# Patient Record
Sex: MALE | Race: WHITE | NOT HISPANIC OR LATINO | Employment: OTHER | URBAN - METROPOLITAN AREA
[De-identification: names, ages, dates, MRNs, and addresses within clinical notes are randomized per-mention and may not be internally consistent; named-entity substitution may affect disease eponyms.]

---

## 2017-01-11 ENCOUNTER — PATIENT MESSAGE (OUTPATIENT)
Dept: HEALTH INFORMATION MANAGEMENT | Facility: OTHER | Age: 67
End: 2017-01-11

## 2017-01-25 ENCOUNTER — TELEPHONE (OUTPATIENT)
Dept: MEDICAL GROUP | Facility: PHYSICIAN GROUP | Age: 67
End: 2017-01-25

## 2017-01-25 NOTE — TELEPHONE ENCOUNTER
ANNUAL WELLNESS VISIT PRE-VISIT PLANNING     1.  Reviewed last PCP office visit assessment and plan notes: Yes    2.  If any orders were placed last visit do we have Results/Consult Notes?        •  Labs? No none ordered       •  Imaging? No none ordered       •  Referrals? No none ordered    3.  Patient Care Coordination Note was updated with diagnosis information:  Yes    4.  Patient is due for these Health Maintenance Topics:   Health Maintenance Due   Topic Date Due   • SERUM CREATININE  01/15/2017   • Annual Wellness Visit  01/15/2017          •  JERICA letter was faxed to:  [facility] for retinal exam records    5.  Immunizations were updated in [employer] using [employer]?: Yes       •  [employer] Recommendations:  Pt is up to date on all vaccines.        •  Is patient due for Tdap/Shingles? No.     6.  Patient has:       •   Diabetes: controlled with diet    7.  Updated Care Team with [facility] and all specialists?        •   Gait devices, O2, CPAP, etc: N\A        •   Eye professional: yes       •   Other specialists (GYN, cardiology, endo, etc): yes    8.  Is patient in need of any refills prior to office visit? No       •    Separate refill encounter created?: no    9.  Patient was informed to arrive 15 min prior to their scheduled appointment and bring in their medication bottles? yes    10.  Patient was advised: “This is a free wellness visit. The provider will screen for medical conditions to help you stay healthy. If you have other concerns to address you may be asked to discuss these at a separate visit or there may be an additional fee.”  Yes

## 2017-01-31 ENCOUNTER — OFFICE VISIT (OUTPATIENT)
Dept: MEDICAL GROUP | Facility: PHYSICIAN GROUP | Age: 67
End: 2017-01-31
Payer: MEDICARE

## 2017-01-31 VITALS
DIASTOLIC BLOOD PRESSURE: 58 MMHG | WEIGHT: 183 LBS | OXYGEN SATURATION: 95 % | SYSTOLIC BLOOD PRESSURE: 114 MMHG | RESPIRATION RATE: 16 BRPM | BODY MASS INDEX: 27.74 KG/M2 | HEART RATE: 70 BPM | TEMPERATURE: 97.7 F | HEIGHT: 68 IN

## 2017-01-31 DIAGNOSIS — Z86.2 HX OF NORMOCYTIC NORMOCHROMIC ANEMIA: ICD-10-CM

## 2017-01-31 DIAGNOSIS — I51.81 STRESS-INDUCED CARDIOMYOPATHY: ICD-10-CM

## 2017-01-31 DIAGNOSIS — Z00.00 MEDICARE ANNUAL WELLNESS VISIT, SUBSEQUENT: ICD-10-CM

## 2017-01-31 DIAGNOSIS — Z87.891 HISTORY OF TOBACCO ABUSE: ICD-10-CM

## 2017-01-31 DIAGNOSIS — Z12.5 SCREENING PSA (PROSTATE SPECIFIC ANTIGEN): ICD-10-CM

## 2017-01-31 DIAGNOSIS — I70.0 ATHEROSCLEROSIS OF AORTA (HCC): ICD-10-CM

## 2017-01-31 DIAGNOSIS — Z86.010 H/O COLONOSCOPY WITH POLYPECTOMY: ICD-10-CM

## 2017-01-31 DIAGNOSIS — E11.29 MICROALBUMINURIA DUE TO TYPE 2 DIABETES MELLITUS (HCC): ICD-10-CM

## 2017-01-31 DIAGNOSIS — Z98.890 H/O COLONOSCOPY WITH POLYPECTOMY: ICD-10-CM

## 2017-01-31 DIAGNOSIS — Z11.59 NEED FOR HEPATITIS C SCREENING TEST: ICD-10-CM

## 2017-01-31 DIAGNOSIS — F10.21 HISTORY OF ALCOHOLISM (HCC): ICD-10-CM

## 2017-01-31 DIAGNOSIS — I73.9 BILATERAL CLAUDICATION OF LOWER LIMB (HCC): ICD-10-CM

## 2017-01-31 DIAGNOSIS — R80.9 MICROALBUMINURIA DUE TO TYPE 2 DIABETES MELLITUS (HCC): ICD-10-CM

## 2017-01-31 DIAGNOSIS — E11.51 DIABETES MELLITUS WITH PERIPHERAL VASCULAR DISEASE (HCC): ICD-10-CM

## 2017-01-31 PROCEDURE — 3044F HG A1C LEVEL LT 7.0%: CPT | Performed by: FAMILY MEDICINE

## 2017-01-31 PROCEDURE — G8432 DEP SCR NOT DOC, RNG: HCPCS | Performed by: FAMILY MEDICINE

## 2017-01-31 PROCEDURE — G0439 PPPS, SUBSEQ VISIT: HCPCS | Performed by: FAMILY MEDICINE

## 2017-01-31 PROCEDURE — 1036F TOBACCO NON-USER: CPT | Performed by: FAMILY MEDICINE

## 2017-01-31 ASSESSMENT — PAIN SCALES - GENERAL: PAINLEVEL: NO PAIN

## 2017-01-31 ASSESSMENT — PATIENT HEALTH QUESTIONNAIRE - PHQ9: CLINICAL INTERPRETATION OF PHQ2 SCORE: 0

## 2017-01-31 NOTE — PROGRESS NOTES
Chief Complaint   Patient presents with   • Annual Wellness Visit         HPI:  Chung is a 67 y.o. male here for Medicare Annual Wellness Visit    Patient has a history of diet controlled diabetes mellitus. Patient denies chest pain, numbness and tingling in hands and feet, change in sensation in hands or feet, sores on feet, change in urine,  or change in vision.    Patient also has history of bilateral peripheral vascular disease. Atherosclerosis is noted of the aorta. Patient states that he has chronic burning pain in bilateral legs with walking greater than 5 minutes. Patient is not interested in surgical intervention at this point.    Patient has a history of tobacco and alcohol use. He has not used either in greater than 5 years.    Patient continues to follow with cardiology.    Patient Active Problem List    Diagnosis Date Noted   • History of tobacco abuse 01/31/2017   • Atherosclerosis of aorta (CMS-HCC) 01/31/2017   • Diabetes mellitus with peripheral vascular disease (CMS-HCC) 11/15/2016   • Microalbuminuria due to type 2 diabetes mellitus (CMS-HCC) 11/15/2016   • Bruit 11/01/2016   • Stress-induced cardiomyopathy 03/10/2015   • H/O colonoscopy with polypectomy 05/07/2013   • Bilateral claudication of lower limb (CMS-HCC) 10/22/2012   • History of alcoholism (CMS-HCC) 08/22/2012       Current Outpatient Prescriptions   Medication Sig Dispense Refill   • Multiple Vitamins-Minerals (CENTRUM SILVER PO) Take  by mouth.     • alprazolam (XANAX) 0.5 MG Tab Take 0.5-1 Tabs by mouth at bedtime as needed for Sleep. 30 Tab 5   • atorvastatin (LIPITOR) 80 MG tablet TAKE 1 TABLET BY MOUTH AT BEDTIME-GENERIC FOR LIPITOR 90 Tab 4   • glucose blood (FREESTYLE LITE) strip TEST EVERY MORNING AND RANDOMLY EVERY      EVENING 100 Strip 12   • lisinopril (PRINIVIL) 5 MG Tab TAKE 1 TABLET BY MOUTH DAILY -GENERICFOR PRINIVIL 90 Tab 3   • metoprolol SR (TOPROL XL) 25 MG TABLET SR 24 HR Take 1 Tab by mouth every day. 90 Tab 3    • clopidogrel (PLAVIX) 75 MG Tab TAKE 1 TABLET BY MOUTH DAILY -GENERICFOR PLAVIX 90 Tab 4   • Misc. Devices KIT Diagnosis 250.00. Freestyle glucometer, test strips, #100, RF11 and lancets #100, RF 11. To test fasting in the AM and random in the PM. 1 Kit 0   • aspirin EC 81 MG EC tablet Take 1 Tab by mouth every day. 30 Tab 0     No current facility-administered medications for this visit.        The patient reports adherence to this regimen   Current supplements as per medication list.   Chronic narcotic pain medicines: no    Allergies: Review of patient's allergies indicates no known allergies.    Current social contact/activities: visits with friends     Is patient current with immunizations?  yes   If no, due for Pt is up to date on all vaccines.     He  reports that he quit smoking about 5 years ago. His smoking use included Cigarettes. He started smoking about 57 years ago. He has a 50 pack-year smoking history. He has never used smokeless tobacco. He reports that he uses illicit drugs (Marijuana). He reports that he does not drink alcohol.  Counseling given: Yes        DPA/Advanced Directive:  Patient does not have an advanced directive.  If not on file, instructed to bring in a copy to scan into his chart. If no advanced directive exists, a packet and workshop information was provided    ROS:    Gait: Uses no assistive device   Ostomy: no   Other tubes: no   Amputations: no   Chronic oxygen use no   Last eye exam 3 wks ago   : Denies incontinence.       Screening:    DIABETES  1. Records requested for overdue  topics specifically for diabetes? N\A    2. Has patient ever had diabetes education? Yes      a. If so, when? When he was in the hospital      b. If not, is patient interested? no       Depression Screening    Little interest or pleasure in doing things?  0 - not at all  Feeling down, depressed, or hopeless?  0 - not at all  Patient Health Questionnaire Score: 0    If depressive symptoms  identified deferred to follow up visit unless specifically addressed in assessment and plan.    Screening for Cognitive Impairment    Three Minute Recall (banana, sunrise, fence)  3/3    Draws clock face with all 12 numbers and sets the hands to show 10 minutes past 10 o'clock  1 5/5  Cognitive concerns identified deferred for follow up unless specifically addressed in assessment and plan.    Fall Risk Assessment    Has the patient had two or more falls in the last year or any fall with injury in the last year?  No    Safety Assessment    Throw rugs on floor.  No  Handrails on all stairs.  Yes  Good lighting in all hallways.  Yes  Difficulty hearing.  Yes  Patient counseled about all safety risks that were identified.    Functional Assessment ADLs    Are there any barriers preventing you from cooking for yourself or meeting nutritional needs?  No.    Are there any barriers preventing you from driving safely or obtaining transportation?  No.    Are there any barriers preventing you from using a telephone or calling for help?  No.    Are there any barriers preventing you from shopping?  No.    Are there any barriers preventing you from taking care of your own finances?  No.    Are there any barriers preventing you from managing your medications?  No.    Are currently engaging any exercise or physical activity?  Yes.  Walks every day    Health Maintenance Summary                SERUM CREATININE Overdue 1/15/2017      Done 1/15/2016 COMP METABOLIC PANEL (A)     Patient has more history with this topic...    Annual Wellness Visit Overdue 1/15/2017      Done 1/15/2016 Visit Dx: Medicare annual wellness visit, initial     Patient has more history with this topic...    A1C SCREENING Next Due 5/15/2017      Done 11/15/2016 POCT A1C     Patient has more history with this topic...    URINE ACR / MICROALBUMIN Next Due 11/9/2017      Done 11/9/2016 MICROALBUMIN CREAT RATIO URINE (A)     Patient has more history with this  topic...    DIABETES MONOFILAMENT / LE EXAM Next Due 11/15/2017      Done 11/15/2016 AMB DIABETIC MONOFILAMENT LOWER EXTREMITY EXAM     Patient has more history with this topic...    FASTING LIPID PROFILE Next Due 11/17/2017      Done 11/17/2016 LIPID PROFILE (A)     Patient has more history with this topic...    RETINAL SCREENING Next Due 1/10/2018      Done 1/10/2017 REFERRAL FOR RETINAL SCREENING EXAM     Patient has more history with this topic...    COLONOSCOPY Next Due 10/3/2019      Done 10/3/2016 REFERRAL TO GI FOR COLONOSCOPY     Patient has more history with this topic...    IMM DTaP/Tdap/Td Vaccine Next Due 12/2/2024      Done 12/2/2014 Imm Admin: Tdap Vaccine          Patient Care Team:  Loree Soto D.O. as PCP - General (Family Medicine)  Manjit Gleason M.D. as Consulting Physician (Cardiology)  [facility] as Consulting Physician (Optometry)  Augustin King M.D. (Cardiology)    Social History   Substance Use Topics   • Smoking status: Former Smoker -- 1.00 packs/day for 50 years     Types: Cigarettes     Start date: 01/01/1960     Quit date: 09/01/2011   • Smokeless tobacco: Never Used      Comment: avoid all tobacco products   • Alcohol Use: No      Comment: quit 1985, drank 2 times since     Family History   Problem Relation Age of Onset   • Heart Disease Father    • Diabetes Neg Hx    • Hypertension Neg Hx    • Stroke Neg Hx    • Hyperlipidemia Neg Hx    • No Known Problems Mother    • No Known Problems Sister    • Cancer Brother      type unknown     He  has a past medical history of Claudication (CMS-HCC) (8/22/2012); History of alcoholism (CMS-HCC) (8/22/2012); Hepatitis C; H/O colonoscopy with polypectomy (5/7/2013); Cancer (CMS-HCC); Hypertension; Hyperlipidemia; and Diabetes (CMS-HCC).   Past Surgical History   Procedure Laterality Date   • Recovery  10/22/2012     Performed by Pily Lee M.D. at SURGERY [facility]   • Wide excision  5/6/2014     Performed by  "Nicholas Govea M.D. at SURGERY SAME DAY [facility]   • Flap graft  5/6/2014     Performed by Nicholas Govea M.D. at SURGERY SAME DAY [facility]           Exam:     Blood pressure 114/58, pulse 70, temperature 36.5 °C (97.7 °F), resp. rate 16, height 1.727 m (5' 7.99\"), weight 83.008 kg (183 lb), SpO2 95 %. Body mass index is 27.83 kg/(m^2).    Hearing fair.    Dentition upper and lower dentures  Alert, oriented in no acute distress.  Eye contact is good, speech goal directed, affect calm  Heart: Regular rate and rhythm no murmurs  Respiratory: Scheduled patient bilaterally no wheezing rales or rhonchi    Assessment and Plan. The following treatment and monitoring plan is recommended:        1. Medicare annual wellness visit, subsequent      2. Diabetes mellitus with peripheral vascular disease (CMS-HCC)  DM2 A1c  Goal A1C 7.0, current A1C 6.0  Complications microalbuminuria, peripheral vascular disease  Other providers involved with care: Cardiology  Continue aspirin, Lipitor, and lisinopril.  - COMP METABOLIC PANEL (For Serum Creatinine Topic, choose 1 only); Future  - MICROALBUMIN CREAT RATIO URINE; Future    3. Microalbuminuria due to type 2 diabetes mellitus (CMS-HCC)  As per #2 diabetes is currently well controlled. There are no signs of acute renal failure.    4. Bilateral claudication of lower limb (CMS-HCC)  Chronic: Patient is not interested in surgical intervention. Recommend patient continue Plavix.    5. Atherosclerosis of aorta (CMS-HCC)  As per #4    6. Stress-induced cardiomyopathy  Continue to follow with cardiology. No chest pain. Continue Lipitor, metoprolol, aspirin, lisinopril, and Plavix    7. History of alcoholism (CMS-HCC)  Currently in remission.    8. History of tobacco abuse  Currently in remission. Lung cancer screening explained to patient.  - REFERRAL TO LUNG CANCER SCREENING PROGRAM    9. Hx of normocytic normochromic anemia  Based on previous CBC. We'll repeat and treat as " indicated.  - CBC WITH DIFFERENTIAL; Future    10. H/O colonoscopy with polypectomy  Based on history. Most recent colonoscopy 10/2016    11. Screening PSA (prostate specific antigen)  - PROSTATE SPECIFIC AG SCREENING; Future    12. Need for hepatitis C screening test  - HEP C VIRUS ANTIBODY      Services needed: No services needed at this time  Health Care Screening recommendations as per orders if indicated.  Referrals offered: PT/OT/Nutrition counseling/Behavioral Health/Smoking cessation as per orders if indicated.    Discussion today about general wellness and lifestyle habits:    · Prevent falls and reduce trip hazards; Cautioned about securing or removing rugs.  · Have a working fire alarm and carbon monoxide detector;   · Engage in regular physical activity and social activities   · Continue with current daily activities (goals)      Follow-up: Return in about 6 months (around 7/31/2017) for chronic conditions.

## 2017-01-31 NOTE — MR AVS SNAPSHOT
"        Chung Limon   2017 9:00 AM   Office Visit   MRN: [id number]    Department:  [facility]   Dept Phone:  [phone]    Description:  Male : 1950   Provider:  Loree Soto D.O.; [facility]            Reason for Visit     Annual Wellness Visit           Allergies as of 2017     No Known Allergies      You were diagnosed with     Medicare annual wellness visit, subsequent   [572998]       Screening PSA (prostate specific antigen)   [835695]       Need for hepatitis C screening test   [290188]       History of tobacco abuse   [489778]       Diabetes mellitus with peripheral vascular disease (CMS-HCC)   [869265]       Hx of normocytic normochromic anemia   [230321]         Vital Signs     Blood Pressure Pulse Temperature Respirations Height Weight    114/58 mmHg 70 36.5 °C (97.7 °F) 16 1.727 m (5' 7.99\") 83.008 kg (183 lb)    Body Mass Index Oxygen Saturation Smoking Status             27.83 kg/m2 95% Former Smoker         Basic Information     Date Of Birth Sex Race Ethnicity Preferred Language    1950 Male White Non- English      Your appointments     2017  6:15 AM   Adult Draw/Collection with [facility] [place]   [facility] [place] (--)    [facility]  [place] [address]   [phone]            May 01, 2017  9:00 AM   FOLLOW UP with Manjit Gleason M.D.   [facility] (--)    [address], [address]  [place] [id number] [phone]            May 15, 2017  8:00 AM   Established Patient with Loree Soto D.O.   [facility]    [address], [address]  [place] [id number] [phone]           You will be receiving a confirmation call a few days before your appointment from our automated call confirmation system.              Problem List              ICD-10-CM Priority Class Noted - Resolved    History of alcoholism (CMS-HCC) F10.21   " 8/22/2012 - Present    Bilateral claudication of lower limb (CMS-HCC) I73.9   10/22/2012 - Present    Occlusion of artery of leg (CMS-HCC) I74.3   2/4/2013 - Present    H/O colonoscopy with polypectomy Z98.890, Z86.010   5/7/2013 - Present    Peripheral vascular disease (CMS-HCC) I73.9   1/21/2015 - Present    Stress-induced cardiomyopathy I51.81   3/10/2015 - Present    Bruit R09.89   11/1/2016 - Present    Diabetes mellitus with peripheral vascular disease (CMS-HCC) E11.51   11/15/2016 - Present    Microalbuminuria due to type 2 diabetes mellitus (CMS-HCC) E11.29, R80.9   11/15/2016 - Present    History of tobacco abuse Z87.891   1/31/2017 - Present      Health Maintenance        Date Due Completion Dates    SERUM CREATININE 1/15/2017 1/15/2016, 3/6/2015, 1/19/2015, 1/17/2015, 1/16/2015, 1/16/2015, 1/16/2015, 1/16/2015, 1/15/2015, 1/15/2015, 1/15/2015, 5/6/2014, 1/9/2013, 10/19/2012, 9/21/2012    A1C SCREENING 5/15/2017 11/15/2016, 4/11/2016, 11/9/2015, 5/4/2015, 1/15/2015    URINE ACR / MICROALBUMIN 11/9/2017 11/9/2016, 11/9/2015    DIABETES MONOFILAMENT / LE EXAM 11/15/2017 11/15/2016, 11/9/2015    FASTING LIPID PROFILE 11/17/2017 11/17/2016, 1/15/2016, 1/16/2015, 9/21/2012    RETINAL SCREENING 1/10/2018 1/10/2017, 1/24/2015    COLONOSCOPY 10/3/2019 10/3/2016, 4/30/2013 (Done)    Override on 4/30/2013: Done (two polyps, tubular adenomas)    IMM DTaP/Tdap/Td Vaccine (2 - Td) 12/2/2024 12/2/2014            Current Immunizations     13-VALENT PCV PREVNAR 1/14/2016    Influenza Vaccine Adult HD 11/15/2016, 10/28/2015    Influenza Vaccine Quad Inj (Pf) 12/2/2014    Pneumococcal polysaccharide vaccine (PPSV-23) 12/2/2014    SHINGLES VACCINE 11/15/2016    Tdap Vaccine 12/2/2014      Below and/or attached are the medications your provider expects you to take. Review all of your home medications and newly ordered medications with your provider and/or pharmacist. Follow medication instructions as directed by your provider  and/or pharmacist. Please keep your medication list with you and share with your provider. Update the information when medications are discontinued, doses are changed, or new medications (including over-the-counter products) are added; and carry medication information at all times in the event of emergency situations     Allergies:  No Known Allergies          Medications  Valid as of: January 31, 2017 - 12:01 PM    Generic Name Brand Name Tablet Size Instructions for use    ALPRAZolam (Tab) XANAX 0.5 MG Take 0.5-1 Tabs by mouth at bedtime as needed for Sleep.        Aspirin (Tablet Delayed Response) aspirin 81 MG Take 1 Tab by mouth every day.        Atorvastatin Calcium (Tab) LIPITOR 80 MG TAKE 1 TABLET BY MOUTH AT BEDTIME-GENERIC FOR LIPITOR        Clopidogrel Bisulfate (Tab) PLAVIX 75 MG TAKE 1 TABLET BY MOUTH DAILY -GENERICFOR PLAVIX        Glucose Blood (Strip) glucose blood  TEST EVERY MORNING AND RANDOMLY EVERY      EVENING        Lisinopril (Tab) PRINIVIL 5 MG TAKE 1 TABLET BY MOUTH DAILY -GENERICFOR PRINIVIL        Metoprolol Succinate (TABLET SR 24 HR) TOPROL XL 25 MG Take 1 Tab by mouth every day.        Misc. Devices (Kit) Misc. Devices  Diagnosis 250.00. Freestyle glucometer, test strips, #100, RF11 and lancets #100, RF 11. To test fasting in the AM and random in the PM.        Multiple Vitamins-Minerals   Take  by mouth.        .                 Medicines prescribed today were sent to:     [id number] - [address]    [address] [place] [address]    Phone: [phone] Fax: [phone]    Open 24 Hours?: No      Medication refill instructions:       If your prescription bottle indicates you have medication refills left, it is not necessary to call your provider’s office. Please contact your pharmacy and they will refill your medication.    If your prescription bottle indicates you do not have any refills left, you may request refills at any time through one of  the following ways: The online [employer] system (except Urgent Care), by calling your provider’s office, or by asking your pharmacy to contact your provider’s office with a refill request. Medication refills are processed only during regular business hours and may not be available until the next business day. Your provider may request additional information or to have a follow-up visit with you prior to refilling your medication.   *Please Note: Medication refills are assigned a new Rx number when refilled electronically. Your pharmacy may indicate that no refills were authorized even though a new prescription for the same medication is available at the pharmacy. Please request the medicine by name with the pharmacy before contacting your provider for a refill.        Your To Do List     Future Labs/Procedures Complete By Expires    CBC WITH DIFFERENTIAL  As directed 1/31/2018    COMP METABOLIC PANEL (For Serum Creatinine Topic, choose 1 only)  As directed 1/31/2018    MICROALBUMIN CREAT RATIO URINE  As directed 1/31/2018    PROSTATE SPECIFIC AG SCREENING  As directed 1/31/2018      Other Notes About Your Plan                    [employer] Access Code: Activation code not generated  Current [employer] Status: Active

## 2017-02-01 ENCOUNTER — HOSPITAL ENCOUNTER (OUTPATIENT)
Dept: LAB | Facility: MEDICAL CENTER | Age: 67
End: 2017-02-01
Attending: FAMILY MEDICINE
Payer: MEDICARE

## 2017-02-01 DIAGNOSIS — E11.51 DIABETES MELLITUS WITH PERIPHERAL VASCULAR DISEASE (HCC): ICD-10-CM

## 2017-02-01 DIAGNOSIS — Z12.5 SCREENING PSA (PROSTATE SPECIFIC ANTIGEN): ICD-10-CM

## 2017-02-01 DIAGNOSIS — Z86.2 HX OF NORMOCYTIC NORMOCHROMIC ANEMIA: ICD-10-CM

## 2017-02-01 LAB
ALBUMIN SERPL BCP-MCNC: 4.1 G/DL (ref 3.2–4.9)
ALBUMIN/GLOB SERPL: 1.2 G/DL
ALP SERPL-CCNC: 66 U/L (ref 30–99)
ALT SERPL-CCNC: 78 U/L (ref 2–50)
ANION GAP SERPL CALC-SCNC: 6 MMOL/L (ref 0–11.9)
AST SERPL-CCNC: 67 U/L (ref 12–45)
BASOPHILS # BLD AUTO: 0.04 K/UL (ref 0–0.12)
BASOPHILS NFR BLD AUTO: 1.1 % (ref 0–1.8)
BILIRUB SERPL-MCNC: 0.6 MG/DL (ref 0.1–1.5)
BUN SERPL-MCNC: 22 MG/DL (ref 8–22)
CALCIUM SERPL-MCNC: 9.7 MG/DL (ref 8.5–10.5)
CHLORIDE SERPL-SCNC: 109 MMOL/L (ref 96–112)
CO2 SERPL-SCNC: 23 MMOL/L (ref 20–33)
CREAT SERPL-MCNC: 0.86 MG/DL (ref 0.5–1.4)
CREAT UR-MCNC: 118.4 MG/DL
EOSINOPHIL # BLD: 0.16 K/UL (ref 0–0.51)
EOSINOPHIL NFR BLD AUTO: 4.6 % (ref 0–6.9)
ERYTHROCYTE [DISTWIDTH] IN BLOOD BY AUTOMATED COUNT: 42.2 FL (ref 35.9–50)
GLOBULIN SER CALC-MCNC: 3.4 G/DL (ref 1.9–3.5)
GLUCOSE SERPL-MCNC: 129 MG/DL (ref 65–99)
HCT VFR BLD AUTO: 42.3 % (ref 42–52)
HCV AB S/CO SERPL IA: REACTIVE
HGB BLD-MCNC: 14.4 G/DL (ref 14–18)
IMM GRANULOCYTES # BLD AUTO: 0 K/UL (ref 0–0.11)
IMM GRANULOCYTES NFR BLD AUTO: 0 % (ref 0–0.9)
LYMPHOCYTES # BLD: 1.56 K/UL (ref 1–4.8)
LYMPHOCYTES NFR BLD AUTO: 44.8 % (ref 22–41)
MCH RBC QN AUTO: 30.7 PG (ref 27–33)
MCHC RBC AUTO-ENTMCNC: 34 G/DL (ref 33.7–35.3)
MCV RBC AUTO: 90.2 FL (ref 81.4–97.8)
MICROALBUMIN UR-MCNC: 7.7 MG/DL
MICROALBUMIN/CREAT UR: 65 MG/G (ref 0–30)
MONOCYTES # BLD: 0.25 K/UL (ref 0–0.85)
MONOCYTES NFR BLD AUTO: 7.2 % (ref 0–13.4)
NEUTROPHILS # BLD: 1.47 K/UL (ref 1.82–7.42)
NEUTROPHILS NFR BLD AUTO: 42.3 % (ref 44–72)
NRBC # BLD AUTO: 0 K/UL
NRBC BLD-RTO: 0 /100 WBC
PLATELET # BLD AUTO: 103 K/UL (ref 164–446)
PMV BLD AUTO: 11.9 FL (ref 9–12.9)
POTASSIUM SERPL-SCNC: 3.9 MMOL/L (ref 3.6–5.5)
PROT SERPL-MCNC: 7.5 G/DL (ref 6–8.2)
PSA SERPL DL<=0.01 NG/ML-MCNC: 0.25 NG/ML (ref 0–4)
RBC # BLD AUTO: 4.69 M/UL (ref 4.7–6.1)
SODIUM SERPL-SCNC: 138 MMOL/L (ref 135–145)
WBC # BLD AUTO: 3.5 K/UL (ref 4.8–10.8)

## 2017-02-01 PROCEDURE — 86803 HEPATITIS C AB TEST: CPT

## 2017-02-01 PROCEDURE — 84153 ASSAY OF PSA TOTAL: CPT

## 2017-02-01 PROCEDURE — 80053 COMPREHEN METABOLIC PANEL: CPT

## 2017-02-01 PROCEDURE — 82570 ASSAY OF URINE CREATININE: CPT

## 2017-02-01 PROCEDURE — 87522 HEPATITIS C REVRS TRNSCRPJ: CPT

## 2017-02-01 PROCEDURE — 36415 COLL VENOUS BLD VENIPUNCTURE: CPT

## 2017-02-01 PROCEDURE — 82043 UR ALBUMIN QUANTITATIVE: CPT

## 2017-02-01 PROCEDURE — 85025 COMPLETE CBC W/AUTO DIFF WBC: CPT

## 2017-02-04 LAB
HCV RNA SERPL NAA+PROBE-ACNC: ABNORMAL IU/ML
HCV RNA SERPL NAA+PROBE-LOG IU: 6.1 LOG IU
HCV RNA SERPL QL NAA+PROBE: DETECTED
PATHOLOGY STUDY: ABNORMAL

## 2017-02-07 ENCOUNTER — TELEPHONE (OUTPATIENT)
Dept: MEDICAL GROUP | Facility: PHYSICIAN GROUP | Age: 67
End: 2017-02-07

## 2017-02-08 NOTE — TELEPHONE ENCOUNTER
----- Message from Loree Soto D.O. sent at 2/6/2017  9:13 AM PST -----  Please have patient schedule an appointment to go over results.  Thank you,  Dr. Soto

## 2017-02-13 ENCOUNTER — DOCUMENTATION (OUTPATIENT)
Dept: HEMATOLOGY ONCOLOGY | Facility: MEDICAL CENTER | Age: 67
End: 2017-02-13

## 2017-02-13 NOTE — NURSING NOTE
Received referral for [facility] from .  Chart review to assess for lung cancer screening program eligibility.   Age 55-80 yrs of age? Yes 67 y.o.  30 pack year hx of smoking, or greater? Yes 1 [id number] 50 pkyr hx  Current smoker? Per chart review pt quit in 2011   Any signs or symptoms of lung cancer? None noted  Previous history of lung cancer? None noted  Chest CT within past 12 mos.? None noted  Patient DOES meet eligibility criteria - [facility] scheduling notified to schedule the shared decision making visit.

## 2017-02-24 ENCOUNTER — TELEPHONE (OUTPATIENT)
Dept: HEMATOLOGY ONCOLOGY | Facility: MEDICAL CENTER | Age: 67
End: 2017-02-24

## 2017-02-25 NOTE — TELEPHONE ENCOUNTER
2nd attempt     Left voicemail for patient requesting a return call to schedule shared decision making visit for lung screening program with TRISTAN Barrios. Ref: Loree Soto:, Dx:History of tobacco abuse

## 2017-02-28 ENCOUNTER — OFFICE VISIT (OUTPATIENT)
Dept: HEMATOLOGY ONCOLOGY | Facility: MEDICAL CENTER | Age: 67
End: 2017-02-28
Payer: MEDICARE

## 2017-02-28 VITALS
BODY MASS INDEX: 27.71 KG/M2 | TEMPERATURE: 97.9 F | RESPIRATION RATE: 16 BRPM | OXYGEN SATURATION: 98 % | DIASTOLIC BLOOD PRESSURE: 56 MMHG | SYSTOLIC BLOOD PRESSURE: 106 MMHG | HEART RATE: 68 BPM | WEIGHT: 182.8 LBS | HEIGHT: 68 IN

## 2017-02-28 DIAGNOSIS — Z87.891 HISTORY OF TOBACCO USE: ICD-10-CM

## 2017-02-28 PROCEDURE — G0296 VISIT TO DETERM LDCT ELIG: HCPCS | Performed by: NURSE PRACTITIONER

## 2017-02-28 ASSESSMENT — ENCOUNTER SYMPTOMS
CHILLS: 0
WEIGHT LOSS: 0
FEVER: 0
SHORTNESS OF BREATH: 1
SPUTUM PRODUCTION: 0
HEMOPTYSIS: 0
WHEEZING: 0
COUGH: 1

## 2017-02-28 ASSESSMENT — PAIN SCALES - GENERAL: PAINLEVEL: NO PAIN

## 2017-02-28 NOTE — MR AVS SNAPSHOT
"Chung Limon   2017 11:00 AM   Office Visit   MRN: [id number]    Department:  [facility]   Dept Phone:  [phone]    Description:  Male : 1950   Provider:  LEVI Barrios           Reason for Visit     Other ILCS      Allergies as of 2017     No Known Allergies      Vital Signs     Blood Pressure Pulse Temperature Respirations Height Weight    106/56 mmHg 68 36.6 °C (97.9 °F) 16 1.727 m (5' 7.99\") 82.918 kg (182 lb 12.8 oz)    Body Mass Index Oxygen Saturation Smoking Status             27.80 kg/m2 98% Former Smoker         Basic Information     Date Of Birth Sex Race Ethnicity Preferred Language    1950 Male White Non- English      Your appointments     Mar 06, 2017  2:00 PM   Established Patient with Loree Soto D.O.   [facility])    [address] [place] [address], [address]  [place] [address]   [phone]           You will be receiving a confirmation call a few days before your appointment from our automated call confirmation system.            May 01, 2017  9:00 AM   FOLLOW UP with Manjit Gleason M.D.   [facility] (--)    [address] [place] [address] [address]  [place] [address]   [phone]            May 15, 2017  8:00 AM   Established Patient with Loree Soto D.O.   [facility])    [facility] [facility], [address]  [place] [address]   [phone]           You will be receiving a confirmation call a few days before your appointment from our automated call confirmation system.            2017  9:00 AM   ADVANCED DIRECTIVE CLASS with RENOWN AT [address]   Events (--)    Please Check Your Invitation   [phone]           [address] [place] [address]              Problem List              ICD-10-CM Priority Class Noted - Resolved    History of alcoholism (CMS-HCC) F10.21   2012 - Present    Bilateral claudication of " lower limb (CMS-HCC) I73.9   10/22/2012 - Present    H/O colonoscopy with polypectomy Z98.890, Z86.010   5/7/2013 - Present    Stress-induced cardiomyopathy I51.81   3/10/2015 - Present    Bruit R09.89   11/1/2016 - Present    Diabetes mellitus with peripheral vascular disease (CMS-HCC) E11.51   11/15/2016 - Present    Microalbuminuria due to type 2 diabetes mellitus (CMS-HCC) E11.29, R80.9   11/15/2016 - Present    History of tobacco abuse Z87.891   1/31/2017 - Present    Atherosclerosis of aorta (CMS-HCC) I70.0   1/31/2017 - Present      Health Maintenance        Date Due Completion Dates    A1C SCREENING 5/15/2017 11/15/2016, 4/11/2016, 11/9/2015, 5/4/2015, 1/15/2015    DIABETES MONOFILAMENT / LE EXAM 11/15/2017 11/15/2016, 11/9/2015    FASTING LIPID PROFILE 11/17/2017 11/17/2016, 1/15/2016, 1/16/2015, 9/21/2012    RETINAL SCREENING 1/10/2018 1/10/2017, 1/24/2015    URINE ACR / MICROALBUMIN 2/1/2018 2/1/2017, 11/9/2016, 11/9/2015    SERUM CREATININE 2/1/2018 2/1/2017, 1/15/2016, 3/6/2015, 1/19/2015, 1/17/2015, 1/16/2015, 1/16/2015, 1/16/2015, 1/16/2015, 1/15/2015, 1/15/2015, 1/15/2015, 5/6/2014, 1/9/2013, 10/19/2012, 9/21/2012    COLONOSCOPY 10/3/2019 10/3/2016, 4/30/2013 (Done)    Override on 4/30/2013: Done (two polyps, tubular adenomas)    IMM DTaP/Tdap/Td Vaccine (2 - Td) 12/2/2024 12/2/2014            Current Immunizations     13-VALENT PCV PREVNAR 1/14/2016    Influenza Vaccine Adult HD 11/15/2016, 10/28/2015    Influenza Vaccine Quad Inj (Pf) 12/2/2014    Pneumococcal polysaccharide vaccine (PPSV-23) 12/2/2014    SHINGLES VACCINE 11/15/2016    Tdap Vaccine 12/2/2014      Below and/or attached are the medications your provider expects you to take. Review all of your home medications and newly ordered medications with your provider and/or pharmacist. Follow medication instructions as directed by your provider and/or pharmacist. Please keep your medication list with you and share with your provider. Update  the information when medications are discontinued, doses are changed, or new medications (including over-the-counter products) are added; and carry medication information at all times in the event of emergency situations     Allergies:  No Known Allergies          Medications  Valid as of: February 28, 2017 - 11:43 AM    Generic Name Brand Name Tablet Size Instructions for use    ALPRAZolam (Tab) XANAX 0.5 MG Take 0.5-1 Tabs by mouth at bedtime as needed for Sleep.        Aspirin (Tablet Delayed Response) aspirin 81 MG Take 1 Tab by mouth every day.        Atorvastatin Calcium (Tab) LIPITOR 80 MG TAKE 1 TABLET BY MOUTH AT BEDTIME-GENERIC FOR LIPITOR        Clopidogrel Bisulfate (Tab) PLAVIX 75 MG TAKE 1 TABLET BY MOUTH DAILY -GENERICFOR PLAVIX        Glucose Blood (Strip) glucose blood  TEST EVERY MORNING AND RANDOMLY EVERY      EVENING        Lisinopril (Tab) PRINIVIL 5 MG TAKE 1 TABLET BY MOUTH DAILY -GENERICFOR PRINIVIL        Metoprolol Succinate (TABLET SR 24 HR) TOPROL XL 25 MG Take 1 Tab by mouth every day.        Misc. Devices (Kit) Misc. Devices  Diagnosis 250.00. Freestyle glucometer, test strips, #100, RF11 and lancets #100, RF 11. To test fasting in the AM and random in the PM.        Multiple Vitamins-Minerals   Take  by mouth.        .                 Medicines prescribed today were sent to:     [id number] [place] - [address]    [address] [place] [address]    Phone: [phone] Fax: [phone]    Open 24 Hours?: No      Medication refill instructions:       If your prescription bottle indicates you have medication refills left, it is not necessary to call your provider’s office. Please contact your pharmacy and they will refill your medication.    If your prescription bottle indicates you do not have any refills left, you may request refills at any time through one of the following ways: The online Tr[employer] system (except Urgent Care), by calling your provider’s  office, or by asking your pharmacy to contact your provider’s office with a refill request. Medication refills are processed only during regular business hours and may not be available until the next business day. Your provider may request additional information or to have a follow-up visit with you prior to refilling your medication.   *Please Note: Medication refills are assigned a new Rx number when refilled electronically. Your pharmacy may indicate that no refills were authorized even though a new prescription for the same medication is available at the pharmacy. Please request the medicine by name with the pharmacy before contacting your provider for a refill.        Other Notes About Your Plan                    MyChart Access Code: Activation code not generated  Current [employer] Status: Active

## 2017-02-28 NOTE — PROGRESS NOTES
Subjective:   Date of Consultation:2/28/2017 11:33 AM  Primary care physician:Loree Soto D.O.  Patient seen today for initial lung cancer screening visit. Patient referred by Dr. Loree Soto.    The patient meets eligibility criteria including age, smoking history (30+ pack years), if former smoker, quit in the last 15 years, and absence of signs or symptoms of lung cancer.    - Age - 67  - Smoking history - Patient has smoked for 51 years at an average of 1 ppd = 50 pack year smoking history.  - Current smoking status -Former smoker.  - No symptoms of lung cancer and no previous history of lung cancer     Past Medical History   Diagnosis Date   • Claudication (CMS-HCC) 8/22/2012   • History of alcoholism (CMS-HCC) 8/22/2012   • Hepatitis C    • H/O colonoscopy with polypectomy 5/7/2013   • Cancer (CMS-HCC)    • Hypertension    • Hyperlipidemia    • Diabetes (CMS-HCC)      Past Surgical History   Procedure Laterality Date   • Recovery  10/22/2012     Performed by Pily Lee M.D. at SURGERY [facility] ORS   • Wide excision  5/6/2014     Performed by Nicholas Govea M.D. at SURGERY SAME DAY [facility] ORS   • Flap graft  5/6/2014     Performed by Nicholas Govea M.D. at SURGERY SAME DAY [facility] ORS     No Known Allergies     History   Smoking status   • Former Smoker -- 1.00 packs/day for 50 years   • Types: Cigarettes   • Start date: 01/01/1960   • Quit date: 09/01/2011   Smokeless tobacco   • Never Used     Comment: avoid all tobacco products     History   Alcohol Use No     Comment: quit 1985, drank 2 times since     History   Drug Use   • Yes   • Special: Marijuana          Family History   Problem Relation Age of Onset   • Heart Disease Father    • Diabetes Neg Hx    • Hypertension Neg Hx    • Stroke Neg Hx    • Hyperlipidemia Neg Hx    • No Known Problems Mother    • No Known Problems Sister    • Cancer Brother      type unknown       Review of Systems   Constitutional: Negative for fever, chills,  weight loss and malaise/fatigue.   Respiratory: Positive for cough and shortness of breath. Negative for hemoptysis, sputum production and wheezing.         Objective:   There were no vitals taken for this visit.    Physical Exam    Assessment:       Medical Decision Making:  Today's Assessment / Status / Plan:   We conducted a shared decision-making process using a decision aid. We reviewed benefits and harms of screening, including false positives and potential need for additional diagnostic testing, the possibility of over diagnosis, and total radiation exposure.    We discussed the importance of adhering to annual LDCT screening. We also discussed the impact of comorbities on the patient's the ability or willingness to undergo diagnostic procedure(s) and treatment.    Counseling on the importance of maintaining cigarette smoking abstinence if former smoker; or the importance of smoking cessation if current smoker and, if appropriate, furnishing of information about tobacco cessation interventions.    Based on our discussion, we have decided not to initiate regular lung cancer screening.     Patient c/o chronic cough which has not been documented by PCP and work-up has not been completed.  Therefore, patient is not eligible at this time.

## 2017-03-06 ENCOUNTER — OFFICE VISIT (OUTPATIENT)
Dept: MEDICAL GROUP | Facility: PHYSICIAN GROUP | Age: 67
End: 2017-03-06
Payer: MEDICARE

## 2017-03-06 ENCOUNTER — APPOINTMENT (OUTPATIENT)
Dept: RADIOLOGY | Facility: IMAGING CENTER | Age: 67
End: 2017-03-06
Attending: FAMILY MEDICINE
Payer: MEDICARE

## 2017-03-06 VITALS
OXYGEN SATURATION: 95 % | DIASTOLIC BLOOD PRESSURE: 70 MMHG | SYSTOLIC BLOOD PRESSURE: 110 MMHG | HEART RATE: 77 BPM | WEIGHT: 182 LBS | HEIGHT: 68 IN | RESPIRATION RATE: 16 BRPM | BODY MASS INDEX: 27.58 KG/M2 | TEMPERATURE: 97.5 F

## 2017-03-06 DIAGNOSIS — B18.2 CHRONIC HEPATITIS C WITHOUT HEPATIC COMA (HCC): ICD-10-CM

## 2017-03-06 DIAGNOSIS — M54.2 CERVICAL PAIN: ICD-10-CM

## 2017-03-06 PROCEDURE — 1101F PT FALLS ASSESS-DOCD LE1/YR: CPT | Performed by: FAMILY MEDICINE

## 2017-03-06 PROCEDURE — G8598 ASA/ANTIPLAT THER USED: HCPCS | Performed by: FAMILY MEDICINE

## 2017-03-06 PROCEDURE — G8420 CALC BMI NORM PARAMETERS: HCPCS | Performed by: FAMILY MEDICINE

## 2017-03-06 PROCEDURE — G8482 FLU IMMUNIZE ORDER/ADMIN: HCPCS | Performed by: FAMILY MEDICINE

## 2017-03-06 PROCEDURE — 1036F TOBACCO NON-USER: CPT | Performed by: FAMILY MEDICINE

## 2017-03-06 PROCEDURE — 99214 OFFICE O/P EST MOD 30 MIN: CPT | Performed by: FAMILY MEDICINE

## 2017-03-06 PROCEDURE — 3017F COLORECTAL CA SCREEN DOC REV: CPT | Performed by: FAMILY MEDICINE

## 2017-03-06 PROCEDURE — 72040 X-RAY EXAM NECK SPINE 2-3 VW: CPT | Mod: TC | Performed by: FAMILY MEDICINE

## 2017-03-06 PROCEDURE — 4040F PNEUMOC VAC/ADMIN/RCVD: CPT | Performed by: FAMILY MEDICINE

## 2017-03-06 PROCEDURE — G8432 DEP SCR NOT DOC, RNG: HCPCS | Performed by: FAMILY MEDICINE

## 2017-03-06 RX ORDER — IBUPROFEN 800 MG/1
800 TABLET ORAL EVERY 8 HOURS PRN
Qty: 30 TAB | Refills: 3 | Status: SHIPPED | OUTPATIENT
Start: 2017-03-06 | End: 2017-03-15

## 2017-03-06 RX ORDER — TIZANIDINE 4 MG/1
4 TABLET ORAL 2 TIMES DAILY
Qty: 30 TAB | Refills: 1 | Status: SHIPPED | OUTPATIENT
Start: 2017-03-06 | End: 2017-05-01 | Stop reason: SINTOL

## 2017-03-06 NOTE — MR AVS SNAPSHOT
"        Chung Limon   3/6/2017 2:00 PM   Office Visit   MRN: [id number]    Department:  [facility]   Dept Phone:  [phone]    Description:  Male : 1950   Provider:  Loree Soto D.O.           Reason for Visit     Results           Allergies as of 3/6/2017     No Known Allergies      You were diagnosed with     Chronic hepatitis C without hepatic coma (CMS-HCC)   [227051]       Cervical pain   [769140]         Vital Signs     Blood Pressure Pulse Temperature Respirations Height Weight    110/70 mmHg 77 36.4 °C (97.5 °F) 16 1.727 m (5' 7.99\") 82.555 kg (182 lb)    Body Mass Index Oxygen Saturation Smoking Status             27.68 kg/m2 95% Former Smoker         Basic Information     Date Of Birth Sex Race Ethnicity Preferred Language    1950 Male White Non- English      Your appointments     May 01, 2017  9:00 AM   FOLLOW UP with Manjit Gleason M.D.   [facility] (--)    [address] [facility]  [place] [id number] [phone]            May 15, 2017  8:00 AM   Established Patient with Loree Soto D.O.   [facility]    [address] [facility], [address]  [place] [address] [phone]           You will be receiving a confirmation call a few days before your appointment from our automated call confirmation system.            2017  9:00 AM   ADVANCED DIRECTIVE CLASS with RENOWN AT [address]   Events (--)    Please Check Your Invitation   [phone]           [address] [place] [address]              Problem List              ICD-10-CM Priority Class Noted - Resolved    History of alcoholism (CMS-HCC) F10.21   2012 - Present    Bilateral claudication of lower limb (CMS-HCC) I73.9   10/22/2012 - Present    H/O colonoscopy with polypectomy Z98.890, Z86.010   2013 - Present    Stress-induced cardiomyopathy I51.81   3/10/2015 - Present    Bruit R09.89   2016 - " Present    Diabetes mellitus with peripheral vascular disease (CMS-HCC) E11.51   11/15/2016 - Present    Microalbuminuria due to type 2 diabetes mellitus (CMS-HCC) E11.29, R80.9   11/15/2016 - Present    History of tobacco abuse Z87.891   1/31/2017 - Present    Atherosclerosis of aorta (CMS-HCC) I70.0   1/31/2017 - Present      Health Maintenance        Date Due Completion Dates    A1C SCREENING 5/15/2017 11/15/2016, 4/11/2016, 11/9/2015, 5/4/2015, 1/15/2015    DIABETES MONOFILAMENT / LE EXAM 11/15/2017 11/15/2016, 11/9/2015    FASTING LIPID PROFILE 11/17/2017 11/17/2016, 1/15/2016, 1/16/2015, 9/21/2012    RETINAL SCREENING 1/10/2018 1/10/2017, 1/24/2015    URINE ACR / MICROALBUMIN 2/1/2018 2/1/2017, 11/9/2016, 11/9/2015    SERUM CREATININE 2/1/2018 2/1/2017, 1/15/2016, 3/6/2015, 1/19/2015, 1/17/2015, 1/16/2015, 1/16/2015, 1/16/2015, 1/16/2015, 1/15/2015, 1/15/2015, 1/15/2015, 5/6/2014, 1/9/2013, 10/19/2012, 9/21/2012    COLONOSCOPY 10/3/2019 10/3/2016, 4/30/2013 (Done)    Override on 4/30/2013: Done (two polyps, tubular adenomas)    IMM DTaP/Tdap/Td Vaccine (2 - Td) 12/2/2024 12/2/2014            Current Immunizations     13-VALENT PCV PREVNAR 1/14/2016    Influenza Vaccine Adult HD 11/15/2016, 10/28/2015    Influenza Vaccine Quad Inj (Pf) 12/2/2014    Pneumococcal polysaccharide vaccine (PPSV-23) 12/2/2014    SHINGLES VACCINE 11/15/2016    Tdap Vaccine 12/2/2014      Below and/or attached are the medications your provider expects you to take. Review all of your home medications and newly ordered medications with your provider and/or pharmacist. Follow medication instructions as directed by your provider and/or pharmacist. Please keep your medication list with you and share with your provider. Update the information when medications are discontinued, doses are changed, or new medications (including over-the-counter products) are added; and carry medication information at all times in the event of emergency  situations     Allergies:  No Known Allergies          Medications  Valid as of: March 06, 2017 -  7:57 PM    Generic Name Brand Name Tablet Size Instructions for use    ALPRAZolam (Tab) XANAX 0.5 MG Take 0.5-1 Tabs by mouth at bedtime as needed for Sleep.        Aspirin (Tablet Delayed Response) aspirin 81 MG Take 1 Tab by mouth every day.        Atorvastatin Calcium (Tab) LIPITOR 80 MG TAKE 1 TABLET BY MOUTH AT BEDTIME-GENERIC FOR LIPITOR        Clopidogrel Bisulfate (Tab) PLAVIX 75 MG TAKE 1 TABLET BY MOUTH DAILY -GENERICFOR PLAVIX        Glucose Blood (Strip) glucose blood  TEST EVERY MORNING AND RANDOMLY EVERY      EVENING        Ibuprofen (Tab) MOTRIN 800 MG Take 1 Tab by mouth every 8 hours as needed.        Lisinopril (Tab) PRINIVIL 5 MG TAKE 1 TABLET BY MOUTH DAILY -GENERICFOR PRINIVIL        Metoprolol Succinate (TABLET SR 24 HR) TOPROL XL 25 MG Take 1 Tab by mouth every day.        Misc. Devices (Kit) Misc. Devices  Diagnosis 250.00. Freestyle glucometer, test strips, #100, RF11 and lancets #100, RF 11. To test fasting in the AM and random in the PM.        Multiple Vitamins-Minerals   Take  by mouth.        TiZANidine HCl (Tab) ZANAFLEX 4 MG Take 1 Tab by mouth 2 times a day.        .                 Medicines prescribed today were sent to:     [id number] - [place] - [address]    [facility] [place] [address]    Phone: [phone] Fax: [phone]    Open 24 Hours?: No      Medication refill instructions:       If your prescription bottle indicates you have medication refills left, it is not necessary to call your provider’s office. Please contact your pharmacy and they will refill your medication.    If your prescription bottle indicates you do not have any refills left, you may request refills at any time through one of the following ways: The online [employer]ix system (except Urgent Care), by calling your provider’s office, or by asking your pharmacy to contact your  provider’s office with a refill request. Medication refills are processed only during regular business hours and may not be available until the next business day. Your provider may request additional information or to have a follow-up visit with you prior to refilling your medication.   *Please Note: Medication refills are assigned a new Rx number when refilled electronically. Your pharmacy may indicate that no refills were authorized even though a new prescription for the same medication is available at the pharmacy. Please request the medicine by name with the pharmacy before contacting your provider for a refill.        Your To Do List     Future Labs/Procedures Complete By Expires    DX-CERVICAL SPINE-2 OR 3 VIEWS  As directed 3/6/2018      Referral     A referral request has been sent to our patient care coordination department. Please allow 3-5 business days for us to process this request and contact you either by phone or mail. If you do not hear from us by the 5th business day, please call us at [phone].        Other Notes About Your Plan                    [employer] Access Code: Activation code not generated  Current [employer] Status: Active

## 2017-03-06 NOTE — PROGRESS NOTES
Subjective:     Chief Complaint   Patient presents with   • Results       Chung Limon is a 67 y.o. male here today for follow-up on labs. Patient had labs on February 1, 2017.  Which showed hep C reactive. Patient reports history of being told he had hepatitis C antibody greater than 15 years prior when donating blood. Patient has had no further workup since then. Patient denies nausea, vomiting, diarrhea, abdominal pain, abdominal distention, or yellowing of skin.    Patient has a new complaint: Pt reports cervical pain for several  years. Pt reports radiation into arm with occasional numbness and tingling. Patient denies weakness or dropping objects.       No Known Allergies  Current medicines (including changes today)  Current Outpatient Prescriptions   Medication Sig Dispense Refill   • tizanidine (ZANAFLEX) 4 MG Tab Take 1 Tab by mouth 2 times a day. 30 Tab 1   • ibuprofen (MOTRIN) 800 MG Tab Take 1 Tab by mouth every 8 hours as needed. 30 Tab 3   • alprazolam (XANAX) 0.5 MG Tab Take 0.5-1 Tabs by mouth at bedtime as needed for Sleep. 30 Tab 5   • atorvastatin (LIPITOR) 80 MG tablet TAKE 1 TABLET BY MOUTH AT BEDTIME-GENERIC FOR LIPITOR 90 Tab 4   • lisinopril (PRINIVIL) 5 MG Tab TAKE 1 TABLET BY MOUTH DAILY -GENERICFOR PRINIVIL 90 Tab 3   • metoprolol SR (TOPROL XL) 25 MG TABLET SR 24 HR Take 1 Tab by mouth every day. 90 Tab 3   • clopidogrel (PLAVIX) 75 MG Tab TAKE 1 TABLET BY MOUTH DAILY -GENERICFOR PLAVIX 90 Tab 4   • Multiple Vitamins-Minerals (CENTRUM SILVER PO) Take  by mouth.     • glucose blood (FREESTYLE LITE) strip TEST EVERY MORNING AND RANDOMLY EVERY      EVENING 100 Strip 12   • [facility]. Devices KIT Diagnosis 250.00. Freestyle glucometer, test strips, #100, RF11 and lancets #100, RF 11. To test fasting in the AM and random in the PM. 1 Kit 0   • aspirin EC 81 MG EC tablet Take 1 Tab by mouth every day. 30 Tab 0     No current facility-administered medications for this visit.     Social History  "  Substance Use Topics   • Smoking status: Former Smoker -- 1.00 packs/day for 50 years     Types: Cigarettes     Start date: 1960     Quit date: 2011   • Smokeless tobacco: Never Used      Comment: avoid all tobacco products   • Alcohol Use: No      Comment: quit , drank 2 times since     Family Status   Relation Status Death Age   • Father     • Brother Alive      1/2 sibling   • Mother Alive    • Sister Alive    • Brother       Family History   Problem Relation Age of Onset   • Heart Disease Father    • Diabetes Neg Hx    • Hypertension Neg Hx    • Stroke Neg Hx    • Hyperlipidemia Neg Hx    • No Known Problems Mother    • No Known Problems Sister    • Cancer Brother      type unknown     He    has a past medical history of Claudication (CMS-[facility]) (2012); History of alcoholism (CMS-HCC) (2012); Hepatitis C; H/O colonoscopy with polypectomy (2013); Cancer (CMS-HCC); Hypertension; Hyperlipidemia; and Diabetes (CMS-HCC).        ROS   GEN: no weight loss, fevers, or chills  HEENT: no blurry vision or change in vision  Resp: no shortness of breath, no cough  CV: no racing heart, no irregular beats, no chest pain  Abd: no nausea, no vomiting, no diarrhea, no constipation, no blood in stool, no dark stools, no incontinence  MSK:  As per HPI  Neuro: no headaches, no dizziness, no LOC, no weakness     Objective:     Blood pressure 110/70, pulse 77, temperature 36.4 °C (97.5 °F), resp. rate 16, height 1.727 m (5' 7.99\"), weight 82.555 kg (182 lb), SpO2 95 %. Body mass index is 27.68 kg/(m^2).  Physical Exam:  Constitutional: Alert, no distress.  Skin: Warm, dry, good turgor, no rashes in visible areas.  Eye: Equal, round and reactive, conjunctiva clear, lids normal.  Neck: Trachea midline, no masses, no thyromegaly. No cervical or supraclavicular lymphadenopathy. Full ROM  Respiratory: Unlabored respiratory effort, good air movement, lungs clear to auscultation, no wheezes, no " candice.  Cardiovascular:RRR, +S1, S2, no murmur, no peripheral edema, pedal and radial pulses equal and intact bilat  Abdomen: Soft, non-tender, no masses, no hepatosplenomegaly.  MSK:5/5 muscle strength in upper extremities as well as lower extremity bilaterally, nonreproducible numbness in arms. No numbness with cervical compression, negative Adson  Neuro: CN2-12 intact, no gross motor or sensory deficits      Assessment and Plan:   The following treatment plan was discussed    1. Chronic hepatitis C without hepatic coma (CMS-HCC)  New finding on labs. Patient reports being told in his history that he had hepatitis C but no further workup was done. Risks discussed with patient. Transmission discussed with patient. Advised avoiding contact via bodily fluids.  - REFERRAL TO GASTROENTEROLOGY    2. Cervical pain   Worsening. Recommend anti-inflammatory and muscle relaxant as needed. Risks and benefits of most of the legs discussed with patient. Patient advised to take only at night when he is not driving and not drinking alcohol.  - tizanidine (ZANAFLEX) 4 MG Tab; Take 1 Tab by mouth 2 times a day.  Dispense: 30 Tab; Refill: 1  - ibuprofen (MOTRIN) 800 MG Tab; Take 1 Tab by mouth every 8 hours as needed.  Dispense: 30 Tab; Refill: 3  - DX-CERVICAL SPINE-2 OR 3 VIEWS; Future      Followup: Return in about 8 weeks (around 5/1/2017) for cervical pain.    Please note that this dictation was created using voice recognition software. I have made every reasonable attempt to correct obvious errors, but I expect that there are errors of grammar and possibly content that I did not discover before finalizing the note.

## 2017-03-10 ENCOUNTER — TELEPHONE (OUTPATIENT)
Dept: MEDICAL GROUP | Facility: PHYSICIAN GROUP | Age: 67
End: 2017-03-10

## 2017-03-10 NOTE — TELEPHONE ENCOUNTER
----- Message from Loree Soto D.O. sent at 3/8/2017 12:24 PM PST -----  Please have patient schedule an appointment to go over results.  Thank you,  Dr. Soto

## 2017-03-15 ENCOUNTER — TELEPHONE (OUTPATIENT)
Dept: URGENT CARE | Facility: PHYSICIAN GROUP | Age: 67
End: 2017-03-15

## 2017-03-15 ENCOUNTER — OFFICE VISIT (OUTPATIENT)
Dept: MEDICAL GROUP | Facility: PHYSICIAN GROUP | Age: 67
End: 2017-03-15
Payer: MEDICARE

## 2017-03-15 VITALS
WEIGHT: 182 LBS | SYSTOLIC BLOOD PRESSURE: 112 MMHG | HEART RATE: 81 BPM | DIASTOLIC BLOOD PRESSURE: 74 MMHG | BODY MASS INDEX: 27.58 KG/M2 | RESPIRATION RATE: 16 BRPM | HEIGHT: 68 IN | OXYGEN SATURATION: 96 % | TEMPERATURE: 97.3 F

## 2017-03-15 DIAGNOSIS — I65.22 STENOSIS OF LEFT CAROTID ARTERY: ICD-10-CM

## 2017-03-15 DIAGNOSIS — F41.9 ANXIETY: ICD-10-CM

## 2017-03-15 DIAGNOSIS — M50.00 CERVICAL DISC DISEASE WITH MYELOPATHY: ICD-10-CM

## 2017-03-15 PROCEDURE — 1101F PT FALLS ASSESS-DOCD LE1/YR: CPT | Performed by: FAMILY MEDICINE

## 2017-03-15 PROCEDURE — G8420 CALC BMI NORM PARAMETERS: HCPCS | Performed by: FAMILY MEDICINE

## 2017-03-15 PROCEDURE — 3017F COLORECTAL CA SCREEN DOC REV: CPT | Performed by: FAMILY MEDICINE

## 2017-03-15 PROCEDURE — 1036F TOBACCO NON-USER: CPT | Performed by: FAMILY MEDICINE

## 2017-03-15 PROCEDURE — G8482 FLU IMMUNIZE ORDER/ADMIN: HCPCS | Performed by: FAMILY MEDICINE

## 2017-03-15 PROCEDURE — G8598 ASA/ANTIPLAT THER USED: HCPCS | Performed by: FAMILY MEDICINE

## 2017-03-15 PROCEDURE — G8432 DEP SCR NOT DOC, RNG: HCPCS | Performed by: FAMILY MEDICINE

## 2017-03-15 PROCEDURE — 4040F PNEUMOC VAC/ADMIN/RCVD: CPT | Performed by: FAMILY MEDICINE

## 2017-03-15 PROCEDURE — 99214 OFFICE O/P EST MOD 30 MIN: CPT | Performed by: FAMILY MEDICINE

## 2017-03-15 RX ORDER — ALPRAZOLAM 0.5 MG/1
.5-.75 TABLET ORAL NIGHTLY PRN
Qty: 40 TAB | Refills: 5 | Status: SHIPPED | OUTPATIENT
Start: 2017-03-15 | End: 2017-09-26 | Stop reason: SDUPTHER

## 2017-03-15 RX ORDER — ALPRAZOLAM 0.5 MG/1
.25-.5 TABLET ORAL NIGHTLY PRN
Qty: 40 TAB | Refills: 5 | Status: SHIPPED | OUTPATIENT
Start: 2017-03-15 | End: 2017-03-15 | Stop reason: SDUPTHER

## 2017-03-15 NOTE — MR AVS SNAPSHOT
"        Chung Limon   3/15/2017 3:20 PM   Office Visit   MRN: [id number]    Department:  [facility]   Dept Phone:  [phone]    Description:  Male : 1950   Provider:  Loree Soto D.O.           Reason for Visit     Results           Allergies as of 3/15/2017     No Known Allergies      You were diagnosed with     Stenosis of left carotid artery   [241267]       Cervical disc disease with myelopathy   [380572]       Anxiety   [631269]         Vital Signs     Blood Pressure Pulse Temperature Respirations Height Weight    112/74 mmHg 81 36.3 °C (97.3 °F) 16 1.727 m (5' 8\") 82.555 kg (182 lb)    Body Mass Index Oxygen Saturation Smoking Status             27.68 kg/m2 96% Former Smoker         Basic Information     Date Of Birth Sex Race Ethnicity Preferred Language    1950 Male White Non- English      Your appointments     May 01, 2017  9:00 AM   FOLLOW UP with Manjit Gleason M.D.   [facility] (--)    [address], [address]  [place] [id number] [phone]            May 15, 2017  8:00 AM   Established Patient with Loree Soto D.O.   [facility] [place]    [address], [address]  [place] [address] [phone]           You will be receiving a confirmation call a few days before your appointment from our automated call confirmation system.            2017  9:00 AM   ADVANCED DIRECTIVE CLASS with RENOWN AT [facility] "[employer]   Events (--)    Please Check Your Invitation   [phone]           [address] [place] [address]              Problem List              ICD-10-CM Priority Class Noted - Resolved    History of alcoholism (CMS-HCC) F10.21   2012 - Present    Bilateral claudication of lower limb (CMS-HCC) I73.9   10/22/2012 - Present    H/O colonoscopy with polypectomy Z98.890, Z86.010   2013 - Present    Stress-induced cardiomyopathy I51.81   3/10/2015 - Present   "    Pelon R09.89   11/1/2016 - Present    Diabetes mellitus with peripheral vascular disease (CMS-HCC) E11.51   11/15/2016 - Present    Microalbuminuria due to type 2 diabetes mellitus (CMS-HCC) E11.29, R80.9   11/15/2016 - Present    History of tobacco abuse Z87.891   1/31/2017 - Present    Atherosclerosis of aorta (CMS-HCC) I70.0   1/31/2017 - Present      Health Maintenance        Date Due Completion Dates    A1C SCREENING 5/15/2017 11/15/2016, 4/11/2016, 11/9/2015, 5/4/2015, 1/15/2015    DIABETES MONOFILAMENT / LE EXAM 11/15/2017 11/15/2016, 11/9/2015    FASTING LIPID PROFILE 11/17/2017 11/17/2016, 1/15/2016, 1/16/2015, 9/21/2012    RETINAL SCREENING 1/10/2018 1/10/2017, 1/24/2015    URINE ACR / MICROALBUMIN 2/1/2018 2/1/2017, 11/9/2016, 11/9/2015    SERUM CREATININE 2/1/2018 2/1/2017, 1/15/2016, 3/6/2015, 1/19/2015, 1/17/2015, 1/16/2015, 1/16/2015, 1/16/2015, 1/16/2015, 1/15/2015, 1/15/2015, 1/15/2015, 5/6/2014, 1/9/2013, 10/19/2012, 9/21/2012    COLONOSCOPY 10/3/2019 10/3/2016, 4/30/2013 (Done)    Override on 4/30/2013: Done (two polyps, tubular adenomas)    IMM DTaP/Tdap/Td Vaccine (2 - Td) 12/2/2024 12/2/2014            Current Immunizations     13-VALENT PCV PREVNAR 1/14/2016    Influenza Vaccine Adult HD 11/15/2016, 10/28/2015    Influenza Vaccine Quad Inj (Pf) 12/2/2014    Pneumococcal polysaccharide vaccine (PPSV-23) 12/2/2014    SHINGLES VACCINE 11/15/2016    Tdap Vaccine 12/2/2014      Below and/or attached are the medications your provider expects you to take. Review all of your home medications and newly ordered medications with your provider and/or pharmacist. Follow medication instructions as directed by your provider and/or pharmacist. Please keep your medication list with you and share with your provider. Update the information when medications are discontinued, doses are changed, or new medications (including over-the-counter products) are added; and carry medication information at all times in  the event of emergency situations     Allergies:  No Known Allergies          Medications  Valid as of: March 15, 2017 -  5:13 PM    Generic Name Brand Name Tablet Size Instructions for use    ALPRAZolam (Tab) XANAX 0.5 MG Take 1-1.5 Tabs by mouth at bedtime as needed for Sleep.        Aspirin (Tablet Delayed Response) aspirin 81 MG Take 1 Tab by mouth every day.        Atorvastatin Calcium (Tab) LIPITOR 80 MG TAKE 1 TABLET BY MOUTH AT BEDTIME-GENERIC FOR LIPITOR        Clopidogrel Bisulfate (Tab) PLAVIX 75 MG TAKE 1 TABLET BY MOUTH DAILY -GENERICFOR PLAVIX        Glucose Blood (Strip) glucose blood  TEST EVERY MORNING AND RANDOMLY EVERY      EVENING        Lisinopril (Tab) PRINIVIL 5 MG TAKE 1 TABLET BY MOUTH DAILY -GENERICFOR PRINIVIL        Metoprolol Succinate (TABLET SR 24 HR) TOPROL XL 25 MG Take 1 Tab by mouth every day.        Misc. Devices (Kit) Misc. Devices  Diagnosis 250.00. Freestyle glucometer, test strips, #100, RF11 and lancets #100, RF 11. To test fasting in the AM and random in the PM.        Multiple Vitamins-Minerals   Take  by mouth.        TiZANidine HCl (Tab) ZANAFLEX 4 MG Take 1 Tab by mouth 2 times a day.        .                 Medicines prescribed today were sent to:     [facility] [place] - [address]    [address] [place] [address]    Phone: [phone] Fax: [phone]    Open 24 Hours?: No      Medication refill instructions:       If your prescription bottle indicates you have medication refills left, it is not necessary to call your provider’s office. Please contact your pharmacy and they will refill your medication.    If your prescription bottle indicates you do not have any refills left, you may request refills at any time through one of the following ways: The online [employer] system (except Urgent Care), by calling your provider’s office, or by asking your pharmacy to contact your provider’s office with a refill request. Medication refills are  processed only during regular business hours and may not be available until the next business day. Your provider may request additional information or to have a follow-up visit with you prior to refilling your medication.   *Please Note: Medication refills are assigned a new Rx number when refilled electronically. Your pharmacy may indicate that no refills were authorized even though a new prescription for the same medication is available at the pharmacy. Please request the medicine by name with the pharmacy before contacting your provider for a refill.        Your To Do List     Future Labs/Procedures Complete By Expires    MR-CERVICAL SPINE-W/O  As directed 3/15/2018      Referral     A referral request has been sent to our patient care coordination department. Please allow 3-5 business days for us to process this request and contact you either by phone or mail. If you do not hear from us by the 5th business day, please call us at [phone].        Other Notes About Your Plan                    [employer] Access Code: Activation code not generated  Current [facility] Status: Active

## 2017-03-15 NOTE — PROGRESS NOTES
Subjective:     Chief Complaint   Patient presents with   • Results       Chung Limon is a 67 y.o. male here today for follow up on CXR. Pt was seen in 3/6/17 for chronic cervical pain. Patient reports he continues to have cervical pain for greater than 10 years. Patient reports numbness and tingling in his left arm when he sits upright or extends his head. Patient reports resolution of numbness and tingling when patient flexes his neck forward.    Cervical spine x-ray revealed disc narrowing at C3/4 and C4/5 as well as multilevel joint arthropathy. There is also noted to be calcified plaque in the carotid arteries. Patient had carotid ultrasound in November 2016 ordered by cardiology which showed mild stenosis of the right internal carotid artery and moderate stenosis of the left internal carotid artery.    No Known Allergies  Current medicines (including changes today)  Current Outpatient Prescriptions   Medication Sig Dispense Refill   • alprazolam (XANAX) 0.5 MG Tab Take 1-1.5 Tabs by mouth at bedtime as needed for Sleep. 40 Tab 5   • tizanidine (ZANAFLEX) 4 MG Tab Take 1 Tab by mouth 2 times a day. 30 Tab 1   • Multiple Vitamins-Minerals (CENTRUM SILVER PO) Take  by mouth.     • atorvastatin (LIPITOR) 80 MG tablet TAKE 1 TABLET BY MOUTH AT BEDTIME-GENERIC FOR LIPITOR 90 Tab 4   • glucose blood (FREESTYLE LITE) strip TEST EVERY MORNING AND RANDOMLY EVERY      EVENING 100 Strip 12   • lisinopril (PRINIVIL) 5 MG Tab TAKE 1 TABLET BY MOUTH DAILY -GENERICFOR PRINIVIL 90 Tab 3   • metoprolol SR (TOPROL XL) 25 MG TABLET SR 24 HR Take 1 Tab by mouth every day. 90 Tab 3   • clopidogrel (PLAVIX) 75 MG Tab TAKE 1 TABLET BY MOUTH DAILY -GENERICFOR PLAVIX 90 Tab 4   • Misc. Devices KIT Diagnosis 250.00. Freestyle glucometer, test strips, #100, RF11 and lancets #100, RF 11. To test fasting in the AM and random in the PM. 1 Kit 0   • aspirin EC 81 MG EC tablet Take 1 Tab by mouth every day. 30 Tab 0     No current  "facility-administered medications for this visit.     Social History   Substance Use Topics   • Smoking status: Former Smoker -- 1.00 packs/day for 50 years     Types: Cigarettes     Start date: 1960     Quit date: 2011   • Smokeless tobacco: Never Used      Comment: avoid all tobacco products   • Alcohol Use: No      Comment: quit , drank 2 times since     Family Status   Relation Status Death Age   • Father     • Brother Alive      1/2 sibling   • Mother Alive    • Sister Alive    • Brother       Family History   Problem Relation Age of Onset   • Heart Disease Father    • Diabetes Neg Hx    • Hypertension Neg Hx    • Stroke Neg Hx    • Hyperlipidemia Neg Hx    • No Known Problems Mother    • No Known Problems Sister    • Cancer Brother      type unknown     He    has a past medical history of Claudication (CMS-[facility]) (2012); History of alcoholism (CMS-HCC) (2012); Hepatitis C; H/O colonoscopy with polypectomy (2013); Cancer (CMS-HCC); Hypertension; Hyperlipidemia; and Diabetes (CMS-HCC).        ROS   HEENT: no blurry vision or change in vision  Resp: no shortness of breath, no cough  CV: no racing heart, no irregular beats, no chest pain  MSK: Cervical pain as per history of present illness  Neuro: no headaches, no dizziness, no LOC, no weakness, + left arm numbness/tingling       Objective:     Blood pressure 112/74, pulse 81, temperature 36.3 °C (97.3 °F), resp. rate 16, height 1.727 m (5' 8\"), weight 82.555 kg (182 lb), SpO2 96 %. Body mass index is 27.68 kg/(m^2).   Physical Exam:  Constitutional: Alert, no distress.  Skin: Warm, dry, good turgor, no rashes in visible areas.  Eye: Equal, round and reactive, conjunctiva clear, lids normal.  Neck: Trachea midline, no masses, no thyromegaly. No cervical or supraclavicular lymphadenopathy. Full ROM, hypertonic cervical paraspinal muscles, hypertonic left trapezius muscle  Respiratory: Unlabored respiratory effort, good " air movement, lungs clear to auscultation, no wheezes, no ronchi.  Cardiovascular:RRR, +S1, S2, no murmur, no peripheral edema, pedal and radial pulses equal and intact bilat  MSK:5/5 muscle strength in upper extremities as well as lower extremity bilaterally  Psych: Alert and oriented x3, appropriate affect and mood.  Neuro: CN2-12 intact, no gross motor or sensory deficits      Assessment and Plan:   The following treatment plan was discussed    1. Stenosis of left carotid artery  Finding based off x-ray and carotid ultrasound. Recommend referral to vascular surgery for consultation. Patient denies any symptoms of dizziness or loss of consciousness.  - REFERRAL TO VASCULAR SURGERY    2. Cervical disc disease with myelopathy  Chronic: Worsening. Due to patient's numbness and tingling in left arm with cervical extension recommend MRI to rule out nerve impingement. Also recommend referral to neurosurgery.- REFERRAL TO NEUROSURGERY  - MR-CERVICAL SPINE-W/O; Future    3. Anxiety  Chronic: Patient states he takes Xanax when necessary usually 1 to 1-1/2 tablets per night.  Patient understands they  cannot take any other medications including prescription, over-the-counter, or illicit substances including alcohol while being treated with a benzodiazepine from our office. Patient understands the risk of benzodiazepine including respiratory depression and sedation. Patient does not take medication prior to driving. Patient only takes medications when they are at home.   verified. Patient was given previous prescription of Xanax. I'm rewriting prescription to increase to  40 tablets per month  - alprazolam (XANAX) 0.5 MG Tab; Take 1-1.5 Tabs by mouth at bedtime as needed for Sleep.  Dispense: 40 Tab; Refill: 5      Followup: No Follow-up on file.    Please note that this dictation was created using voice recognition software. I have made every reasonable attempt to correct obvious errors, but I expect that there are  errors of grammar and possibly content that I did not discover before finalizing the note.

## 2017-03-15 NOTE — TELEPHONE ENCOUNTER
Pt came in stating that his Rx for Xanax was rejected by the pharmacy.  I called [facility] and they stated the sig was wrong.  It states take 0.5-1 tab but it needed to state take 1-1.5 tab.   New Rx printed, old one taken and scanned into media.

## 2017-03-31 ENCOUNTER — HOSPITAL ENCOUNTER (OUTPATIENT)
Dept: RADIOLOGY | Facility: MEDICAL CENTER | Age: 67
End: 2017-03-31
Attending: FAMILY MEDICINE
Payer: MEDICARE

## 2017-03-31 DIAGNOSIS — M50.00 CERVICAL DISC DISEASE WITH MYELOPATHY: ICD-10-CM

## 2017-03-31 PROCEDURE — 72141 MRI NECK SPINE W/O DYE: CPT

## 2017-05-01 ENCOUNTER — OFFICE VISIT (OUTPATIENT)
Dept: CARDIOLOGY | Facility: MEDICAL CENTER | Age: 67
End: 2017-05-01
Payer: MEDICARE

## 2017-05-01 VITALS
HEART RATE: 86 BPM | DIASTOLIC BLOOD PRESSURE: 62 MMHG | OXYGEN SATURATION: 96 % | BODY MASS INDEX: 24.68 KG/M2 | HEIGHT: 70 IN | WEIGHT: 172.4 LBS | SYSTOLIC BLOOD PRESSURE: 140 MMHG

## 2017-05-01 DIAGNOSIS — R06.02 SOB (SHORTNESS OF BREATH): ICD-10-CM

## 2017-05-01 DIAGNOSIS — I73.9 PERIPHERAL VASCULAR DISEASE (HCC): ICD-10-CM

## 2017-05-01 DIAGNOSIS — I51.81 STRESS-INDUCED CARDIOMYOPATHY: ICD-10-CM

## 2017-05-01 DIAGNOSIS — E11.51 DIABETES MELLITUS WITH PERIPHERAL VASCULAR DISEASE (HCC): ICD-10-CM

## 2017-05-01 PROCEDURE — G8598 ASA/ANTIPLAT THER USED: HCPCS | Performed by: NURSE PRACTITIONER

## 2017-05-01 PROCEDURE — 94620 PR PULMONARY STRESS TESTING,SIMPLE: CPT | Performed by: NURSE PRACTITIONER

## 2017-05-01 PROCEDURE — 3046F HEMOGLOBIN A1C LEVEL >9.0%: CPT | Mod: 8P | Performed by: NURSE PRACTITIONER

## 2017-05-01 PROCEDURE — 4040F PNEUMOC VAC/ADMIN/RCVD: CPT | Performed by: NURSE PRACTITIONER

## 2017-05-01 PROCEDURE — G8420 CALC BMI NORM PARAMETERS: HCPCS | Performed by: NURSE PRACTITIONER

## 2017-05-01 PROCEDURE — G8432 DEP SCR NOT DOC, RNG: HCPCS | Performed by: NURSE PRACTITIONER

## 2017-05-01 PROCEDURE — 1036F TOBACCO NON-USER: CPT | Performed by: NURSE PRACTITIONER

## 2017-05-01 PROCEDURE — 1101F PT FALLS ASSESS-DOCD LE1/YR: CPT | Performed by: NURSE PRACTITIONER

## 2017-05-01 PROCEDURE — 99214 OFFICE O/P EST MOD 30 MIN: CPT | Mod: 25 | Performed by: NURSE PRACTITIONER

## 2017-05-01 RX ORDER — LISINOPRIL 10 MG/1
10 TABLET ORAL DAILY
Qty: 90 TAB | Refills: 3 | Status: SHIPPED | OUTPATIENT
Start: 2017-05-01 | End: 2018-05-10 | Stop reason: SDUPTHER

## 2017-05-01 ASSESSMENT — ENCOUNTER SYMPTOMS
ABDOMINAL PAIN: 0
FEVER: 0
SHORTNESS OF BREATH: 0
PALPITATIONS: 0
ORTHOPNEA: 0
MYALGIAS: 0
PND: 0
COUGH: 0
DIZZINESS: 0
CLAUDICATION: 1

## 2017-05-01 ASSESSMENT — MINNESOTA LIVING WITH HEART FAILURE QUESTIONNAIRE (MLHF)
FEELING LIKE A BURDEN TO FAMILY AND FRIENDS: 1
HAVING TO SIT OR LIE DOWN DURING THE DAY: 3
TOTAL_SCORE: 48
COSTING YOU MONEY FOR MEDICAL CARE: 0
DIFFICULTY SLEEPING WELL AT NIGHT: 2
EATING LESS FOODS YOU LIKE: 1
DIFFICULTY WITH RECREATIONAL PASTIMES, SPORTS, HOBBIES: 4
WALKING ABOUT OR CLIMBING STAIRS DIFFICULT: 4
DIFFICULTY GOING AWAY FROM HOME: 1
TIRED, FATIGUED OR LOW ON ENERGY: 4
DIFFICULTY WITH SEXUAL ACTIVITIES: 4
DIFFICULTY WORKING TO EARN A LIVING: 4
MAKING YOU STAY IN A HOSPITAL: 0
SWELLING IN ANKLES OR LEGS: 1
MAKING YOU SHORT OF BREATH: 2
GIVING YOU SIDE EFFECTS FROM TREATMENTS: 1
LOSS OF SELF CONTROL IN YOUR LIFE: 2
MAKING YOU FEEL DEPRESSED: 2
DIFFICULTY TO CONCENTRATE OR REMEMBERING THINGS: 4
DIFFICULTY SOCIALIZING WITH FAMILY OR FRIENDS: 3
WORKING AROUND THE HOUSE OR YARD DIFFICULT: 2
MAKING YOU WORRY: 3

## 2017-05-01 ASSESSMENT — 6 MINUTE WALK TEST (6MWT): TOTAL DISTANCE WALKED (METERS): 381

## 2017-05-01 NOTE — MR AVS SNAPSHOT
Chung Chen Ashly   2017 8:20 AM   Office Visit   MRN: [id number]    Department:  [facility]   Dept Phone:  [phone]    Description:  Male : 1950   Provider:  LEVI Carlin           Reason for Visit     Follow-Up           Allergies as of 2017     No Known Allergies      You were diagnosed with     Peripheral vascular disease (CMS-HCC)   [379421]       Stress-induced cardiomyopathy   [701997]         Vital Signs     Blood Pressure Pulse Weight Oxygen Saturation Smoking Status       140/62 mmHg 86 78.2 kg (172 lb 6.4 oz) 96% Former Smoker       Basic Information     Date Of Birth Sex Race Ethnicity Preferred Language    1950 Male White Non- English      Your appointments     May 16, 2017  8:00 AM   Established Patient with [facility])    [address], [address]  [place] [address] [phone]           You will be receiving a confirmation call a few days before your appointment from our automated call confirmation system.            2017  9:00 AM   ADVANCED DIRECTIVE CLASS with RENOWN AT [facility] [place]   Events (--)    Please Check Your Invitation   [phone]           [address] [facility] [address] [place] [address]              Problem List              ICD-10-CM Priority Class Noted - Resolved    History of alcoholism (CMS-[facility]) F10.21   2012 - Present    Bilateral claudication of lower limb (CMS-[facility]) I73.9   10/22/2012 - Present    H/O colonoscopy with polypectomy Z98.890, Z86.010   2013 - Present    Stress-induced cardiomyopathy I51.81   3/10/2015 - Present    Bruit R09.89   2016 - Present    Diabetes mellitus with peripheral vascular disease (CMS-HCC) E11.51   11/15/2016 - Present    Microalbuminuria due to type 2 diabetes mellitus (CMS-[facility]) E11.29, R80.9   11/15/2016 - Present    History of tobacco abuse Z87.891   2017 - Present    Atherosclerosis of aorta (CMS-[facility]) I70.0    1/31/2017 - Present      Health Maintenance        Date Due Completion Dates    A1C SCREENING 5/15/2017 11/15/2016, 4/11/2016, 11/9/2015, 5/4/2015, 1/15/2015    DIABETES MONOFILAMENT / LE EXAM 11/15/2017 11/15/2016, 11/9/2015    FASTING LIPID PROFILE 11/17/2017 11/17/2016, 1/15/2016, 1/16/2015, 9/21/2012    RETINAL SCREENING 1/10/2018 1/10/2017, 1/24/2015    URINE ACR / MICROALBUMIN 2/1/2018 2/1/2017, 11/9/2016, 11/9/2015    SERUM CREATININE 2/1/2018 2/1/2017, 1/15/2016, 3/6/2015, 1/19/2015, 1/17/2015, 1/16/2015, 1/16/2015, 1/16/2015, 1/16/2015, 1/15/2015, 1/15/2015, 1/15/2015, 5/6/2014, 1/9/2013, 10/19/2012, 9/21/2012    COLONOSCOPY 10/3/2019 10/3/2016, 4/30/2013 (Done)    Override on 4/30/2013: Done (two polyps, tubular adenomas)    IMM DTaP/Tdap/Td Vaccine (2 - Td) 12/2/2024 12/2/2014            Current Immunizations     13-VALENT PCV PREVNAR 1/14/2016    Influenza Vaccine Adult HD 11/15/2016, 10/28/2015    Influenza Vaccine Quad Inj (Pf) 12/2/2014    Pneumococcal polysaccharide vaccine (PPSV-23) 12/2/2014    SHINGLES VACCINE 11/15/2016    Tdap Vaccine 12/2/2014      Below and/or attached are the medications your provider expects you to take. Review all of your home medications and newly ordered medications with your provider and/or pharmacist. Follow medication instructions as directed by your provider and/or pharmacist. Please keep your medication list with you and share with your provider. Update the information when medications are discontinued, doses are changed, or new medications (including over-the-counter products) are added; and carry medication information at all times in the event of emergency situations     Allergies:  No Known Allergies          Medications  Valid as of: May 01, 2017 -  9:20 AM    Generic Name Brand Name Tablet Size Instructions for use    ALPRAZolam (Tab) XANAX 0.5 MG Take 1-1.5 Tabs by mouth at bedtime as needed for Sleep.        Aspirin (Tablet Delayed Response) aspirin 81 MG  Take 1 Tab by mouth every day.        Atorvastatin Calcium (Tab) LIPITOR 80 MG TAKE 1 TABLET BY MOUTH AT BEDTIME-GENERIC FOR LIPITOR        Clopidogrel Bisulfate (Tab) PLAVIX 75 MG TAKE 1 TABLET BY MOUTH DAILY -GENERICFOR PLAVIX        Glucose Blood (Strip) glucose blood  TEST EVERY MORNING AND RANDOMLY EVERY      EVENING        Lisinopril (Tab) PRINIVIL 10 MG Take 1 Tab by mouth every day.        Metoprolol Succinate (TABLET SR 24 HR) TOPROL XL 25 MG Take 1 Tab by mouth every day.        Misc. Devices (Kit) Misc. Devices  Diagnosis 250.00. Freestyle glucometer, test strips, #100, RF11 and lancets #100, RF 11. To test fasting in the AM and random in the PM.        Multiple Vitamins-Minerals   Take  by mouth.        .                 Medicines prescribed today were sent to:     [facility] - [place], [place] - [address]    [address] [place] [address]    Phone: [phone] Fax: [phone]    Open 24 Hours?: No      Medication refill instructions:       If your prescription bottle indicates you have medication refills left, it is not necessary to call your provider’s office. Please contact your pharmacy and they will refill your medication.    If your prescription bottle indicates you do not have any refills left, you may request refills at any time through one of the following ways: The online [employer] system (except Urgent Care), by calling your provider’s office, or by asking your pharmacy to contact your provider’s office with a refill request. Medication refills are processed only during regular business hours and may not be available until the next business day. Your provider may request additional information or to have a follow-up visit with you prior to refilling your medication.   *Please Note: Medication refills are assigned a new Rx number when refilled electronically. Your pharmacy may indicate that no refills were authorized even though a new prescription for the same medication  is available at the pharmacy. Please request the medicine by name with the pharmacy before contacting your provider for a refill.        Other Notes About Your Plan                    MyChart Access Code: Activation code not generated  Current MyChart Status: Active

## 2017-05-01 NOTE — PROGRESS NOTES
Subjective:   Chung Lmion is a 67 y.o. male who presents today for follow up.    Patient of Dr. Gleason. Pt was last seen 11/1/16. Pt has peripheral vascular disease, with claudication. Pt states he is able to ambulate about 5 minutes at a time before developing a burning sensation to his calves. Pt denies pain. Pt continues to decline intervention for his PVD d/t time and cost of the procedure. Pt states he is on a limited income. Pt states his claudication have not worsened and he is monitoring signs and symptoms.     Patient feels well, denies chest pain, shortness of breath with rest, ADLs and exertion, palpitations, dizziness/lightheadedness, orthopnea, PND or Edema. Home weights have been 172-173 lbs.    At 6 minute walk test re-evaluation, patient was able to complete 381 m during his 6 minute walk test. His O2 saturation at baseline was 96% and at the end of the test, the O2 saturation was 97%. He reported level 2 of dyspnea on Franki scale.       Past Medical History   Diagnosis Date   • Claudication (CMS-HCC) 8/22/2012   • History of alcoholism (CMS-HCC) 8/22/2012   • Hepatitis C    • H/O colonoscopy with polypectomy 5/7/2013   • Cancer (CMS-HCC)    • Hypertension    • Hyperlipidemia    • Diabetes (CMS-HCC)      Past Surgical History   Procedure Laterality Date   • Recovery  10/22/2012     Performed by Pily Lee M.D. at SURGERY [facility]   • Wide excision  5/6/2014     Performed by Nicholas Govea M.D. at SURGERY SAME DAY [facility]   • Flap graft  5/6/2014     Performed by Nicholas Govea M.D. at SURGERY SAME DAY [facility]     Family History   Problem Relation Age of Onset   • Heart Disease Father    • Diabetes Neg Hx    • Hypertension Neg Hx    • Stroke Neg Hx    • Hyperlipidemia Neg Hx    • Other Mother      arthritis   • No Known Problems Sister    • Cancer Brother      type unknown     History   Smoking status   • Former Smoker -- 1.00 packs/day for 50 years   • Types:  Cigarettes   • Start date: 01/01/1960   • Quit date: 09/01/2011   Smokeless tobacco   • Never Used     Comment: avoid all tobacco products     No Known Allergies  Outpatient Encounter Prescriptions as of 5/1/2017   Medication Sig Dispense Refill   • lisinopril (PRINIVIL) 10 MG Tab Take 1 Tab by mouth every day. 90 Tab 3   • Multiple Vitamins-Minerals (CENTRUM SILVER PO) Take  by mouth.     • atorvastatin (LIPITOR) 80 MG tablet TAKE 1 TABLET BY MOUTH AT BEDTIME-GENERIC FOR LIPITOR 90 Tab 4   • glucose blood (FREESTYLE LITE) strip TEST EVERY MORNING AND RANDOMLY EVERY      EVENING 100 Strip 12   • metoprolol SR (TOPROL XL) 25 MG TABLET SR 24 HR Take 1 Tab by mouth every day. 90 Tab 3   • clopidogrel (PLAVIX) 75 MG Tab TAKE 1 TABLET BY MOUTH DAILY -GENERICFOR PLAVIX 90 Tab 4   • Misc. Devices KIT Diagnosis 250.00. Freestyle glucometer, test strips, #100, RF11 and lancets #100, RF 11. To test fasting in the AM and random in the PM. 1 Kit 0   • aspirin EC 81 MG EC tablet Take 1 Tab by mouth every day. 30 Tab 0   • alprazolam (XANAX) 0.5 MG Tab Take 1-1.5 Tabs by mouth at bedtime as needed for Sleep. 40 Tab 5   • [DISCONTINUED] tizanidine (ZANAFLEX) 4 MG Tab Take 1 Tab by mouth 2 times a day. 30 Tab 1   • [DISCONTINUED] lisinopril (PRINIVIL) 5 MG Tab TAKE 1 TABLET BY MOUTH DAILY -GENERICFOR PRINIVIL 90 Tab 3     No facility-administered encounter medications on file as of 5/1/2017.     Review of Systems   Constitutional: Negative for fever and malaise/fatigue.   Respiratory: Negative for cough and shortness of breath.    Cardiovascular: Positive for claudication (Burning sensation in calves after ambulating 5 minutes.). Negative for chest pain, palpitations, orthopnea, leg swelling and PND.   Gastrointestinal: Negative for abdominal pain.   Musculoskeletal: Negative for myalgias.   Neurological: Negative for dizziness.   All other systems reviewed and are negative.       Objective:   /62 mmHg  Pulse 86  Wt 78.2 kg  (172 lb 6.4 oz)  SpO2 96%    Physical Exam   Constitutional: He is oriented to person, place, and time. He appears well-developed and well-nourished.   HENT:   Head: Normocephalic and atraumatic.   Eyes: EOM are normal.   Neck: Normal range of motion. Neck supple. No JVD present.   Cardiovascular: Normal rate, regular rhythm, normal heart sounds and intact distal pulses.    No murmur heard.  Pulses:       Dorsalis pedis pulses are 1+ on the right side, and 1+ on the left side.        Posterior tibial pulses are 1+ on the right side, and 1+ on the left side.   Warm LE    Pulmonary/Chest: Effort normal and breath sounds normal. No respiratory distress. He has no wheezes. He has no rales.   Abdominal: Soft. Bowel sounds are normal.   Musculoskeletal: Normal range of motion. He exhibits no edema.   Neurological: He is alert and oriented to person, place, and time.   Skin: Skin is warm and dry.   Psychiatric: He has a normal mood and affect.   Nursing note and vitals reviewed.    Lab Results   Component Value Date/Time    CHOLESTEROL, 11/17/2016 09:40 AM    LDL 50 11/17/2016 09:40 AM    HDL 33* 11/17/2016 09:40 AM    TRIGLYCERIDES 113 11/17/2016 09:40 AM       Lab Results   Component Value Date/Time    SODIUM 138 02/01/2017 06:08 AM    POTASSIUM 3.9 02/01/2017 06:08 AM    CHLORIDE 109 02/01/2017 06:08 AM    CO2 23 02/01/2017 06:08 AM    GLUCOSE 129* 02/01/2017 06:08 AM    BUN 22 02/01/2017 06:08 AM    CREATININE 0.86 02/01/2017 06:08 AM     Lab Results   Component Value Date/Time    ALKALINE PHOSPHATASE 66 02/01/2017 06:08 AM    AST(SGOT) 67* 02/01/2017 06:08 AM    ALT(SGPT) 78* 02/01/2017 06:08 AM    TOTAL BILIRUBIN 0.6 02/01/2017 06:08 AM      Results for BI CHOI (MRN [id number]) as of 5/1/2017 09:34   Ref. Range 11/15/2016 11:55   Glycohemoglobin Unknown 6.0     Transthoracic Echo Report 1/15/15  Moderate to severely reduced left ventricular systolic function.  Mid to apical anterior and lateral  severe hypokinesis to akinesis.  Mild concentric left ventricular hypertrophy.  Grade I diastolic dysfunction.  Mildly dilated left atrium.  Calcification of the posterior mitral valve chordae.  Trace mitral regurgitation.  Aortic sclerosis without stenosis.  Mild aortic insufficiency.  Trace tricuspid regurgitation.    Transthoracic Echo Report 6/8/15  Normal left ventricular size, thickness, systolic function.  Aortic sclerosis without stenosis.  Trace mitral regurgitation.  Right ventricular systolic pressure is estimated to be 20 mmHg.  The right ventricle was normal in size and function.     Carotid Duplex Report 11/16/16     Mild stenosis of the right internal carotid (< 50%).    Moderate stenosis of the left internal carotid (50-69%).    Flow within both subclavian arteries appears to be within normal limits.      Antegrade flow, bilateral vertebral arteries.    No prior study is available for comparison..       Assessment:     1. Stress-induced cardiomyopathy     2. Peripheral vascular disease (CMS-HCC)  lisinopril (PRINIVIL) 10 MG Tab   3. Diabetes mellitus with peripheral vascular disease (CMS-[facility])         Medical Decision Making:  Today's Assessment / Status / Plan:   1. Stress-induced Cardiomyopathy: Pt does not exhibit any heart failure symptoms.   -Increase lisinopril to 10 mg daily (for borderline blood pressure)  -Continue Toprol XL 25 mg Daily     2. PVD:  -Discussed with patient to consider having regular follow up with Vascular  -Continue Atorvastatin 80 mg daily  -Continue Plavix 75 mg daily  -Get lipid panel drawn as ordered by PCP for follow up appointment.    3. Diabetes: Stable, Last A1c on file was 6.0 from 11/15/16.  -Continue diet and exercise control    FU in clinic in 6-8 months with Dr. Gleason. Sooner if needed.    Patient verbalizes understanding and agrees with the plan of care.     Collaborating MD: Claudia Mishra MD

## 2017-05-16 ENCOUNTER — OFFICE VISIT (OUTPATIENT)
Dept: MEDICAL GROUP | Facility: PHYSICIAN GROUP | Age: 67
End: 2017-05-16
Payer: MEDICARE

## 2017-05-16 VITALS
SYSTOLIC BLOOD PRESSURE: 110 MMHG | BODY MASS INDEX: 24.62 KG/M2 | TEMPERATURE: 98.6 F | RESPIRATION RATE: 16 BRPM | DIASTOLIC BLOOD PRESSURE: 70 MMHG | OXYGEN SATURATION: 94 % | WEIGHT: 172 LBS | HEIGHT: 70 IN | HEART RATE: 71 BPM

## 2017-05-16 DIAGNOSIS — I73.9 BILATERAL CLAUDICATION OF LOWER LIMB (HCC): ICD-10-CM

## 2017-05-16 DIAGNOSIS — M50.90 CERVICAL DISC DISEASE: ICD-10-CM

## 2017-05-16 DIAGNOSIS — B18.2 CHRONIC HEPATITIS C WITHOUT HEPATIC COMA (HCC): ICD-10-CM

## 2017-05-16 DIAGNOSIS — R05.9 COUGH: ICD-10-CM

## 2017-05-16 PROCEDURE — 4040F PNEUMOC VAC/ADMIN/RCVD: CPT | Performed by: FAMILY MEDICINE

## 2017-05-16 PROCEDURE — 1036F TOBACCO NON-USER: CPT | Performed by: FAMILY MEDICINE

## 2017-05-16 PROCEDURE — G8432 DEP SCR NOT DOC, RNG: HCPCS | Performed by: FAMILY MEDICINE

## 2017-05-16 PROCEDURE — 1101F PT FALLS ASSESS-DOCD LE1/YR: CPT | Performed by: FAMILY MEDICINE

## 2017-05-16 PROCEDURE — G8598 ASA/ANTIPLAT THER USED: HCPCS | Performed by: FAMILY MEDICINE

## 2017-05-16 PROCEDURE — 99214 OFFICE O/P EST MOD 30 MIN: CPT | Performed by: FAMILY MEDICINE

## 2017-05-16 PROCEDURE — G8420 CALC BMI NORM PARAMETERS: HCPCS | Performed by: FAMILY MEDICINE

## 2017-05-16 NOTE — MR AVS SNAPSHOT
"        Chung Limon   2017 8:00 AM   Office Visit   MRN: [id number]    Department:  [facility]   Dept Phone:  [phone]    Description:  Male : 1950   Provider:  Loree Soto D.O.           Reason for Visit     Follow-Up           Allergies as of 2017     No Known Allergies      You were diagnosed with     Cough   [786.2.ICD-9-CM]       Atherosclerosis of aorta (CMS-HCC)   [440.0.ICD-9-CM]         Vital Signs     Blood Pressure Pulse Temperature Respirations Height Weight    110/70 mmHg 71 37 °C (98.6 °F) 16 1.778 m (5' 10\") 78.019 kg (172 lb)    Body Mass Index Oxygen Saturation Smoking Status             24.68 kg/m2 94% Former Smoker         Basic Information     Date Of Birth Sex Race Ethnicity Preferred Language    1950 Male White Non- English      Your appointments     2017 10:30 AM   US ABDOMEN FASTING (30 MINUTES) with [facility]    [address] [facility] [address] [phone]           NPO 8 hours.  For  Abdomen, NPO 2 hours, schedule Abdomen Complete and include \" Abdomen\" in Appt Notes.            2017  9:00 AM   ADVANCED DIRECTIVE CLASS with [facility] AT [address]   Events (--)    Please Check Your Invitation   [phone]           [address] [place] [address]              Problem List              ICD-10-CM Priority Class Noted - Resolved    History of alcoholism F10.21   2012 - Present    Bilateral claudication of lower limb (CMS-HCC) I73.9   10/22/2012 - Present    H/O colonoscopy with polypectomy Z98.890, Z86.010   2013 - Present    Stress-induced cardiomyopathy I51.81   3/10/2015 - Present    Bruit R09.89   2016 - Present    Diabetes mellitus with peripheral vascular disease (CMS-HCC) E11.51   11/15/2016 - Present    Microalbuminuria due to type 2 diabetes mellitus (CMS-HCC) E11.29, R80.9   11/15/2016 - Present    History of tobacco abuse Z87.891   2017 - " Present    Atherosclerosis of aorta (CMS-HCC) I70.0   1/31/2017 - Present      Health Maintenance        Date Due Completion Dates    A1C SCREENING 5/15/2017 11/15/2016, 4/11/2016, 11/9/2015, 5/4/2015, 1/15/2015    DIABETES MONOFILAMENT / LE EXAM 11/15/2017 11/15/2016, 11/9/2015    FASTING LIPID PROFILE 11/17/2017 11/17/2016, 1/15/2016, 1/16/2015, 9/21/2012    RETINAL SCREENING 1/10/2018 1/10/2017, 1/24/2015    URINE ACR / MICROALBUMIN 2/1/2018 2/1/2017, 11/9/2016, 11/9/2015    SERUM CREATININE 2/1/2018 2/1/2017, 1/15/2016, 3/6/2015, 1/19/2015, 1/17/2015, 1/16/2015, 1/16/2015, 1/16/2015, 1/16/2015, 1/15/2015, 1/15/2015, 1/15/2015, 5/6/2014, 1/9/2013, 10/19/2012, 9/21/2012    COLONOSCOPY 10/3/2019 10/3/2016, 4/30/2013 (Done)    Override on 4/30/2013: Done (two polyps, tubular adenomas)    IMM DTaP/Tdap/Td Vaccine (2 - Td) 12/2/2024 12/2/2014            Current Immunizations     13-VALENT PCV PREVNAR 1/14/2016    Influenza Vaccine Adult HD 11/15/2016, 10/28/2015    Influenza Vaccine Quad Inj (Pf) 12/2/2014    Pneumococcal polysaccharide vaccine (PPSV-23) 12/2/2014    SHINGLES VACCINE 11/15/2016    Tdap Vaccine 12/2/2014      Below and/or attached are the medications your provider expects you to take. Review all of your home medications and newly ordered medications with your provider and/or pharmacist. Follow medication instructions as directed by your provider and/or pharmacist. Please keep your medication list with you and share with your provider. Update the information when medications are discontinued, doses are changed, or new medications (including over-the-counter products) are added; and carry medication information at all times in the event of emergency situations     Allergies:  No Known Allergies          Medications  Valid as of: May 16, 2017 -  9:52 AM    Generic Name Brand Name Tablet Size Instructions for use    ALPRAZolam (Tab) XANAX 0.5 MG Take 1-1.5 Tabs by mouth at bedtime as needed for Sleep.          Aspirin (Tablet Delayed Response) aspirin 81 MG Take 1 Tab by mouth every day.        Atorvastatin Calcium (Tab) LIPITOR 80 MG TAKE 1 TABLET BY MOUTH AT BEDTIME-GENERIC FOR LIPITOR        Clopidogrel Bisulfate (Tab) PLAVIX 75 MG TAKE 1 TABLET BY MOUTH DAILY -GENERICFOR PLAVIX        Glucose Blood (Strip) glucose blood  TEST EVERY MORNING AND RANDOMLY EVERY      EVENING        Lisinopril (Tab) PRINIVIL 10 MG Take 1 Tab by mouth every day.        Metoprolol Succinate (TABLET SR 24 HR) TOPROL XL 25 MG Take 1 Tab by mouth every day.        Misc. Devices (Kit) Misc. Devices  Diagnosis 250.00. Freestyle glucometer, test strips, #100, RF11 and lancets #100, RF 11. To test fasting in the AM and random in the PM.        Multiple Vitamins-Minerals   Take  by mouth.        .                 Medicines prescribed today were sent to:     [id number] - [place], [place] - [address]    [address] [place] [address]    Phone: [phone] Fax: [phone]    Open 24 Hours?: No      Medication refill instructions:       If your prescription bottle indicates you have medication refills left, it is not necessary to call your provider’s office. Please contact your pharmacy and they will refill your medication.    If your prescription bottle indicates you do not have any refills left, you may request refills at any time through one of the following ways: The online [employer] system (except Urgent Care), by calling your provider’s office, or by asking your pharmacy to contact your provider’s office with a refill request. Medication refills are processed only during regular business hours and may not be available until the next business day. Your provider may request additional information or to have a follow-up visit with you prior to refilling your medication.   *Please Note: Medication refills are assigned a new Rx number when refilled electronically. Your pharmacy may indicate that no refills were authorized  even though a new prescription for the same medication is available at the pharmacy. Please request the medicine by name with the pharmacy before contacting your provider for a refill.        Other Notes About Your Plan                    MyChart Access Code: Activation code not generated  Current MyChart Status: Active

## 2017-05-16 NOTE — PROGRESS NOTES
Subjective:     Chief Complaint   Patient presents with   • Follow-Up       Chung Limon is a 67 y.o. male here today for follow up multiple complaints.  Patient reports that he continues to have bilateral lower extremity pain. He continues to be followed by vascular. Patient states he cannot afford to have surgery done.    Patient has a history of hepatitis C. He was recently followed by GI. Patient states that the recommended treatment but he is concerned about the cost. Patient denies nausea, vomiting, skin changes, or abdominal pain.    Patient reports chronic dry cough for many years. He currently denies shortness of breath on exertion. Patient states he is unable to walk greater than 100 feet due to leg pain. He is therefore unable to assess his shortness of breath. Patient has significant history of tobacco abuse. Patient was denied lung cancer screening due to reports of chronic cough.       Patient continues to have cervical neck pain. Patient was evaluated by spine [place]. MRI showed multilevel degenerative changes. He was recommended to start physical therapy. The patient has not yet gone to physical therapy.      No Known Allergies  Current medicines (including changes today)  Current Outpatient Prescriptions   Medication Sig Dispense Refill   • lisinopril (PRINIVIL) 10 MG Tab Take 1 Tab by mouth every day. 90 Tab 3   • alprazolam (XANAX) 0.5 MG Tab Take 1-1.5 Tabs by mouth at bedtime as needed for Sleep. 40 Tab 5   • Multiple Vitamins-Minerals (CENTRUM SILVER PO) Take  by mouth.     • atorvastatin (LIPITOR) 80 MG tablet TAKE 1 TABLET BY MOUTH AT BEDTIME-GENERIC FOR LIPITOR 90 Tab 4   • glucose blood (FREESTYLE LITE) strip TEST EVERY MORNING AND RANDOMLY EVERY      EVENING 100 Strip 12   • metoprolol SR (TOPROL XL) 25 MG TABLET SR 24 HR Take 1 Tab by mouth every day. 90 Tab 3   • clopidogrel (PLAVIX) 75 MG Tab TAKE 1 TABLET BY MOUTH DAILY -GENERICFOR PLAVIX 90 Tab 4   • Misc. Devices KIT Diagnosis  "250.00. Freestyle glucometer, test strips, #100, RF11 and lancets #100, RF 11. To test fasting in the AM and random in the PM. 1 Kit 0   • aspirin EC 81 MG EC tablet Take 1 Tab by mouth every day. 30 Tab 0     No current facility-administered medications for this visit.     Social History   Substance Use Topics   • Smoking status: Former Smoker -- 1.00 packs/day for 50 years     Types: Cigarettes     Start date: 1960     Quit date: 2011   • Smokeless tobacco: Never Used      Comment: avoid all tobacco products   • Alcohol Use: No      Comment: quit , drank 2 times since     Family Status   Relation Status Death Age   • Father  75     heart failure   • Mother Alive    • Sister Alive    • Brother     • Brother Alive      1/2 sibling     Family History   Problem Relation Age of Onset   • Heart Disease Father    • Diabetes Neg Hx    • Hypertension Neg Hx    • Stroke Neg Hx    • Hyperlipidemia Neg Hx    • Other Mother      arthritis   • No Known Problems Sister    • Cancer Brother      type unknown     He    has a past medical history of Claudication (CMS-HCC) (2012); History of alcoholism (2012); Hepatitis C; H/O colonoscopy with polypectomy (2013); Cancer (CMS-HCC); Hypertension; Hyperlipidemia; and Diabetes (CMS-HCC).        ROS   GEN: no weight loss, fevers, or chills  HEENT:  no ear pain, no difficulty swallowing, no throat pain, no runny nose, no nasal congestion  Resp: + cough  CV: no racing heart, no irregular beats, no chest pain  Abd: no nausea, no vomiting, no diarrhea, no constipation, no blood in stool, no dark stools, no incontinence  MSK:cervical pain, bilateral lower extremity claudication  Neuro: no headaches, no dizziness, no LOC, no weakness, no numbness/tingling       Objective:     Blood pressure 110/70, pulse 71, temperature 37 °C (98.6 °F), resp. rate 16, height 1.778 m (5' 10\"), weight 78.019 kg (172 lb), SpO2 94 %. Body mass index is 24.68 " kg/(m^2).  Physical Exam:  Constitutional: Alert, no distress.  Skin: Warm, dry, good turgor, no rashes in visible areas.  Eye: Equal, round and reactive, conjunctiva clear, lids normal.  ENMT: Lips without lesions, oropharynx non-erythematous, no exudate, moist oral mucosa  Neck: Trachea midline, no masses, no thyromegaly. No cervical or supraclavicular lymphadenopathy.  Respiratory: Unlabored respiratory effort, good air movement, lungs clear to auscultation, no wheezes, no ronchi.  Cardiovascular:RRR, +S1, S2, no murmur, no peripheral edema, pedal and radial pulses equal and intact bilat  Abdomen: Soft, non-tender, no masses, no hepatosplenomegaly.  Psych: Alert and oriented x3, appropriate affect and mood.  Neuro: CN2-12 intact, no gross motor or sensory deficits      Assessment and Plan:   The following treatment plan was discussed  1. Cough  Patient reports chronic in nature. Most likely variant of COPD due to patient's history of tobacco abuse. Patient states he is unable to assess shortness of breath as claudication prevents him from walking.  - PULMONARY FUNCTION TESTS Test requested: Complete Pulmonary Function Test    2. Bilateral claudication of lower limb (CMS-HCC)  Chronic: Continue to follow with vascular. I advised patient that the cost of surgery will most likely be covered by insurance and if he is concerned about the cost needs to discuss it with his vascular specialist    3. Chronic hepatitis C without hepatic coma (CMS-HCC)  Recommend continuing to follow with GI. Again discussed insurance will most likely convert cost of treatment. Need for treatment discussed to prevent liver cirrhosis.    4. Cervical disc disease  Chronic: Recommend continuing to follow with him at his spine and physical therapy.      Followup: Return in about 6 months (around 11/16/2017) for F/U DM.    Please note that this dictation was created using voice recognition software. I have made every reasonable attempt to  correct obvious errors, but I expect that there are errors of grammar and possibly content that I did not discover before finalizing the note.

## 2017-05-18 PROBLEM — M50.00 CERVICAL DISC DISEASE WITH MYELOPATHY: Status: ACTIVE | Noted: 2017-05-18

## 2017-05-18 PROBLEM — M50.90 CERVICAL DISC DISEASE: Status: ACTIVE | Noted: 2017-05-18

## 2017-05-18 PROBLEM — B18.2 CHRONIC HEPATITIS C WITHOUT HEPATIC COMA (HCC): Status: ACTIVE | Noted: 2017-05-18

## 2017-05-26 NOTE — PROGRESS NOTES
"Evaluation of 6MWT/MLWHF Questionnaire    Date: No previous on file  6MWT: n/a  MLWHF: n/a    Date: 2017  6MWT: 381 meters  MLWHF: 48    OP Heart Failure  Vitals  Appointment Type: Heart Failure Established  Weight: 78.2 kg (172 lb 6.4 oz) (pt stated he weighed this morning )  How Weight Obtained: Stated Weight  Height: 177.8 cm (5' 10\")  BMI (Calculated): 24.74  Blood Pressure : 140/62 mmHg  Pulse: 86    System Assessment  NYHA Functional Class Assessment:  (Stress-Induced CM)     Smoking Hx  Have you Ever Smoked: Yes  Have you Smoked in the Last 12 Mos: No  Confirm Quit Date: 11     Alcohol Hx  Do you Drink?: No     Illicit Drug Hx  Illicit Drug History: No    [place] Living with Heart Failure  Swelling in Ankles or Legs: 1  Having to Sit or Lie Down During the Day: 3  Walking About or Climbing Stairs Difficult: 4  Working Around the House or Yard Difficult: 2  Difficulty Going Away from Home: 1  Difficulty Sleeping Well at Night: 2  Difficulty Socializing with Family or Friends: 3  Difficulty Working to Earn a Livin  Difficulty with Recreational Pastimes, Sports, Hobbies: 4  Difficulty with Sexual Activities: 4  Eating Less Foods You Like: 1  Making you Short of Breath: 2  Tired, Fatigued or Low on Energy: 4  Making you Stay in a Hospital: 0  Costing you Money for Medical Care?: 0  Giving you Side Effects from Treatments: 1  Feeling like a [place] to Family and Friends: 1  Loss of Self Control in your Life: 2  Making You Worry: 3  Difficulty to Concentrate or Remembering Things: 4  Making you Feel Depressed: 2  MLF Total Score : 48    6 Minute Walk Test  Baseline to end of test: 6:00  Total meters walked: 381       DICK Manuel RN  [phone]  "

## 2017-06-02 ENCOUNTER — HOSPITAL ENCOUNTER (OUTPATIENT)
Dept: OTHER | Facility: MEDICAL CENTER | Age: 67
End: 2017-06-02
Attending: FAMILY MEDICINE
Payer: MEDICARE

## 2017-06-02 DIAGNOSIS — R05.9 COUGH: ICD-10-CM

## 2017-06-02 PROCEDURE — 94726 PLETHYSMOGRAPHY LUNG VOLUMES: CPT | Mod: 26 | Performed by: INTERNAL MEDICINE

## 2017-06-02 PROCEDURE — 94060 EVALUATION OF WHEEZING: CPT

## 2017-06-02 PROCEDURE — 94060 EVALUATION OF WHEEZING: CPT | Mod: 26 | Performed by: INTERNAL MEDICINE

## 2017-06-02 PROCEDURE — 94726 PLETHYSMOGRAPHY LUNG VOLUMES: CPT

## 2017-06-02 PROCEDURE — 94729 DIFFUSING CAPACITY: CPT

## 2017-06-02 PROCEDURE — 94729 DIFFUSING CAPACITY: CPT | Mod: 26 | Performed by: INTERNAL MEDICINE

## 2017-06-02 ASSESSMENT — PULMONARY FUNCTION TESTS
FEV1_PERCENT_PREDICTED: 114
FEV1/FVC_PERCENT_PREDICTED: 74
FVC: 4.96
FEV1_PREDICTED: 3.04
FEV1: 3.71
FVC_PERCENT_PREDICTED: 120
FEV1: 3.46
FEV1/FVC_PERCENT_PREDICTED: 95
FEV1_PERCENT_CHANGE: 2
FEV1_PERCENT_CHANGE: 7
FEV1/FVC_PERCENT_PREDICTED: 99
FVC_PERCENT_PREDICTED: 123
FEV1_PERCENT_PREDICTED: 122
FEV1/FVC: 70
FVC: 5.07
FVC_PREDICTED: 4.12
FEV1/FVC_PERCENT_CHANGE: 350
FEV1/FVC: 73.18

## 2017-06-03 NOTE — PROCEDURES
Review of spirometry shows a mild obstructive process evident by reduction in   the FEV1/FVC percent.  However, the absolute flow rates are all totally   normal.  The flow volume loop is confirmatory.  There is no significant change   in flow rates following inhaled bronchodilators.  Lung volumes are normal   aside from a minimal increase in the RV to 121% of predicted.  Diffusion and   airways resistance are both normal.  Overall, pulmonary function is normal,   although the pattern is suggestive of mild obstruction, which may not be   significant, given the normal absolute value.       ____________________________________     MD BRITTANY DIALLO / NTS    DD:  06/03/2017 08:58:30  DT:  06/03/2017 09:46:21    D#:  [id number]  Job#:  [id number]

## 2017-06-07 ENCOUNTER — HOSPITAL ENCOUNTER (OUTPATIENT)
Dept: RADIOLOGY | Facility: MEDICAL CENTER | Age: 67
End: 2017-06-07
Attending: INTERNAL MEDICINE
Payer: MEDICARE

## 2017-06-07 DIAGNOSIS — B18.2 CARRIER OF VIRAL HEPATITIS C (HCC): ICD-10-CM

## 2017-06-07 PROCEDURE — 76700 US EXAM ABDOM COMPLETE: CPT

## 2017-06-07 PROCEDURE — 0346T US-LIVER ELASTOGRAPHY: CPT

## 2017-06-09 ENCOUNTER — APPOINTMENT (OUTPATIENT)
Dept: OTHER | Facility: IMAGING CENTER | Age: 67
End: 2017-06-09

## 2017-06-09 ENCOUNTER — HOSPITAL ENCOUNTER (OUTPATIENT)
Dept: LAB | Facility: MEDICAL CENTER | Age: 67
End: 2017-06-09
Attending: INTERNAL MEDICINE
Payer: MEDICARE

## 2017-06-09 LAB
ALBUMIN SERPL BCP-MCNC: 3.9 G/DL (ref 3.2–4.9)
ALBUMIN/GLOB SERPL: 1.1 G/DL
ALP SERPL-CCNC: 68 U/L (ref 30–99)
ALT SERPL-CCNC: 67 U/L (ref 2–50)
AMMONIA PLAS-SCNC: 47 UMOL/L (ref 11–45)
AMPHET UR QL SCN: NEGATIVE
ANION GAP SERPL CALC-SCNC: 7 MMOL/L (ref 0–11.9)
AST SERPL-CCNC: 46 U/L (ref 12–45)
BARBITURATES UR QL SCN: NEGATIVE
BASOPHILS # BLD AUTO: 0.7 % (ref 0–1.8)
BASOPHILS # BLD: 0.03 K/UL (ref 0–0.12)
BENZODIAZ UR QL SCN: POSITIVE
BILIRUB SERPL-MCNC: 0.5 MG/DL (ref 0.1–1.5)
BUN SERPL-MCNC: 23 MG/DL (ref 8–22)
BZE UR QL SCN: NEGATIVE
CALCIUM SERPL-MCNC: 9.3 MG/DL (ref 8.5–10.5)
CANNABINOIDS UR QL SCN: POSITIVE
CHLORIDE SERPL-SCNC: 111 MMOL/L (ref 96–112)
CO2 SERPL-SCNC: 23 MMOL/L (ref 20–33)
CREAT SERPL-MCNC: 0.66 MG/DL (ref 0.5–1.4)
EOSINOPHIL # BLD AUTO: 0.16 K/UL (ref 0–0.51)
EOSINOPHIL NFR BLD: 3.6 % (ref 0–6.9)
ERYTHROCYTE [DISTWIDTH] IN BLOOD BY AUTOMATED COUNT: 41.7 FL (ref 35.9–50)
ETHANOL BLD-MCNC: 0 G/DL
GFR SERPL CREATININE-BSD FRML MDRD: >60 ML/MIN/1.73 M 2
GLOBULIN SER CALC-MCNC: 3.4 G/DL (ref 1.9–3.5)
GLUCOSE SERPL-MCNC: 96 MG/DL (ref 65–99)
HBV SURFACE AB SERPL IA-ACNC: <3.1 MIU/ML (ref 0–10)
HBV SURFACE AG SER QL: NEGATIVE
HCT VFR BLD AUTO: 39 % (ref 42–52)
HGB BLD-MCNC: 13.4 G/DL (ref 14–18)
IMM GRANULOCYTES # BLD AUTO: 0 K/UL (ref 0–0.11)
IMM GRANULOCYTES NFR BLD AUTO: 0 % (ref 0–0.9)
INR PPP: 0.99 (ref 0.87–1.13)
LYMPHOCYTES # BLD AUTO: 1.93 K/UL (ref 1–4.8)
LYMPHOCYTES NFR BLD: 43 % (ref 22–41)
MCH RBC QN AUTO: 30.7 PG (ref 27–33)
MCHC RBC AUTO-ENTMCNC: 34.4 G/DL (ref 33.7–35.3)
MCV RBC AUTO: 89.4 FL (ref 81.4–97.8)
MDMA UR QL SCN: NEGATIVE
METHADONE UR QL SCN: NEGATIVE
MONOCYTES # BLD AUTO: 0.24 K/UL (ref 0–0.85)
MONOCYTES NFR BLD AUTO: 5.3 % (ref 0–13.4)
NEUTROPHILS # BLD AUTO: 2.13 K/UL (ref 1.82–7.42)
NEUTROPHILS NFR BLD: 47.4 % (ref 44–72)
NRBC # BLD AUTO: 0 K/UL
NRBC BLD AUTO-RTO: 0 /100 WBC
OPIATES UR QL SCN: NEGATIVE
OXYCODONE UR QL SCN: NEGATIVE
PCP UR QL SCN: NEGATIVE
PLATELET # BLD AUTO: 113 K/UL (ref 164–446)
PMV BLD AUTO: 12.7 FL (ref 9–12.9)
POTASSIUM SERPL-SCNC: 4.5 MMOL/L (ref 3.6–5.5)
PROPOXYPH UR QL SCN: NEGATIVE
PROT SERPL-MCNC: 7.3 G/DL (ref 6–8.2)
PROTHROMBIN TIME: 13.4 SEC (ref 12–14.6)
RBC # BLD AUTO: 4.36 M/UL (ref 4.7–6.1)
SODIUM SERPL-SCNC: 141 MMOL/L (ref 135–145)
WBC # BLD AUTO: 4.5 K/UL (ref 4.8–10.8)

## 2017-06-09 PROCEDURE — 80053 COMPREHEN METABOLIC PANEL: CPT

## 2017-06-09 PROCEDURE — 86706 HEP B SURFACE ANTIBODY: CPT

## 2017-06-09 PROCEDURE — 85025 COMPLETE CBC W/AUTO DIFF WBC: CPT

## 2017-06-09 PROCEDURE — 82105 ALPHA-FETOPROTEIN SERUM: CPT

## 2017-06-09 PROCEDURE — 85610 PROTHROMBIN TIME: CPT

## 2017-06-09 PROCEDURE — 83516 IMMUNOASSAY NONANTIBODY: CPT | Mod: 59

## 2017-06-09 PROCEDURE — 86708 HEPATITIS A ANTIBODY: CPT

## 2017-06-09 PROCEDURE — 36415 COLL VENOUS BLD VENIPUNCTURE: CPT

## 2017-06-09 PROCEDURE — 82784 ASSAY IGA/IGD/IGG/IGM EACH: CPT

## 2017-06-09 PROCEDURE — 87902 NFCT AGT GNTYP ALYS HEP C: CPT

## 2017-06-09 PROCEDURE — 87340 HEPATITIS B SURFACE AG IA: CPT

## 2017-06-09 PROCEDURE — 82140 ASSAY OF AMMONIA: CPT

## 2017-06-09 PROCEDURE — 80307 DRUG TEST PRSMV CHEM ANLYZR: CPT

## 2017-06-11 LAB
AFP-TM SERPL-MCNC: 11 NG/ML (ref 0–9)
HAV AB SER QL IA: POSITIVE
IGA SERPL-MCNC: 178 MG/DL (ref 68–408)
TTG IGA SER IA-ACNC: 1 U/ML (ref 0–3)

## 2017-06-16 LAB — HCV GENTYP SERPL NAA+PROBE: NORMAL

## 2017-06-20 DIAGNOSIS — I25.2 HISTORY OF MI (MYOCARDIAL INFARCTION): ICD-10-CM

## 2017-06-20 DIAGNOSIS — I70.209 OCCLUSION OF ARTERY OF LEG (HCC): ICD-10-CM

## 2017-06-20 RX ORDER — CLOPIDOGREL BISULFATE 75 MG/1
TABLET ORAL
Qty: 90 TAB | Refills: 1 | Status: SHIPPED | OUTPATIENT
Start: 2017-06-20 | End: 2017-12-22 | Stop reason: SDUPTHER

## 2017-06-20 NOTE — TELEPHONE ENCOUNTER
Was the patient seen in the last year in this department? Yes     Does patient have an active prescription for medications requested? No     Received Request Via: Pharmacy      Pt met protocol?: Yes, OV 5/17   Lab Results   Component Value Date/Time    HDL 33* 11/17/2016 09:40 AM

## 2017-08-08 DIAGNOSIS — I25.2 HISTORY OF MI (MYOCARDIAL INFARCTION): ICD-10-CM

## 2017-08-08 RX ORDER — METOPROLOL SUCCINATE 25 MG/1
25 TABLET, EXTENDED RELEASE ORAL DAILY
Qty: 90 TAB | Refills: 3 | Status: SHIPPED | OUTPATIENT
Start: 2017-08-08 | End: 2018-08-29 | Stop reason: SDUPTHER

## 2017-09-26 DIAGNOSIS — F41.9 ANXIETY: ICD-10-CM

## 2017-09-27 RX ORDER — ALPRAZOLAM 0.5 MG/1
.5-.75 TABLET ORAL NIGHTLY PRN
Qty: 40 TAB | Refills: 5 | Status: SHIPPED | OUTPATIENT
Start: 2017-09-27 | End: 2018-04-05 | Stop reason: SDUPTHER

## 2017-12-05 ENCOUNTER — PATIENT OUTREACH (OUTPATIENT)
Dept: HEALTH INFORMATION MANAGEMENT | Facility: OTHER | Age: 67
End: 2017-12-05

## 2017-12-05 ENCOUNTER — TELEPHONE (OUTPATIENT)
Dept: MEDICAL GROUP | Facility: PHYSICIAN GROUP | Age: 67
End: 2017-12-05

## 2017-12-05 DIAGNOSIS — E11.9 CONTROLLED TYPE 2 DIABETES MELLITUS WITHOUT COMPLICATION, WITHOUT LONG-TERM CURRENT USE OF INSULIN (HCC): ICD-10-CM

## 2017-12-05 NOTE — PROGRESS NOTES
WebIZ Checked & Epic Updated: Yes  · WebIZ Recommendations: Patient is up to date on all vaccines  · Is patient due for Tdap? NO  · Is patient due for Shingles? NO  [facility] Verified: yes  Verify PCP: yes    Communication Preference Obtained: yes     Review Care Team: yes    Annual Wellness Visit Scheduling  1. Scheduling Status:Scheduled      Care Gap Scheduling (Attempt to Schedule EACH Overdue Care Gap!)     Health Maintenance Due   Topic Date Due   • A1C SCREENING  Orders sent to PCP   • IMM INFLUENZA (1) Declined   • DIABETES MONOFILAMENT / LE EXAM  Scheduled   • FASTING LIPID PROFILE  Orders sent to PCP        Scheduled patient for Annual Wellness Visit and Diabetes Monofilament Exam       MyChart Activation: already active

## 2017-12-12 ENCOUNTER — OFFICE VISIT (OUTPATIENT)
Dept: MEDICAL GROUP | Facility: PHYSICIAN GROUP | Age: 67
End: 2017-12-12
Payer: MEDICARE

## 2017-12-12 VITALS
DIASTOLIC BLOOD PRESSURE: 60 MMHG | BODY MASS INDEX: 25.48 KG/M2 | HEIGHT: 69 IN | HEART RATE: 72 BPM | OXYGEN SATURATION: 96 % | SYSTOLIC BLOOD PRESSURE: 112 MMHG | WEIGHT: 172 LBS | TEMPERATURE: 97.6 F

## 2017-12-12 DIAGNOSIS — M50.90 CERVICAL DISC DISEASE: ICD-10-CM

## 2017-12-12 DIAGNOSIS — E11.51 DIABETES MELLITUS WITH PERIPHERAL VASCULAR DISEASE (HCC): ICD-10-CM

## 2017-12-12 DIAGNOSIS — E11.29 MICROALBUMINURIA DUE TO TYPE 2 DIABETES MELLITUS (HCC): ICD-10-CM

## 2017-12-12 DIAGNOSIS — R80.9 MICROALBUMINURIA DUE TO TYPE 2 DIABETES MELLITUS (HCC): ICD-10-CM

## 2017-12-12 PROCEDURE — 99215 OFFICE O/P EST HI 40 MIN: CPT | Performed by: FAMILY MEDICINE

## 2017-12-12 NOTE — PROGRESS NOTES
Subjective:     HPI    Chief Complaint   Patient presents with   • Diabetes       Chung Limon is a 67 y.o. male who presents for follow up of chronic conditions of diabetes mellitus    RN-CDE notes reviewed including HPI for DM  Exercise frequency and limitations reviewed.  Feet symptoms reviewed.  Nutritional status reviewed.  Retinal eye exam history reviewed.    Patient additionally reports:  Cervical neck pain. Patient reports his left arm falls asleep when he is driving for a long period of time. Patient was seen by [facility]  but did not wish to pay 30 co-pay.      He indicates that he is feeling well and denies any symptoms referable to the above diagnoses. Specifically denies chest pain, palpitations, dyspnea, orthopnea, PND or peripheral edema. Also denies polyuria, polydipsia, urinary complaints, abdominal complaints, myalgias, numbness, weakness or other related symptoms.     Patient Active Problem List    Diagnosis Date Noted   • Chronic hepatitis C without hepatic coma (CMS-HCC) 05/18/2017   • Cervical disc disease 05/18/2017   • History of tobacco abuse 01/31/2017   • Atherosclerosis of aorta (CMS-HCC) 01/31/2017   • Diabetes mellitus with peripheral vascular disease (CMS-HCC) 11/15/2016   • Microalbuminuria due to type 2 diabetes mellitus (CMS-HCC) 11/15/2016   • Bruit 11/01/2016   • Stress-induced cardiomyopathy 03/10/2015   • H/O colonoscopy with polypectomy 05/07/2013   • Bilateral claudication of lower limb (CMS-HCC) 10/22/2012   • History of alcoholism (CMS-HCC) 08/22/2012       Health Maintenance/Immunizations:   Health Maintenance Topics with due status: Overdue       Topic Date Due    A1C SCREENING 05/15/2017    IMM INFLUENZA 09/01/2017    FASTING LIPID PROFILE 11/17/2017         Current medications including changes today:  Current Outpatient Prescriptions   Medication Sig Dispense Refill   • alprazolam (XANAX) 0.5 MG Tab Take 1-1.5 Tabs by mouth at bedtime as needed for Sleep.  "40 Tab 5   • metoprolol SR (TOPROL XL) 25 MG TABLET SR 24 HR Take 1 Tab by mouth every day. 90 Tab 3   • clopidogrel (PLAVIX) 75 MG Tab Take 1 tablet orally once daily-GENERIC FOR PLAVIX 90 Tab 1   • lisinopril (PRINIVIL) 10 MG Tab Take 1 Tab by mouth every day. 90 Tab 3   • Multiple Vitamins-Minerals (CENTRUM SILVER PO) Take  by mouth.     • atorvastatin (LIPITOR) 80 MG tablet TAKE 1 TABLET BY MOUTH AT BEDTIME-GENERIC FOR LIPITOR 90 Tab 4   • glucose blood (FREESTYLE LITE) strip TEST EVERY MORNING AND RANDOMLY EVERY      EVENING 100 Strip 12   • Misc. Devices KIT Diagnosis 250.00. Freestyle glucometer, test strips, #100, RF11 and lancets #100, RF 11. To test fasting in the AM and random in the PM. 1 Kit 0   • aspirin EC 81 MG EC tablet Take 1 Tab by mouth every day. 30 Tab 0     No current facility-administered medications for this visit.        Allergies: No Known Allergies     Social History   Substance Use Topics   • Smoking status: Former Smoker     Packs/day: 1.00     Years: 50.00     Types: Cigarettes     Start date: 1/1/1960     Quit date: 9/1/2011   • Smokeless tobacco: Never Used      Comment: avoid all tobacco products   • Alcohol use No      Comment: quit 1985, drank 2 times since       Family History   Problem Relation Age of Onset   • Heart Disease Father    • Diabetes Neg Hx    • Hypertension Neg Hx    • Stroke Neg Hx    • Hyperlipidemia Neg Hx    • Other Mother      arthritis   • No Known Problems Sister    • Cancer Brother      type unknown         ROS  Pertinent ROS findings as above.   All other systems reviewed and are negative except as per HPI.     Objective:     /60   Pulse 72   Temp 36.4 °C (97.6 °F)   Ht 1.753 m (5' 9\")   Wt 78 kg (172 lb)   SpO2 96%   BMI 25.40 kg/m²  Body mass index is 25.4 kg/m².     Alert, oriented in no acute distress.  Eye contact is good, speech goal directed, affect calm.  HEENT: EOMI, PERRL, conjunctiva non-injected, sclera non-icteric.  Nares patent " with no significant congestion or drainage.  Chanell pinnae, external auditory canals, TM pearly gray with normal light reflex bilaterally.  Oral mucous membranes pink and moist with no lesions or thrush.  Neck supple with no cervical lymphadenopathy, JVD, palpable thyroid nodules or carotid bruits.  Lungs: clear to auscultation bilaterally with good excursion.  CV: regular rate and rhythm.  Abdomen soft, non-distended, non-tender with normal bowel sounds. No CVAT  Lower extremities warm with no edema.    Lab Results   Component Value Date/Time    HBA1C 6.0 11/15/2016 11:55 AM          Assessment/Plan:       1. Diabetes mellitus with peripheral vascular disease (CMS-[facility])  Diabetic Monofilament LE Exam   2. Microalbuminuria due to type 2 diabetes mellitus (CMS-HCC)     3. Cervical disc disease         DM2 A1c is at goal -RN-CDE notes reviewed and discussed.  -Discussed diet, exercise, disease management and weight loss goals.   -Education and advice provided today: See RN-CDE note.  Additional education, advice, refills, and referrals per order    MRI showed degenerative cervical changes. Patient declines follow-up with spinal specialist or physical therapy. Recommend a home stretches. Patient is interested in spinal decompression. Risks and benefits discussed.    Follow-up:   Return in about 6 months (around 6/12/2018) for chronic conditions, F/U DM with RN DM Nurse. sooner should new symptoms or problems arise.    The total time spent seeing the patient in consultation, and formulating an action plan for this visit was 40minutes.  Greater than 50% of this time was spent counseling, discussing problems documented above, coordinating care and answering questions by the provider and registered nurse--certified diabetes educator.

## 2017-12-12 NOTE — PROGRESS NOTES
RN-CDE Note  Type 2 Diabetes controlled with diet and exercise  Subjective:     Health changes since last visit/interval Hx: States has pain in lower extremities with walking due to claudiacation.    Medications (including changes made today)  Current Outpatient Prescriptions   Medication Sig Dispense Refill   • alprazolam (XANAX) 0.5 MG Tab Take 1-1.5 Tabs by mouth at bedtime as needed for Sleep. 40 Tab 5   • metoprolol SR (TOPROL XL) 25 MG TABLET SR 24 HR Take 1 Tab by mouth every day. 90 Tab 3   • clopidogrel (PLAVIX) 75 MG Tab Take 1 tablet orally once daily-GENERIC FOR PLAVIX 90 Tab 1   • lisinopril (PRINIVIL) 10 MG Tab Take 1 Tab by mouth every day. 90 Tab 3   • Multiple Vitamins-Minerals (CENTRUM SILVER PO) Take  by mouth.     • atorvastatin (LIPITOR) 80 MG tablet TAKE 1 TABLET BY MOUTH AT BEDTIME-GENERIC FOR LIPITOR 90 Tab 4   • glucose blood (FREESTYLE LITE) strip TEST EVERY MORNING AND RANDOMLY EVERY      EVENING 100 Strip 12   • Misc. Devices KIT Diagnosis 250.00. Freestyle glucometer, test strips, #100, RF11 and lancets #100, RF 11. To test fasting in the AM and random in the PM. 1 Kit 0   • aspirin EC 81 MG EC tablet Take 1 Tab by mouth every day. 30 Tab 0     No current facility-administered medications for this visit.          Taking daily ASA: Yes  Taking above medications as prescribed: Yes   Patient Denies side effects of medication.    Exercise: walking as tolerated and keeping busy.  Diet: Eating the loaded [employer] breakfast sandwich.  Eating a lot of chicken and salads the rest of the day.    Health Maintenance:   Health Maintenance Topics with due status: Overdue       Topic Date Due    A1C SCREENING 05/15/2017    IMM INFLUENZA 09/01/2017    DIABETES MONOFILAMENT / LE EXAM 11/15/2017    FASTING LIPID PROFILE 11/17/2017         DM:   Last A1c:   Lab Results   Component Value Date/Time    HBA1C 6.0 11/15/2016 11:55 AM      A1c goal: < 6.5    Glucose monitoring frequency: weekly  trend:  100-110  Hypoglycemic episodes: no     Last Retinal Exam: 8/30/17 Provider: Leonardo  Daily Foot Exam: yes  Routine Dental Exams: yes    Lab Results   Component Value Date/Time    MALBCRT 65 (H) 02/01/2017 06:08 AM    MICROALBUR 7.7 02/01/2017 06:08 AM        ACR Albumin/Creatinine Ratio goal <30     Diabetic complications: peripheral vascular disease    HTN:   Blood pressure goal <140/<80 .   Currently Rx ACE/ARB: Yes    Dyslipidemia:    Lab Results   Component Value Date/Time    CHOLSTRLTOT 106 11/17/2016 09:40 AM    LDL 50 11/17/2016 09:40 AM    HDL 33 (A) 11/17/2016 09:40 AM    TRIGLYCERIDE 113 11/17/2016 09:40 AM       Lab Results   Component Value Date/Time    SODIUM 141 06/09/2017 02:07 PM    POTASSIUM 4.5 06/09/2017 02:07 PM    CHLORIDE 111 06/09/2017 02:07 PM    CO2 23 06/09/2017 02:07 PM    GLUCOSE 96 06/09/2017 02:07 PM    BUN 23 (H) 06/09/2017 02:07 PM    CREATININE 0.66 06/09/2017 02:07 PM     Lab Results   Component Value Date/Time    ALKPHOSPHAT 68 06/09/2017 02:07 PM    ASTSGOT 46 (H) 06/09/2017 02:07 PM    ALTSGPT 67 (H) 06/09/2017 02:07 PM    TBILIRUBIN 0.5 06/09/2017 02:07 PM        Currently Rx Statin: Yes      He  reports that he quit smoking about 6 years ago. His smoking use included Cigarettes. He started smoking about 57 years ago. He has a 50.00 pack-year smoking history. He has never used smokeless tobacco.    Objective:     Exam:  Monofilament: done  Monofilament testing with a 10 gram force: sensation intact: intact bilaterally  Visual Inspection: Feet without maceration, ulcers, fissures.  Pedal pulses: intact bilaterally      Plan:     - Diabetic diet discussed in detail-plate method.  - Home glucose monitoring.   - He will walk for 20-30 minutes daily.  - Discussed importance of immunizations and yearly eye exams.   - Advised daily foot exams. Educated on signs of infection.       Recommended medication changes: States totally changed his eating since learning he had diabetes.  Found  his family and is looking forward to life.

## 2017-12-22 DIAGNOSIS — E78.6 HYPOCHOLESTEREMIA: ICD-10-CM

## 2017-12-22 DIAGNOSIS — I25.2 HISTORY OF MI (MYOCARDIAL INFARCTION): ICD-10-CM

## 2017-12-22 DIAGNOSIS — I70.209 OCCLUSION OF ARTERY OF LEG (HCC): ICD-10-CM

## 2017-12-22 RX ORDER — ATORVASTATIN CALCIUM 80 MG/1
TABLET, FILM COATED ORAL
Qty: 90 TAB | Refills: 0 | Status: SHIPPED | OUTPATIENT
Start: 2017-12-22 | End: 2018-03-22 | Stop reason: SDUPTHER

## 2017-12-22 RX ORDER — CLOPIDOGREL BISULFATE 75 MG/1
TABLET ORAL
Qty: 90 TAB | Refills: 0 | Status: SHIPPED | OUTPATIENT
Start: 2017-12-22 | End: 2018-04-05 | Stop reason: SDUPTHER

## 2017-12-22 NOTE — TELEPHONE ENCOUNTER
Was the patient seen in the last year in this department? Yes     Does patient have an active prescription for medications requested? No     Received Request Via: Pharmacy      Pt met protocol?: No, PT LAST OV 12/12/17  Due for labs  HDL   Date Value Ref Range Status   11/17/2016 33 (A) >=40 mg/dL Final     LDL   Date Value Ref Range Status   11/17/2016 50 <100 mg/dL Final     Cholesterol,Tot   Date Value Ref Range Status   11/17/2016 106 100 - 199 mg/dL Final

## 2018-01-09 ENCOUNTER — TELEPHONE (OUTPATIENT)
Dept: MEDICAL GROUP | Facility: PHYSICIAN GROUP | Age: 68
End: 2018-01-09

## 2018-01-09 NOTE — TELEPHONE ENCOUNTER
ANNUAL WELLNESS VISIT PRE-VISIT PLANNING     1.  Reviewed note from last office visit with PCP: YES 12/12/2017    2.  If any orders were placed at last visit, do we have Results/Consult Notes?        •  Labs - Labs ordered, NOT completed. Patient advised to complete prior to next appointment.       •  Imaging - Imaging was not ordered at last office visit.       •  Referrals - No referrals were ordered at last office visit.    3.  Immunizations were updated in [facility] using WebIZ?: Yes       •  WebIZ Recommendations: FLU       •  Is patient due for Tdap? NO       •  Is patient due for Shingles? NO     4.  Patient is due for the following Health Maintenance Topics:   Health Maintenance Due   Topic Date Due   • A1C SCREENING  05/15/2017   • IMM INFLUENZA (1) 09/01/2017   • FASTING LIPID PROFILE  11/17/2017       5.  Reviewed/Updated the following with patient:       •   Preferred Pharmacy? YES       •   Preferred Lab? YES       •   Preferred Communication? YES       •   Allergies? YES       •   Medications? YES. Was Abstract Encounter opened and chart updated? YES       •   Social History? YES. Was Abstract Encounter opened and chart updated? YES       •   Family History (document living status of immediate family members and if + hx of cancer, diabetes, hypertension, hyperlipidemia, heart attack, stroke) YES. Was Abstract Encounter opened and chart updated? YES    6.  Care Team Updated:       •   DME Company (gait device, O2, CPAP, etc.): YES       •   Other Specialists (eye doctor, derm, GYN, cardiology, endo, etc): YES    7.  Patient has the following Care Path diagnoses on Problem List:  DIABETES    8.  Specialty Comments was updated with diagnosis information provided by [facility]: YES There is a Note     9.  Patient was advised: “This is a free wellness visit. The provider will screen for medical conditions to help you stay healthy. If you have other concerns to address you may be asked to discuss these at a separate  visit or there may be an additional fee.”     6.  Patient was informed to arrive 15 min prior to their scheduled appointment and bring in their medication bottles.

## 2018-01-11 ENCOUNTER — TELEPHONE (OUTPATIENT)
Dept: MEDICAL GROUP | Facility: PHYSICIAN GROUP | Age: 68
End: 2018-01-11

## 2018-01-16 ENCOUNTER — OFFICE VISIT (OUTPATIENT)
Dept: MEDICAL GROUP | Facility: PHYSICIAN GROUP | Age: 68
End: 2018-01-16
Payer: MEDICARE

## 2018-01-16 ENCOUNTER — TELEPHONE (OUTPATIENT)
Dept: HEMATOLOGY ONCOLOGY | Facility: MEDICAL CENTER | Age: 68
End: 2018-01-16

## 2018-01-16 VITALS
SYSTOLIC BLOOD PRESSURE: 124 MMHG | TEMPERATURE: 98 F | WEIGHT: 177 LBS | HEART RATE: 80 BPM | HEIGHT: 69 IN | OXYGEN SATURATION: 94 % | BODY MASS INDEX: 26.22 KG/M2 | DIASTOLIC BLOOD PRESSURE: 60 MMHG

## 2018-01-16 DIAGNOSIS — Z01.83 BLOOD TYPING ENCOUNTER: ICD-10-CM

## 2018-01-16 DIAGNOSIS — M50.90 CERVICAL DISC DISEASE: ICD-10-CM

## 2018-01-16 DIAGNOSIS — Z12.5 SCREENING PSA (PROSTATE SPECIFIC ANTIGEN): ICD-10-CM

## 2018-01-16 DIAGNOSIS — Z86.010 H/O COLONOSCOPY WITH POLYPECTOMY: ICD-10-CM

## 2018-01-16 DIAGNOSIS — E11.29 MICROALBUMINURIA DUE TO TYPE 2 DIABETES MELLITUS (HCC): ICD-10-CM

## 2018-01-16 DIAGNOSIS — E11.51 DIABETES MELLITUS WITH PERIPHERAL VASCULAR DISEASE (HCC): ICD-10-CM

## 2018-01-16 DIAGNOSIS — I73.9 BILATERAL CLAUDICATION OF LOWER LIMB (HCC): ICD-10-CM

## 2018-01-16 DIAGNOSIS — I51.81 STRESS-INDUCED CARDIOMYOPATHY: ICD-10-CM

## 2018-01-16 DIAGNOSIS — Z87.891 HISTORY OF TOBACCO ABUSE: ICD-10-CM

## 2018-01-16 DIAGNOSIS — Z00.00 MEDICARE ANNUAL WELLNESS VISIT, SUBSEQUENT: ICD-10-CM

## 2018-01-16 DIAGNOSIS — R80.9 MICROALBUMINURIA DUE TO TYPE 2 DIABETES MELLITUS (HCC): ICD-10-CM

## 2018-01-16 DIAGNOSIS — B18.2 CHRONIC HEPATITIS C WITHOUT HEPATIC COMA (HCC): ICD-10-CM

## 2018-01-16 DIAGNOSIS — D10.39: ICD-10-CM

## 2018-01-16 DIAGNOSIS — I70.0 ATHEROSCLEROSIS OF AORTA (HCC): ICD-10-CM

## 2018-01-16 DIAGNOSIS — R09.89 BRUIT: ICD-10-CM

## 2018-01-16 DIAGNOSIS — Z98.890 H/O COLONOSCOPY WITH POLYPECTOMY: ICD-10-CM

## 2018-01-16 DIAGNOSIS — F10.21 HISTORY OF ALCOHOLISM (HCC): ICD-10-CM

## 2018-01-16 PROBLEM — C05.1 CANCER OF SOFT PALATE (HCC): Status: ACTIVE | Noted: 2018-01-16

## 2018-01-16 PROCEDURE — G0439 PPPS, SUBSEQ VISIT: HCPCS | Performed by: FAMILY MEDICINE

## 2018-01-16 ASSESSMENT — PATIENT HEALTH QUESTIONNAIRE - PHQ9: CLINICAL INTERPRETATION OF PHQ2 SCORE: 0

## 2018-01-16 NOTE — PROGRESS NOTES
Chief Complaint   Patient presents with   • Annual Wellness Visit         HPI:  Chung is a 67 y.o. 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   • Chronic hepatitis C without hepatic coma (CMS-HCC) 05/18/2017   • Cervical disc disease 05/18/2017   • History of tobacco abuse 01/31/2017   • Atherosclerosis of aorta (CMS-HCC) 01/31/2017   • Diabetes mellitus with peripheral vascular disease (CMS-HCC) 11/15/2016   • Microalbuminuria due to type 2 diabetes mellitus (CMS-HCC) 11/15/2016   • Bruit 11/01/2016   • Stress-induced cardiomyopathy 03/10/2015   • H/O colonoscopy with polypectomy 05/07/2013   • Bilateral claudication of lower limb (CMS-HCC) 10/22/2012   • History of alcoholism (CMS-HCC) 08/22/2012       Current Outpatient Prescriptions   Medication Sig Dispense Refill   • Cholecalciferol (VITAMIN D PO) Take  by mouth.     • VITAMIN K PO Take  by mouth.     • atorvastatin (LIPITOR) 80 MG tablet TAKE 1 TABLET BY MOUTH AT BEDTIME-GENERIC FOR LIPITOR 90 Tab 0   • clopidogrel (PLAVIX) 75 MG Tab Take 1 tablet orally once daily-GENERIC FOR PLAVIX 90 Tab 0   • alprazolam (XANAX) 0.5 MG Tab Take 1-1.5 Tabs by mouth at bedtime as needed for Sleep. 40 Tab 5   • metoprolol SR (TOPROL XL) 25 MG TABLET SR 24 HR Take 1 Tab by mouth every day. 90 Tab 3   • lisinopril (PRINIVIL) 10 MG Tab Take 1 Tab by mouth every day. 90 Tab 3   • Multiple Vitamins-Minerals (CENTRUM SILVER PO) Take  by mouth.     • glucose blood (FREESTYLE LITE) strip TEST EVERY MORNING AND RANDOMLY EVERY      EVENING 100 Strip 12   • [facility]. Devices KIT Diagnosis 250.00. Freestyle glucometer, test strips, #100, RF11 and lancets #100, RF 11. To test fasting in the AM and random in the PM. 1 Kit 0   • aspirin EC 81 MG EC tablet Take 1 Tab by mouth every day. 30 Tab 0     No current facility-administered medications for this visit.         Patient is taking  medications as noted in medication list.  Current supplements as per medication list.     Allergies: Patient has no known allergies.    Current social contact/activities: Visits with family and friends, fishing with friends      Is patient current with immunizations? No, due for FLU. Patient is interested in receiving NONE today.    He  reports that he quit smoking about 6 years ago. His smoking use included Cigarettes. He started smoking about 58 years ago. He has a 50.00 pack-year smoking history. He has never used smokeless tobacco. He reports that he does not drink alcohol or use drugs.  Counseling given: Not Answered        DPA/Advanced directive: Patient has Advanced Directive on file.     ROS:    Gait: Uses no assistive device   Ostomy: no   Other tubes: no   Amputations: no   Chronic oxygen use no   Last eye exam 1 year ago    Wears hearing aids: no   : Denies any urinary leakage during the last 6 months      Screening:    DIABETES    Has patient ever had diabetes education? Yes, and is NOT interested in more at this time.            Depression Screening    Little interest or pleasure in doing things?  0 - not at all  Feeling down, depressed, or hopeless? 0 - not at all  Patient Health Questionnaire Score: 0    If depressive symptoms identified deferred to follow up visit unless specifically addressed in assessment and plan.    Interpretation of PHQ-9 Total Score   Score Severity   1-4 No Depression   5-9 Mild Depression   10-14 Moderate Depression   15-19 Moderately Severe Depression   20-27 Severe Depression    Screening for Cognitive Impairment    Three Minute Recall (apple, watch, masoud)  3/3 [facility] kitchen baby   Draw clock face with all 12 numbers set to the hand to show 10 minutes past 11 o'clock  1 3:45 4/5  If cognitive concerns identified, deferred for follow up unless specifically addressed in assessment and plan.    Fall Risk Assessment    Has the patient had two or more falls in the last year  or any fall with injury in the last year?  No  If fall risk identified, deferred for follow up unless specifically addressed in assessment and plan.    Safety Assessment    Throw rugs on floor.  No  Handrails on all stairs.  Yes  Good lighting in all hallways.  Yes  Difficulty hearing.  No  Patient counseled about all safety risks that were identified.    Functional Assessment ADLs    Are there any barriers preventing you from cooking for yourself or meeting nutritional needs?  No.    Are there any barriers preventing you from driving safely or obtaining transportation?  No.    Are there any barriers preventing you from using a telephone or calling for help?  No.    Are there any barriers preventing you from shopping?  No.    Are there any barriers preventing you from taking care of your own finances?  No.    Are there any barriers preventing you from managing your medications?  No.    Are you currently engaging any exercise or physical activity?  Yes.  Walk    Health Maintenance Summary                A1C SCREENING Overdue 5/15/2017      Done 11/15/2016 POCT A1C     Patient has more history with this topic...    IMM INFLUENZA Overdue 9/1/2017      Done 11/15/2016 Imm Admin: Influenza Vaccine Adult HD     Patient has more history with this topic...    FASTING LIPID PROFILE Overdue 11/17/2017      Done 11/17/2016 LIPID PROFILE (A)     Patient has more history with this topic...    URINE ACR / MICROALBUMIN Next Due 2/1/2018      Done 2/1/2017 MICROALBUMIN CREAT RATIO URINE (A)     Patient has more history with this topic...    Annual Wellness Visit Next Due 2/1/2018      Done 1/31/2017 Visit Dx: Medicare annual wellness visit, subsequent     Patient has more history with this topic...    SERUM CREATININE Next Due 6/9/2018      Done 6/9/2017 COMP METABOLIC PANEL (A)     Patient has more history with this topic...    RETINAL SCREENING Next Due 8/30/2018      Done 8/30/2017 REFERRAL FOR RETINAL SCREENING EXAM     Patient  has more history with this topic...    DIABETES MONOFILAMENT / LE EXAM Next Due 12/12/2018      Done 12/12/2017 AMB DIABETIC MONOFILAMENT LOWER EXTREMITY EXAM     Patient has more history with this topic...    COLONOSCOPY Next Due 10/3/2019      Done 10/3/2016 REFERRAL TO GI FOR COLONOSCOPY     Patient has more history with this topic...    IMM DTaP/Tdap/Td Vaccine Next Due 12/2/2024      Done 12/2/2014 Imm Admin: Tdap Vaccine          Patient Care Team:  Loree Polanco D.O. as PCP - General (Family Medicine)  Manjit Gleason M.D. as Consulting Physician (Cardiology)  [facility] as Consulting Physician (Optometry)    Social History   Substance Use Topics   • Smoking status: Former Smoker     Packs/day: 1.00     Years: 50.00     Types: Cigarettes     Start date: 1/1/1960     Quit date: 9/1/2011   • Smokeless tobacco: Never Used      Comment: avoid all tobacco products   • Alcohol use No      Comment: quit 1985, drank 2 times since     Family History   Problem Relation Age of Onset   • Heart Disease Father    • Arthritis Mother    • No Known Problems Sister    • Cancer Brother      type unknown   • Cancer Brother      skin    • No Known Problems Maternal Grandmother    • No Known Problems Maternal Grandfather    • No Known Problems Paternal Grandmother    • No Known Problems Paternal Grandfather    • Diabetes Neg Hx    • Hypertension Neg Hx    • Stroke Neg Hx    • Hyperlipidemia Neg Hx      He  has a past medical history of Cancer (CMS-HCC); Claudication (CMS-HCC) (8/22/2012); Diabetes (CMS-HCC); H/O colonoscopy with polypectomy (5/7/2013); Hepatitis C; History of alcoholism (CMS-HCC) (8/22/2012); Hyperlipidemia; and Hypertension.   Past Surgical History:   Procedure Laterality Date   • WIDE EXCISION  5/6/2014    Performed by Nicholas Govea M.D. at SURGERY SAME DAY [facility] ORS   • FLAP GRAFT  5/6/2014    Performed by Nicholas Govea M.D. at SURGERY SAME DAY [facility] ORS   • RECOVERY  10/22/2012  "   Performed by Pily Lee M.D. at SURGERY [facility] ORS           Exam:     Blood pressure 124/60, pulse 80, temperature 36.7 °C (98 °F), height 1.753 m (5' 9\"), weight 80.3 kg (177 lb), SpO2 94 %. Body mass index is 26.14 kg/m².    Hearing excellent.    Dentition good  Alert, oriented in no acute distress.  Eye contact is good, speech goal directed, affect calm      Assessment and Plan. The following treatment and monitoring plan is recommended:    1. Medicare annual wellness visit, subsequent      2. Microalbuminuria due to type 2 diabetes mellitus (CMS-HCC)  Chronic: stable    3. Diabetes mellitus with peripheral vascular disease (CMS-HCC)  Chronic: stable    4. Bilateral claudication of lower limb (CMS-HCC)  Chronic: improving, patient declines surgery     5. Atherosclerosis of aorta (CMS-HCC)  Chronic: followed by Cardio.  Continue BP control , patient declines referral to vascular surgeon    6. Chronic hepatitis C without hepatic coma (CMS-HCC)  Chronic: Patient has been seen by GI. He declines treatment.    7. History of alcoholism (CMS-HCC)  Patient has had no recent alcohol use    8. History of tobacco abuse  Patient is in no recent tobacco abuse. Qualifies for lung cancer screening which has been ordered.    9. H/O colonoscopy with polypectomy  Chronic: Continue routine follow-up    10. Cervical disc disease  Chronic: Stable    11. Bruit  Noted in history: Patient declines vascular surgery.    12. Screening PSA (prostate specific antigen)  - PROSTATE SPECIFIC AG SCREENING; Future    13. Benign neoplasm of soft palate  She requests referral to ENT. Noted in 2014.  - REFERRAL TO ENT    14. Blood typing encounter  Pt requests blood typing in order to follow blood type diet. Patient aware that insurance will not cover this.  - ABO AND RH DETERMINATION; Future    15. Stress-induced cardiomyopathy  Pt does not been seen by cardiology in greater than 6 months. Referral created.      Services suggested: " No services needed at this time  Health Care Screening recommendations as per orders if indicated.  Referrals offered: PT/OT/Nutrition counseling/Behavioral Health/Smoking cessation as per orders if indicated.    Discussion today about general wellness and lifestyle habits:    · Prevent falls and reduce trip hazards; Cautioned about securing or removing rugs.  · Have a working fire alarm and carbon monoxide detector;   · Engage in regular physical activity and social activities       Follow-up: Return in about 3 months (around 4/16/2018) for chronic conditions.

## 2018-01-16 NOTE — TELEPHONE ENCOUNTER
Received referral to lung cancer screening program.  Chart review to assess for lung cancer screening program eligibility.   1. Age 55-77 yrs of age? Yes 67 y.o.  2. 30 pack year hx of smoking, or greater? Yes 1 ppdx 58 yrs= 58 pkyr hx  3. Current smoker or if quit, has pt quit within last 15 yrs?Yes  Quit 9/2011  4. Any signs or symptoms of lung cancer? None noted  5. Previous history of lung cancer? None noted- Does have a hx of previous cancer, site unknown  6. Chest CT within past 12 mos.? None noted  Patient does meet eligibility criteria. [facility] scheduling notified to schedule the shared decision making visit.

## 2018-01-17 ENCOUNTER — HOSPITAL ENCOUNTER (OUTPATIENT)
Dept: LAB | Facility: MEDICAL CENTER | Age: 68
End: 2018-01-17
Attending: FAMILY MEDICINE
Payer: MEDICARE

## 2018-01-17 DIAGNOSIS — E11.9 CONTROLLED TYPE 2 DIABETES MELLITUS WITHOUT COMPLICATION, WITHOUT LONG-TERM CURRENT USE OF INSULIN (HCC): ICD-10-CM

## 2018-01-17 DIAGNOSIS — B18.2 CHRONIC HEPATITIS C WITHOUT HEPATIC COMA (HCC): ICD-10-CM

## 2018-01-17 DIAGNOSIS — Z12.5 SCREENING PSA (PROSTATE SPECIFIC ANTIGEN): ICD-10-CM

## 2018-01-17 DIAGNOSIS — Z01.83 BLOOD TYPING ENCOUNTER: ICD-10-CM

## 2018-01-17 DIAGNOSIS — E11.51 DIABETES MELLITUS WITH PERIPHERAL VASCULAR DISEASE (HCC): ICD-10-CM

## 2018-01-17 LAB
ABO GROUP BLD: NORMAL
ALBUMIN SERPL BCP-MCNC: 4 G/DL (ref 3.2–4.9)
ALBUMIN/GLOB SERPL: 1.3 G/DL
ALP SERPL-CCNC: 44 U/L (ref 30–99)
ALT SERPL-CCNC: 61 U/L (ref 2–50)
ANION GAP SERPL CALC-SCNC: 7 MMOL/L (ref 0–11.9)
AST SERPL-CCNC: 44 U/L (ref 12–45)
BILIRUB SERPL-MCNC: 0.7 MG/DL (ref 0.1–1.5)
BUN SERPL-MCNC: 18 MG/DL (ref 8–22)
CALCIUM SERPL-MCNC: 9.3 MG/DL (ref 8.5–10.5)
CHLORIDE SERPL-SCNC: 109 MMOL/L (ref 96–112)
CHOLEST SERPL-MCNC: 86 MG/DL (ref 100–199)
CO2 SERPL-SCNC: 25 MMOL/L (ref 20–33)
CREAT SERPL-MCNC: 0.79 MG/DL (ref 0.5–1.4)
EST. AVERAGE GLUCOSE BLD GHB EST-MCNC: 128 MG/DL
GLOBULIN SER CALC-MCNC: 3 G/DL (ref 1.9–3.5)
GLUCOSE SERPL-MCNC: 116 MG/DL (ref 65–99)
HBA1C MFR BLD: 6.1 % (ref 0–5.6)
HDLC SERPL-MCNC: 29 MG/DL
LDLC SERPL CALC-MCNC: 34 MG/DL
POTASSIUM SERPL-SCNC: 4.2 MMOL/L (ref 3.6–5.5)
PROT SERPL-MCNC: 7 G/DL (ref 6–8.2)
PSA SERPL-MCNC: 0.37 NG/ML (ref 0–4)
RH BLD: NORMAL
SODIUM SERPL-SCNC: 141 MMOL/L (ref 135–145)
TRIGL SERPL-MCNC: 117 MG/DL (ref 0–149)

## 2018-01-17 PROCEDURE — 84153 ASSAY OF PSA TOTAL: CPT

## 2018-01-17 PROCEDURE — 86900 BLOOD TYPING SEROLOGIC ABO: CPT

## 2018-01-17 PROCEDURE — 86901 BLOOD TYPING SEROLOGIC RH(D): CPT

## 2018-01-17 PROCEDURE — 83036 HEMOGLOBIN GLYCOSYLATED A1C: CPT

## 2018-01-17 PROCEDURE — 80053 COMPREHEN METABOLIC PANEL: CPT

## 2018-01-17 PROCEDURE — 36415 COLL VENOUS BLD VENIPUNCTURE: CPT

## 2018-01-17 PROCEDURE — 80061 LIPID PANEL: CPT

## 2018-01-26 ENCOUNTER — APPOINTMENT (OUTPATIENT)
Dept: HEMATOLOGY ONCOLOGY | Facility: MEDICAL CENTER | Age: 68
End: 2018-01-26
Payer: MEDICARE

## 2018-01-31 ENCOUNTER — OFFICE VISIT (OUTPATIENT)
Dept: CARDIOLOGY | Facility: MEDICAL CENTER | Age: 68
End: 2018-01-31
Payer: MEDICARE

## 2018-01-31 VITALS
BODY MASS INDEX: 25.05 KG/M2 | WEIGHT: 175 LBS | DIASTOLIC BLOOD PRESSURE: 68 MMHG | HEIGHT: 70 IN | HEART RATE: 73 BPM | SYSTOLIC BLOOD PRESSURE: 112 MMHG | OXYGEN SATURATION: 96 %

## 2018-01-31 DIAGNOSIS — E11.51 DIABETES MELLITUS WITH PERIPHERAL VASCULAR DISEASE (HCC): ICD-10-CM

## 2018-01-31 DIAGNOSIS — R80.9 MICROALBUMINURIA DUE TO TYPE 2 DIABETES MELLITUS (HCC): ICD-10-CM

## 2018-01-31 DIAGNOSIS — I51.81 STRESS-INDUCED CARDIOMYOPATHY: ICD-10-CM

## 2018-01-31 DIAGNOSIS — I70.0 ATHEROSCLEROSIS OF AORTA (HCC): ICD-10-CM

## 2018-01-31 DIAGNOSIS — I73.9 BILATERAL CLAUDICATION OF LOWER LIMB (HCC): ICD-10-CM

## 2018-01-31 DIAGNOSIS — E11.29 MICROALBUMINURIA DUE TO TYPE 2 DIABETES MELLITUS (HCC): ICD-10-CM

## 2018-01-31 DIAGNOSIS — B18.2 CHRONIC HEPATITIS C WITHOUT HEPATIC COMA (HCC): ICD-10-CM

## 2018-01-31 DIAGNOSIS — F10.21 HISTORY OF ALCOHOLISM (HCC): ICD-10-CM

## 2018-01-31 PROCEDURE — 99214 OFFICE O/P EST MOD 30 MIN: CPT | Performed by: INTERNAL MEDICINE

## 2018-01-31 ASSESSMENT — ENCOUNTER SYMPTOMS
PND: 0
ORTHOPNEA: 0
WEAKNESS: 0
BRUISES/BLEEDS EASILY: 0
NEUROLOGICAL NEGATIVE: 1
CARDIOVASCULAR NEGATIVE: 1
HEMOPTYSIS: 0
SPUTUM PRODUCTION: 0
SHORTNESS OF BREATH: 0
FEVER: 0
CHILLS: 0
DIZZINESS: 0
COUGH: 0
CONSTITUTIONAL NEGATIVE: 1
WHEEZING: 0
LOSS OF CONSCIOUSNESS: 0
CLAUDICATION: 0
MUSCULOSKELETAL NEGATIVE: 1
SORE THROAT: 0
EYES NEGATIVE: 1
GASTROINTESTINAL NEGATIVE: 1
STRIDOR: 0
RESPIRATORY NEGATIVE: 1
PALPITATIONS: 0

## 2018-01-31 NOTE — LETTER
[facility]   [address], [facility] [address]  [place] [address]  Phone: [phone]  Fax: [phone]              Chung Limon  1950    Encounter Date: 1/31/2018    Manjit Gleason M.D.          PROGRESS NOTE:  Subjective:   Chung Limon is a 68 y.o. male who presents today as a follow-up for his peripheral vascular disease. He is quite happy because he found his long loss daughter who was taken from him in 6 months of age and is now 48. He is establishing a relationship with her. He's changed his diet because of this and is becoming more healthy. His blood pressure is well controlled he's having stable to nonexistent lower extremity claudication symptoms.      Past Medical History:   Diagnosis Date   • Cancer (CMS-HCC)    • Claudication (CMS-HCC) 8/22/2012   • Diabetes (CMS-[facility])    • H/O colonoscopy with polypectomy 5/7/2013   • Hepatitis C    • History of alcoholism (CMS-[facility]) 8/22/2012   • Hyperlipidemia    • Hypertension      Past Surgical History:   Procedure Laterality Date   • WIDE EXCISION  5/6/2014    Performed by Nicholas Govea M.D. at SURGERY SAME DAY [facility] ORS   • FLAP GRAFT  5/6/2014    Performed by Nicholas Govea M.D. at SURGERY SAME DAY [facility] ORS   • RECOVERY  10/22/2012    Performed by Pily Lee M.D. at SURGERY [facility]     Family History   Problem Relation Age of Onset   • Heart Disease Father    • Arthritis Mother    • No Known Problems Sister    • Cancer Brother      type unknown   • Cancer Brother      skin    • No Known Problems Maternal Grandmother    • No Known Problems Maternal Grandfather    • No Known Problems Paternal Grandmother    • No Known Problems Paternal Grandfather    • Diabetes Neg Hx    • Hypertension Neg Hx    • Stroke Neg Hx    • Hyperlipidemia Neg Hx      History   Smoking Status   • Former Smoker   • Packs/day: 1.00   • Years: 50.00   • Types: Cigarettes   • Start date: 1/1/1960   • Quit  date: 9/1/2011   Smokeless Tobacco   • Never Used     Comment: avoid all tobacco products     No Known Allergies  Outpatient Encounter Prescriptions as of 1/31/2018   Medication Sig Dispense Refill   • Cholecalciferol (VITAMIN D PO) Take  by mouth.     • VITAMIN K PO Take  by mouth.     • atorvastatin (LIPITOR) 80 MG tablet TAKE 1 TABLET BY MOUTH AT BEDTIME-GENERIC FOR LIPITOR 90 Tab 0   • clopidogrel (PLAVIX) 75 MG Tab Take 1 tablet orally once daily-GENERIC FOR PLAVIX 90 Tab 0   • alprazolam (XANAX) 0.5 MG Tab Take 1-1.5 Tabs by mouth at bedtime as needed for Sleep. 40 Tab 5   • metoprolol SR (TOPROL XL) 25 MG TABLET SR 24 HR Take 1 Tab by mouth every day. 90 Tab 3   • lisinopril (PRINIVIL) 10 MG Tab Take 1 Tab by mouth every day. 90 Tab 3   • Multiple Vitamins-Minerals (CENTRUM SILVER PO) Take  by mouth.     • glucose blood (FREESTYLE LITE) strip TEST EVERY MORNING AND RANDOMLY EVERY      EVENING 100 Strip 12   • [facility]. Devices KIT Diagnosis 250.00. Freestyle glucometer, test strips, #100, RF11 and lancets #100, RF 11. To test fasting in the AM and random in the PM. 1 Kit 0   • aspirin EC 81 MG EC tablet Take 1 Tab by mouth every day. 30 Tab 0     No facility-administered encounter medications on file as of 1/31/2018.      Review of Systems   Constitutional: Negative.  Negative for chills, fever and malaise/fatigue.   HENT: Negative.  Negative for sore throat.    Eyes: Negative.    Respiratory: Negative.  Negative for cough, hemoptysis, sputum production, shortness of breath, wheezing and stridor.    Cardiovascular: Negative.  Negative for chest pain, palpitations, orthopnea, claudication, leg swelling and PND.   Gastrointestinal: Negative.    Genitourinary: Negative.    Musculoskeletal: Negative.    Skin: Negative.    Neurological: Negative.  Negative for dizziness, loss of consciousness and weakness.   Endo/Heme/Allergies: Negative.  Does not bruise/bleed easily.   All other systems reviewed and are  "negative.       Objective:   /68   Pulse 73   Ht 1.778 m (5' 10\")   Wt 79.4 kg (175 lb)   SpO2 96%   BMI 25.11 kg/m²      Physical Exam   Constitutional: He is oriented to person, place, and time. He appears well-developed and well-nourished. No distress.   HENT:   Head: Normocephalic.   Mouth/Throat: Oropharynx is clear and moist.   Eyes: EOM are normal. Pupils are equal, round, and reactive to light. Right eye exhibits no discharge. Left eye exhibits no discharge. No scleral icterus.   Neck: Normal range of motion. Neck supple. No JVD present. No tracheal deviation present.   Cardiovascular: Normal rate, regular rhythm, S1 normal, S2 normal, normal heart sounds, intact distal pulses and normal pulses.  Exam reveals no gallop, no S3, no S4 and no friction rub.    No murmur heard.   No systolic murmur is present    No diastolic murmur is present   Pulses:       Carotid pulses are 2+ on the right side, and 2+ on the left side.       Radial pulses are 2+ on the right side, and 2+ on the left side.        Dorsalis pedis pulses are 2+ on the right side, and 2+ on the left side.        Posterior tibial pulses are 2+ on the right side, and 2+ on the left side.   Pulmonary/Chest: Effort normal and breath sounds normal. No respiratory distress. He has no wheezes. He has no rales.   Abdominal: Soft. Bowel sounds are normal. He exhibits no distension and no mass. There is no tenderness. There is no rebound and no guarding.   Musculoskeletal: He exhibits no edema.   Neurological: He is alert and oriented to person, place, and time. No cranial nerve deficit.   Skin: Skin is warm and dry. He is not diaphoretic. No pallor.   Psychiatric: He has a normal mood and affect. His behavior is normal. Judgment and thought content normal.   Nursing note and vitals reviewed.      Assessment:     1. Atherosclerosis of aorta (CMS-HCC)     2. Bilateral claudication of lower limb (CMS-HCC)     3. Chronic hepatitis C without hepatic " coma (CMS-[facility])     4. Diabetes mellitus with peripheral vascular disease (CMS-[facility])     5. History of alcoholism (CMS-[facility])     6. Microalbuminuria due to type 2 diabetes mellitus (CMS-[facility])     7. Stress-induced cardiomyopathy         Medical Decision Making:  Today's Assessment / Status / Plan:     68-year-old male with peripheral vascular disease. I congratulated him on finding his daughter. He is otherwise doing well. We will see him back in one year.    Thank for you allowing me to take part in your patient's care, please call should you have any questions or would like to discuss this patient.      Loree Polanco D.O.  [address]  [address]  [place] [address]  VIA In Basket

## 2018-02-01 NOTE — PROGRESS NOTES
Subjective:   Chung Limon is a 68 y.o. male who presents today as a follow-up for his peripheral vascular disease. He is quite happy because he found his long loss daughter who was taken from him in 6 months of age and is now 48. He is establishing a relationship with her. He's changed his diet because of this and is becoming more healthy. His blood pressure is well controlled he's having stable to nonexistent lower extremity claudication symptoms.      Past Medical History:   Diagnosis Date   • Cancer (CMS-HCC)    • Claudication (CMS-HCC) 8/22/2012   • Diabetes (CMS-HCC)    • H/O colonoscopy with polypectomy 5/7/2013   • Hepatitis C    • History of alcoholism (CMS-HCC) 8/22/2012   • Hyperlipidemia    • Hypertension      Past Surgical History:   Procedure Laterality Date   • WIDE EXCISION  5/6/2014    Performed by Nicholas Govea M.D. at SURGERY SAME DAY [facility] ORS   • FLAP GRAFT  5/6/2014    Performed by Nicholas Govea M.D. at SURGERY SAME DAY [facility] ORS   • RECOVERY  10/22/2012    Performed by Pily Lee M.D. at SURGERY [facility]     Family History   Problem Relation Age of Onset   • Heart Disease Father    • Arthritis Mother    • No Known Problems Sister    • Cancer Brother      type unknown   • Cancer Brother      skin    • No Known Problems Maternal Grandmother    • No Known Problems Maternal Grandfather    • No Known Problems Paternal Grandmother    • No Known Problems Paternal Grandfather    • Diabetes Neg Hx    • Hypertension Neg Hx    • Stroke Neg Hx    • Hyperlipidemia Neg Hx      History   Smoking Status   • Former Smoker   • Packs/day: 1.00   • Years: 50.00   • Types: Cigarettes   • Start date: 1/1/1960   • Quit date: 9/1/2011   Smokeless Tobacco   • Never Used     Comment: avoid all tobacco products     No Known Allergies  Outpatient Encounter Prescriptions as of 1/31/2018   Medication Sig Dispense Refill   • Cholecalciferol (VITAMIN D PO) Take  by mouth.     • VITAMIN K PO  "Take  by mouth.     • atorvastatin (LIPITOR) 80 MG tablet TAKE 1 TABLET BY MOUTH AT BEDTIME-GENERIC FOR LIPITOR 90 Tab 0   • clopidogrel (PLAVIX) 75 MG Tab Take 1 tablet orally once daily-GENERIC FOR PLAVIX 90 Tab 0   • alprazolam (XANAX) 0.5 MG Tab Take 1-1.5 Tabs by mouth at bedtime as needed for Sleep. 40 Tab 5   • metoprolol SR (TOPROL XL) 25 MG TABLET SR 24 HR Take 1 Tab by mouth every day. 90 Tab 3   • lisinopril (PRINIVIL) 10 MG Tab Take 1 Tab by mouth every day. 90 Tab 3   • Multiple Vitamins-Minerals (CENTRUM SILVER PO) Take  by mouth.     • glucose blood (FREESTYLE LITE) strip TEST EVERY MORNING AND RANDOMLY EVERY      EVENING 100 Strip 12   • [facility]. Devices KIT Diagnosis 250.00. Freestyle glucometer, test strips, #100, RF11 and lancets #100, RF 11. To test fasting in the AM and random in the PM. 1 Kit 0   • aspirin EC 81 MG EC tablet Take 1 Tab by mouth every day. 30 Tab 0     No facility-administered encounter medications on file as of 1/31/2018.      Review of Systems   Constitutional: Negative.  Negative for chills, fever and malaise/fatigue.   HENT: Negative.  Negative for sore throat.    Eyes: Negative.    Respiratory: Negative.  Negative for cough, hemoptysis, sputum production, shortness of breath, wheezing and stridor.    Cardiovascular: Negative.  Negative for chest pain, palpitations, orthopnea, claudication, leg swelling and PND.   Gastrointestinal: Negative.    Genitourinary: Negative.    Musculoskeletal: Negative.    Skin: Negative.    Neurological: Negative.  Negative for dizziness, loss of consciousness and weakness.   Endo/Heme/Allergies: Negative.  Does not bruise/bleed easily.   All other systems reviewed and are negative.       Objective:   /68   Pulse 73   Ht 1.778 m (5' 10\")   Wt 79.4 kg (175 lb)   SpO2 96%   BMI 25.11 kg/m²     Physical Exam   Constitutional: He is oriented to person, place, and time. He appears well-developed and well-nourished. No distress.   HENT: "   Head: Normocephalic.   Mouth/Throat: Oropharynx is clear and moist.   Eyes: EOM are normal. Pupils are equal, round, and reactive to light. Right eye exhibits no discharge. Left eye exhibits no discharge. No scleral icterus.   Neck: Normal range of motion. Neck supple. No JVD present. No tracheal deviation present.   Cardiovascular: Normal rate, regular rhythm, S1 normal, S2 normal, normal heart sounds, intact distal pulses and normal pulses.  Exam reveals no gallop, no S3, no S4 and no friction rub.    No murmur heard.   No systolic murmur is present    No diastolic murmur is present   Pulses:       Carotid pulses are 2+ on the right side, and 2+ on the left side.       Radial pulses are 2+ on the right side, and 2+ on the left side.        Dorsalis pedis pulses are 2+ on the right side, and 2+ on the left side.        Posterior tibial pulses are 2+ on the right side, and 2+ on the left side.   Pulmonary/Chest: Effort normal and breath sounds normal. No respiratory distress. He has no wheezes. He has no rales.   Abdominal: Soft. Bowel sounds are normal. He exhibits no distension and no mass. There is no tenderness. There is no rebound and no guarding.   Musculoskeletal: He exhibits no edema.   Neurological: He is alert and oriented to person, place, and time. No cranial nerve deficit.   Skin: Skin is warm and dry. He is not diaphoretic. No pallor.   Psychiatric: He has a normal mood and affect. His behavior is normal. Judgment and thought content normal.   Nursing note and vitals reviewed.      Assessment:     1. Atherosclerosis of aorta (CMS-HCC)     2. Bilateral claudication of lower limb (CMS-HCC)     3. Chronic hepatitis C without hepatic coma (CMS-HCC)     4. Diabetes mellitus with peripheral vascular disease (CMS-HCC)     5. History of alcoholism (CMS-HCC)     6. Microalbuminuria due to type 2 diabetes mellitus (CMS-HCC)     7. Stress-induced cardiomyopathy         Medical Decision Making:  Today's  Assessment / Status / Plan:     68-year-old male with peripheral vascular disease. I congratulated him on finding his daughter. He is otherwise doing well. We will see him back in one year.    Thank for you allowing me to take part in your patient's care, please call should you have any questions or would like to discuss this patient.

## 2018-02-16 ENCOUNTER — OFFICE VISIT (OUTPATIENT)
Dept: HEMATOLOGY ONCOLOGY | Facility: MEDICAL CENTER | Age: 68
End: 2018-02-16
Payer: MEDICARE

## 2018-02-16 VITALS
RESPIRATION RATE: 16 BRPM | DIASTOLIC BLOOD PRESSURE: 68 MMHG | BODY MASS INDEX: 25.03 KG/M2 | HEIGHT: 70 IN | HEART RATE: 72 BPM | TEMPERATURE: 97.7 F | OXYGEN SATURATION: 97 % | WEIGHT: 174.82 LBS | SYSTOLIC BLOOD PRESSURE: 108 MMHG

## 2018-02-16 DIAGNOSIS — Z87.891 PERSONAL HISTORY OF TOBACCO USE, PRESENTING HAZARDS TO HEALTH: ICD-10-CM

## 2018-02-16 PROCEDURE — G0296 VISIT TO DETERM LDCT ELIG: HCPCS | Performed by: NURSE PRACTITIONER

## 2018-02-16 ASSESSMENT — ENCOUNTER SYMPTOMS
HEMOPTYSIS: 0
SPUTUM PRODUCTION: 1
SHORTNESS OF BREATH: 0
WEIGHT LOSS: 1
COUGH: 1
WHEEZING: 0

## 2018-02-16 ASSESSMENT — PAIN SCALES - GENERAL: PAINLEVEL: NO PAIN

## 2018-02-16 NOTE — PROGRESS NOTES
Subjective:      Chung Limon is a 68 y.o. male who presents for Lung Cancer Screening Program Prescreen (Ref by Vannessa Soto Dx: Hx of Nicotine Dependence) for lung cancer screening shared decision making visit.        HPI  Patient seen today for initial lung cancer screening visit. Patient referred by PCP, Dr. Soto.     The patient meets eligibility criteria including age, smoking history (30+ pack years), if former smoker, quit in the last 15 years, and absence of signs or symptoms of lung cancer.    - Age - 68  - Smoking history - Patient has smoked for 58 years at an average of 1 ppd = 58 pack year smoking history.  - Current smoking status - Quit 9/2011  - No symptoms of lung cancer and no previous history of lung cancer     No Known Allergies    Current Outpatient Prescriptions on File Prior to Visit   Medication Sig Dispense Refill   • Cholecalciferol (VITAMIN D PO) Take  by mouth.     • VITAMIN K PO Take  by mouth.     • atorvastatin (LIPITOR) 80 MG tablet TAKE 1 TABLET BY MOUTH AT BEDTIME-GENERIC FOR LIPITOR 90 Tab 0   • clopidogrel (PLAVIX) 75 MG Tab Take 1 tablet orally once daily-GENERIC FOR PLAVIX 90 Tab 0   • alprazolam (XANAX) 0.5 MG Tab Take 1-1.5 Tabs by mouth at bedtime as needed for Sleep. 40 Tab 5   • metoprolol SR (TOPROL XL) 25 MG TABLET SR 24 HR Take 1 Tab by mouth every day. 90 Tab 3   • lisinopril (PRINIVIL) 10 MG Tab Take 1 Tab by mouth every day. 90 Tab 3   • Multiple Vitamins-Minerals (CENTRUM SILVER PO) Take  by mouth.     • glucose blood (FREESTYLE LITE) strip TEST EVERY MORNING AND RANDOMLY EVERY      EVENING 100 Strip 12   • [employer] Devices KIT Diagnosis 250.00. Freestyle glucometer, test strips, #100, RF11 and lancets #100, RF 11. To test fasting in the AM and random in the PM. 1 Kit 0   • aspirin EC 81 MG EC tablet Take 1 Tab by mouth every day. 30 Tab 0     No current facility-administered medications on file prior to visit.        Review of Systems   Constitutional:  "Positive for weight loss (30 lb. weight loss over 2 years with diet changes). Negative for malaise/fatigue (\"clogged aorta\" legs get fatigued).   Respiratory: Positive for cough (\" a little in the mornig\") and sputum production (clear white in am). Negative for hemoptysis, shortness of breath and wheezing.         Objective:     /68   Pulse 72   Temp 36.5 °C (97.7 °F)   Resp 16   Ht 1.778 m (5' 10\")   Wt 79.3 kg (174 lb 13.2 oz)   SpO2 97%   BMI 25.08 kg/m²      Physical Exam   Constitutional: He is oriented to person, place, and time. He appears well-developed and well-nourished. No distress.   Cardiovascular: Normal rate, regular rhythm and normal heart sounds.  Exam reveals no gallop and no friction rub.    No murmur heard.  Pulmonary/Chest: Effort normal and breath sounds normal. No respiratory distress. He has no wheezes.   Musculoskeletal: Normal range of motion.   Neurological: He is alert and oriented to person, place, and time.   Skin: Skin is warm and dry. He is not diaphoretic.   Vitals reviewed.       Assessment/Plan:     1. Personal history of tobacco use, presenting hazards to health  CT-LUNG CANCER-SCREENING       We conducted a shared decision-making process using a decision aid. We reviewed benefits and harms of screening, including false positives and potential need for additional diagnostic testing, the possibility of over diagnosis, and total radiation exposure.    We discussed the importance of adhering to annual LDCT screening. We also discussed the impact of comorbities on the patient's the ability or willingness to undergo diagnostic procedure(s) and treatment.    Counseling on the importance of maintaining cigarette smoking abstinence if former smoker; or the importance of smoking cessation if current smoker and, if appropriate, furnishing of information about tobacco cessation interventions.    Based on our discussion, we have decided to begin annual lung cancer screening " starting now.

## 2018-02-27 ENCOUNTER — HOSPITAL ENCOUNTER (OUTPATIENT)
Dept: RADIOLOGY | Facility: MEDICAL CENTER | Age: 68
End: 2018-02-27
Attending: NURSE PRACTITIONER
Payer: MEDICARE

## 2018-02-27 DIAGNOSIS — Z87.891 PERSONAL HISTORY OF TOBACCO USE, PRESENTING HAZARDS TO HEALTH: ICD-10-CM

## 2018-02-27 PROCEDURE — G0297 LDCT FOR LUNG CA SCREEN: HCPCS

## 2018-02-28 ENCOUNTER — TELEPHONE (OUTPATIENT)
Dept: HEMATOLOGY ONCOLOGY | Facility: MEDICAL CENTER | Age: 68
End: 2018-02-28

## 2018-02-28 NOTE — TELEPHONE ENCOUNTER
Phoned patient with results of LDCT exam performed 2/28/18.  Notified him that the results showed no suspicious pulmonary nodules and no concern for lung cancer.  Recommend follow up CT in 12 months.Patient agrees to all recommendations. Referring provider Loree Polanco D.O. notified of results via inbasket message. Health maintenance updated and patient sent lung cancer screening result letter.

## 2018-03-12 ENCOUNTER — TELEPHONE (OUTPATIENT)
Dept: MEDICAL GROUP | Facility: PHYSICIAN GROUP | Age: 68
End: 2018-03-12

## 2018-03-12 DIAGNOSIS — M54.2 CERVICAL PAIN: ICD-10-CM

## 2018-03-12 RX ORDER — IBUPROFEN 800 MG/1
800 TABLET ORAL EVERY 8 HOURS PRN
Qty: 30 TAB | Refills: 0 | Status: SHIPPED | OUTPATIENT
Start: 2018-03-12 | End: 2018-05-08 | Stop reason: SDUPTHER

## 2018-03-22 DIAGNOSIS — E78.6 HYPOCHOLESTEREMIA: ICD-10-CM

## 2018-03-22 RX ORDER — ATORVASTATIN CALCIUM 80 MG/1
TABLET, FILM COATED ORAL
Qty: 90 TAB | Refills: 1 | Status: SHIPPED | OUTPATIENT
Start: 2018-03-22 | End: 2018-10-19 | Stop reason: SDUPTHER

## 2018-03-22 NOTE — TELEPHONE ENCOUNTER
Was the patient seen in the last year in this department? Yes     Does patient have an active prescription for medications requested? No     Received Request Via: Pharmacy      Pt met protocol?: Yes    LAST OV 01/16/2018      Lab Results  Component Value Date/Time   HBA1C 6.1 (H) 01/17/2018 0631       Lab Results  Component Value Date/Time   AVGLUC 128 01/17/2018 0631       Lab Results  Component Value Date/Time   CHOLSTRLTOT 86 (L) 01/17/2018 0631       Lab Results  Component Value Date/Time   TRIGLYCERIDE 117 01/17/2018 0631       Lab Results  Component Value Date/Time   HDL 29 (A) 01/17/2018 0631       Lab Results  Component Value Date/Time   LDL 34 01/17/2018 0631

## 2018-03-26 DIAGNOSIS — F41.9 ANXIETY: ICD-10-CM

## 2018-03-26 RX ORDER — ALPRAZOLAM 0.5 MG/1
.5-.75 TABLET ORAL NIGHTLY PRN
Qty: 40 TAB | Refills: 5 | OUTPATIENT
Start: 2018-03-26

## 2018-04-04 ENCOUNTER — TELEPHONE (OUTPATIENT)
Dept: MEDICAL GROUP | Facility: PHYSICIAN GROUP | Age: 68
End: 2018-04-04

## 2018-04-04 NOTE — TELEPHONE ENCOUNTER
Future Appointments       Provider Department Center    4/5/2018 4:40 PM Gloria Holman M.D. [facility]        ESTABLISHED PATIENT PRE-VISIT PLANNING     Note: Patient will not be contacted if there is no indication to call.     1.  Reviewed notes from the last few office visits within the medical group: Yes 01/16/2018  2.  If any orders were placed at last visit or intended to be done for this visit (i.e. 6 mos follow-up), do we have Results/Consult Notes?        •  Labs - Labs ordered, completed on 01/17/2018 and results are in chart.       •  Imaging - Imaging ordered, completed and results are in chart.       •  Referrals - Referral ordered, patient was seen and consult notes are in chart. Care Teams updated  YES.    3. Is this appointment scheduled as a Hospital Follow-Up? No    4.  Immunizations were updated in [facility] using WebIZ?: Yes       •  Web Iz Recommendations: FLU and TD    5.  Patient is due for the following Health Maintenance Topics:   Health Maintenance Due   Topic Date Due   • URINE ACR / MICROALBUMIN  02/01/2018       6.  MDX printed for Provider? YES    7.  Patient was informed to arrive 15 min prior to their scheduled appointment and bring in their medication bottles.

## 2018-04-05 ENCOUNTER — OFFICE VISIT (OUTPATIENT)
Dept: MEDICAL GROUP | Facility: PHYSICIAN GROUP | Age: 68
End: 2018-04-05
Payer: MEDICARE

## 2018-04-05 VITALS
SYSTOLIC BLOOD PRESSURE: 122 MMHG | WEIGHT: 171 LBS | TEMPERATURE: 98.2 F | HEIGHT: 70 IN | HEART RATE: 80 BPM | DIASTOLIC BLOOD PRESSURE: 64 MMHG | OXYGEN SATURATION: 95 % | BODY MASS INDEX: 24.48 KG/M2

## 2018-04-05 DIAGNOSIS — Z98.890 H/O COLONOSCOPY WITH POLYPECTOMY: ICD-10-CM

## 2018-04-05 DIAGNOSIS — I73.9 BILATERAL CLAUDICATION OF LOWER LIMB (HCC): ICD-10-CM

## 2018-04-05 DIAGNOSIS — I25.2 HISTORY OF MI (MYOCARDIAL INFARCTION): ICD-10-CM

## 2018-04-05 DIAGNOSIS — I70.209 OCCLUSION OF ARTERY OF LEG (HCC): ICD-10-CM

## 2018-04-05 DIAGNOSIS — F41.9 ANXIETY: ICD-10-CM

## 2018-04-05 DIAGNOSIS — Z00.00 HEALTHCARE MAINTENANCE: ICD-10-CM

## 2018-04-05 DIAGNOSIS — Z86.010 H/O COLONOSCOPY WITH POLYPECTOMY: ICD-10-CM

## 2018-04-05 DIAGNOSIS — I10 ESSENTIAL HYPERTENSION: ICD-10-CM

## 2018-04-05 DIAGNOSIS — E11.51 DIABETES MELLITUS WITH PERIPHERAL VASCULAR DISEASE (HCC): ICD-10-CM

## 2018-04-05 DIAGNOSIS — B18.2 CHRONIC HEPATITIS C WITHOUT HEPATIC COMA (HCC): ICD-10-CM

## 2018-04-05 DIAGNOSIS — M54.12 CERVICAL RADICULOPATHY, CHRONIC: ICD-10-CM

## 2018-04-05 PROCEDURE — 99204 OFFICE O/P NEW MOD 45 MIN: CPT | Performed by: INTERNAL MEDICINE

## 2018-04-05 RX ORDER — ALPRAZOLAM 0.5 MG/1
.5-.75 TABLET ORAL NIGHTLY PRN
Qty: 40 TAB | Refills: 5 | Status: SHIPPED | OUTPATIENT
Start: 2018-04-05 | End: 2018-05-05

## 2018-04-05 RX ORDER — CLOPIDOGREL BISULFATE 75 MG/1
TABLET ORAL
Qty: 90 TAB | Refills: 0 | Status: SHIPPED | OUTPATIENT
Start: 2018-04-05 | End: 2018-07-06 | Stop reason: SDUPTHER

## 2018-04-05 NOTE — ASSESSMENT & PLAN NOTE
This is a chronic health problem that is uncontrolled with current medications and lifestyle measures. Takes ibuprofen when necessary 800 mg around 2-3 times week. Last imaging reviewed done in 2017 positive for degenerative disc disease at uncovertebral joint. Planning to use a cervical collar.

## 2018-04-06 NOTE — PROGRESS NOTES
CC:  To establish care with new PCP, hypertension.    HISTORY OF THE PRESENT ILLNESS: Patient is a 68 y.o. male. This pleasant patient is here today to establish care with new PCP, discuss on his medical conditions as mentioned in history of low.     Health Maintenance: Completed      Cervical radiculopathy, chronic  This is a chronic health problem that is uncontrolled with current medications and lifestyle measures. Takes ibuprofen when necessary 800 mg around 2-3 times week. Last imaging reviewed done in 2017 positive for degenerative disc disease at uncovertebral joint. Planning to use a cervical collar.    Diabetes mellitus with peripheral vascular disease (CMS-HCC)  This is a chronic health problem that is well controlled with current medications and lifestyle measures. Last A1c in January 2018 6.1%. Not on any medications. Lost over 30 pounds with intentional weight loss to come over this.    Bilateral claudication of lower limb  This is a chronic health problem that is well controlled with current medications and lifestyle measures.Pt denies to undergo any procedure at this time, previously seen by Dr. Lee after the [facility] with a duplex positive for Aorto iliac atherosclerosis. Currently takes Plavix 75 mg daily, aspirin 81 mg daily and atorvastatin 80 mg daily. His cholesterol was normal and the last check on January 2018.    Chronic hepatitis C without hepatic coma (CMS-HCC)  Pt prefers not to have any treatment at this time as he is 69 yrs. last lab work done in 2015 positive for RNA load and reactive for hepatitis C.    H/O colonoscopy with polypectomy  Last colonoscopy done in January 2017 positive for 2 polyps removed which was showing pathology of hyperplastic polyp. As per the report he needs repeat colonoscopy in 3-5 years. next one due in 2021.    Healthcare maintenance  Had pneumococcal shot, not opting for zoster vaccine this time/flu shot at this time.    Essential hypertension  This is a  chronic health problem that is well controlled with current medications and lifestyle measures. Does not check blood pressure at home, currently on medications lisinopril 10 mg daily, metoprolol 25 mg daily.    Anxiety  This is a chronic health problem that is well controlled with current medications and lifestyle measures. Well controlled on Xanax 0.5 mg tablet takes one to one and half tablet as needed every night. Requesting for refills. We will do pain contract and urine drug screen today.    PHQ score 0, BMI within normal limits, no tobacco, no fall injuries    Allergies: Patient has no known allergies.    Current Outpatient Prescriptions Ordered in [facility]   Medication Sig Dispense Refill   • ALPRAZolam (XANAX) 0.5 MG Tab Take 1-1.5 Tabs by mouth at bedtime as needed for Sleep for up to 30 days. 40 Tab 5   • clopidogrel (PLAVIX) 75 MG Tab Take 1 tablet orally once daily-GENERIC FOR PLAVIX 90 Tab 0   • atorvastatin (LIPITOR) 80 MG tablet TAKE 1 TABLET BY MOUTH AT BEDTIME-GENERIC FOR LIPITOR 90 Tab 1   • ibuprofen (MOTRIN) 800 MG Tab Take 1 Tab by mouth every 8 hours as needed. 30 Tab 0   • Cholecalciferol (VITAMIN D PO) Take  by mouth.     • VITAMIN K PO Take  by mouth.     • metoprolol SR (TOPROL XL) 25 MG TABLET SR 24 HR Take 1 Tab by mouth every day. 90 Tab 3   • lisinopril (PRINIVIL) 10 MG Tab Take 1 Tab by mouth every day. 90 Tab 3   • Multiple Vitamins-Minerals (CENTRUM SILVER PO) Take  by mouth.     • glucose blood (FREESTYLE LITE) strip TEST EVERY MORNING AND RANDOMLY EVERY      EVENING 100 Strip 12   • [facility] [place] KIT Diagnosis 250.00. Freestyle glucometer, test strips, #100, RF11 and lancets #100, RF 11. To test fasting in the AM and random in the PM. 1 Kit 0   • aspirin EC 81 MG EC tablet Take 1 Tab by mouth every day. 30 Tab 0     No current Epic-ordered facility-administered medications on file.        Past Medical History:   Diagnosis Date   • Cancer (CMS-HCC)    • Claudication (CMS-HCC) 8/22/2012    • Diabetes (CMS-HCC)    • H/O colonoscopy with polypectomy 5/7/2013   • Hepatitis C    • History of alcoholism (CMS-HCC) 8/22/2012   • Hyperlipidemia    • Hypertension        Past Surgical History:   Procedure Laterality Date   • WIDE EXCISION  5/6/2014    Performed by Nicholas Govea M.D. at SURGERY SAME DAY [facility] ORS   • FLAP GRAFT  5/6/2014    Performed by Nicholas Govea M.D. at SURGERY SAME DAY [facility]   • RECOVERY  10/22/2012    Performed by Pily Lee M.D. at SURGERY [facility]       Social History   Substance Use Topics   • Smoking status: Former Smoker     Packs/day: 1.00     Years: 58.00     Types: Cigarettes     Start date: 1/1/1960     Quit date: 9/1/2011   • Smokeless tobacco: Never Used      Comment: avoid all tobacco products   • Alcohol use No      Comment: quit 1985, drank 2 times since       Social History     Social History Narrative   • No narrative on file       Family History   Problem Relation Age of Onset   • Heart Disease Father    • Arthritis Mother    • No Known Problems Sister    • Cancer Brother      type unknown   • Cancer Brother      skin    • No Known Problems Maternal Grandmother    • No Known Problems Maternal Grandfather    • No Known Problems Paternal Grandmother    • No Known Problems Paternal Grandfather    • Diabetes Neg Hx    • Hypertension Neg Hx    • Stroke Neg Hx    • Hyperlipidemia Neg Hx        ROS:     - Constitutional: Negative for fever, chills, unexpected weight change, and fatigue/generalized weakness.     - HEENT: Negative for headaches, vision changes, hearing changes, ear pain, ear discharge, rhinorrhea, sinus congestion, sore throat, and neck pain.      - Respiratory: Negative for cough, sputum production, chest congestion, dyspnea, wheezing, and crackles.      - Cardiovascular: Negative for chest pain, palpitations, orthopnea, and bilateral lower extremity edema.     - Gastrointestinal: Negative for heartburn, nausea, vomiting, abdominal  "pain, hematochezia, melena, diarrhea, constipation, and greasy/foul-smelling stools.     - Genitourinary: Negative for dysuria, polyuria, hematuria, pyuria, urinary urgency, and urinary incontinence.     - Musculoskeletal: Positive for neck pain and radiation to the left arm Negative for myalgias, back pain, and joint pain.     - Skin: Negative for rash, itching, cyanotic skin color change.     - Neurological: Negative for dizziness, tingling, tremors, focal sensory deficit, focal weakness and headaches.     - Endo/Heme/Allergies: Does not bruise/bleed easily.     - Psychiatric/Behavioral: Negative for depression, suicidal/homicidal ideation and memory loss.      Last lab done in January 2018 reviewed and discussed with the patient.    Exam: Blood pressure 122/64, pulse 80, temperature 36.8 °C (98.2 °F), height 1.778 m (5' 10\"), weight 77.6 kg (171 lb), SpO2 95 %. Body mass index is 24.54 kg/m².    General: Normal appearing. No distress.  HEENT: Normocephalic. Eyes conjunctiva clear lids without ptosis, pupils equal and reactive to light accommodation, ears normal shape and contour, canals are clear bilaterally, tympanic membranes are benign, nasal mucosa benign, oropharynx is without erythema, edema or exudates.   Neck: Supple without JVD or bruit. Thyroid is not enlarged.  Pulmonary: Clear to ausculation.  Normal effort. No rales, ronchi, or wheezing.  Cardiovascular: Regular rate and rhythm without murmur. Carotid and radial pulses are intact and equal bilaterally.  Abdomen: Soft, nontender, nondistended. Normal bowel sounds. Liver and spleen are not palpable  Neurologic: Grossly nonfocal  Lymph: No cervical, supraclavicular or axillary lymph nodes are palpable  Skin: Warm and dry.  No obvious lesions.  Musculoskeletal: Normal gait. No extremity cyanosis, clubbing, or edema.  Psych: Normal mood and affect. Alert and oriented x3. Judgment and insight is normal.      Please note that this dictation was created " using voice recognition software. I have made every reasonable attempt to correct obvious errors, but I expect that there are errors of grammar and possibly content that I did not discover before finalizing the note.      Assessment/Plan  1. Diabetes mellitus with peripheral vascular disease (CMS-HCC)  Last A1c well controlled at 6.1%, patient has managed to normalize his diabetes with diet and lifestyle modification. Currently not on any medication to continue the diet and lifestyle modification.    2. Cervical radiculopathy, chronic  Well controlled on when necessary ibuprofen but intermittent neck pains, given instructions to manage with cervical collar as patient refuses to go for any referrals/procedures at this time. Discussed at length regarding the risks and benefits but patient wants to try the cervical collar before I give referral to [facility] [place] which she understood and agreed.    3. Bilateral claudication of lower limb (CMS-HCC)  4. Occlusion of artery of leg (CMS-HCC)  Positive for aortoiliac atherosclerosis, denies to go for any femoral bypass surgeries at this time. Continue the current medication aspirin 81 mg daily, Plavix 75 mg daily, atorvastatin 80 mg daily. Patient is also taking vitamin K for some reason thinking that it is a supplemental vitamin for him self treating. I have explained to him that the reason behind the antiplatelet medication is to prevent clots and vitamin K is acting against it which she understood and agreed to stop it at this time.  - clopidogrel (PLAVIX) 75 MG Tab; Take 1 tablet orally once daily-GENERIC FOR PLAVIX  Dispense: 90 Tab; Refill: 0    5. Chronic hepatitis C without hepatic coma (CMS-HCC)  Given the last lab work done in 2015 offered the patient regarding referral to infectious disease for treatment of hepatitis C given his viral load but patient refuses to go for any treatment at this time because he says he is asymptomatic and wants to leave it alone.    6.  H/O colonoscopy with polypectomy  Last colonoscopy done in 2017 positive for hyperplastic polyps status post polypectomy of 2 polyps. He was recommended to have repeat colonoscopy after 3-5 years not due until 2021.    7.  Anxiety  Well controlled on when necessary Xanax daily at bedtime, patient requesting for refills will do a pain substance contract and urine drug screen as I'm new provided to him. Refilled his medication.  - ALPRAZolam (XANAX) 0.5 MG Tab; Take 1-1.5 Tabs by mouth at bedtime as needed for Sleep for up to 30 days.  Dispense: 40 Tab; Refill: 5  - CONTROLLED SUBSTANCE TREATMENT AGREEMENT  - [facility] PAIN MANAGEMENT SCREEN; Future    8. Essential hypertension  Well-controlled, to continue the current medication lisinopril 10 mg daily, metoprolol 25 mg daily.

## 2018-04-06 NOTE — ASSESSMENT & PLAN NOTE
Pt prefers not to have any treatment at this time as he is 69 yrs. last lab work done in 2015 positive for RNA load and reactive for hepatitis C.

## 2018-04-06 NOTE — ASSESSMENT & PLAN NOTE
Last colonoscopy done in January 2017 positive for 2 polyps removed which was showing pathology of hyperplastic polyp. As per the report he needs repeat colonoscopy in 3-5 years. next one due in 2021.

## 2018-04-06 NOTE — ASSESSMENT & PLAN NOTE
This is a chronic health problem that is well controlled with current medications and lifestyle measures. Well controlled on Xanax 0.5 mg tablet takes one to one and half tablet as needed every night. Requesting for refills. We will do pain contract and urine drug screen today.

## 2018-04-06 NOTE — ASSESSMENT & PLAN NOTE
This is a chronic health problem that is well controlled with current medications and lifestyle measures.Pt denies to undergo any procedure at this time, previously seen by Dr. Lee after the lower extremity duplex positive for Aorto iliac atherosclerosis. Currently takes Plavix 75 mg daily, aspirin 81 mg daily and atorvastatin 80 mg daily. His cholesterol was normal and the last check on January 2018.

## 2018-04-06 NOTE — ASSESSMENT & PLAN NOTE
This is a chronic health problem that is well controlled with current medications and lifestyle measures. Does not check blood pressure at home, currently on medications lisinopril 10 mg daily, metoprolol 25 mg daily.

## 2018-04-12 ENCOUNTER — TELEPHONE (OUTPATIENT)
Dept: URGENT CARE | Facility: PHYSICIAN GROUP | Age: 68
End: 2018-04-12

## 2018-04-12 NOTE — TELEPHONE ENCOUNTER
----- Message from Aziza Solorio, Med Ass't sent at 4/12/2018  9:33 AM PDT -----      ----- Message -----  From: Gloria Holman M.D.  Sent: 4/12/2018  12:00 AM  To: [facility]    Your UDS is positive for Marijuana and as per our clinic policy we abide by [place] rules that I will not be able to refill your Xanax anymore.I am sorry for that.   Gloria Holman M.D.

## 2018-04-12 NOTE — LETTER
April 12, 2018        Chung Limon  [address]  [place] [address]        Dear Chung:    After much consideration, Dr Holman and the [facility] have decided to discontinue our treatment for your pain management related issues after several inconsistencies were noted with your urine samples that you provided us.    Dr Holman will still remain your Primary Care Provider for any other issues should you desire. Dr Holman will also provide you enough of your narcotic medications to last you through the next month. Those prescriptions can be picked up from the  of our office at your convenience. This office will not provide you with any other controlled substances.    If you should desire narcotic rehabilitation services, Dr Holman would be more than happy to refer you to an appropriate clinic to receive treatment.    In the event that you wish to no longer remain a patient with Dr Holman  please establish with another provider ASAP to minimize any gaps in your healthcare. We would be happy to transfer any records to your new provider upon your written request.    If you have any questions, please do not hesitate to call.      Sincerely,          Brie Ochoa  Practice Supervisor  [facility]

## 2018-05-03 ENCOUNTER — PATIENT MESSAGE (OUTPATIENT)
Dept: URGENT CARE | Facility: PHYSICIAN GROUP | Age: 68
End: 2018-05-03

## 2018-05-08 DIAGNOSIS — I73.9 PERIPHERAL VASCULAR DISEASE (HCC): ICD-10-CM

## 2018-05-08 DIAGNOSIS — M54.2 CERVICAL PAIN: ICD-10-CM

## 2018-05-08 NOTE — TELEPHONE ENCOUNTER
Was the patient seen in the last year in this department? Yes     Does patient have an active prescription for medications requested? No     Received Request Via: Pharmacy      Pt met protocol?: Yes, OV last month

## 2018-05-09 RX ORDER — IBUPROFEN 800 MG/1
800 TABLET ORAL EVERY 8 HOURS PRN
Qty: 30 TAB | Refills: 2 | Status: SHIPPED | OUTPATIENT
Start: 2018-05-09 | End: 2019-01-11 | Stop reason: SDUPTHER

## 2018-05-10 DIAGNOSIS — I73.9 PERIPHERAL VASCULAR DISEASE (HCC): ICD-10-CM

## 2018-05-11 RX ORDER — LISINOPRIL 10 MG/1
10 TABLET ORAL DAILY
Qty: 90 TAB | Refills: 3 | Status: SHIPPED | OUTPATIENT
Start: 2018-05-11 | End: 2019-04-16 | Stop reason: SDUPTHER

## 2018-07-06 DIAGNOSIS — I70.209 OCCLUSION OF ARTERY OF LEG (HCC): ICD-10-CM

## 2018-07-06 DIAGNOSIS — I25.2 HISTORY OF MI (MYOCARDIAL INFARCTION): ICD-10-CM

## 2018-07-06 NOTE — TELEPHONE ENCOUNTER
*PT NEEDS TO ESTABLISH WITH NEW PCP*  Was the patient seen in the last year in this department? Yes     Does patient have an active prescription for medications requested? No     Received Request Via: Pharmacy      Pt met protocol?: Yes    LAST OV 04/05/2018

## 2018-07-09 RX ORDER — CLOPIDOGREL BISULFATE 75 MG/1
TABLET ORAL
Qty: 90 TAB | Refills: 0 | Status: SHIPPED | OUTPATIENT
Start: 2018-07-09 | End: 2018-10-19 | Stop reason: SDUPTHER

## 2018-07-09 NOTE — TELEPHONE ENCOUNTER
Refilled x 3 months. Please advise patient to schedule follow-up for future fills to establish with new PCP.

## 2018-08-10 ENCOUNTER — TELEPHONE (OUTPATIENT)
Dept: MEDICAL GROUP | Facility: PHYSICIAN GROUP | Age: 68
End: 2018-08-10

## 2018-08-10 ENCOUNTER — PATIENT OUTREACH (OUTPATIENT)
Dept: HEALTH INFORMATION MANAGEMENT | Facility: OTHER | Age: 68
End: 2018-08-10

## 2018-08-10 DIAGNOSIS — R80.9 MICROALBUMINURIA DUE TO TYPE 2 DIABETES MELLITUS (HCC): ICD-10-CM

## 2018-08-10 DIAGNOSIS — E11.29 MICROALBUMINURIA DUE TO TYPE 2 DIABETES MELLITUS (HCC): ICD-10-CM

## 2018-08-10 NOTE — PROGRESS NOTES
1. Attempt #:1/Need to call back when diabetic lab orders are ready to schedule a follow up visit for those labs    2. WebIZ Checked & Epic Updated: Yes  3. HealthConnect Verified: yes  4. Verify PCP: yes    5. Communication Preference Obtained: yes    6. Diabetes Visit Scheduling  Scheduling Status:Not yet scheduled. Will schedule when lab orders are ready      7. Care Gap Scheduling (Attempt to Schedule EACH Overdue Care Gap!)    Health Maintenance Due   Topic Date Due   • IMM ZOSTER VACCINES (2 of 3) 01/10/2017   • URINE ACR / MICROALBUMIN  02/01/2018   • A1C SCREENING  07/17/2018        8. Patient was directed to Health and Wellness Website: no/patient has advanced directive on file/non tobacco smoker     - Patient plans to schedule appointment for diabetic fv    9. Screened for [employer] Prescription? yes  10. [employer] Activation: already active  11. [employer] Ramez: no  12. Virtual Visits: no  13. Opt In to Text Messages: no

## 2018-08-15 ENCOUNTER — HOSPITAL ENCOUNTER (OUTPATIENT)
Dept: LAB | Facility: MEDICAL CENTER | Age: 68
End: 2018-08-15
Attending: FAMILY MEDICINE
Payer: MEDICARE

## 2018-08-15 DIAGNOSIS — R80.9 MICROALBUMINURIA DUE TO TYPE 2 DIABETES MELLITUS (HCC): ICD-10-CM

## 2018-08-15 DIAGNOSIS — E11.29 MICROALBUMINURIA DUE TO TYPE 2 DIABETES MELLITUS (HCC): ICD-10-CM

## 2018-08-15 LAB
CREAT UR-MCNC: 172.5 MG/DL
EST. AVERAGE GLUCOSE BLD GHB EST-MCNC: 140 MG/DL
HBA1C MFR BLD: 6.5 % (ref 0–5.6)
MICROALBUMIN UR-MCNC: 15.6 MG/DL
MICROALBUMIN/CREAT UR: 90 MG/G (ref 0–30)

## 2018-08-15 PROCEDURE — 82043 UR ALBUMIN QUANTITATIVE: CPT

## 2018-08-15 PROCEDURE — 82570 ASSAY OF URINE CREATININE: CPT

## 2018-08-15 PROCEDURE — 36415 COLL VENOUS BLD VENIPUNCTURE: CPT

## 2018-08-15 PROCEDURE — 83036 HEMOGLOBIN GLYCOSYLATED A1C: CPT

## 2018-08-20 ENCOUNTER — OFFICE VISIT (OUTPATIENT)
Dept: URGENT CARE | Facility: PHYSICIAN GROUP | Age: 68
End: 2018-08-20
Payer: MEDICARE

## 2018-08-20 VITALS
DIASTOLIC BLOOD PRESSURE: 60 MMHG | OXYGEN SATURATION: 97 % | TEMPERATURE: 97.2 F | HEART RATE: 65 BPM | SYSTOLIC BLOOD PRESSURE: 112 MMHG

## 2018-08-20 DIAGNOSIS — S05.02XA CONJUNCTIVAL ABRASION, LEFT, INITIAL ENCOUNTER: ICD-10-CM

## 2018-08-20 PROCEDURE — 99214 OFFICE O/P EST MOD 30 MIN: CPT | Performed by: NURSE PRACTITIONER

## 2018-08-20 RX ORDER — POLYMYXIN B SULFATE AND TRIMETHOPRIM 1; 10000 MG/ML; [USP'U]/ML
1 SOLUTION OPHTHALMIC EVERY 4 HOURS
Qty: 10 ML | Refills: 0 | Status: SHIPPED | OUTPATIENT
Start: 2018-08-20 | End: 2018-08-25

## 2018-08-20 ASSESSMENT — ENCOUNTER SYMPTOMS
PHOTOPHOBIA: 0
FOREIGN BODY SENSATION: 1
DOUBLE VISION: 0
EYE REDNESS: 1
EYE PAIN: 0
EYE DISCHARGE: 0
BLURRED VISION: 0

## 2018-08-20 NOTE — PROGRESS NOTES
Subjective:      Chung Limon is a 68 y.o. male who presents with Eye Problem (Possible saw dust in lt eye for 3 days )            Eye Problem    The left eye is affected. This is a new problem. Episode onset: pt reports it feels like he may have gotten saw dust in his left eye 3 days ago. he admits when he blinks he feels like there is still something in his eye. Reports slight film over field of vision but clears with blinking. Denies drainage or pain to eye. The problem occurs constantly. The problem has been unchanged. Injury mechanism: possible sawdust. There is no known exposure to pink eye. He does not wear contacts. Associated symptoms include eye redness and a foreign body sensation. Pertinent negatives include no blurred vision, eye discharge, double vision or photophobia. He has tried nothing for the symptoms.       Review of Systems   Eyes: Positive for redness. Negative for blurred vision, double vision, photophobia, pain and discharge.   All other systems reviewed and are negative.    Past Medical History:   Diagnosis Date   • Cancer (HCC)    • Claudication (HCC) 8/22/2012   • Diabetes (HCC)    • H/O colonoscopy with polypectomy 5/7/2013   • Hepatitis C    • History of alcoholism (HCC) 8/22/2012   • Hyperlipidemia    • Hypertension       Past Surgical History:   Procedure Laterality Date   • WIDE EXCISION  5/6/2014    Performed by Nicholas Govea M.D. at SURGERY SAME DAY [facility] ORS   • FLAP GRAFT  5/6/2014    Performed by Nicholas Govea M.D. at SURGERY SAME DAY [facility] ORS   • RECOVERY  10/22/2012    Performed by Pily Lee M.D. at SURGERY [facility]      Social History     Social History   • Marital status: Single     Spouse name: N/A   • Number of children: N/A   • Years of education: N/A     Occupational History   • Not on file.     Social History Main Topics   • Smoking status: Former Smoker     Packs/day: 1.00     Years: 58.00     Types: Cigarettes     Start date: 1/1/1960      Quit date: 9/1/2011   • Smokeless tobacco: Never Used      Comment: avoid all tobacco products   • Alcohol use No      Comment: quit 1985, drank 2 times since   • Drug use: No   • Sexual activity: No     Other Topics Concern   • Not on file     Social History Narrative   • No narrative on file          Objective:     /60   Pulse 65   Temp 36.2 °C (97.2 °F)   SpO2 97%      Physical Exam   Constitutional: He is oriented to person, place, and time. Vital signs are normal. He appears well-developed and well-nourished.   HENT:   Head: Normocephalic and atraumatic.   Eyes: Pupils are equal, round, and reactive to light. EOM are normal.   Slit lamp exam:       The left eye shows fluorescein uptake.       Neck: Normal range of motion.   Cardiovascular: Normal rate and regular rhythm.    Pulmonary/Chest: Effort normal.   Musculoskeletal: Normal range of motion.   Neurological: He is alert and oriented to person, place, and time.   Skin: Skin is warm and dry. Capillary refill takes less than 2 seconds.   Psychiatric: He has a normal mood and affect. His speech is normal and behavior is normal. Thought content normal.   Vitals reviewed.              Assessment/Plan:     1. Conjunctival abrasion, left, initial encounter  - polymixin-trimethoprim (POLYTRIM) 21812-5.1 UNIT/ML-% Solution; Place 1 Drop in left eye every 4 hours for 5 days.  Dispense: 10 mL; Refill: 0    Alternate warm and cool compresses to eye TID  Do not touch dropper to eye (s)  Wash hands frequently   Supportive care, differential diagnoses, and indications for immediate follow-up discussed with patient.    Pathogenesis of diagnosis discussed including typical length and natural progression.      Instructed to return to  or nearest emergency department if symptoms fail to improve, for any change in condition, further concerns, or new concerning symptoms.  Patient states understanding of the plan of care and discharge instructions.

## 2018-08-29 ENCOUNTER — TELEPHONE (OUTPATIENT)
Dept: MEDICAL GROUP | Facility: PHYSICIAN GROUP | Age: 68
End: 2018-08-29

## 2018-08-29 DIAGNOSIS — I10 ESSENTIAL HYPERTENSION: ICD-10-CM

## 2018-08-29 DIAGNOSIS — I25.2 HISTORY OF MI (MYOCARDIAL INFARCTION): ICD-10-CM

## 2018-08-29 RX ORDER — METOPROLOL SUCCINATE 25 MG/1
25 TABLET, EXTENDED RELEASE ORAL DAILY
Qty: 90 TAB | Refills: 2 | Status: SHIPPED | OUTPATIENT
Start: 2018-08-29 | End: 2019-07-17 | Stop reason: SDUPTHER

## 2018-08-29 NOTE — TELEPHONE ENCOUNTER
Future Appointments       Provider Department Center    8/30/2018 9:00 AM Gloria Holman M.D. [facility]        ESTABLISHED PATIENT PRE-VISIT PLANNING     Note: Patient will not be contacted if there is no indication to call.     1.  Reviewed notes from the last few office visits within the medical group: Yes    2.  If any orders were placed at last visit or intended to be done for this visit (i.e. 6 mos follow-up), do we have Results/Consult Notes?        •  Labs - Labs ordered, completed on 08/15/18 and results are in chart.       •  Imaging - Imaging was not ordered at last office visit.       •  Referrals - No referrals were ordered at last office visit.    3. Is this appointment scheduled as a Hospital Follow-Up? No    4.  Immunizations were updated in [employer] using WebIZ?: Yes       •  Web Iz Recommendations: FLU, TD and ZOSTAVAX (Shingles)    5.  Patient is due for the following Health Maintenance Topics:   Health Maintenance Due   Topic Date Due   • IMM HEP B VACCINE (1 of 3 - Risk 3-dose series) 01/19/1969   • IMM ZOSTER VACCINES (2 of 3) 01/10/2017   • IMM INFLUENZA (1) 09/01/2018       6.  MDX printed for Provider? NO complete and compliant on 04/05/18    7.  Patient was informed to arrive 15 min prior to their scheduled appointment and bring in their medication bottles. Confirmed through automated call

## 2018-08-30 ENCOUNTER — OFFICE VISIT (OUTPATIENT)
Dept: MEDICAL GROUP | Facility: PHYSICIAN GROUP | Age: 68
End: 2018-08-30
Payer: MEDICARE

## 2018-08-30 VITALS
TEMPERATURE: 98.8 F | OXYGEN SATURATION: 96 % | SYSTOLIC BLOOD PRESSURE: 108 MMHG | HEIGHT: 70 IN | WEIGHT: 171 LBS | DIASTOLIC BLOOD PRESSURE: 64 MMHG | BODY MASS INDEX: 24.48 KG/M2 | HEART RATE: 62 BPM

## 2018-08-30 DIAGNOSIS — E11.29 MICROALBUMINURIA DUE TO TYPE 2 DIABETES MELLITUS (HCC): ICD-10-CM

## 2018-08-30 DIAGNOSIS — E11.51 DIABETES MELLITUS WITH PERIPHERAL VASCULAR DISEASE (HCC): ICD-10-CM

## 2018-08-30 DIAGNOSIS — F41.9 ANXIETY: ICD-10-CM

## 2018-08-30 DIAGNOSIS — I70.0 ATHEROSCLEROSIS OF AORTA (HCC): ICD-10-CM

## 2018-08-30 DIAGNOSIS — I10 ESSENTIAL HYPERTENSION: ICD-10-CM

## 2018-08-30 DIAGNOSIS — R80.9 MICROALBUMINURIA DUE TO TYPE 2 DIABETES MELLITUS (HCC): ICD-10-CM

## 2018-08-30 PROCEDURE — 99214 OFFICE O/P EST MOD 30 MIN: CPT | Performed by: INTERNAL MEDICINE

## 2018-08-30 NOTE — ASSESSMENT & PLAN NOTE
This is a chronic health problem that is well controlled with current medications and lifestyle measures. Reviewed the recent lower extremity Dopplers which is positive for Low velocity monophasic flow in the bilateral common femoral arteries without stenosis from inguinal ligament to the ankle, indicitive of aorto-iliac occlusion. Last A1c in August 2018 was showing 6.2%, currently not on any medications following diet and lifestyle modification. Due for a retinal eye exam for which he is scheduled at [facility] on September 10.

## 2018-08-30 NOTE — PROGRESS NOTES
CC: Follow-up visit, diabetes mellitus. To review on his lab work.    HISTORY OF PRESENT ILLNESS: Patient is a 68 y.o. male established patient who presents today to discuss some medical conditions as mentioned in history of present illness below.    Health Maintenance: Patient is due for his immunizations for shingles and also hepatitis B which he refuses to take.    Diabetes mellitus with peripheral vascular disease (CMS-HCC)  This is a chronic health problem that is well controlled with current medications and lifestyle measures. Reviewed the recent lower extremity Dopplers which is positive for Low velocity monophasic flow in the bilateral common femoral arteries without stenosis from inguinal ligament to the ankle, indicitive of aorto-iliac occlusion. Last A1c in August 2018 was showing 6.2%, currently not on any medications following diet and lifestyle modification. Due for a retinal eye exam for which he is scheduled at [facility] on September 10.    Microalbuminuria due to type 2 diabetes mellitus (CMS-[facility])  This is a chronic health problem that is uncontrolled with current medications and lifestyle measures. Last microalbumin levels in August 2018 was showing abnormal at 90. Currently on lisinopril 10 mg daily.    Essential hypertension  This is a chronic health problem that is well controlled with current medications and lifestyle measures. Blood pressure in office today is 108/64, currently on lisinopril 10 mg daily for his microalbuminuria. Given instructions to check blood pressure every day.      PHQ score 0, BMI within normal limits, Former tobacco, no fall injuries.    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   • Chronic hepatitis C without hepatic coma (HCC) 05/18/2017   • Cervical disc  disease 05/18/2017   • History of tobacco abuse 01/31/2017   • Atherosclerosis of aorta (HCC) 01/31/2017   • Diabetes mellitus with peripheral vascular disease (HCC) 11/15/2016   • Microalbuminuria due to type 2 diabetes mellitus ([facility]) 11/15/2016   • Bruit 11/01/2016   • Stress-induced cardiomyopathy 03/10/2015   • H/O colonoscopy with polypectomy 05/07/2013   • Bilateral claudication of lower limb ([facility]) 10/22/2012      Allergies:Patient has no known allergies.    Current Outpatient Prescriptions   Medication Sig Dispense Refill   • metoprolol SR (TOPROL XL) 25 MG TABLET SR 24 HR Take 1 Tab by mouth every day. 90 Tab 2   • clopidogrel (PLAVIX) 75 MG Tab Take 1 tablet orally once daily-GENERIC FOR PLAVIX 90 Tab 0   • lisinopril (PRINIVIL) 10 MG Tab Take 1 Tab by mouth every day. 90 Tab 3   • ibuprofen (MOTRIN) 800 MG Tab Take 1 Tab by mouth every 8 hours as needed. 30 Tab 2   • atorvastatin (LIPITOR) 80 MG tablet TAKE 1 TABLET BY MOUTH AT BEDTIME-GENERIC FOR LIPITOR 90 Tab 1   • Cholecalciferol (VITAMIN D PO) Take  by mouth.     • Multiple Vitamins-Minerals (CENTRUM SILVER PO) Take  by mouth.     • glucose blood (FREESTYLE LITE) strip TEST EVERY MORNING AND RANDOMLY EVERY      EVENING 100 Strip 12   • [facility] [place] [facility] KIT Diagnosis 250.00. Freestyle glucometer, test strips, #100, RF11 and lancets #100, RF 11. To test fasting in the AM and random in the PM. 1 Kit 0   • aspirin EC 81 MG EC tablet Take 1 Tab by mouth every day. 30 Tab 0     No current facility-administered medications for this visit.        Social History   Substance Use Topics   • Smoking status: Former Smoker     Packs/day: 1.00     Years: 58.00     Types: Cigarettes     Start date: 1/1/1960     Quit date: 9/1/2011   • Smokeless tobacco: Never Used      Comment: avoid all tobacco products   • Alcohol use No      Comment: quit 1985, drank 2 times since     Social History     Social History Narrative   • No narrative on file       Family History   Problem  "Relation Age of Onset   • Heart Disease Father    • Arthritis Mother    • No Known Problems Sister    • Cancer Brother         type unknown   • Cancer Brother         skin    • No Known Problems Maternal Grandmother    • No Known Problems Maternal Grandfather    • No Known Problems Paternal Grandmother    • No Known Problems Paternal Grandfather    • Diabetes Neg Hx    • Hypertension Neg Hx    • Stroke Neg Hx    • Hyperlipidemia Neg Hx         ROS:     - Constitutional:  Negative for fever, chills, unexpected weight change, and fatigue/generalized weakness.    - HEENT:  Negative for headaches, vision changes, hearing changes, ear pain, ear discharge, rhinorrhea, sinus congestion, sore throat, and neck pain.      - Respiratory: Negative for cough, sputum production, chest congestion, dyspnea, wheezing, and crackles.      - Cardiovascular: Negative for chest pain, palpitations, orthopnea, and bilateral lower extremity edema.     - Gastrointestinal: Negative for heartburn, nausea, vomiting, abdominal pain, hematochezia, melena, diarrhea, constipation, and greasy/foul-smelling stools.     - Genitourinary: Negative for dysuria, polyuria, hematuria, pyuria, urinary urgency, and urinary incontinence.     - Musculoskeletal: Occasional leg pain due to work-related stress. Negative for myalgias, back pain, and joint pain.     - Skin: Negative for rash, itching, cyanotic skin color change.     - Neurological: Negative for dizziness, tingling, tremors, focal sensory deficit, focal weakness and headaches.     - Endo/Heme/Allergies: Does not bruise/bleed easily.     - Psychiatric/Behavioral: Negative for depression, suicidal/homicidal ideation and memory loss.       Last lab work done in 08/2018 reviewed and discussed with the patient.      Exam:    Blood pressure 108/64, pulse 62, temperature 37.1 °C (98.8 °F), height 1.778 m (5' 10\"), weight 77.6 kg (171 lb), SpO2 96 %. Body mass index is 24.54 kg/m².    General:  Well " nourished, well developed male in NAD  Head is grossly normal.  Neck: Supple without JVD or bruit. Thyroid is not enlarged.  Pulmonary: Clear to ausculation and percussion.  Normal effort. No rales, ronchi, or wheezing.  Cardiovascular: Regular rate and rhythm without murmur. Carotid and radial pulses are intact and equal bilaterally.  Extremities: no clubbing, cyanosis, or edema.      Please note that this dictation was created using voice recognition software. I have made every reasonable attempt to correct obvious errors, but I expect that there are errors of grammar and possibly content that I did not discover before finalizing the note.    Assessment/Plan:  1. Essential hypertension  Well controlled, continue current lisinopril 10 mg daily, metoprolol 25 mg daily.Given instructions to check his blood pressure every day to make sure he doesn't go too low and hydrate himself well which he understood and agreed.    2. Diabetes mellitus with peripheral vascular disease (HCC)  Stable, last A1c in now August 2018 showing 6.5%. Discussed at length on his diet and lifestyle more dictation. Provided American diabetic Association diet in his office visit summary today.    3. Anxiety  Stable at this time, previously on Xanax but tapered it down and stopped. Patient is not getting any more of his Xanax refills from this office because he was positive for marijuana on his urine drug screen.    4. Atherosclerosis of aorta ([facility])  Reviewed his previous arterial Doppler showing aorto iliac occlusion, patient was offered procedure by vascular surgery in the past but he refused to get any procedures done. Given instructions to keep his cholesterol levels within normal limits which patient understood. Last cholesterol levels this year was normal. Continue current atorvastatin 80 mg daily.    5. Microalbuminuria due to type 2 diabetes mellitus ([facility])  Abnormal, recent microalbumin levels in August 2018 showing at 90. His current  lisinopril is 10 mg daily to continue the same. I wouldn't increase the dose at this time as the patient blood pressure is running in 100/60s. Given instructions to check his blood pressure every day to make sure he doesn't go too low and hydrate himself well which she understood and agreed.

## 2018-08-30 NOTE — PATIENT INSTRUCTIONS
American Diabetes Association (ADA) nutritional guidelines --   1. A diet that includes carbohydrates from fruits, vegetables, whole grains, legumes, and low-fat milk is encouraged.The use of lower glycemic index and glycemic load meals may provide a modest additional benefit for glycemic control.  Low-Glycemic Foods  1. Sweet potatoes  2. Many vegetables, including leafy greens, asparagus, cauliflower  3. Steel-cut oatmeal  4. Farrow  5. Quinoa  6. Legumes, including lentils, chickpeas  7. Leif bread  8. Skim milk  9. Reduced-fat yogurt  10. Sesame seeds, peanuts, flax seeds  2. A variety of eating patterns (Mediterranean, low fat, low carbohydrate, vegetarian) are acceptable.  3. Fat quality is more important than fat quantity-  with monounsaturated and polyunsaturated fatty acids (eg, those found in fish, olive oil, nuts).   4. Protein intake goals should be individualized but not lower than 0.8 g/kg body weight per day (the recommended daily allowance). Patients should be encouraged to substitute lean meats, fish, eggs, beans, peas, soy products, and nuts and seeds for red meat.  5. Fiber intake should be at least 14 grams per 1000 calories daily; higher fiber intake may improve glycemic control.  6. A reduced sodium intake of 2300 mg per day with a diet high in fruits, vegetables, and low-fat dairy products is prudent. For individuals with hypertension, further reduction in sodium may be necessary.  7. Foods containing sucrose to be avoided.  Physical Activity - 30 minutes or more of moderate-intensity physical activity on most days of the week .

## 2018-08-30 NOTE — ASSESSMENT & PLAN NOTE
This is a chronic health problem that is uncontrolled with current medications and lifestyle measures. Last microalbumin levels in August 2018 was showing abnormal at 90. Currently on lisinopril 10 mg daily.

## 2018-08-30 NOTE — ASSESSMENT & PLAN NOTE
This is a chronic health problem that is well controlled with current medications and lifestyle measures. Blood pressure in office today is 108/64, currently on lisinopril 10 mg daily for his microalbuminuria. Given instructions to check blood pressure every day.

## 2018-10-19 DIAGNOSIS — I25.2 HISTORY OF MI (MYOCARDIAL INFARCTION): ICD-10-CM

## 2018-10-19 DIAGNOSIS — I70.209 OCCLUSION OF ARTERY OF LEG (HCC): ICD-10-CM

## 2018-10-19 DIAGNOSIS — E78.6 HYPOCHOLESTEREMIA: ICD-10-CM

## 2018-10-19 RX ORDER — CLOPIDOGREL BISULFATE 75 MG/1
TABLET ORAL
Qty: 90 TAB | Refills: 1 | Status: SHIPPED | OUTPATIENT
Start: 2018-10-19 | End: 2019-05-30 | Stop reason: SDUPTHER

## 2018-10-19 RX ORDER — ATORVASTATIN CALCIUM 80 MG/1
TABLET, FILM COATED ORAL
Qty: 90 TAB | Refills: 1 | Status: SHIPPED | OUTPATIENT
Start: 2018-10-19 | End: 2019-06-13 | Stop reason: SDUPTHER

## 2018-10-19 NOTE — TELEPHONE ENCOUNTER
Pt has had OV within the 12 month protocol and lipid panel is current. 6 month supply sent to pharmacy.   Lab Results   Component Value Date/Time    CHOLSTRLTOT 86 (L) 01/17/2018 06:31 AM    LDL 34 01/17/2018 06:31 AM    HDL 29 (A) 01/17/2018 06:31 AM    TRIGLYCERIDE 117 01/17/2018 06:31 AM       Lab Results   Component Value Date/Time    SODIUM 141 01/17/2018 06:31 AM    POTASSIUM 4.2 01/17/2018 06:31 AM    CHLORIDE 109 01/17/2018 06:31 AM    CO2 25 01/17/2018 06:31 AM    GLUCOSE 116 (H) 01/17/2018 06:31 AM    BUN 18 01/17/2018 06:31 AM    CREATININE 0.79 01/17/2018 06:31 AM     Lab Results   Component Value Date/Time    ALKPHOSPHAT 44 01/17/2018 06:31 AM    ASTSGOT 44 01/17/2018 06:31 AM    ALTSGPT 61 (H) 01/17/2018 06:31 AM    TBILIRUBIN 0.7 01/17/2018 06:31 AM

## 2019-01-11 DIAGNOSIS — M54.2 CERVICAL PAIN: ICD-10-CM

## 2019-01-11 RX ORDER — IBUPROFEN 800 MG/1
800 TABLET ORAL EVERY 8 HOURS PRN
Qty: 30 TAB | Refills: 1 | Status: SHIPPED | OUTPATIENT
Start: 2019-01-11 | End: 2019-04-23 | Stop reason: SDUPTHER

## 2019-04-16 DIAGNOSIS — I73.9 PERIPHERAL VASCULAR DISEASE (HCC): ICD-10-CM

## 2019-04-16 RX ORDER — LISINOPRIL 10 MG/1
10 TABLET ORAL DAILY
Qty: 100 TAB | Refills: 3 | Status: ON HOLD | OUTPATIENT
Start: 2019-04-16 | End: 2019-09-18

## 2019-04-18 ENCOUNTER — TELEPHONE (OUTPATIENT)
Dept: MEDICAL GROUP | Facility: PHYSICIAN GROUP | Age: 69
End: 2019-04-18

## 2019-04-18 NOTE — TELEPHONE ENCOUNTER
Future Appointments       Provider Department Center    4/19/2019 9:40 AM Gloria Holman M.D. [facility] [place]    4/22/2019 1:30 PM Manjit Gleason M.D. [facility]         ESTABLISHED PATIENT PRE-VISIT PLANNING     Patient was NOT contacted to complete PVP.      1.  Reviewed notes from the last few office visits within the medical group: Yes    2.  If any orders were placed at last visit or intended to be done for this visit (i.e. 6 mos follow-up), do we have Results/Consult Notes?        •  Labs - Labs were not ordered at last office visit.          •  Imaging - Imaging was not ordered at last office visit.       •  Referrals - No referrals were ordered at last office visit.    3. Is this appointment scheduled as a Hospital Follow-Up? No    4.  Immunizations were updated in katena using WebIZ?: Yes       •  Web Iz Recommendations: FLU, TD, VARICELLA (Chicken Pox)  and SHINGRIX (Shingles)    5.  Patient is due for the following Health Maintenance Topics:   Health Maintenance Due   Topic Date Due   • IMM HEP B VACCINE (1 of 3 - Risk 3-dose series) 01/19/1969   • IMM ZOSTER VACCINES (2 of 3) 01/10/2017   • RETINAL SCREENING  08/30/2018   • DIABETES MONOFILAMENT / LE EXAM  12/12/2018   • FASTING LIPID PROFILE  01/17/2019   • SERUM CREATININE  01/17/2019   • Annual Wellness Visit  01/17/2019   • A1C SCREENING  02/15/2019   • LUNG CANCER SCREENING  02/27/2019           6. Orders for overdue Health Maintenance topics pended in Pre-Charting? YES    7.  AHA (MDX) form printed for Provider? YES    8.  Patient was NOT informed to arrive 15 min prior to their scheduled appointment and bring in their medication bottles.  Called pt and left a VM for appointment reminder and to arrive early

## 2019-04-19 ENCOUNTER — OFFICE VISIT (OUTPATIENT)
Dept: MEDICAL GROUP | Facility: PHYSICIAN GROUP | Age: 69
End: 2019-04-19
Payer: MEDICARE

## 2019-04-19 VITALS
HEIGHT: 70 IN | BODY MASS INDEX: 25.48 KG/M2 | WEIGHT: 178 LBS | HEART RATE: 74 BPM | OXYGEN SATURATION: 96 % | DIASTOLIC BLOOD PRESSURE: 64 MMHG | TEMPERATURE: 97.9 F | SYSTOLIC BLOOD PRESSURE: 126 MMHG

## 2019-04-19 DIAGNOSIS — I73.9 BILATERAL CLAUDICATION OF LOWER LIMB (HCC): ICD-10-CM

## 2019-04-19 DIAGNOSIS — R80.9 MICROALBUMINURIA DUE TO TYPE 2 DIABETES MELLITUS (HCC): ICD-10-CM

## 2019-04-19 DIAGNOSIS — B18.2 CHRONIC HEPATITIS C WITHOUT HEPATIC COMA (HCC): ICD-10-CM

## 2019-04-19 DIAGNOSIS — D17.21 LIPOMA OF RIGHT SHOULDER: ICD-10-CM

## 2019-04-19 DIAGNOSIS — E11.51 DIABETES MELLITUS WITH PERIPHERAL VASCULAR DISEASE (HCC): ICD-10-CM

## 2019-04-19 DIAGNOSIS — E11.29 MICROALBUMINURIA DUE TO TYPE 2 DIABETES MELLITUS (HCC): ICD-10-CM

## 2019-04-19 DIAGNOSIS — I51.81 STRESS-INDUCED CARDIOMYOPATHY: ICD-10-CM

## 2019-04-19 DIAGNOSIS — Z87.891 HISTORY OF TOBACCO ABUSE: ICD-10-CM

## 2019-04-19 DIAGNOSIS — M54.12 CERVICAL RADICULOPATHY, CHRONIC: ICD-10-CM

## 2019-04-19 DIAGNOSIS — D14.0: ICD-10-CM

## 2019-04-19 DIAGNOSIS — F10.21 ALCOHOL DEPENDENCE IN REMISSION (HCC): ICD-10-CM

## 2019-04-19 DIAGNOSIS — I74.3 EMBOLISM AND THROMBOSIS OF ARTERIES OF LOWER EXTREMITY (HCC): ICD-10-CM

## 2019-04-19 DIAGNOSIS — Z12.2 ENCOUNTER FOR SCREENING FOR MALIGNANT NEOPLASM OF RESPIRATORY ORGANS: ICD-10-CM

## 2019-04-19 PROBLEM — F41.9 ANXIETY: Status: RESOLVED | Noted: 2018-04-05 | Resolved: 2019-04-19

## 2019-04-19 PROCEDURE — 99214 OFFICE O/P EST MOD 30 MIN: CPT | Performed by: INTERNAL MEDICINE

## 2019-04-19 PROCEDURE — 8041 PR SCP AHA: Performed by: INTERNAL MEDICINE

## 2019-04-19 ASSESSMENT — PATIENT HEALTH QUESTIONNAIRE - PHQ9: CLINICAL INTERPRETATION OF PHQ2 SCORE: 0

## 2019-04-19 NOTE — ASSESSMENT & PLAN NOTE
This is a chronic health problem that is well controlled with current medications and lifestyle measures.  Patient has been diagnosed with thrombosis of the artery supplying the lower extremities in the past and was supposed to undergo procedure for removal of this which patient refused due to the cost of it and affordability.  Currently on medications  Plavix for his PVD, on medications metoprolol 25 mg daily, atorvastatin 80 mg daily, lisinopril 10 mg daily and aspirin 81 mg daily.

## 2019-04-19 NOTE — ASSESSMENT & PLAN NOTE
This is a chronic health problem that is well controlled with current medications and lifestyle measures. Follows cardiology . Angiogram was normal. Currently on Plavix for his PVD, on medications metoprolol 25 mg daily, atorvastatin 80 mg daily, lisinopril 10 mg daily and aspirin 81 mg daily.  Has upcoming appointment with .

## 2019-04-19 NOTE — ASSESSMENT & PLAN NOTE
This is a chronic health problem that is uncontrolled with current medications and lifestyle measures. Also has ongoing shoulder pain and takes Ibuprofen everynight

## 2019-04-19 NOTE — ASSESSMENT & PLAN NOTE
This is a chronic health problem that is well controlled with current medications and lifestyle measures.  Patient has quit alcohol remotely and not on it anymore.

## 2019-04-19 NOTE — PROGRESS NOTES
Subjective:     Chung Limon is a 69 y.o. male here today for follow-up visit, diabetes mellitus.  And Annual Health Assessment.    Stress-induced cardiomyopathy  This is a chronic health problem that is well controlled with current medications and lifestyle measures. Follows cardiology . Angiogram was normal. Currently on Plavix for his PVD, on medications metoprolol 25 mg daily, atorvastatin 80 mg daily, lisinopril 10 mg daily and aspirin 81 mg daily.  Has upcoming appointment with .    Cervical radiculopathy, chronic  This is a chronic health problem that is uncontrolled with current medications and lifestyle measures. Also has ongoing shoulder pain and takes Ibuprofen everynight    Alcohol dependence in remission (HCC)  This is a chronic health problem that is well controlled with current medications and lifestyle measures.  Patient has quit alcohol remotely and not on it anymore.    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  Patient has been diagnosed with thrombosis of the artery supplying the lower extremities in the past and was supposed to undergo procedure for removal of this which patient refused due to the cost of it and affordability.  Currently on medications  Plavix for his PVD, on medications metoprolol 25 mg daily, atorvastatin 80 mg daily, lisinopril 10 mg daily and aspirin 81 mg daily.     PHQ score 0, BMI within normal limits, former tobacco, no fall injuries    Health Maintenance Summary                IMM HEP B VACCINE Overdue 1/19/1968     IMM ZOSTER VACCINES Overdue 1/10/2017      Done 11/15/2016 Imm Admin: Zoster Vaccine Live (ZVL) (Zostavax)    RETINAL SCREENING Overdue 8/30/2018      Done 8/30/2017 REFERRAL FOR RETINAL SCREENING EXAM     Patient has more history with this topic...    DIABETES MONOFILAMENT / LE EXAM Overdue 12/12/2018      Done  12/12/2017 AMB DIABETIC MONOFILAMENT LOWER EXTREMITY EXAM     Patient has more history with this topic...    FASTING LIPID PROFILE Overdue 1/17/2019      Done 1/17/2018 LIPID PROFILE      Patient has more history with this topic...    SERUM CREATININE Overdue 1/17/2019      Done 1/17/2018 COMP METABOLIC PANEL      Patient has more history with this topic...    Annual Wellness Visit Overdue 1/17/2019      Done 1/16/2018 Visit Dx: Medicare annual wellness visit, subsequent     Patient has more history with this topic...    A1C SCREENING Overdue 2/15/2019      Done 8/15/2018 HEMOGLOBIN A1C      Patient has more history with this topic...    LUNG CANCER SCREENING Overdue 2/27/2019      Done 2/27/2018 CT-LUNG CANCER-SCREENING    URINE ACR / MICROALBUMIN Next Due 8/15/2019      Done 8/15/2018 MICROALBUMIN CREAT RATIO URINE      Patient has more history with this topic...    IMM INFLUENZA Next Due 9/1/2019      Done 11/15/2016 Imm Admin: Influenza Vaccine Adult HD     Patient has more history with this topic...    COLONOSCOPY Next Due 10/3/2019      Done 10/3/2016 REFERRAL TO GI FOR COLONOSCOPY     Patient has more history with this topic...    IMM DTaP/Tdap/Td Vaccine Next Due 12/2/2024      Done 12/2/2014 Imm Admin: Tdap Vaccine           Annual Health Assessment Questions:     1.  Are you currently engaging in any exercise or physical activity? Yes    2.  How would you describe your mood or emotional well-being today? fair    3.  Have you had any falls in the last year? No    4.  Have you noticed any problems with your balance or had difficulty walking? No    5.  In the last six months have you experienced any leakage of urine? No    6. DPA/Advanced Directive: Patient has Advanced Directive, but it is not on file. Instructed to bring in a copy to scan into their chart.    Current medicines (including changes today)  Current Outpatient Prescriptions   Medication Sig Dispense Refill   • lisinopril (PRINIVIL) 10 MG Tab  Take 1 Tab by mouth every day. 100 Tab 3   • clopidogrel (PLAVIX) 75 MG Tab Take 1 tablet orally once daily-GENERIC FOR PLAVIX 90 Tab 1   • atorvastatin (LIPITOR) 80 MG tablet TAKE 1 TABLET BY MOUTH AT BEDTIME-GENERIC FOR LIPITOR 90 Tab 1   • metoprolol SR (TOPROL XL) 25 MG TABLET SR 24 HR Take 1 Tab by mouth every day. 90 Tab 2   • Multiple Vitamins-Minerals (CENTRUM SILVER PO) Take  by mouth.     • glucose blood (FREESTYLE LITE) strip TEST EVERY MORNING AND RANDOMLY EVERY      EVENING 100 Strip 12   • aspirin EC 81 MG EC tablet Take 1 Tab by mouth every day. 30 Tab 0   • ibuprofen (MOTRIN) 800 MG Tab Take 1 Tab by mouth every 8 hours as needed. 30 Tab 1     No current facility-administered medications for this visit.        He  has a past medical history of Cancer (HCC); Claudication (HCC) (8/22/2012); Diabetes (HCC); H/O colonoscopy with polypectomy (5/7/2013); Hepatitis C; History of alcoholism (HCC) (8/22/2012); Hyperlipidemia; and Hypertension.    Patient has no known allergies.    He  reports that he quit smoking about 7 years ago. His smoking use included Cigarettes. He started smoking about 59 years ago. He has a 58.00 pack-year smoking history. He has never used smokeless tobacco. He reports that he does not drink alcohol or use drugs.  Counseling given: Not Answered      Constitutional: Denies fevers or chills  Eyes: Denies changes in vision  Ears/Nose/Throat/Mouth: Denies nasal congestion or sore throat   Cardiovascular: Denies chest pain or palpitations   Respiratory: Denies shortness of breath , Denies cough  Gastrointestinal/Hepatic: Denies abd pain, nausea, vomiting   Genitourinary: Denies dysuria or frequency  Musculoskeletal/Rheum: Denies joint pain and swelling   Neurological: Denies headache  Psychiatric: Denies mood disorder   Endocrine: Denies hx of diabetes or thyroid dysfunction  Heme/Oncology/Lymph Nodes: Denies weight changes or enlarged LNs.   Allergic/Immunologic: Denies hx of allergies  "     Objective:     Physical Exam:  /64 (BP Location: Right arm, Patient Position: Sitting, BP Cuff Size: Adult)   Pulse 74   Temp 36.6 °C (97.9 °F) (Temporal)   Ht 1.778 m (5' 10\")   Wt 80.7 kg (178 lb)   SpO2 96%  Body mass index is 25.54 kg/m².   Constitutional: Alert, no distress.  Skin: Warm, dry, good turgor, no rashes in visible areas.  Eye: Equal, round and reactive, conjunctiva clear, lids normal.  Positive for a congested benign tumor on the nasal septum through the right nostril with tenderness on palpation.  ENMT: Lips without lesions, good dentition, oropharynx clear.  Neck: Trachea midline, no masses, no thyromegaly. No cervical or supraclavicular lymphadenopathy.  Respiratory: Unlabored respiratory effort, lungs clear to auscultation, no wheezes, no rhonchi.  Cardiovascular: Normal S1, S2, no murmur, no edema.  Abdomen: Soft, non-tender, no masses, no hepatosplenomegaly.  Right shoulder exam : Positive for lipoma tumor present on the upper part of the deltoid region on the right shoulder, no tenderness on palpation.  It measures around 5 cmx 4 cm in diameter.  Psych: Alert and oriented x3, normal affect and mood.      Assessment and Plan:     1. Diabetes mellitus with peripheral vascular disease (HCC)  Improving, with HbA1c at goal given his age.  Will recheck all the diabetic maintenance at this time.  Continue current diet and lifestyle modification.  - Lipid Profile; Future  - VITAMIN D,25 HYDROXY; Future  - HEMOGLOBIN A1C; Future  - POCT Retinal Eye Exam  - Comp Metabolic Panel; Future    2. Chronic hepatitis C without hepatic coma (HCC)  Stable, asymptomatic.  Will check a CBC and CMP at this time.  - CBC WITH DIFFERENTIAL; Future    3. Microalbuminuria due to type 2 diabetes mellitus (HCC)  Improving, continue current lisinopril 10 mg daily.    4. 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 Bilateral claudication of lower limb (HCC)  Well-controlled, continue " current Plavix, atorvastatin.  Patient has chosen conservative management instead of procedure.    6. Stress-induced cardiomyopathy  Well-controlled, follow-up with cardiology.  As mentioned in his.  I will continue all the current medications.    7. Cervical radiculopathy, chronic  Stable, continue the current ibuprofen as needed which he takes for pain.    8. Encounter for screening for malignant neoplasm of respiratory organs  9. History of tobacco abuse  Last CT lungs was done in 2018, will need a repeat at this time.  - CT-LUNG CANCER-SCREENING; Future    10. Benign tumor of septum of nose  As per HPI above and physical exam findings will give referral to ENT for possible need of removal because patient does have tendency to tumors with previous removal on the palate.  - REFERRAL TO ENT    11. Lipoma of right shoulder  New problem, will give referral to general surgery for removal.  - REFERRAL TO GENERAL SURGERY    12. Alcohol dependence in remission (HCC)  Well-controlled, to remain abstain from the alcohol at this time.      Discussion today about general wellness and lifestyle habits:    · Engage in regular physical activity and social activities.  · Prevent falls and reduce trip hazards; using ambulatory aides, hearing and vision testing if appropriate.  · Steps to improve urinary incontinence.  · Advanced care planning.    Follow-Up: Return in about 6 months (around 10/19/2019), or if symptoms worsen or fail to improve, for Procedure visit 2-3 weeks.         PLEASE NOTE: This dictation was created using voice recognition software. I have made every reasonable attempt to correct obvious errors, but I expect that there are errors of grammar and possibly content that I did not discover before finalizing the note.

## 2019-04-22 ENCOUNTER — OFFICE VISIT (OUTPATIENT)
Dept: CARDIOLOGY | Facility: MEDICAL CENTER | Age: 69
End: 2019-04-22
Payer: MEDICARE

## 2019-04-22 VITALS
BODY MASS INDEX: 25.48 KG/M2 | DIASTOLIC BLOOD PRESSURE: 60 MMHG | HEART RATE: 66 BPM | OXYGEN SATURATION: 95 % | SYSTOLIC BLOOD PRESSURE: 120 MMHG | WEIGHT: 178 LBS | HEIGHT: 70 IN

## 2019-04-22 DIAGNOSIS — F10.21 ALCOHOL DEPENDENCE IN REMISSION (HCC): ICD-10-CM

## 2019-04-22 DIAGNOSIS — Z00.00 HEALTHCARE MAINTENANCE: ICD-10-CM

## 2019-04-22 DIAGNOSIS — R09.89 BRUIT: ICD-10-CM

## 2019-04-22 DIAGNOSIS — I70.0 ATHEROSCLEROSIS OF AORTA (HCC): ICD-10-CM

## 2019-04-22 DIAGNOSIS — I51.81 STRESS-INDUCED CARDIOMYOPATHY: ICD-10-CM

## 2019-04-22 DIAGNOSIS — E11.51 DIABETES MELLITUS WITH PERIPHERAL VASCULAR DISEASE (HCC): ICD-10-CM

## 2019-04-22 DIAGNOSIS — I73.9 BILATERAL CLAUDICATION OF LOWER LIMB (HCC): ICD-10-CM

## 2019-04-22 DIAGNOSIS — B18.2 CHRONIC HEPATITIS C WITHOUT HEPATIC COMA (HCC): ICD-10-CM

## 2019-04-22 DIAGNOSIS — I10 ESSENTIAL HYPERTENSION: ICD-10-CM

## 2019-04-22 DIAGNOSIS — Z87.891 HISTORY OF TOBACCO ABUSE: ICD-10-CM

## 2019-04-22 PROCEDURE — 99214 OFFICE O/P EST MOD 30 MIN: CPT | Performed by: INTERNAL MEDICINE

## 2019-04-22 ASSESSMENT — ENCOUNTER SYMPTOMS
CARDIOVASCULAR NEGATIVE: 1
EYES NEGATIVE: 1
DIZZINESS: 0
RESPIRATORY NEGATIVE: 1
LOSS OF CONSCIOUSNESS: 0
CHILLS: 0
NEUROLOGICAL NEGATIVE: 1
SORE THROAT: 0
GASTROINTESTINAL NEGATIVE: 1
PND: 0
WHEEZING: 0
BRUISES/BLEEDS EASILY: 0
CLAUDICATION: 0
HEMOPTYSIS: 0
STRIDOR: 0
FEVER: 0
CONSTITUTIONAL NEGATIVE: 1
PALPITATIONS: 0
ORTHOPNEA: 0
COUGH: 0
MUSCULOSKELETAL NEGATIVE: 1
SHORTNESS OF BREATH: 0
SPUTUM PRODUCTION: 0
WEAKNESS: 0

## 2019-04-22 NOTE — LETTER
[facility]   [address], [facility] [address]  [place] [address]  Phone: [phone]  Fax: [phone]              Chung Limon  1950    Encounter Date: 4/22/2019    Manjit Gleason M.D.          PROGRESS NOTE:  Chief Complaint   Patient presents with   • Aortic Atherosclerosis     F/V: 1 YR       Subjective:   Chung Limon is a 69 y.o. male who presents today as a follow-up for hypertension peripheral vascular disease diabetes claudication and high blood pressure.  Since he was last seen his lipids continue to be at goal.  He is having no chest pain palpitations or PND.  Blood pressures are goal.  He reports good compliance with medical therapy.    Past Medical History:   Diagnosis Date   • Cancer (HCC)    • Claudication (HCC) 8/22/2012   • Diabetes (HCC)    • H/O colonoscopy with polypectomy 5/7/2013   • Hepatitis C    • History of alcoholism (HCC) 8/22/2012   • Hyperlipidemia    • Hypertension      Past Surgical History:   Procedure Laterality Date   • WIDE EXCISION  5/6/2014    Performed by Nicholas Govea M.D. at SURGERY SAME DAY [facility]S   • FLAP GRAFT  5/6/2014    Performed by Nicholas Govea M.D. at SURGERY SAME DAY [facility]   • RECOVERY  10/22/2012    Performed by Pily Lee M.D. at SURGERY [facility]     Family History   Problem Relation Age of Onset   • Heart Disease Father    • Arthritis Mother    • No Known Problems Sister    • Cancer Brother         type unknown   • Cancer Brother         skin    • No Known Problems Maternal Grandmother    • No Known Problems Maternal Grandfather    • No Known Problems Paternal Grandmother    • No Known Problems Paternal Grandfather    • Diabetes Neg Hx    • Hypertension Neg Hx    • Stroke Neg Hx    • Hyperlipidemia Neg Hx      Social History     Social History   • Marital status: Single     Spouse name: N/A   • Number of children: N/A   • Years of education: N/A     Occupational  History   • Not on file.     Social History Main Topics   • Smoking status: Former Smoker     Packs/day: 1.00     Years: 58.00     Types: Cigarettes     Start date: 1/1/1960     Quit date: 9/1/2011   • Smokeless tobacco: Never Used      Comment: avoid all tobacco products   • Alcohol use No      Comment: quit 1985, drank 2 times since   • Drug use: No   • Sexual activity: No     Other Topics Concern   • Not on file     Social History Narrative   • No narrative on file     No Known Allergies  Outpatient Encounter Prescriptions as of 4/22/2019   Medication Sig Dispense Refill   • lisinopril (PRINIVIL) 10 MG Tab Take 1 Tab by mouth every day. 100 Tab 3   • ibuprofen (MOTRIN) 800 MG Tab Take 1 Tab by mouth every 8 hours as needed. 30 Tab 1   • clopidogrel (PLAVIX) 75 MG Tab Take 1 tablet orally once daily-GENERIC FOR PLAVIX 90 Tab 1   • atorvastatin (LIPITOR) 80 MG tablet TAKE 1 TABLET BY MOUTH AT BEDTIME-GENERIC FOR LIPITOR 90 Tab 1   • metoprolol SR (TOPROL XL) 25 MG TABLET SR 24 HR Take 1 Tab by mouth every day. 90 Tab 2   • Multiple Vitamins-Minerals (CENTRUM SILVER PO) Take  by mouth.     • glucose blood (FREESTYLE LITE) strip TEST EVERY MORNING AND RANDOMLY EVERY      EVENING 100 Strip 12   • aspirin EC 81 MG EC tablet Take 1 Tab by mouth every day. 30 Tab 0     No facility-administered encounter medications on file as of 4/22/2019.      Review of Systems   Constitutional: Negative.  Negative for chills, fever and malaise/fatigue.   HENT: Negative.  Negative for sore throat.    Eyes: Negative.    Respiratory: Negative.  Negative for cough, hemoptysis, sputum production, shortness of breath, wheezing and stridor.    Cardiovascular: Negative.  Negative for chest pain, palpitations, orthopnea, claudication, leg swelling and PND.   Gastrointestinal: Negative.    Genitourinary: Negative.    Musculoskeletal: Negative.    Skin: Negative.    Neurological: Negative.  Negative for dizziness, loss of consciousness and  "weakness.   Endo/Heme/Allergies: Negative.  Does not bruise/bleed easily.   All other systems reviewed and are negative.       Objective:   /60 (BP Location: Left arm, Patient Position: Sitting, BP Cuff Size: Adult)   Pulse 66   Ht 1.778 m (5' 10\")   Wt 80.7 kg (178 lb)   SpO2 95%   BMI 25.54 kg/m²      Physical Exam   Constitutional: He appears well-developed and well-nourished. No distress.   HENT:   Head: Normocephalic and atraumatic.   Right Ear: External ear normal.   Left Ear: External ear normal.   Nose: Nose normal.   Mouth/Throat: No oropharyngeal exudate.   Eyes: Pupils are equal, round, and reactive to light. Conjunctivae and EOM are normal. Right eye exhibits no discharge. Left eye exhibits no discharge. No scleral icterus.   Neck: Neck supple. No JVD present.   Cardiovascular: Normal rate, regular rhythm and intact distal pulses.  Exam reveals no gallop and no friction rub.    No murmur heard.  Pulmonary/Chest: Effort normal. No stridor. No respiratory distress. He has no wheezes. He has no rales. He exhibits no tenderness.   Abdominal: Soft. He exhibits no distension. There is no guarding.   Musculoskeletal: Normal range of motion. He exhibits no edema, tenderness or deformity.   Neurological: He is alert. He has normal reflexes. He displays normal reflexes. No cranial nerve deficit. He exhibits normal muscle tone. Coordination normal.   Skin: Skin is warm and dry. No rash noted. He is not diaphoretic. No erythema. No pallor.   Psychiatric: He has a normal mood and affect. His behavior is normal. Judgment and thought content normal.   Nursing note and vitals reviewed.      Assessment:     1. Atherosclerosis of aorta (HCC)  Lipid Profile   2. Alcohol dependence in remission (HCC)  Lipid Profile   3. Bilateral claudication of lower limb (HCC)  Lipid Profile   4. Bruit  Lipid Profile   5. Chronic hepatitis C without hepatic coma (HCC)  Lipid Profile   6. Diabetes mellitus with peripheral " vascular disease (HCC)  Lipid Profile   7. Essential hypertension  Lipid Profile   8. History of tobacco abuse  Lipid Profile   9. Stress-induced cardiomyopathy  Lipid Profile   10. Healthcare maintenance         Medical Decision Making:  Today's Assessment / Status / Plan:     69-year-old male with peripheral vascular disease, high blood pressure, alcohol dependence in remission, stress-induced cardia myopathy.  Again he continues to do well.  We will not make any changes to his medical therapy.  We can see him back in 1 year.    Thank for you allowing me to take part in your patient's care, please call should you have any questions or would like to discuss this patient.      Gloria Holman M.D.  [address] [id number]  VIA In Basket

## 2019-04-22 NOTE — PROGRESS NOTES
Chief Complaint   Patient presents with   • Aortic Atherosclerosis     F/V: 1 YR       Subjective:   Chung Limon is a 69 y.o. male who presents today as a follow-up for hypertension peripheral vascular disease diabetes claudication and high blood pressure.  Since he was last seen his lipids continue to be at goal.  He is having no chest pain palpitations or PND.  Blood pressures are goal.  He reports good compliance with medical therapy.    Past Medical History:   Diagnosis Date   • Cancer (HCC)    • Claudication (HCC) 8/22/2012   • Diabetes (HCC)    • H/O colonoscopy with polypectomy 5/7/2013   • Hepatitis C    • History of alcoholism (HCC) 8/22/2012   • Hyperlipidemia    • Hypertension      Past Surgical History:   Procedure Laterality Date   • WIDE EXCISION  5/6/2014    Performed by Nicholas Govea M.D. at SURGERY SAME DAY [facility]S   • FLAP GRAFT  5/6/2014    Performed by Nicholas Govea M.D. at SURGERY SAME DAY [facility] ORS   • RECOVERY  10/22/2012    Performed by Pily Lee M.D. at SURGERY [facility]     Family History   Problem Relation Age of Onset   • Heart Disease Father    • Arthritis Mother    • No Known Problems Sister    • Cancer Brother         type unknown   • Cancer Brother         skin    • No Known Problems Maternal Grandmother    • No Known Problems Maternal Grandfather    • No Known Problems Paternal Grandmother    • No Known Problems Paternal Grandfather    • Diabetes Neg Hx    • Hypertension Neg Hx    • Stroke Neg Hx    • Hyperlipidemia Neg Hx      Social History     Social History   • Marital status: Single     Spouse name: N/A   • Number of children: N/A   • Years of education: N/A     Occupational History   • Not on file.     Social History Main Topics   • Smoking status: Former Smoker     Packs/day: 1.00     Years: 58.00     Types: Cigarettes     Start date: 1/1/1960     Quit date: 9/1/2011   • Smokeless tobacco: Never Used      Comment: avoid all tobacco products  "  • Alcohol use No      Comment: quit 1985, drank 2 times since   • Drug use: No   • Sexual activity: No     Other Topics Concern   • Not on file     Social History Narrative   • No narrative on file     No Known Allergies  Outpatient Encounter Prescriptions as of 4/22/2019   Medication Sig Dispense Refill   • lisinopril (PRINIVIL) 10 MG Tab Take 1 Tab by mouth every day. 100 Tab 3   • ibuprofen (MOTRIN) 800 MG Tab Take 1 Tab by mouth every 8 hours as needed. 30 Tab 1   • clopidogrel (PLAVIX) 75 MG Tab Take 1 tablet orally once daily-GENERIC FOR PLAVIX 90 Tab 1   • atorvastatin (LIPITOR) 80 MG tablet TAKE 1 TABLET BY MOUTH AT BEDTIME-GENERIC FOR LIPITOR 90 Tab 1   • metoprolol SR (TOPROL XL) 25 MG TABLET SR 24 HR Take 1 Tab by mouth every day. 90 Tab 2   • Multiple Vitamins-Minerals (CENTRUM SILVER PO) Take  by mouth.     • glucose blood (FREESTYLE LITE) strip TEST EVERY MORNING AND RANDOMLY EVERY      EVENING 100 Strip 12   • aspirin EC 81 MG EC tablet Take 1 Tab by mouth every day. 30 Tab 0     No facility-administered encounter medications on file as of 4/22/2019.      Review of Systems   Constitutional: Negative.  Negative for chills, fever and malaise/fatigue.   HENT: Negative.  Negative for sore throat.    Eyes: Negative.    Respiratory: Negative.  Negative for cough, hemoptysis, sputum production, shortness of breath, wheezing and stridor.    Cardiovascular: Negative.  Negative for chest pain, palpitations, orthopnea, claudication, leg swelling and PND.   Gastrointestinal: Negative.    Genitourinary: Negative.    Musculoskeletal: Negative.    Skin: Negative.    Neurological: Negative.  Negative for dizziness, loss of consciousness and weakness.   Endo/Heme/Allergies: Negative.  Does not bruise/bleed easily.   All other systems reviewed and are negative.       Objective:   /60 (BP Location: Left arm, Patient Position: Sitting, BP Cuff Size: Adult)   Pulse 66   Ht 1.778 m (5' 10\")   Wt 80.7 kg (178 lb) "   SpO2 95%   BMI 25.54 kg/m²     Physical Exam   Constitutional: He appears well-developed and well-nourished. No distress.   HENT:   Head: Normocephalic and atraumatic.   Right Ear: External ear normal.   Left Ear: External ear normal.   Nose: Nose normal.   Mouth/Throat: No oropharyngeal exudate.   Eyes: Pupils are equal, round, and reactive to light. Conjunctivae and EOM are normal. Right eye exhibits no discharge. Left eye exhibits no discharge. No scleral icterus.   Neck: Neck supple. No JVD present.   Cardiovascular: Normal rate, regular rhythm and intact distal pulses.  Exam reveals no gallop and no friction rub.    No murmur heard.  Pulmonary/Chest: Effort normal. No stridor. No respiratory distress. He has no wheezes. He has no rales. He exhibits no tenderness.   Abdominal: Soft. He exhibits no distension. There is no guarding.   Musculoskeletal: Normal range of motion. He exhibits no edema, tenderness or deformity.   Neurological: He is alert. He has normal reflexes. He displays normal reflexes. No cranial nerve deficit. He exhibits normal muscle tone. Coordination normal.   Skin: Skin is warm and dry. No rash noted. He is not diaphoretic. No erythema. No pallor.   Psychiatric: He has a normal mood and affect. His behavior is normal. Judgment and thought content normal.   Nursing note and vitals reviewed.      Assessment:     1. Atherosclerosis of aorta (HCC)  Lipid Profile   2. Alcohol dependence in remission (HCC)  Lipid Profile   3. Bilateral claudication of lower limb (HCC)  Lipid Profile   4. Bruit  Lipid Profile   5. Chronic hepatitis C without hepatic coma (HCC)  Lipid Profile   6. Diabetes mellitus with peripheral vascular disease (HCC)  Lipid Profile   7. Essential hypertension  Lipid Profile   8. History of tobacco abuse  Lipid Profile   9. Stress-induced cardiomyopathy  Lipid Profile   10. Healthcare maintenance         Medical Decision Making:  Today's Assessment / Status / Plan:      69-year-old male with peripheral vascular disease, high blood pressure, alcohol dependence in remission, stress-induced cardia myopathy.  Again he continues to do well.  We will not make any changes to his medical therapy.  We can see him back in 1 year.    Thank for you allowing me to take part in your patient's care, please call should you have any questions or would like to discuss this patient.

## 2019-04-23 DIAGNOSIS — M54.2 CERVICAL PAIN: ICD-10-CM

## 2019-04-23 NOTE — TELEPHONE ENCOUNTER
Was the patient seen in the last year in this department? Yes    Does patient have an active prescription for medications requested? No     Received Request Via: Pharmacy    Pt met protocol?: Yes     Last OV 04/19/19

## 2019-04-24 RX ORDER — IBUPROFEN 800 MG/1
800 TABLET ORAL EVERY 8 HOURS PRN
Qty: 30 TAB | Refills: 0 | Status: SHIPPED | OUTPATIENT
Start: 2019-04-24 | End: 2019-06-24 | Stop reason: SDUPTHER

## 2019-05-15 ENCOUNTER — HOSPITAL ENCOUNTER (OUTPATIENT)
Dept: RADIOLOGY | Facility: MEDICAL CENTER | Age: 69
End: 2019-05-15
Attending: INTERNAL MEDICINE
Payer: MEDICARE

## 2019-05-15 DIAGNOSIS — Z87.891 HISTORY OF TOBACCO ABUSE: ICD-10-CM

## 2019-05-15 DIAGNOSIS — I70.90 ATHEROSCLEROSIS OF ARTERIES: ICD-10-CM

## 2019-05-15 PROCEDURE — G0297 LDCT FOR LUNG CA SCREEN: HCPCS

## 2019-05-30 DIAGNOSIS — I70.209 OCCLUSION OF ARTERY OF LEG (HCC): ICD-10-CM

## 2019-05-30 DIAGNOSIS — I25.2 HISTORY OF MI (MYOCARDIAL INFARCTION): ICD-10-CM

## 2019-05-30 NOTE — TELEPHONE ENCOUNTER
Was the patient seen in the last year in this department? Yes    Does patient have an active prescription for medications requested? No     Received Request Via: Pharmacy      Pt met protocol?: Yes, ov 4/19

## 2019-05-31 RX ORDER — CLOPIDOGREL BISULFATE 75 MG/1
TABLET ORAL
Qty: 90 TAB | Refills: 0 | Status: ON HOLD | OUTPATIENT
Start: 2019-05-31 | End: 2019-07-31

## 2019-06-13 DIAGNOSIS — E78.6 HYPOCHOLESTEREMIA: ICD-10-CM

## 2019-06-13 RX ORDER — ATORVASTATIN CALCIUM 80 MG/1
TABLET, FILM COATED ORAL
Qty: 90 TAB | Refills: 1 | Status: ON HOLD | OUTPATIENT
Start: 2019-06-13 | End: 2019-07-31

## 2019-06-24 DIAGNOSIS — M54.2 CERVICAL PAIN: ICD-10-CM

## 2019-06-24 RX ORDER — IBUPROFEN 800 MG/1
800 TABLET ORAL EVERY 8 HOURS PRN
Qty: 30 TAB | Refills: 0 | Status: ON HOLD | OUTPATIENT
Start: 2019-06-24 | End: 2019-07-31

## 2019-06-24 NOTE — TELEPHONE ENCOUNTER
Was the patient seen in the last year in this department? Yes    Does patient have an active prescription for medications requested? No     Received Request Via: Pharmacy      Pt met protocol?: Yes  LAST OV 04/19/2019

## 2019-06-26 ENCOUNTER — TELEPHONE (OUTPATIENT)
Dept: MEDICAL GROUP | Facility: PHYSICIAN GROUP | Age: 69
End: 2019-06-26

## 2019-06-26 NOTE — TELEPHONE ENCOUNTER
Future Appointments       Provider Department Center    7/3/2019 11:00 AM Gloria Holman M.D. [facility]        ESTABLISHED PATIENT PRE-VISIT PLANNING     Patient was contacted to complete PVP.     Note: Patient will not be contacted if there is no indication to call.     1.  Reviewed notes from the last few office visits within the medical group: Yes    2.  If any orders were placed at last visit or intended to be done for this visit (i.e. 6 mos follow-up), do we have Results/Consult Notes?        •  Labs - Labs ordered, NOT completed. Patient advised to complete prior to next appointment.   Note: If patient appointment is for lab review and patient did not complete labs, check with provider if OK to reschedule patient until labs completed.       •  Imaging - Imaging ordered, completed and results are in chart.       •  Referrals - Referral ordered, patient has NOT been seen. Pt. Not seen by ENT yet. Pt. Seen by [facility] on 05/16/2019 Records in media     3. Is this appointment scheduled as a Hospital Follow-Up? No    4.  Immunizations were updated in [employer] using WebIZ?: Yes       •  Web Iz Recommendations: FLU, TD, VARICELLA (Chicken Pox)  and SHINGRIX (Shingles)    5.  Patient is due for the following Health Maintenance Topics:   Health Maintenance Due   Topic Date Due   • IMM HEP B VACCINE (1 of 3 - Risk 3-dose series) 01/19/1969   • IMM ZOSTER VACCINES (2 of 3) 01/10/2017   • RETINAL SCREENING  08/30/2018   • DIABETES MONOFILAMENT / LE EXAM  12/12/2018   • FASTING LIPID PROFILE  01/17/2019   • SERUM CREATININE  01/17/2019   • Annual Wellness Visit  01/17/2019   • A1C SCREENING  02/15/2019       6. Orders for overdue Health Maintenance topics pended in Pre-Charting? YES    7.  AHA (MDX) form printed for Provider? No, already completed    8.  Patient was informed to arrive 15 min prior to their scheduled appointment and bring in their medication bottles.

## 2019-06-27 ENCOUNTER — HOSPITAL ENCOUNTER (OUTPATIENT)
Dept: LAB | Facility: MEDICAL CENTER | Age: 69
End: 2019-06-27
Attending: INTERNAL MEDICINE
Payer: MEDICARE

## 2019-06-27 DIAGNOSIS — B18.2 CHRONIC HEPATITIS C WITHOUT HEPATIC COMA (HCC): ICD-10-CM

## 2019-06-27 DIAGNOSIS — I10 ESSENTIAL HYPERTENSION: ICD-10-CM

## 2019-06-27 DIAGNOSIS — I70.0 ATHEROSCLEROSIS OF AORTA (HCC): ICD-10-CM

## 2019-06-27 DIAGNOSIS — E11.51 DIABETES MELLITUS WITH PERIPHERAL VASCULAR DISEASE (HCC): ICD-10-CM

## 2019-06-27 DIAGNOSIS — I73.9 BILATERAL CLAUDICATION OF LOWER LIMB (HCC): ICD-10-CM

## 2019-06-27 DIAGNOSIS — I51.81 STRESS-INDUCED CARDIOMYOPATHY: ICD-10-CM

## 2019-06-27 DIAGNOSIS — F10.21 ALCOHOL DEPENDENCE IN REMISSION (HCC): ICD-10-CM

## 2019-06-27 DIAGNOSIS — Z87.891 HISTORY OF TOBACCO ABUSE: ICD-10-CM

## 2019-06-27 DIAGNOSIS — R09.89 BRUIT: ICD-10-CM

## 2019-06-27 LAB
25(OH)D3 SERPL-MCNC: 99 NG/ML (ref 30–100)
ALBUMIN SERPL BCP-MCNC: 4 G/DL (ref 3.2–4.9)
ALBUMIN/GLOB SERPL: 1.1 G/DL
ALP SERPL-CCNC: 60 U/L (ref 30–99)
ALT SERPL-CCNC: 138 U/L (ref 2–50)
ANION GAP SERPL CALC-SCNC: 8 MMOL/L (ref 0–11.9)
AST SERPL-CCNC: 88 U/L (ref 12–45)
BASOPHILS # BLD AUTO: 1.1 % (ref 0–1.8)
BASOPHILS # BLD: 0.04 K/UL (ref 0–0.12)
BILIRUB SERPL-MCNC: 0.8 MG/DL (ref 0.1–1.5)
BUN SERPL-MCNC: 17 MG/DL (ref 8–22)
CALCIUM SERPL-MCNC: 9.9 MG/DL (ref 8.5–10.5)
CHLORIDE SERPL-SCNC: 104 MMOL/L (ref 96–112)
CHOLEST SERPL-MCNC: 103 MG/DL (ref 100–199)
CHOLEST SERPL-MCNC: 104 MG/DL (ref 100–199)
CO2 SERPL-SCNC: 27 MMOL/L (ref 20–33)
CREAT SERPL-MCNC: 0.87 MG/DL (ref 0.5–1.4)
EOSINOPHIL # BLD AUTO: 0.2 K/UL (ref 0–0.51)
EOSINOPHIL NFR BLD: 5.4 % (ref 0–6.9)
ERYTHROCYTE [DISTWIDTH] IN BLOOD BY AUTOMATED COUNT: 41.8 FL (ref 35.9–50)
EST. AVERAGE GLUCOSE BLD GHB EST-MCNC: 169 MG/DL
FASTING STATUS PATIENT QL REPORTED: NORMAL
FASTING STATUS PATIENT QL REPORTED: NORMAL
GLOBULIN SER CALC-MCNC: 3.6 G/DL (ref 1.9–3.5)
GLUCOSE SERPL-MCNC: 178 MG/DL (ref 65–99)
HBA1C MFR BLD: 7.5 % (ref 0–5.6)
HCT VFR BLD AUTO: 43.5 % (ref 42–52)
HDLC SERPL-MCNC: 27 MG/DL
HDLC SERPL-MCNC: 29 MG/DL
HGB BLD-MCNC: 14.4 G/DL (ref 14–18)
IMM GRANULOCYTES # BLD AUTO: 0 K/UL (ref 0–0.11)
IMM GRANULOCYTES NFR BLD AUTO: 0 % (ref 0–0.9)
LDLC SERPL CALC-MCNC: 53 MG/DL
LDLC SERPL CALC-MCNC: 53 MG/DL
LYMPHOCYTES # BLD AUTO: 1.68 K/UL (ref 1–4.8)
LYMPHOCYTES NFR BLD: 45.7 % (ref 22–41)
MCH RBC QN AUTO: 29.4 PG (ref 27–33)
MCHC RBC AUTO-ENTMCNC: 33.1 G/DL (ref 33.7–35.3)
MCV RBC AUTO: 89 FL (ref 81.4–97.8)
MONOCYTES # BLD AUTO: 0.25 K/UL (ref 0–0.85)
MONOCYTES NFR BLD AUTO: 6.8 % (ref 0–13.4)
NEUTROPHILS # BLD AUTO: 1.51 K/UL (ref 1.82–7.42)
NEUTROPHILS NFR BLD: 41 % (ref 44–72)
NRBC # BLD AUTO: 0 K/UL
NRBC BLD-RTO: 0 /100 WBC
PLATELET # BLD AUTO: 117 K/UL (ref 164–446)
PMV BLD AUTO: 12.4 FL (ref 9–12.9)
POTASSIUM SERPL-SCNC: 4.4 MMOL/L (ref 3.6–5.5)
PROT SERPL-MCNC: 7.6 G/DL (ref 6–8.2)
RBC # BLD AUTO: 4.89 M/UL (ref 4.7–6.1)
SODIUM SERPL-SCNC: 139 MMOL/L (ref 135–145)
TRIGL SERPL-MCNC: 111 MG/DL (ref 0–149)
TRIGL SERPL-MCNC: 116 MG/DL (ref 0–149)
WBC # BLD AUTO: 3.7 K/UL (ref 4.8–10.8)

## 2019-06-27 PROCEDURE — 80053 COMPREHEN METABOLIC PANEL: CPT

## 2019-06-27 PROCEDURE — 85025 COMPLETE CBC W/AUTO DIFF WBC: CPT

## 2019-06-27 PROCEDURE — 82306 VITAMIN D 25 HYDROXY: CPT

## 2019-06-27 PROCEDURE — 80061 LIPID PANEL: CPT

## 2019-06-27 PROCEDURE — 36415 COLL VENOUS BLD VENIPUNCTURE: CPT

## 2019-06-27 PROCEDURE — 80061 LIPID PANEL: CPT | Mod: 91

## 2019-06-27 PROCEDURE — 83036 HEMOGLOBIN GLYCOSYLATED A1C: CPT

## 2019-06-27 RX ORDER — GLIPIZIDE 2.5 MG/1
2.5 TABLET, EXTENDED RELEASE ORAL DAILY
Qty: 30 TAB | Refills: 1 | Status: SHIPPED | OUTPATIENT
Start: 2019-06-27 | End: 2019-07-03

## 2019-07-03 ENCOUNTER — OFFICE VISIT (OUTPATIENT)
Dept: MEDICAL GROUP | Facility: PHYSICIAN GROUP | Age: 69
End: 2019-07-03
Payer: MEDICARE

## 2019-07-03 VITALS
OXYGEN SATURATION: 97 % | SYSTOLIC BLOOD PRESSURE: 138 MMHG | HEIGHT: 70 IN | WEIGHT: 178 LBS | RESPIRATION RATE: 18 BRPM | BODY MASS INDEX: 25.48 KG/M2 | HEART RATE: 78 BPM | DIASTOLIC BLOOD PRESSURE: 82 MMHG | TEMPERATURE: 98 F

## 2019-07-03 DIAGNOSIS — D23.22 DERMOID CYST OF EAR, LEFT: ICD-10-CM

## 2019-07-03 PROCEDURE — 10060 I&D ABSCESS SIMPLE/SINGLE: CPT | Performed by: INTERNAL MEDICINE

## 2019-07-03 RX ORDER — BACITRACIN ZINC AND POLYMYXIN B SULFATE 500; 1000 [USP'U]/G; [USP'U]/G
OINTMENT TOPICAL
Qty: 1 TUBE | Refills: 0 | Status: SHIPPED | OUTPATIENT
Start: 2019-07-03 | End: 2019-07-26

## 2019-07-03 NOTE — PROGRESS NOTES
CC: Short visit, cyst removal.    HISTORY OF PRESENT ILLNESS: Patient is a 69 y.o. male established patient who presents today to discuss on medical conditions as mentioned in HPI below.    Health Maintenance: Due for diabetic maintenance exams will be done in the next visit.  Patient was started on glipizide 2.5 mg daily given his recent HbA1c of 7.5 and we wanted the target to go below 7 for his age.  But patient felt he was going to low blood sugar when he checked in the afternoon, will hold the medication at this time and patient will follow only diet and lifestyle modification will be recheck in his next upcoming visit.  Patient understood and agreed with the plan.    Dermoid cyst of ear, left  This is a new problem which patient is facing since long time, causing discomfort and pain when he tries to check with his hand and nails.  Requesting for removal of this.  Denies any swelling, pain, fever or chills.  No discharge.      PHQ score 0, BMI within normal limits, former tobacco, no fall injuries.    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    Current Outpatient Prescriptions   Medication Sig Dispense Refill   • glipiZIDE SR (GLUCOTROL) 2.5 MG TABLET SR 24 HR Take 1 Tab by mouth every day. 30 Tab 1   • ibuprofen (MOTRIN) 800 MG Tab Take 1 Tab by mouth every 8 hours as needed. 30 Tab 0   • atorvastatin (LIPITOR) 80 MG tablet TAKE 1 TABLET BY MOUTH AT BEDTIME-GENERIC FOR LIPITOR 90 Tab 1   • clopidogrel (PLAVIX) 75 MG Tab Take 1 tablet orally once daily-GENERIC FOR PLAVIX 90 Tab 0   • lisinopril (PRINIVIL) 10 MG Tab Take 1 Tab by mouth every day. 100 Tab 3   • metoprolol SR (TOPROL XL) 25 MG TABLET SR 24 HR Take 1 Tab by mouth every day. 90 Tab 2   • Multiple Vitamins-Minerals (CENTRUM SILVER PO) Take  by mouth.     • glucose blood (FREESTYLE LITE) strip TEST EVERY MORNING AND RANDOMLY EVERY      EVENING 100 Strip 12   • aspirin EC 81 MG EC tablet Take 1 Tab by mouth every day. 30 Tab 0     No current facility-administered medications for this visit.        Social History   Substance Use Topics   • Smoking status: Former Smoker     Packs/day: 1.00     Years: 58.00     Types: Cigarettes     Start date: 1/1/1960     Quit date: 9/1/2011   • Smokeless tobacco: Never Used      Comment: avoid all tobacco products   • Alcohol use No      Comment: quit 1985, drank 2 times since     Social History     Social History Narrative   • No narrative on file       Family History   Problem Relation Age of Onset   • Heart Disease Father    • Arthritis Mother    • No Known Problems Sister    • Cancer Brother         type unknown   • Cancer Brother         skin    • No Known Problems Maternal Grandmother    • No Known Problems Maternal Grandfather    • No Known Problems Paternal Grandmother    • No Known Problems Paternal Grandfather    • Diabetes Neg Hx    • Hypertension Neg Hx    • Stroke Neg Hx    • Hyperlipidemia Neg Hx         ROS:     - Constitutional:  Negative for fever, chills, unexpected weight change, and fatigue/generalized weakness.    - HEENT: As  "mentioned in HPI above negative for headaches, vision changes, hearing changes, ear pain, ear discharge, rhinorrhea, sinus congestion, sore throat, and neck pain.      - Respiratory: Negative for cough, sputum production, chest congestion, dyspnea, wheezing, and crackles.      - Cardiovascular: Negative for chest pain, palpitations, orthopnea, and bilateral lower extremity edema.     - Gastrointestinal: Negative for heartburn, nausea, vomiting, abdominal pain, hematochezia, melena, diarrhea, constipation, and greasy/foul-smelling stools.     - Genitourinary: Negative for dysuria, polyuria, hematuria, pyuria, urinary urgency, and urinary incontinence.     - Musculoskeletal: Negative for myalgias, back pain, and joint pain.     - Skin: As mentioned in HPI above negative for rash, itching, cyanotic skin color change.     - Neurological: Negative for dizziness, tingling, tremors, focal sensory deficit, focal weakness and headaches.     - Endo/Heme/Allergies: Does not bruise/bleed easily.     - Psychiatric/Behavioral: Negative for depression, suicidal/homicidal ideation and memory loss.      Last lab work in June 2019 reviewed.    Exam:    /82 (BP Location: Left arm, Patient Position: Sitting, BP Cuff Size: Adult)   Pulse 78   Temp 36.7 °C (98 °F) (Temporal)   Resp 18   Ht 1.778 m (5' 10\")   Wt 80.7 kg (178 lb)   SpO2 97%  Body mass index is 25.54 kg/m².    General:  Well nourished, well developed male in NAD  Head is grossly normal.  Positive for dermoid cyst behind the left ear lobule measuring around 0.9 cm in diameter.  No discharge, erythema or tenderness on palpation.  Neck: Supple without JVD or bruit. Thyroid is not enlarged.  Pulmonary: Clear to ausculation and percussion.  Normal effort. No rales, ronchi, or wheezing.  Cardiovascular: Regular rate and rhythm without murmur. Carotid and radial pulses are intact and equal bilaterally.  Extremities: no clubbing, cyanosis, or edema.    Please note that " this dictation was created using voice recognition software. I have made every reasonable attempt to correct obvious errors, but I expect that there are errors of grammar and possibly content that I did not discover before finalizing the note.    Assessment/Plan:  1. Dermoid cyst of ear, left  New problem, as mentioned in HPI above and my physical exam findings procedure for dermoid cyst removal was done today in the office with no complications.  Please find the procedure note separately, given all the aftercare instructions and also antibiotic cream for topical application.  Given instructions to let me know if any signs of infection which I do not suspect as it was a steroidal cyst with no infection.  Patient understood and agreed with the plan.  - Consent for Surgery / Procedure  - bacitracin-polymyxin b (POLYSPORIN) [id number] UNIT/GM Ointment; Apply to the surgical wound 3 times a day for 1 week.  Dispense: 1 Tube; Refill: 0  - CYST EXCISION

## 2019-07-03 NOTE — PATIENT INSTRUCTIONS
1. 1. Your doctor will ask you to use your hand to hold direct, firm pressure on the site after the procedure. Because no deep stitches are placed in the wound, it is important that pressure be applied so blood will not collect where the cyst was.  2. 2.  Antibiotic ointment will be applied to the wound immediately after the procedure. 3.Antibiotic ointment should be applied daily until the wound is completely healed.  3. 4. Some gauze may be taped over the surgery site, unless the cyst was on your scalp. If the gauze becomes soaked with blood, apply firm pressure over the area and change the gauze. If the bleeding continues despite the pressure, call your doctor. The gauze can be removed the morning after your procedure, and the site can be covered with an adhesive bandage.  4. 5. Some wounds will have a clear or yellow fluid drainage after this procedure. The drainage is fairly common after the removal of large cysts. This drainage usually stops within 1 week after the surgery. If pus drainage is noted, especially if the wound is red or tender, call your doctor.  5. 6. You can take a shower 36 hours after the procedure. You can take a bath once the wound has completely healed. Do not take a bath or swim while the wound remains open, or if fluid drainage is noted. Vigorous physical activity can be resumed 1 week after your procedure.  6. 7. Occasionally, a small piece of cyst wall may remain beneath the skin, and the cyst can reform. If you notice a lump or cyst at or near the original surgery site, return to be examined by your doctor.  7. 8. If stitches are used to close your wound, you will be asked to return to the office to have them removed in 7 to 10 days.  8. 9. Report any problems, such as infection or bleeding, to your doctor.

## 2019-07-03 NOTE — PROCEDURES
Epidermal cyst - minimal excision on the cyst behind the left ear lobule measuring around 0.9 cm to 1 cm in diameter    After obtaining informed consent, the patient's identity, procedure, and site were verified during a pause prior to proceding with the minor surgical procedure as per universal protocol recommendations.   The area was prepared with Betadine/Alcohol/Chlorhexidine/prep solution and draped.   1% Xylocaine/1% Xylocaine with Epinephrine/2% Xylocaine/0.5% Bupivicaine local anaesthetic was used for anesthesia.   The cyst was incised with a #11 blade and the contents evacuated with pressure.   Hemostasis achieved with pencil point cautery without difficulty./Hemostasis achieved with pressure without difficulty.  Bacitracin and bandaid applied.   Tolerated well. Negligible blood loss.   Routine instructions were given to the patient to return if any signs of infection.

## 2019-07-17 DIAGNOSIS — I10 ESSENTIAL HYPERTENSION: ICD-10-CM

## 2019-07-17 DIAGNOSIS — I25.2 HISTORY OF MI (MYOCARDIAL INFARCTION): ICD-10-CM

## 2019-07-17 RX ORDER — METOPROLOL SUCCINATE 25 MG/1
TABLET, EXTENDED RELEASE ORAL
Qty: 100 TAB | Refills: 3 | Status: ON HOLD | OUTPATIENT
Start: 2019-07-17 | End: 2019-07-31

## 2019-07-26 DIAGNOSIS — Z01.810 PRE-OPERATIVE CARDIOVASCULAR EXAMINATION: ICD-10-CM

## 2019-07-26 DIAGNOSIS — Z01.812 PRE-PROCEDURAL LABORATORY EXAMINATION: ICD-10-CM

## 2019-07-26 LAB
ALBUMIN SERPL BCP-MCNC: 4 G/DL (ref 3.2–4.9)
ALBUMIN/GLOB SERPL: 1.1 G/DL
ALP SERPL-CCNC: 56 U/L (ref 30–99)
ALT SERPL-CCNC: 120 U/L (ref 2–50)
ANION GAP SERPL CALC-SCNC: 7 MMOL/L (ref 0–11.9)
AST SERPL-CCNC: 83 U/L (ref 12–45)
BILIRUB SERPL-MCNC: 1 MG/DL (ref 0.1–1.5)
BUN SERPL-MCNC: 21 MG/DL (ref 8–22)
CALCIUM SERPL-MCNC: 9.7 MG/DL (ref 8.5–10.5)
CHLORIDE SERPL-SCNC: 105 MMOL/L (ref 96–112)
CO2 SERPL-SCNC: 25 MMOL/L (ref 20–33)
CREAT SERPL-MCNC: 1.01 MG/DL (ref 0.5–1.4)
EKG IMPRESSION: NORMAL
ERYTHROCYTE [DISTWIDTH] IN BLOOD BY AUTOMATED COUNT: 42.5 FL (ref 35.9–50)
GLOBULIN SER CALC-MCNC: 3.8 G/DL (ref 1.9–3.5)
GLUCOSE SERPL-MCNC: 141 MG/DL (ref 65–99)
HCT VFR BLD AUTO: 43.3 % (ref 42–52)
HGB BLD-MCNC: 14.7 G/DL (ref 14–18)
MCH RBC QN AUTO: 30.6 PG (ref 27–33)
MCHC RBC AUTO-ENTMCNC: 33.9 G/DL (ref 33.7–35.3)
MCV RBC AUTO: 90 FL (ref 81.4–97.8)
PLATELET # BLD AUTO: 123 K/UL (ref 164–446)
PMV BLD AUTO: 12.4 FL (ref 9–12.9)
POTASSIUM SERPL-SCNC: 4.5 MMOL/L (ref 3.6–5.5)
PROT SERPL-MCNC: 7.8 G/DL (ref 6–8.2)
RBC # BLD AUTO: 4.81 M/UL (ref 4.7–6.1)
SODIUM SERPL-SCNC: 137 MMOL/L (ref 135–145)
WBC # BLD AUTO: 5.1 K/UL (ref 4.8–10.8)

## 2019-07-26 PROCEDURE — 36415 COLL VENOUS BLD VENIPUNCTURE: CPT

## 2019-07-26 PROCEDURE — 85027 COMPLETE CBC AUTOMATED: CPT

## 2019-07-26 PROCEDURE — 93005 ELECTROCARDIOGRAM TRACING: CPT

## 2019-07-26 PROCEDURE — 80053 COMPREHEN METABOLIC PANEL: CPT

## 2019-07-26 PROCEDURE — 93010 ELECTROCARDIOGRAM REPORT: CPT | Performed by: INTERNAL MEDICINE

## 2019-07-26 NOTE — OR NURSING
Pt. stated today at pre-admit appointment that he will contact Dr. Rosales's office today for instructions on taking his Aspirin and Plavix prior to his 7-31-19 surgery.

## 2019-07-31 ENCOUNTER — HOSPITAL ENCOUNTER (OUTPATIENT)
Facility: MEDICAL CENTER | Age: 69
End: 2019-07-31
Attending: SURGERY | Admitting: SURGERY
Payer: MEDICARE

## 2019-07-31 ENCOUNTER — ANESTHESIA EVENT (OUTPATIENT)
Dept: SURGERY | Facility: MEDICAL CENTER | Age: 69
End: 2019-07-31
Payer: MEDICARE

## 2019-07-31 ENCOUNTER — ANESTHESIA (OUTPATIENT)
Dept: SURGERY | Facility: MEDICAL CENTER | Age: 69
End: 2019-07-31
Payer: MEDICARE

## 2019-07-31 VITALS
SYSTOLIC BLOOD PRESSURE: 161 MMHG | BODY MASS INDEX: 26.26 KG/M2 | HEIGHT: 70 IN | WEIGHT: 183.42 LBS | HEART RATE: 52 BPM | RESPIRATION RATE: 14 BRPM | TEMPERATURE: 97.3 F | OXYGEN SATURATION: 97 % | DIASTOLIC BLOOD PRESSURE: 77 MMHG

## 2019-07-31 DIAGNOSIS — G89.18 POST-OP PAIN: ICD-10-CM

## 2019-07-31 LAB
GLUCOSE BLD-MCNC: 122 MG/DL (ref 65–99)
PATHOLOGY CONSULT NOTE: NORMAL

## 2019-07-31 PROCEDURE — 700105 HCHG RX REV CODE 258: Performed by: SURGERY

## 2019-07-31 PROCEDURE — 82962 GLUCOSE BLOOD TEST: CPT

## 2019-07-31 PROCEDURE — 160002 HCHG RECOVERY MINUTES (STAT): Performed by: SURGERY

## 2019-07-31 PROCEDURE — 160035 HCHG PACU - 1ST 60 MINS PHASE I: Performed by: SURGERY

## 2019-07-31 PROCEDURE — 160048 HCHG OR STATISTICAL LEVEL 1-5: Performed by: SURGERY

## 2019-07-31 PROCEDURE — 700101 HCHG RX REV CODE 250: Performed by: SURGERY

## 2019-07-31 PROCEDURE — 160028 HCHG SURGERY MINUTES - 1ST 30 MINS LEVEL 3: Performed by: SURGERY

## 2019-07-31 PROCEDURE — 88304 TISSUE EXAM BY PATHOLOGIST: CPT

## 2019-07-31 PROCEDURE — 700101 HCHG RX REV CODE 250: Performed by: ANESTHESIOLOGY

## 2019-07-31 PROCEDURE — 160025 RECOVERY II MINUTES (STATS): Performed by: SURGERY

## 2019-07-31 PROCEDURE — 700102 HCHG RX REV CODE 250 W/ 637 OVERRIDE(OP): Performed by: ANESTHESIOLOGY

## 2019-07-31 PROCEDURE — A9270 NON-COVERED ITEM OR SERVICE: HCPCS | Performed by: ANESTHESIOLOGY

## 2019-07-31 PROCEDURE — 160046 HCHG PACU - 1ST 60 MINS PHASE II: Performed by: SURGERY

## 2019-07-31 PROCEDURE — 160009 HCHG ANES TIME/MIN: Performed by: SURGERY

## 2019-07-31 PROCEDURE — 700111 HCHG RX REV CODE 636 W/ 250 OVERRIDE (IP): Performed by: ANESTHESIOLOGY

## 2019-07-31 PROCEDURE — 501838 HCHG SUTURE GENERAL: Performed by: SURGERY

## 2019-07-31 PROCEDURE — 501445 HCHG STAPLER, SKIN DISP: Performed by: SURGERY

## 2019-07-31 RX ORDER — MAGNESIUM HYDROXIDE 1200 MG/15ML
LIQUID ORAL
Status: COMPLETED | OUTPATIENT
Start: 2019-07-31 | End: 2019-07-31

## 2019-07-31 RX ORDER — MEPERIDINE HYDROCHLORIDE 25 MG/ML
25 INJECTION INTRAMUSCULAR; INTRAVENOUS; SUBCUTANEOUS
Status: DISCONTINUED | OUTPATIENT
Start: 2019-07-31 | End: 2019-07-31 | Stop reason: HOSPADM

## 2019-07-31 RX ORDER — GABAPENTIN 300 MG/1
300 CAPSULE ORAL ONCE
Status: COMPLETED | OUTPATIENT
Start: 2019-07-31 | End: 2019-07-31

## 2019-07-31 RX ORDER — CLOPIDOGREL BISULFATE 75 MG/1
75 TABLET ORAL DAILY
Status: ON HOLD | COMMUNITY
End: 2019-09-18

## 2019-07-31 RX ORDER — HALOPERIDOL 5 MG/ML
1 INJECTION INTRAMUSCULAR
Status: DISCONTINUED | OUTPATIENT
Start: 2019-07-31 | End: 2019-07-31 | Stop reason: HOSPADM

## 2019-07-31 RX ORDER — ACETAMINOPHEN 500 MG
1000 TABLET ORAL ONCE
Status: COMPLETED | OUTPATIENT
Start: 2019-07-31 | End: 2019-07-31

## 2019-07-31 RX ORDER — CEFAZOLIN SODIUM 1 G/3ML
INJECTION, POWDER, FOR SOLUTION INTRAMUSCULAR; INTRAVENOUS PRN
Status: DISCONTINUED | OUTPATIENT
Start: 2019-07-31 | End: 2019-07-31 | Stop reason: SURG

## 2019-07-31 RX ORDER — METOPROLOL SUCCINATE 25 MG/1
25 TABLET, EXTENDED RELEASE ORAL DAILY
Status: ON HOLD | COMMUNITY
End: 2019-09-18

## 2019-07-31 RX ORDER — LABETALOL HYDROCHLORIDE 5 MG/ML
INJECTION, SOLUTION INTRAVENOUS PRN
Status: DISCONTINUED | OUTPATIENT
Start: 2019-07-31 | End: 2019-07-31 | Stop reason: SURG

## 2019-07-31 RX ORDER — ONDANSETRON 2 MG/ML
4 INJECTION INTRAMUSCULAR; INTRAVENOUS
Status: DISCONTINUED | OUTPATIENT
Start: 2019-07-31 | End: 2019-07-31 | Stop reason: HOSPADM

## 2019-07-31 RX ORDER — SODIUM CHLORIDE, SODIUM LACTATE, POTASSIUM CHLORIDE, CALCIUM CHLORIDE 600; 310; 30; 20 MG/100ML; MG/100ML; MG/100ML; MG/100ML
INJECTION, SOLUTION INTRAVENOUS CONTINUOUS
Status: DISCONTINUED | OUTPATIENT
Start: 2019-07-31 | End: 2019-07-31

## 2019-07-31 RX ORDER — IBUPROFEN 800 MG/1
800 TABLET ORAL EVERY 8 HOURS PRN
Status: ON HOLD | COMMUNITY
End: 2019-07-31

## 2019-07-31 RX ORDER — ONDANSETRON 2 MG/ML
INJECTION INTRAMUSCULAR; INTRAVENOUS PRN
Status: DISCONTINUED | OUTPATIENT
Start: 2019-07-31 | End: 2019-07-31 | Stop reason: SURG

## 2019-07-31 RX ORDER — ATORVASTATIN CALCIUM 80 MG/1
80 TABLET, FILM COATED ORAL DAILY
Status: ON HOLD | COMMUNITY
End: 2019-09-18

## 2019-07-31 RX ORDER — BUPIVACAINE HYDROCHLORIDE AND EPINEPHRINE 5; 5 MG/ML; UG/ML
INJECTION, SOLUTION EPIDURAL; INTRACAUDAL; PERINEURAL
Status: DISCONTINUED | OUTPATIENT
Start: 2019-07-31 | End: 2019-07-31 | Stop reason: HOSPADM

## 2019-07-31 RX ORDER — DIPHENHYDRAMINE HYDROCHLORIDE 50 MG/ML
12.5 INJECTION INTRAMUSCULAR; INTRAVENOUS
Status: DISCONTINUED | OUTPATIENT
Start: 2019-07-31 | End: 2019-07-31 | Stop reason: HOSPADM

## 2019-07-31 RX ORDER — HYDROCODONE BITARTRATE AND ACETAMINOPHEN 5; 325 MG/1; MG/1
1-2 TABLET ORAL EVERY 4 HOURS PRN
Qty: 10 TAB | Refills: 0 | Status: SHIPPED | OUTPATIENT
Start: 2019-07-31 | End: 2019-08-02

## 2019-07-31 RX ORDER — OXYCODONE HCL 5 MG/5 ML
10 SOLUTION, ORAL ORAL
Status: DISCONTINUED | OUTPATIENT
Start: 2019-07-31 | End: 2019-07-31 | Stop reason: HOSPADM

## 2019-07-31 RX ORDER — SODIUM CHLORIDE, SODIUM LACTATE, POTASSIUM CHLORIDE, CALCIUM CHLORIDE 600; 310; 30; 20 MG/100ML; MG/100ML; MG/100ML; MG/100ML
INJECTION, SOLUTION INTRAVENOUS CONTINUOUS
Status: DISCONTINUED | OUTPATIENT
Start: 2019-07-31 | End: 2019-07-31 | Stop reason: HOSPADM

## 2019-07-31 RX ORDER — METOPROLOL TARTRATE 1 MG/ML
1 INJECTION, SOLUTION INTRAVENOUS
Status: DISCONTINUED | OUTPATIENT
Start: 2019-07-31 | End: 2019-07-31 | Stop reason: HOSPADM

## 2019-07-31 RX ORDER — MEPERIDINE HYDROCHLORIDE 25 MG/ML
INJECTION INTRAMUSCULAR; INTRAVENOUS; SUBCUTANEOUS PRN
Status: DISCONTINUED | OUTPATIENT
Start: 2019-07-31 | End: 2019-07-31 | Stop reason: SURG

## 2019-07-31 RX ORDER — OXYCODONE HCL 5 MG/5 ML
5 SOLUTION, ORAL ORAL
Status: DISCONTINUED | OUTPATIENT
Start: 2019-07-31 | End: 2019-07-31 | Stop reason: HOSPADM

## 2019-07-31 RX ADMIN — GABAPENTIN 300 MG: 300 CAPSULE ORAL at 10:15

## 2019-07-31 RX ADMIN — PROPOFOL 60 MG: 10 INJECTION, EMULSION INTRAVENOUS at 11:12

## 2019-07-31 RX ADMIN — LABETALOL HYDROCHLORIDE 5 MG: 5 INJECTION, SOLUTION INTRAVENOUS at 11:21

## 2019-07-31 RX ADMIN — ROCURONIUM BROMIDE 5 MG: 10 INJECTION, SOLUTION INTRAVENOUS at 11:09

## 2019-07-31 RX ADMIN — LABETALOL HYDROCHLORIDE 10 MG: 5 INJECTION, SOLUTION INTRAVENOUS at 11:25

## 2019-07-31 RX ADMIN — MEPERIDINE HYDROCHLORIDE 25 MG: 25 INJECTION INTRAMUSCULAR; INTRAVENOUS; SUBCUTANEOUS at 11:15

## 2019-07-31 RX ADMIN — CEFAZOLIN 2 G: 330 INJECTION, POWDER, FOR SOLUTION INTRAMUSCULAR; INTRAVENOUS at 11:08

## 2019-07-31 RX ADMIN — SUCCINYLCHOLINE CHLORIDE 40 MG: 20 INJECTION, SOLUTION INTRAMUSCULAR; INTRAVENOUS at 11:12

## 2019-07-31 RX ADMIN — SODIUM CHLORIDE, POTASSIUM CHLORIDE, SODIUM LACTATE AND CALCIUM CHLORIDE: 600; 310; 30; 20 INJECTION, SOLUTION INTRAVENOUS at 10:15

## 2019-07-31 RX ADMIN — ALFENTANIL HYDROCHLORIDE 1000 MCG: 500 INJECTION, SOLUTION INTRAVENOUS at 11:09

## 2019-07-31 RX ADMIN — ONDANSETRON 4 MG: 2 INJECTION INTRAMUSCULAR; INTRAVENOUS at 11:27

## 2019-07-31 RX ADMIN — PROPOFOL 20 MG: 10 INJECTION, EMULSION INTRAVENOUS at 11:09

## 2019-07-31 RX ADMIN — ACETAMINOPHEN 1000 MG: 500 TABLET ORAL at 10:15

## 2019-07-31 ASSESSMENT — PAIN SCALES - GENERAL: PAIN_LEVEL: 0

## 2019-07-31 NOTE — ANESTHESIA POSTPROCEDURE EVALUATION
Patient: Chung Limon    Procedure Summary     Date:  07/31/19 Room / Location:  [facility] OR 09 / SURGERY [facility]    Anesthesia Start:  1108 Anesthesia Stop:  1136    Procedure:  EXCISION, MASS, GENERAL - 15 CM LIPOMA OF SHOULDER (Right Shoulder) Diagnosis:  (LIPOMA OF EXTREMITY)    Surgeon:  Augustin Rosales M.D. Responsible Provider:  Nicholas Lemons M.D.    Anesthesia Type:  general ASA Status:  2          Final Anesthesia Type: general  Last vitals  BP   Blood Pressure : 160/81    Temp   36.6 °C (97.9 °F)    Pulse   Pulse: 65   Resp        SpO2   96 %      Anesthesia Post Evaluation    Patient location during evaluation: PACU  Patient participation: complete - patient participated  Level of consciousness: awake and alert  Pain score: 0    Airway patency: patent  Anesthetic complications: no  Cardiovascular status: hemodynamically stable  Respiratory status: acceptable  Hydration status: euvolemic    PONV: none           Nurse Pain Score: 0 (NPRS)

## 2019-07-31 NOTE — H&P
Date of Service:  7/31/2019    PCP: Gloria Holman M.D.    CC:  Right shoulder mass    HPI: This is a 69 y.o. male with a right shoulder mass that he desires to have removed.    ROS: As above. The remainder of a complete review of systems is negative in all systems except as noted.    PMHx:  Active Ambulatory Problems     Diagnosis Date Noted   • Bilateral claudication of lower limb (HCC) 10/22/2012   • H/O colonoscopy with polypectomy 05/07/2013   • Stress-induced cardiomyopathy 03/10/2015   • Bruit 11/01/2016   • Diabetes mellitus with peripheral vascular disease (HCC) 11/15/2016   • Microalbuminuria due to type 2 diabetes mellitus (HCC) 11/15/2016   • History of tobacco abuse 01/31/2017   • Atherosclerosis of aorta (HCC) 01/31/2017   • Chronic hepatitis C without hepatic coma (HCC) 05/18/2017   • Cervical disc disease 05/18/2017   • Benign neoplasm of soft palate 01/16/2018   • Cervical radiculopathy, chronic 04/05/2018   • Healthcare maintenance 04/05/2018   • Essential hypertension 04/05/2018   • Alcohol dependence in remission (HCC) 04/19/2019   • 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) 04/19/2019   • Dermoid cyst of ear, left 07/03/2019     Resolved Ambulatory Problems     Diagnosis Date Noted   • Occlusion of artery of leg (CMS-HCC) 02/04/2013   • Peripheral vascular disease (HCC) 01/21/2015   • Anxiety 04/05/2018     Past Medical History:   Diagnosis Date   • Cancer (HCC)    • Claudication (HCC) 8/22/2012   • Dental disorder 07/26/2019   • Diabetes (HCC) 07/26/2019   • Hayfever    • Hepatitis C    • History of alcoholism (HCC) 8/22/2012   • [place] (hard of hearing) 07/26/2019   • Hyperlipidemia    • Hypertension    • Insulin dependent diabetes mellitus (HCC) 07/26/2019       SHx:  Social History     Socioeconomic History   • Marital status: Single     Spouse name: Not on file   • Number of children: Not on file   • Years of education: Not on file   • Highest education level: Not on file    Occupational History   • Not on file   Social Needs   • Financial resource strain: Not on file   • Food insecurity:     Worry: Not on file     Inability: Not on file   • Transportation needs:     Medical: Not on file     Non-medical: Not on file   Tobacco Use   • Smoking status: Former Smoker     Packs/day: 1.00     Years: 58.00     Pack years: 58.00     Types: Cigarettes     Start date: 1960     Last attempt to quit: 2011     Years since quittin.9   • Smokeless tobacco: Never Used   • Tobacco comment: avoid all tobacco products   Substance and Sexual Activity   • Alcohol use: No     Alcohol/week: 0.0 oz     Comment: quit , drank 2 times since   • Drug use: Yes     Types: Inhaled     Comment: marijuana (last use 2 weeks ago per pt)   • Sexual activity: Never   Lifestyle   • Physical activity:     Days per week: Not on file     Minutes per session: Not on file   • Stress: Not on file   Relationships   • Social connections:     Talks on phone: Not on file     Gets together: Not on file     Attends Yazidism service: Not on file     Active member of club or organization: Not on file     Attends meetings of clubs or organizations: Not on file     Relationship status: Not on file   • Intimate partner violence:     Fear of current or ex partner: Not on file     Emotionally abused: Not on file     Physically abused: Not on file     Forced sexual activity: Not on file   Other Topics Concern   • Not on file   Social History Narrative   • Not on file       FHx:  family history includes Arthritis in his mother; Cancer in his brother and brother; Heart Disease in his father; No Known Problems in his maternal grandfather, maternal grandmother, paternal grandfather, paternal grandmother, and sister.    Allergies:  No Known Allergies    Medications:  No current facility-administered medications on file prior to encounter.      No current outpatient medications on file prior to encounter.       Objective  "Exam:  Vitals:    07/26/19 1000 07/31/19 0959   BP:  160/81   Pulse:  65   Temp:  36.6 °C (97.9 °F)   TempSrc:  Temporal   SpO2:  96%   Weight: 84.3 kg (185 lb 13.6 oz) 83.2 kg (183 lb 6.8 oz)   Height: 1.778 m (5' 10\") 1.778 m (5' 10\")       Review of Systems  Negative except for shoulder mass      General: Awake and Alert  HEENT: normocephalic, atraumatic  Chest: Clear and equal bilaterally  CV: RRR  Abd: Soft, non-tender, nondistended  Ext: Warm, well perfused, right shoulder mass   Neuro: Intact  Skin: normal    Laboratory--reviewed personally and are as follows:  @LABBRIEF(WBC,RBC,HEMOGLOBIN,HEMATOCRIT,MCV,MCH,MCHC,RDWPLATELETCT,MPV,NEUTSPOLYS,LYMPHOCYTES,MONOCYTES,EOSINOPHILS,BASOPHILS,HYPOCHROMIA,ANISOCYTOSIS)@   @LABBRIEF(SODIUM,POTASSIUM,CHLORIDE,CO2,GLUCOSE,BUN,CREATININE,GFR,BUNCREATRAT,GLOMRATE)@   @LABBRIEF(PROTHROMBTM,INR)@       MDM:  69 y.o. male here with right shoulder mass.    Assessment/Plan:  Active Problems:    * No active hospital problems. *  Resolved Problems:    * No resolved hospital problems. *      Plan:  Right shoulder mass excision  "

## 2019-07-31 NOTE — ANESTHESIA TIME REPORT
Anesthesia Start and Stop Event Times     Date Time Event    7/31/2019 1009 Ready for Procedure     1108 Anesthesia Start     1136 Anesthesia Stop        Responsible Staff  07/31/19    Name Role Begin End    Nicholas Lemons M.D. Anesth 1108 1136        Preop Diagnosis (Free Text):  Pre-op Diagnosis     LIPOMA OF EXTREMITY        Preop Diagnosis (Codes):    Post op Diagnosis  Mass of joint of right shoulder      Premium Reason  Non-Premium    Comments:

## 2019-07-31 NOTE — DISCHARGE INSTRUCTIONS
ACTIVITY: Rest and take it easy for the first 24 hours.  A responsible adult is recommended to remain with you during that time.  It is normal to feel sleepy.  We encourage you to not do anything that requires balance, judgment or coordination.    MILD FLU-LIKE SYMPTOMS ARE NORMAL. YOU MAY EXPERIENCE GENERALIZED MUSCLE ACHES, THROAT IRRITATION, HEADACHE AND/OR SOME NAUSEA.    FOR 24 HOURS DO NOT:  Drive, operate machinery or run household appliances.  Drink beer or alcoholic beverages.   Make important decisions or sign legal documents.    SPECIAL INSTRUCTIONS:     D/C instructions:    1. DIET: Upon discharge from the hospital you may resume your normal preoperative diet. Depending on how you are feeling and whether you have nausea or not, you may wish to stay with a bland diet for the first few days. However, you can advance this as quickly as you feel ready.    2. ACTIVITIES: After discharge from the hospital, you may resume full routine activities. However, there should be no heavy lifting (greater than 15 pounds) and no strenuous activities until after your follow-up visit. Otherwise, routine activities of daily living are acceptable.    3. DRIVING: You may drive whenever you are off pain medications and are able to perform the activities needed to drive, i.e. turning, bending, twisting, etc.    4. BATHING: You may get the wound wet at any time after leaving the hospital. You may shower, but do not submerge in a bath for at least a week. Dressings may come off after 48 hours. Leave steri strips on until they fall off.  It may be necessary to trim the edges.     5. BOWEL FUNCTION: Constipation is common after an operation, especially with pain medications. The combination of pain medication and decreased activity level can cause constipation in otherwise normal patients. If you feel this is occurring, take a laxative (Milk of Magnesia, Ex-Lax, Senokot, etc.) until the problem has resolved.    6. PAIN  MEDICATION: You will be given a prescription for pain medication at discharge. Please take these as directed. It is important to remember not to take medications on an empty stomach as this may cause nausea.    7.CALL IF YOU HAVE: (1) Fevers to more than 1010 F, (2) Unusual chest or leg pain, (3) Drainage or fluid from incision that may be foul smelling, increased tenderness or soreness at the wound or the wound edges are no longer together, redness or swelling at the incision site. Please do not hesitate to call with any other questions.     8. APPOINTMENT: Contact our office at [phone] for a follow-up appointment in 1-2 weeks following your procedure.    If you have any additional questions, please do not hesitate to call the office and speak to either myself or the physician on call.    Office address:   [address]  Augustin Rosales MD  [facility]  [phone]    DIET: To avoid nausea, slowly advance diet as tolerated, avoiding spicy or greasy foods for the first day.  Add more substantial food to your diet according to your physician's instructions.  Babies can be fed formula or breast milk as soon as they are hungry.  INCREASE FLUIDS AND FIBER TO AVOID CONSTIPATION.    SURGICAL DRESSING/BATHING: Keep dressing Clean and Dry. No bath tubs, pools or hot tubs.    FOLLOW-UP APPOINTMENT:  A follow-up appointment should be arranged with your doctor in 1-2 weeks; call to schedule.    You should CALL YOUR PHYSICIAN if you develop:  Fever greater than 101 degrees F.  Pain not relieved by medication, or persistent nausea or vomiting.  Excessive bleeding (blood soaking through dressing) or unexpected drainage from the wound.  Extreme redness or swelling around the incision site, drainage of pus or foul smelling drainage.  Inability to urinate or empty your bladder within 8 hours.  Problems with breathing or chest pain.    You should call 911 if you develop problems with breathing or chest  pain.  If you are unable to contact your doctor or surgical center, you should go to the nearest emergency room or urgent care center.  Physician's telephone #: [phone]    If any questions arise, call your doctor.  If your doctor is not available, please feel free to call the Surgical Center at [phone].  The Center is open Monday through Friday from 7AM to 7PM.  You can also call the HEALTH HOTLINE open 24 hours/day, 7 days/week and speak to a nurse at [phone], or toll free at [phone].    A registered nurse may call you a few days after your surgery to see how you are doing after your procedure.    MEDICATIONS: Resume taking daily medication.  Take prescribed pain medication with food.  If no medication is prescribed, you may take non-aspirin pain medication if needed.  PAIN MEDICATION CAN BE VERY CONSTIPATING.  Take a stool softener or laxative such as senokot, pericolace, or milk of magnesia if needed.    Prescription given for Norco (Pain).  No pain medication given.    If your physician has prescribed pain medication that includes Acetaminophen (Tylenol), do not take additional Acetaminophen (Tylenol) while taking the prescribed medication.    Depression / Suicide Risk    As you are discharged from this [facility], it is important to learn how to keep safe from harming yourself.    Recognize the warning signs:  · Abrupt changes in personality, positive or negative- including increase in energy   · Giving away possessions  · Change in eating patterns- significant weight changes-  positive or negative  · Change in sleeping patterns- unable to sleep or sleeping all the time   · Unwillingness or inability to communicate  · Depression  · Unusual sadness, discouragement and loneliness  · Talk of wanting to die  · Neglect of personal appearance   · Rebelliousness- reckless behavior  · Withdrawal from people/activities they love  · Confusion- inability to concentrate     If you or a  loved one observes any of these behaviors or has concerns about self-harm, here's what you can do:  · Talk about it- your feelings and reasons for harming yourself  · Remove any means that you might use to hurt yourself (examples: pills, rope, extension cords, firearm)  · Get professional help from the community (Mental Health, Substance Abuse, psychological counseling)  · Do not be alone:Call your Safe Contact- someone whom you trust who will be there for you.  · Call your local CRISIS HOTLINE 596-4988 or 518-254-9512  · Call your local Children's Mobile Crisis Response Team [place] (232) 902-8291 or www.Baby Blendy  · Call the toll free National Suicide Prevention Hotlines   · National Suicide Prevention Lifeline 951-576-LKUC (7832)  · National Hope Line Network 800-SUICIDE (837-5875)

## 2019-07-31 NOTE — ANESTHESIA QCDR
2019 [facility] Clinical Data Registry (for Quality Improvement)     Postoperative nausea/vomiting risk protocol (Adult = 18 yrs and Pediatric 3-17 yrs)- (430 and 463)  General inhalation anesthetic (NOT TIVA) with PONV risk factors: No  Provision of anti-emetic therapy with at least 2 different classes of agents: N/A  Patient DID NOT receive anti-emetic therapy and reason is documented in Medical Record: N/A    Multimodal Pain Management- (AQI59)  Patient undergoing Elective Surgery (i.e. Outpatient, or ASC, or Prescheduled Surgery prior to Hospital Admission): Yes  Use of Multimodal Pain Management, two or more drugs and/or interventions, NOT including systemic opioids: Yes   Exception: Documented allergy to multiple classes of analgesics:  N/A    PACU assessment of acute postoperative pain prior to Anesthesia Care End- Applies to Patients Age = 18- (ABG7)  Initial PACU pain score is which of the following: < 7/10  Patient unable to report pain score: N/A    Post-anesthetic transfer of care checklist/protocol to PACU/ICU- (426 and 427)  Upon conclusion of case, patient transferred to which of the following locations: PACU/Non-ICU  Use of transfer checklist/protocol: Yes  Exclusion: Service Performed in Patient Hospital Room (and thus did not require transfer): N/A    PACU Reintubation- (AQI31)  General anesthesia requiring endotracheal intubation (ETT) along with subsequent extubation in OR or PACU: No  Required reintubation in the PACU: N/A  Extubation was a planned trial documented in the medical record prior to removal of the original airway device: N/A    Unplanned admission to ICU related to anesthesia service up through end of PACU care- (MD51)  Unplanned admission to ICU (not initially anticipated at anesthesia start time): No

## 2019-07-31 NOTE — ANESTHESIA PREPROCEDURE EVALUATION
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)   (+) Essential hypertension         (+) Chronic hepatitis C without hepatic coma (HCC)   (+) Microalbuminuria due to type 2 diabetes mellitus (HCC)       Physical Exam    Airway   Mallampati: II  TM distance: >3 FB  Neck ROM: full       Cardiovascular - normal exam  Rhythm: regular  Rate: normal     Dental - normal exam         Pulmonary - normal exam  Breath sounds clear to auscultation     Abdominal    Neurological - normal exam                 Anesthesia Plan    ASA 2       Plan - general       Airway plan will be LMA        Induction: intravenous    Postoperative Plan: Postoperative administration of opioids is intended.    Pertinent diagnostic labs and testing reviewed    Informed Consent:    Anesthetic plan and risks discussed with patient.    Use of blood products discussed with: patient whom consented to blood products.

## 2019-07-31 NOTE — PROGRESS NOTES
Pt arrived to floor from PACU. Pt A+Ox4, able to make needs known, PIV Patent and SL. Pain 0/10 to right shoulder.   CSMT's and KRYS's WNL, Ice pack applied.     Pt's VSS; denies N/V; states pain is 0/10 but tolerable level. Dressing CDI to right shoulder. D/c orders received. IV dc'd. Pt changed into clothing with assistance. Discharge instructions given; pt and family verbalized understanding and questions answered. Patient states ready to d/c home. Prescriptions given. Pt  Completed phase 2.

## 2019-07-31 NOTE — ANESTHESIA PROCEDURE NOTES
Airway  Date/Time: 7/31/2019 11:12 AM  Performed by: Nicholas Lemons M.D.  Authorized by: Nicholas Lemons M.D.     Location:  OR  Urgency:  Elective  Indications for Airway Management:  Anesthesia  Spontaneous Ventilation: absent    Sedation Level:  Deep  Preoxygenated: Yes    Patient Position:  Sniffing  Final Airway Type:  Supraglottic airway  Final Supraglottic Airway:  Standard LMA  SGA Size:  4  Number of Attempts at Approach:  1

## 2019-07-31 NOTE — OP REPORT
07/31/19    OPERATIVE NOTE    PRE-OPERATIVE DIAGNOSIS -     1. Large lipoma right shoulder    POST-OPERATIVE DIAGNOSIS -same    PROCEDURE PERFORMED -     1.  Excision of 12 cm x 7 cm lipoma right shoulder    SURGEON - Augustin Rosales M.D.    ASSISTANT - JOVON Echols    TYPE OF ANESTHESIA - General     ANESTHESIOLOGIST  -Dr. ct BARONE -lipoma 12 cm x 7 cm    PROCEDURE IN DETAIL -     Patient was taken to the operating suite placed in the supine position.  After adequate anesthesia the patient's right shoulder region was prepped and draped in sterile fashion.  An incision was made overlying the large lipoma.  The incision was carried down through the subcutaneous tissue to the lipoma itself.  The lipoma was circumferentially dissected and was delivered through the incision in its entirety including several areas of lobulation.  Using electrocautery the lipoma was excised.  The area was irrigated hemostasis was assured.  3-0 Vicryl subcutaneous sutures were placed followed by 4-0 strata fix suture for the skin.  Benzoin Steri-Strips sterile dressings were applied.      Augustin Rosales M.D.

## 2019-08-22 DIAGNOSIS — M54.2 CERVICAL PAIN: ICD-10-CM

## 2019-08-23 NOTE — TELEPHONE ENCOUNTER
Was the patient seen in the last year in this department? Yes    Does patient have an active prescription for medications requested? No     Received Request Via: Pharmacy      Pt met protocol?: Yes    LAST OV 07/03/2019    MEDICATION WAS D/C 07/31/2019

## 2019-08-26 RX ORDER — IBUPROFEN 800 MG/1
TABLET ORAL
Qty: 30 TAB | Refills: 0 | Status: SHIPPED | OUTPATIENT
Start: 2019-08-26 | End: 2019-10-01 | Stop reason: SDUPTHER

## 2019-09-06 ENCOUNTER — TELEPHONE (OUTPATIENT)
Dept: MEDICAL GROUP | Facility: PHYSICIAN GROUP | Age: 69
End: 2019-09-06

## 2019-09-06 NOTE — TELEPHONE ENCOUNTER
Future Appointments       Provider Department Center    9/12/2019 11:40 AM Gloria Holman M.D. [facility]        ESTABLISHED PATIENT PRE-VISIT PLANNING     Patient was NOT contacted to complete PVP.     Note: Patient will not be contacted if there is no indication to call.     1.  Reviewed notes from the last few office visits within the medical group: Yes    2.  If any orders were placed at last visit or intended to be done for this visit (i.e. 6 mos follow-up), do we have Results/Consult Notes?        •  Labs - Labs ordered, completed on 07/26-31/2019 and results are in chart.   Note: If patient appointment is for lab review and patient did not complete labs, check with provider if OK to reschedule patient until labs completed.       •  Imaging - Imaging was not ordered at last office visit.       •  Referrals - No referrals were ordered at last office visit.    3. Is this appointment scheduled as a Hospital Follow-Up? No    4.  Immunizations were updated in NUMBER26 using WebIZ?: Yes       •  Web Iz Recommendations: FLU, TD, VARICELLA (Chicken Pox)  and SHINGRIX (Shingles)    5.  Patient is due for the following Health Maintenance Topics:   Health Maintenance Due   Topic Date Due   • IMM HEP B VACCINE (1 of 3 - Risk 3-dose series) 01/19/1969   • IMM ZOSTER VACCINES (2 of 3) 01/10/2017   • RETINAL SCREENING  08/30/2018   • DIABETES MONOFILAMENT / LE EXAM  12/12/2018   • Annual Wellness Visit  01/17/2019   • URINE ACR / MICROALBUMIN  08/15/2019   • IMM INFLUENZA (1) 09/01/2019   • COLONOSCOPY  10/03/2019       6. Orders for overdue Health Maintenance topics pended in Pre-Charting? YES    7.  AHA (MDX) form printed for Provider? No, already completed    8.  Patient was informed to arrive 15 min prior to their scheduled appointment and bring in their medication bottles.

## 2019-09-18 ENCOUNTER — APPOINTMENT (OUTPATIENT)
Dept: RADIOLOGY | Facility: IMAGING CENTER | Age: 69
End: 2019-09-18
Attending: INTERNAL MEDICINE
Payer: MEDICARE

## 2019-09-18 ENCOUNTER — OFFICE VISIT (OUTPATIENT)
Dept: MEDICAL GROUP | Facility: PHYSICIAN GROUP | Age: 69
End: 2019-09-18
Payer: MEDICARE

## 2019-09-18 VITALS
OXYGEN SATURATION: 98 % | DIASTOLIC BLOOD PRESSURE: 76 MMHG | SYSTOLIC BLOOD PRESSURE: 124 MMHG | HEIGHT: 70 IN | HEART RATE: 70 BPM | TEMPERATURE: 98 F | BODY MASS INDEX: 25.91 KG/M2 | WEIGHT: 181 LBS

## 2019-09-18 DIAGNOSIS — M25.511 CHRONIC RIGHT SHOULDER PAIN: ICD-10-CM

## 2019-09-18 DIAGNOSIS — Z98.890 S/P EXCISION OF LIPOMA: ICD-10-CM

## 2019-09-18 DIAGNOSIS — Z86.018 S/P EXCISION OF LIPOMA: ICD-10-CM

## 2019-09-18 DIAGNOSIS — I73.9 PERIPHERAL VASCULAR DISEASE (HCC): ICD-10-CM

## 2019-09-18 DIAGNOSIS — G89.29 CHRONIC RIGHT SHOULDER PAIN: ICD-10-CM

## 2019-09-18 DIAGNOSIS — E11.51 DIABETES MELLITUS WITH PERIPHERAL VASCULAR DISEASE (HCC): ICD-10-CM

## 2019-09-18 PROCEDURE — 73030 X-RAY EXAM OF SHOULDER: CPT | Mod: TC,RT | Performed by: INTERNAL MEDICINE

## 2019-09-18 PROCEDURE — 99214 OFFICE O/P EST MOD 30 MIN: CPT | Performed by: INTERNAL MEDICINE

## 2019-09-18 RX ORDER — ASPIRIN 81 MG/1
81 TABLET, CHEWABLE ORAL DAILY
COMMUNITY

## 2019-09-18 RX ORDER — METOPROLOL SUCCINATE 25 MG/1
25 TABLET, EXTENDED RELEASE ORAL DAILY
Qty: 100 TAB | Refills: 1 | Status: SHIPPED | OUTPATIENT
Start: 2019-09-18 | End: 2020-08-05

## 2019-09-18 RX ORDER — CLOPIDOGREL BISULFATE 75 MG/1
75 TABLET ORAL DAILY
Qty: 100 TAB | Refills: 1 | Status: SHIPPED | OUTPATIENT
Start: 2019-09-18 | End: 2020-06-19

## 2019-09-18 RX ORDER — ATORVASTATIN CALCIUM 80 MG/1
80 TABLET, FILM COATED ORAL DAILY
Qty: 100 TAB | Refills: 1 | Status: SHIPPED | OUTPATIENT
Start: 2019-09-18 | End: 2020-05-12 | Stop reason: SDUPTHER

## 2019-09-18 RX ORDER — LISINOPRIL 10 MG/1
10 TABLET ORAL DAILY
Qty: 100 TAB | Refills: 3 | Status: SHIPPED | OUTPATIENT
Start: 2019-09-18 | End: 2020-09-08 | Stop reason: SDUPTHER

## 2019-09-18 NOTE — ASSESSMENT & PLAN NOTE
Status post right shoulder lipoma removal procedure with hospitalization and discharge.  Denies any symptoms at this time.

## 2019-09-18 NOTE — ASSESSMENT & PLAN NOTE
This is a chronic health problem that is uncontrolled with current medications and lifestyle measures.  Recent HbA1c was at 7.5, discussed at length on starting a low-dose of glipizide but patient states that whenever any medication he started on him for diabetes he goes into hypoglycemic attacks.  Prefers to follow only diet modification.  All the risks understood.

## 2019-09-18 NOTE — LETTER
[facility]  Gloria Holman M.D.  [address]  [place] [address]  Fax: [phone]   Authorization for Release/Disclosure of   Protected Health Information   Name: CHUNG CHOI : 1950 SSN: [id number]   Address: [address] [place] [address] [address]  [place] [address] Phone:    [phone] (home)    I authorize the entity listed below to release/disclose the PHI below to:   [facility]/Gloria Holman M.D. and Gloria Holman M.D.   Provider or Entity Name:  [facility]   Address   City, [facility]   Phone:      Fax:[phone]     Reason for request: continuity of care   Information to be released:    [  ] LAST COLONOSCOPY,  including any PATH REPORT and follow-up  [  ] LAST FIT/COLOGUARD RESULT [  ] LAST DEXA  [  ] LAST MAMMOGRAM  [  ] LAST PAP  [  ] LAST LABS [XX  ] RETINA EXAM REPORT  [  ] IMMUNIZATION RECORDS  [XXX  ] Release all info      [  ] Check here and initial the line next to each item to release ALL health information INCLUDING  _____ Care and treatment for drug and / or alcohol abuse  _____ HIV testing, infection status, or AIDS  _____ Genetic Testing    DATES OF SERVICE OR TIME PERIOD TO BE DISCLOSED: _____________  I understand and acknowledge that:  * This Authorization may be revoked at any time by you in writing, except if your health information has already been used or disclosed.  * Your health information that will be used or disclosed as a result of you signing this authorization could be re-disclosed by the recipient. If this occurs, your re-disclosed health information may no longer be protected by State or Federal laws.  * You may refuse to sign this Authorization. Your refusal will not affect your ability to obtain treatment.  * This Authorization becomes effective upon signing and will  on (date) __________.      If no date is indicated, this Authorization will  one (1) year from the signature date.    Name: Chung Chen  Ashly    Signature: Continuity of care   Date:     9/18/2019       PLEASE FAX REQUESTED RECORDS BACK TO: [phone]

## 2019-09-18 NOTE — ASSESSMENT & PLAN NOTE
This is a chronic health problem that is well controlled with current medications and lifestyle measures.  Currently on medications Plavix 75 mg daily, atorvastatin 80 mg daily, aspirin 81 mg daily.

## 2019-09-18 NOTE — ASSESSMENT & PLAN NOTE
This is a new problem which patient says is worsening and current pain is 4/10 severity. 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 No images done anytime.Pt says its relieved with Ibuprofen 800 mg daily.

## 2019-09-18 NOTE — PROGRESS NOTES
CC: Follow-up visit, diabetes mellitus.    HISTORY OF PRESENT ILLNESS: Patient is a 69 y.o. male established patient who presents today to discuss on medical conditions as mentioned in HPI below.    Health Maintenance: Due for retinal eye exam which patient says he got it at outside facility around 2 months back which was normal, will need to get the records.  Due for urine microalbumin okay to get the orders in the office today.  Due for flu vaccine and hepatitis B vaccine which he refuses all the risks understood.    Chronic right shoulder pain  This is a new problem which patient says is worsening and current pain is 4/10 severity. 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 No images done anytime.Pt says its relieved with Ibuprofen 800 mg daily.     S/P excision of lipoma  Status post right shoulder lipoma removal procedure with hospitalization and discharge.  Denies any symptoms at this time.    Diabetes mellitus with peripheral vascular disease (CMS-HCC)  This is a chronic health problem that is uncontrolled with current medications and lifestyle measures.  Recent HbA1c was at 7.5, discussed at length on starting a low-dose of glipizide but patient states that whenever any medication he started on him for diabetes he goes into hypoglycemic attacks.  Prefers to follow only diet modification.  All the risks understood.    Peripheral vascular disease (HCC)  This is a chronic health problem that is well controlled with current medications and lifestyle measures.  Currently on medications Plavix 75 mg daily, atorvastatin 80 mg daily, aspirin 81 mg daily.      PHQ score 0, BMI within normal limits, former tobacco, no fall injuries.    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) 2019   • Cervical radiculopathy, chronic 2018   • Healthcare maintenance 2018   • Essential hypertension 2018   • Benign neoplasm of soft palate 2018   • Chronic hepatitis C without hepatic coma (HCC) 2017   • Cervical disc disease 2017   • History of tobacco abuse 2017   • Atherosclerosis of aorta (HCC) 2017   • Diabetes mellitus with peripheral vascular disease (HCC) 11/15/2016   • Microalbuminuria due to type 2 diabetes mellitus (HCC) 11/15/2016   • Bruit 2016   • Stress-induced cardiomyopathy 03/10/2015   • Peripheral vascular disease (HCC) 2015   • H/O colonoscopy with polypectomy 2013   • Bilateral claudication of lower limb (HCC) 10/22/2012      Allergies:Patient has no known allergies.    Current Outpatient Medications   Medication Sig Dispense Refill   • aspirin (ASA) 81 MG Chew Tab chewable tablet Take 81 mg by mouth every day.     • atorvastatin (LIPITOR) 80 MG tablet Take 1 Tab by mouth every day. 100 Tab 1   • lisinopril (PRINIVIL) 10 MG Tab Take 1 Tab by mouth every day. 100 Tab 3   • clopidogrel (PLAVIX) 75 MG Tab Take 1 Tab by mouth every day. 100 Tab 1   • metoprolol SR (TOPROL XL) 25 MG TABLET SR 24 HR Take 1 Tab by mouth every day. 100 Tab 1   • ibuprofen (MOTRIN) 800 MG Tab TAKE ONE TABLET BY MOUTH EVERY 8 HOURS AS NEEDED 30 Tab 0     No current facility-administered medications for this visit.        Social History     Tobacco Use   • Smoking status: Former Smoker     Packs/day: 1.00     Years: 58.00     Pack years: 58.00     Types: Cigarettes     Start date: 1960     Last attempt to quit: 2011     Years since quittin.0   • Smokeless tobacco: Never Used   • Tobacco comment: avoid all tobacco products   Substance Use Topics   • Alcohol use: No     Alcohol/week: 0.0 oz     Comment: quit 1985, drank 2 times since   • Drug use: Yes     Types: Inhaled     Comment: marijuana (last use  2 weeks ago per pt)     Social History     Social History Narrative   • Not on file       Family History   Problem Relation Age of Onset   • Heart Disease Father    • Arthritis Mother    • No Known Problems Sister    • Cancer Brother         type unknown   • Cancer Brother         skin    • No Known Problems Maternal Grandmother    • No Known Problems Maternal Grandfather    • No Known Problems Paternal Grandmother    • No Known Problems Paternal Grandfather    • Diabetes Neg Hx    • Hypertension Neg Hx    • Stroke Neg Hx    • Hyperlipidemia Neg Hx         ROS:     - Constitutional:  Negative for fever, chills, unexpected weight change, and fatigue/generalized weakness.    - HEENT:  Negative for headaches, vision changes, hearing changes, ear pain, ear discharge, rhinorrhea, sinus congestion, sore throat, and neck pain.      - Respiratory: Negative for cough, sputum production, chest congestion, dyspnea, wheezing, and crackles.      - Cardiovascular: Negative for chest pain, palpitations, orthopnea, and bilateral lower extremity edema.     - Gastrointestinal: Negative for heartburn, nausea, vomiting, abdominal pain, hematochezia, melena, diarrhea, constipation, and greasy/foul-smelling stools.     - Genitourinary: Negative for dysuria, polyuria, hematuria, pyuria, urinary urgency, and urinary incontinence.     - Musculoskeletal: As mentioned in HPI above negative for myalgias, back pain.     - Skin: Negative for rash, itching, cyanotic skin color change.     - Neurological: Negative for dizziness, tingling, tremors, focal sensory deficit, focal weakness and headaches.     - Endo/Heme/Allergies: Does not bruise/bleed easily.     - Psychiatric/Behavioral: Negative for depression, suicidal/homicidal ideation and memory loss.          Last lab work in July 2019 reviewed and discussed with the patient.    Exam:    /76 (BP Location: Right arm, Patient Position: Sitting, BP Cuff Size: Adult)   Pulse 70   Temp 36.7  "°C (98 °F) (Temporal)   Ht 1.778 m (5' 10\")   Wt 82.1 kg (181 lb)   SpO2 98%  Body mass index is 25.97 kg/m².    General:  Well nourished, well developed male in NAD  Head is grossly normal.  Well-healed excision of the cyst done on the left external ear.  Neck: Supple without JVD or bruit. Thyroid is not enlarged.  Pulmonary: Clear to ausculation and percussion.  Normal effort. No rales, ronchi, or wheezing.  Cardiovascular: Regular rate and rhythm without murmur. Carotid and radial pulses are intact and equal bilaterally.  Extremities: no clubbing, cyanosis, or edema.  Right shoulder exam : Mild tenderness on palpation of the upper end of the humerus, no erythema or swelling.  Well-healed lipoma removal scar seen on the right shoulder.  Painful arc at around 95 degrees.    Diabetic foot exam  Monofilament testing with a 10 gram force: sensation intact: intact bilaterally  Visual Inspection: Feet without maceration, ulcers, fissures.  Pedal pulses: intact bilaterally     Please note that this dictation was created using voice recognition software. I have made every reasonable attempt to correct obvious errors, but I expect that there are errors of grammar and possibly content that I did not discover before finalizing the note.    Assessment/Plan:  1. Diabetes mellitus with peripheral vascular disease (HCC)  Borderline elevated for his age at 7.5% in July 2019, discussed at length given his risk factors of peripheral vascular disease he might need to be on a low-dose of antidiabetic medications but patient prefers to follow only diet modification given his previous hypoglycemic attacks and oral antidiabetic tablets.  All the risks understood.  Will do the diabetic maintenance exams.  - Diabetic Monofilament LE Exam  - Microalbumin Creat Ratio Urine (Lab Collect); Future    2. Peripheral vascular disease (HCC)  Well-controlled, continue current lisinopril 10 mg daily, atorvastatin 80 mg daily, Plavix 70 mg daily, " aspirin 81 mg daily.  - lisinopril (PRINIVIL) 10 MG Tab; Take 1 Tab by mouth every day.  Dispense: 100 Tab; Refill: 3    3. Chronic right shoulder pain  Uncontrolled, as mentioned in HPI above and my physical exam findings will do a baseline x-ray which was never done in the past.  We will plan for physical therapy once I see the x-ray report, to continue current ibuprofen.  Patient understood and agreed with the plan.  - DX-SHOULDER 2+ RIGHT; Future    4. S/P excision of lipoma  Stable, well-healed and dry scar seen on the right shoulder per my physical exam today.  Reviewed the procedure notes and discussed with the patient.

## 2019-09-21 ENCOUNTER — TELEPHONE (OUTPATIENT)
Dept: MEDICAL GROUP | Facility: PHYSICIAN GROUP | Age: 69
End: 2019-09-21

## 2019-09-21 DIAGNOSIS — M19.011 OSTEOARTHRITIS OF RIGHT SHOULDER, UNSPECIFIED OSTEOARTHRITIS TYPE: ICD-10-CM

## 2019-09-21 DIAGNOSIS — M75.30 CALCIFIC BURSITIS OF SHOULDER: ICD-10-CM

## 2019-10-01 DIAGNOSIS — M54.2 CERVICAL PAIN: ICD-10-CM

## 2019-10-02 NOTE — TELEPHONE ENCOUNTER
Was the patient seen in the last year in this department? Yes    Does patient have an active prescription for medications requested? No     Received Request Via: Pharmacy    Pt met protocol?: Yes     Last OV 09/18/19

## 2019-10-03 RX ORDER — IBUPROFEN 800 MG/1
TABLET ORAL
Qty: 30 TAB | Refills: 0 | Status: SHIPPED | OUTPATIENT
Start: 2019-10-03 | End: 2019-11-04 | Stop reason: SDUPTHER

## 2019-11-04 DIAGNOSIS — M54.2 CERVICAL PAIN: ICD-10-CM

## 2019-11-04 RX ORDER — IBUPROFEN 800 MG/1
800 TABLET ORAL EVERY 8 HOURS PRN
Qty: 30 TAB | Refills: 0 | Status: SHIPPED | OUTPATIENT
Start: 2019-11-04 | End: 2019-12-20 | Stop reason: SDUPTHER

## 2019-11-04 NOTE — TELEPHONE ENCOUNTER
Was the patient seen in the last year in this department? Yes    Does patient have an active prescription for medications requested? No     Received Request Via: Pharmacy      Pt met protocol?: Yes,ov 9/19

## 2019-11-20 ENCOUNTER — OFFICE VISIT (OUTPATIENT)
Dept: MEDICAL GROUP | Facility: PHYSICIAN GROUP | Age: 69
End: 2019-11-20
Payer: MEDICARE

## 2019-11-20 VITALS
OXYGEN SATURATION: 99 % | WEIGHT: 188 LBS | TEMPERATURE: 97.9 F | HEIGHT: 70 IN | DIASTOLIC BLOOD PRESSURE: 80 MMHG | BODY MASS INDEX: 26.92 KG/M2 | SYSTOLIC BLOOD PRESSURE: 132 MMHG | HEART RATE: 90 BPM

## 2019-11-20 DIAGNOSIS — I73.9 BILATERAL CLAUDICATION OF LOWER LIMB (HCC): ICD-10-CM

## 2019-11-20 DIAGNOSIS — I70.0 ATHEROSCLEROSIS OF AORTA (HCC): ICD-10-CM

## 2019-11-20 DIAGNOSIS — F10.21 ALCOHOL DEPENDENCE IN REMISSION (HCC): ICD-10-CM

## 2019-11-20 DIAGNOSIS — E11.29 MICROALBUMINURIA DUE TO TYPE 2 DIABETES MELLITUS (HCC): ICD-10-CM

## 2019-11-20 DIAGNOSIS — R80.9 MICROALBUMINURIA DUE TO TYPE 2 DIABETES MELLITUS (HCC): ICD-10-CM

## 2019-11-20 DIAGNOSIS — I73.9 PERIPHERAL VASCULAR DISEASE (HCC): ICD-10-CM

## 2019-11-20 DIAGNOSIS — Z12.12 SCREENING FOR COLORECTAL CANCER: ICD-10-CM

## 2019-11-20 DIAGNOSIS — E11.51 DIABETES MELLITUS WITH PERIPHERAL VASCULAR DISEASE (HCC): ICD-10-CM

## 2019-11-20 DIAGNOSIS — Z12.11 SCREENING FOR COLORECTAL CANCER: ICD-10-CM

## 2019-11-20 PROCEDURE — G0439 PPPS, SUBSEQ VISIT: HCPCS | Performed by: INTERNAL MEDICINE

## 2019-11-20 ASSESSMENT — PATIENT HEALTH QUESTIONNAIRE - PHQ9: CLINICAL INTERPRETATION OF PHQ2 SCORE: 0

## 2019-11-20 ASSESSMENT — ENCOUNTER SYMPTOMS: GENERAL WELL-BEING: GOOD

## 2019-11-20 ASSESSMENT — ACTIVITIES OF DAILY LIVING (ADL): BATHING_REQUIRES_ASSISTANCE: 0

## 2019-11-20 NOTE — PROGRESS NOTES
Chief Complaint   Patient presents with   • Annual Wellness Visit         HPI:  Tuan is a 69 y.o. here for Medicare Annual Wellness Visit      Healthcare maintenance : Patient refusing all the vaccines offered to him including flu, hepatitis B all the risks understood.  Okay to get the fit test, diabetic maintenance exam urine microalbumin orders given.    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) 04/19/2019   • Cervical radiculopathy, chronic 04/05/2018   • Healthcare maintenance 04/05/2018   • Essential hypertension 04/05/2018   • Benign neoplasm of soft palate 01/16/2018   • Chronic hepatitis C without hepatic coma (HCC) 05/18/2017   • Cervical disc disease 05/18/2017   • History of tobacco abuse 01/31/2017   • Atherosclerosis of aorta (HCC) 01/31/2017   • Diabetes mellitus with peripheral vascular disease (HCC) 11/15/2016   • Microalbuminuria due to type 2 diabetes mellitus (HCC) 11/15/2016   • Bruit 11/01/2016   • Stress-induced cardiomyopathy 03/10/2015   • Peripheral vascular disease (HCC) 01/21/2015   • H/O colonoscopy with polypectomy 05/07/2013   • Bilateral claudication of lower limb (HCC) 10/22/2012       Current Outpatient Medications   Medication Sig Dispense Refill   • ibuprofen (MOTRIN) 800 MG Tab Take 1 Tab by mouth every 8 hours as needed. 30 Tab 0   • aspirin (ASA) 81 MG Chew Tab chewable tablet Take 81 mg by mouth every day.     • atorvastatin (LIPITOR) 80 MG tablet Take 1 Tab by mouth every day. 100 Tab 1   • lisinopril (PRINIVIL) 10 MG Tab Take 1 Tab by mouth every day. 100 Tab 3   • clopidogrel (PLAVIX) 75 MG Tab Take 1 Tab by mouth every day. 100 Tab 1   • metoprolol SR (TOPROL XL) 25 MG TABLET SR 24 HR Take 1 Tab by mouth every day. 100 Tab 1          No current facility-administered medications for this visit.         Patient is taking medications as noted in medication list.  Current supplements as per medication list.     Allergies: Patient has no known allergies.    Current social contact/activities: Pt belongs to [employer].      Is patient current with immunizations? No, due for FLU, HEPATITIS B and SHINGRIX (Shingles). Patient is interested in receiving NONE today.    He  reports that he quit smoking about 8 years ago. His smoking use included cigarettes. He started smoking about 59 years ago. He has a 58.00 pack-year smoking history. He has never used smokeless tobacco. He reports current drug use. Drug: Inhaled. He reports that he does not drink alcohol.  Counseling given: Not Answered  Comment: avoid all tobacco products        DPA/Advanced directive: Patient has Advanced Directive on file.     ROS:    Gait: Uses no assistive device   Ostomy: No   Other tubes: No   Amputations: No   Chronic oxygen use No   Last eye exam 10/2019   Wears hearing aids: No   : Denies any urinary leakage during the last 6 months          Depression Screening    Little interest or pleasure in doing things?  0 - not at all  Feeling down, depressed, or hopeless? 0 - not at all  Trouble falling or staying asleep, or sleeping too much?     Feeling tired or having little energy?     Poor appetite or overeating?     Feeling bad about yourself - or that you are a failure or have let yourself or your family down?    Trouble concentrating on things, such as reading the newspaper or watching television?    Moving or speaking so slowly that other people could have noticed.  Or the opposite - being so fidgety or restless that you have been moving around a lot more than usual?     Thoughts that you would be better off dead, or of hurting yourself?     Patient Health Questionnaire Score:        If depressive symptoms identified deferred to follow up visit  unless specifically addressed in assessment and plan.    Interpretation of PHQ-9 Total Score   Score Severity   1-4 No Depression   5-9 Mild Depression   10-14 Moderate Depression   15-19 Moderately Severe Depression   20-27 Severe Depression      Screening for Cognitive Impairment    Three Minute Recall (village, kitchen, baby)  3/3    Draw clock face with all 12 numbers and set the hands to show 10 past 10.  Yes 5/5  If cognitive concerns identified, deferred for follow up unless specifically addressed in assessment and plan.    Fall Risk Assessment    Has the patient had two or more falls in the last year or any fall with injury in the last year?  No  If fall risk identified, deferred for follow up unless specifically addressed in assessment and plan.      Safety Assessment    Throw rugs on floor.  No  Handrails on all stairs.  Yes  Good lighting in all hallways.  Yes  Difficulty hearing.  No  Patient counseled about all safety risks that were identified.    Functional Assessment ADLs    Are there any barriers preventing you from cooking for yourself or meeting nutritional needs?  No.    Are there any barriers preventing you from driving safely or obtaining transportation?  No.    Are there any barriers preventing you from using a telephone or calling for help?  No.    Are there any barriers preventing you from shopping?  No.    Are there any barriers preventing you from taking care of your own finances?  No.    Are there any barriers preventing you from managing your medications?    No.    Are there any barriers preventing you from showering, bathing or dressing yourself?  No.    Are you currently engaging in any exercise or physical activity?  Yes.  Pt walks every day.     What is your perception of your health?  Good.    Health Maintenance Summary                IMM HEP B VACCINE Overdue 1[birth year]19/1969     IMM ZOSTER VACCINES Overdue 1/10/2017      Done 11/15/2016 Imm Admin: Zoster Vaccine Live (ZVL) (Zostavax)     Annual Wellness Visit Overdue 1/17/2019      Done 1/16/2018 Visit Dx: Medicare annual wellness visit, subsequent     Patient has more history with this topic...    URINE ACR / MICROALBUMIN Overdue 8/15/2019      Done 8/15/2018 MICROALBUMIN CREAT RATIO URINE     Patient has more history with this topic...    IMM INFLUENZA Overdue 9/1/2019      Done 11/15/2016 Imm Admin: Influenza Vaccine Adult HD     Patient has more history with this topic...    COLONOSCOPY Overdue 10/3/2019      Done 10/3/2016 REFERRAL TO GI FOR COLONOSCOPY     Patient has more history with this topic...    A1C SCREENING Next Due 12/27/2019      Done 6/27/2019 HEMOGLOBIN A1C     Patient has more history with this topic...    LUNG CANCER SCREENING Next Due 5/15/2020      Done 5/15/2019 CT-LUNG CANCER-SCREENING     Patient has more history with this topic...    FASTING LIPID PROFILE Next Due 6/27/2020      Done 6/27/2019 LIPID PROFILE     Patient has more history with this topic...    SERUM CREATININE Next Due 7/26/2020      Done 7/26/2019 COMP METABOLIC PANEL     Patient has more history with this topic...    RETINAL SCREENING Next Due 8/23/2020      Done 8/23/2019 REFERRAL FOR RETINAL SCREENING EXAM     Patient has more history with this topic...    DIABETES MONOFILAMENT / LE EXAM Next Due 9/18/2020      Done 9/18/2019 AMB DIABETIC MONOFILAMENT LOWER EXTREMITY EXAM     Patient has more history with this topic...    IMM DTaP/Tdap/Td Vaccine Next Due 12/2/2024      Done 12/2/2014 Imm Admin: Tdap Vaccine          Patient Care Team:  Gloria Holman M.D. as PCP - General (Internal Medicine)  Manjit Gleason M.D. as Consulting Physician (Cardiology)  [facility] as Consulting Physician (Optometry)  Glorai Holman M.D. as Consulting Physician (Internal Medicine)      Social History     Tobacco Use   • Smoking status: Former Smoker     Packs/day: 1.00     Years: 58.00     Pack years: 58.00     Types: Cigarettes     Start date: 1/1/1960     " Last attempt to quit: 2011     Years since quittin.2   • Smokeless tobacco: Never Used   • Tobacco comment: avoid all tobacco products   Substance Use Topics   • Alcohol use: No     Alcohol/week: 0.0 oz     Comment: quit , drank 2 times since   • Drug use: Yes     Types: Inhaled     Comment: marijuana (last use 2 weeks ago per pt)     Family History   Problem Relation Age of Onset   • Heart Disease Father    • Arthritis Mother    • No Known Problems Sister    • Cancer Brother         type unknown   • Cancer Brother         skin    • No Known Problems Maternal Grandmother    • No Known Problems Maternal Grandfather    • No Known Problems Paternal Grandmother    • No Known Problems Paternal Grandfather    • Diabetes Neg Hx    • Hypertension Neg Hx    • Stroke Neg Hx    • Hyperlipidemia Neg Hx      He  has a past medical history of Cancer (HCC), Claudication (HCC) (2012), Dental disorder (2019), Diabetes (HCC) (2019), H/O colonoscopy with polypectomy (2013), Hayfever, Hepatitis C, History of alcoholism (HCC) (2012), [place] (hard of hearing) (2019), Hyperlipidemia, Hypertension, and Insulin dependent diabetes mellitus (HCC) (2019).   Past Surgical History:   Procedure Laterality Date   • MASS EXCISION GENERAL Right 2019    Procedure: EXCISION, MASS, GENERAL - 15 CM LIPOMA OF SHOULDER;  Surgeon: Augustin Rosales M.D.;  Location: SURGERY [facility];  Service: General   • COLONOSCOPY     • WIDE EXCISION  2014    Performed by Nicholas Govea M.D. at SURGERY SAME DAY [facility]   • FLAP GRAFT  2014    Performed by Nicholas Govea M.D. at SURGERY SAME DAY [facility]   • RECOVERY  10/22/2012    Performed by Pily Lee M.D. at SURGERY [facility]         PHYSICAL EXAM  VITAL SIGNS:   /80 (BP Location: Left arm, Patient Position: Sitting)   Pulse 90   Temp 36.6 °C (97.9 °F) (Temporal)   Ht 1.778 m (5' 10\")   Wt 85.3 kg (188 lb)   SpO2 " 99%   BMI 26.98 kg/m²   Constitutional: Well developed, Well nourished, No acute distress, Non-toxic appearance.    HENT: Normocephalic, Atraumatic, Bilateral external ears normal, Oropharynx moist, No oral exudates, Nose normal.    Eyes: PERRLA, EOMI, Conjunctiva normal, No discharge.    Neck: Normal range of motion, No tenderness, Supple, No stridor.    Lymphatic: No lymphadenopathy noted.    Cardiovascular: Regular rate and rhythm,  No murmurs, No rubs, No gallops.    Thorax & Lungs: Normal breath sounds, No respiratory distress, No wheezing, No chest tenderness.    Abdomen: Bowel sounds normal, Soft, No tenderness, No masses, No pulsatile masses.    Skin: Warm, Dry, No erythema, No rash.    Back: No tenderness, No CVA tenderness.   Extremities: Intact distal pulses, No edema, No tenderness, No cyanosis, No clubbing.    Musculoskeletal: Good range of motion in all major joints. No tenderness to palpation or major deformities noted.    Neurologic: Alert & oriented x 3, Normal motor function, Normal sensory function, No focal deficits noted.    Psychiatric: Affect normal, Judgment normal, Mood normal.   Hearing fair.    Dentition fair  Alert, oriented in no acute distress.  Eye contact is good, speech goal directed, affect calm      Assessment and Plan. The following treatment and monitoring plan is recommended:      1. Alcohol dependence in remission (HCC)  Patient sober since more than 20 years, emphasized to abstain from it.  - Subsequent Annual Wellness Visit - Includes PPPS ()    2. Atherosclerosis of aorta (HCC)  Stable, last lipid panel in June 2019 within normal limits.  Continue the current aspirin 81 mg daily, atorvastatin 80 mg nightly.  - Subsequent Annual Wellness Visit - Includes PPPS ()    3. Peripheral vascular disease (HCC)  4. Bilateral claudication of lower limb (HCC)  Uncontrolled, patient was supposed to undergo procedure which was recommended by the vascular surgery but he has  decided and opted not to go with any interventional surgeries as he cannot afford in spite of the insurance.  All the risks understood.  Continue current Plavix, aspirin, atorvastatin.  - Subsequent Annual Wellness Visit - Includes PPPS ()    5. Diabetes mellitus with peripheral vascular disease (HCC)  6. Microalbuminuria due to type 2 diabetes mellitus (HCC)  Well-controlled, A1c at goal given his age last checked in June 2019, not due until December 2019.  To continue current diet and lifestyle modification.  - Subsequent Annual Wellness Visit - Includes PPPS ()  - MICROALBUMIN CREAT RATIO URINE; Future    7. Screening for colorectal cancer  - OCCULT BLOOD FECES IMMUNOASSAY (FIT); Future  - Subsequent Annual Wellness Visit - Includes PPPS ()    Services suggested: No services needed at this time  Health Care Screening recommendations as per orders if indicated.  Referrals offered: PT/OT/Nutrition counseling/Behavioral Health/Smoking cessation as per orders if indicated.    Discussion today about general wellness and lifestyle habits:    · Prevent falls and reduce trip hazards; Cautioned about securing or removing rugs.  · Have a working fire alarm and carbon monoxide detector;   · Engage in regular physical activity and social activities       Follow-up: Return in about 6 months (around 5/20/2020).

## 2019-12-20 DIAGNOSIS — M54.2 CERVICAL PAIN: ICD-10-CM

## 2019-12-20 RX ORDER — IBUPROFEN 800 MG/1
800 TABLET ORAL EVERY 8 HOURS PRN
Qty: 30 TAB | Refills: 0 | Status: SHIPPED | OUTPATIENT
Start: 2019-12-20 | End: 2020-01-29

## 2019-12-20 NOTE — TELEPHONE ENCOUNTER
Was the patient seen in the last year in this department? Yes    Does patient have an active prescription for medications requested? No     Received Request Via: Pharmacy      Pt met protocol?: Yes, ov 11/19

## 2020-01-17 ENCOUNTER — PATIENT OUTREACH (OUTPATIENT)
Dept: HEALTH INFORMATION MANAGEMENT | Facility: OTHER | Age: 70
End: 2020-01-17

## 2020-01-17 NOTE — PROGRESS NOTES
Called pt to introduce myself as his [employer] program and to wish an him a Happy Early Birthday, pt requested that I call back after 3pm today.

## 2020-01-29 DIAGNOSIS — M54.2 CERVICAL PAIN: ICD-10-CM

## 2020-01-29 RX ORDER — IBUPROFEN 800 MG/1
TABLET ORAL
Qty: 30 TAB | Refills: 1 | Status: SHIPPED | OUTPATIENT
Start: 2020-01-29 | End: 2020-07-06 | Stop reason: SDUPTHER

## 2020-01-29 NOTE — TELEPHONE ENCOUNTER
Was the patient seen in the last year in this department? Yes    Does patient have an active prescription for medications requested? No     Received Request Via: Pharmacy    Pt met protocol?: Yes     Last OV 11/20/19

## 2020-03-02 ENCOUNTER — TELEPHONE (OUTPATIENT)
Dept: MEDICAL GROUP | Facility: PHYSICIAN GROUP | Age: 70
End: 2020-03-02

## 2020-03-02 NOTE — TELEPHONE ENCOUNTER
1. Name: Tuan Limon    Call Back Number: [phone]   Patient approves a detailed voicemail message: yes    2. Which medication(s) is being requested? Test Strips (free style)    3. What is the preferred Pharmacy? [facility] [address]

## 2020-03-10 DIAGNOSIS — E11.51 DIABETES MELLITUS WITH PERIPHERAL VASCULAR DISEASE (HCC): ICD-10-CM

## 2020-03-10 NOTE — PROGRESS NOTES
1. Diabetes mellitus with peripheral vascular disease (HCC)  - Blood Glucose Test Strips; Test strips order: Test strips for Freestyle Trang meter. Sig: use before breakfast and prn ssx high or low sugar #100 RF x 3  Dispense: 100 Strip; Refill: 3

## 2020-05-04 ENCOUNTER — TELEPHONE (OUTPATIENT)
Dept: MEDICAL GROUP | Facility: PHYSICIAN GROUP | Age: 70
End: 2020-05-04

## 2020-05-04 NOTE — TELEPHONE ENCOUNTER
ESTABLISHED PATIENT PRE-VISIT PLANNING     Patient was contacted to complete PVP  1.  Reviewed notes from the last few office visits within the medical group: Yes    2.  If any orders were placed at last visit or intended to be done for this visit (i.e. 6 mos follow-up), do we have Results/Consult Notes?        •  Labs - Labs ordered, NOT completed. Patient advised to complete prior to next appointment.       •  Imaging - Imaging was not ordered at last office visit.       •  Referrals - No referrals were ordered at last office visit.    3. Is this appointment scheduled as a Hospital Follow-Up? No    4.  Immunizations were updated in [place] using WebIZ?: Yes       •  Web Iz Recommendations: FLU, TD, VARICELLA (Chicken Pox)  and Patient is up to date on all vaccines    5.  Patient is due for the following Health Maintenance Topics:   Health Maintenance Due   Topic Date Due   • IMM HEP B VACCINE (1 of 3 - Risk 3-dose series) 01/19/1968   • IMM ZOSTER VACCINES (2 of 3) 01/10/2017   • URINE ACR / MICROALBUMIN  08/15/2019   • COLONOSCOPY  10/03/2019   • A1C SCREENING  12/27/2019   • LUNG CANCER SCREENING  05/15/2020     6. Orders for overdue Health Maintenance topics pended in Pre-Charting? NO    7.  AHA (MDX) form printed for Provider? YES    8.  Patient was informed to arrive 15 min prior to their scheduled appointment and bring in their medication bottles.

## 2020-05-12 ENCOUNTER — TELEMEDICINE (OUTPATIENT)
Dept: MEDICAL GROUP | Facility: PHYSICIAN GROUP | Age: 70
End: 2020-05-12
Payer: MEDICARE

## 2020-05-12 VITALS
OXYGEN SATURATION: 96 % | HEART RATE: 95 BPM | HEIGHT: 70 IN | BODY MASS INDEX: 26.2 KG/M2 | SYSTOLIC BLOOD PRESSURE: 130 MMHG | WEIGHT: 183 LBS | DIASTOLIC BLOOD PRESSURE: 72 MMHG | TEMPERATURE: 98 F

## 2020-05-12 DIAGNOSIS — R09.89 BRUIT: ICD-10-CM

## 2020-05-12 DIAGNOSIS — I70.0 ATHEROSCLEROSIS OF AORTA (HCC): ICD-10-CM

## 2020-05-12 DIAGNOSIS — E66.3 OVERWEIGHT: ICD-10-CM

## 2020-05-12 DIAGNOSIS — R74.01 TRANSAMINITIS: ICD-10-CM

## 2020-05-12 DIAGNOSIS — I73.9 PERIPHERAL VASCULAR DISEASE (HCC): ICD-10-CM

## 2020-05-12 DIAGNOSIS — E11.51 DIABETES MELLITUS WITH PERIPHERAL VASCULAR DISEASE (HCC): ICD-10-CM

## 2020-05-12 DIAGNOSIS — I10 ESSENTIAL HYPERTENSION: ICD-10-CM

## 2020-05-12 DIAGNOSIS — I73.9 BILATERAL CLAUDICATION OF LOWER LIMB (HCC): ICD-10-CM

## 2020-05-12 PROBLEM — Z98.890 S/P EXCISION OF LIPOMA: Status: RESOLVED | Noted: 2019-09-18 | Resolved: 2020-05-12

## 2020-05-12 PROBLEM — Z86.018 S/P EXCISION OF LIPOMA: Status: RESOLVED | Noted: 2019-09-18 | Resolved: 2020-05-12

## 2020-05-12 PROBLEM — D10.39: Status: RESOLVED | Noted: 2018-01-16 | Resolved: 2020-05-12

## 2020-05-12 PROBLEM — Z00.00 HEALTHCARE MAINTENANCE: Status: RESOLVED | Noted: 2018-04-05 | Resolved: 2020-05-12

## 2020-05-12 PROBLEM — D23.22: Status: RESOLVED | Noted: 2019-07-03 | Resolved: 2020-05-12

## 2020-05-12 LAB
HBA1C MFR BLD: 7.2 % (ref 0–5.6)
INT CON NEG: ABNORMAL
INT CON POS: ABNORMAL

## 2020-05-12 PROCEDURE — 83036 HEMOGLOBIN GLYCOSYLATED A1C: CPT | Performed by: INTERNAL MEDICINE

## 2020-05-12 PROCEDURE — 99215 OFFICE O/P EST HI 40 MIN: CPT | Performed by: INTERNAL MEDICINE

## 2020-05-12 RX ORDER — BLOOD-GLUCOSE METER
KIT MISCELLANEOUS
COMMUNITY
Start: 2020-03-10 | End: 2023-06-16

## 2020-05-12 RX ORDER — ATORVASTATIN CALCIUM 80 MG/1
80 TABLET, FILM COATED ORAL DAILY
Qty: 100 TAB | Refills: 1 | Status: SHIPPED | OUTPATIENT
Start: 2020-05-12 | End: 2020-12-22

## 2020-05-12 ASSESSMENT — FIBROSIS 4 INDEX: FIB4 SCORE: 4.37

## 2020-05-12 NOTE — PROGRESS NOTES
Established Patient    Chief Complaint   Patient presents with   • Establish Care     blood sugar       Subjective:     HPI:   Chung presents today with the following.    2. Peripheral vascular disease (HCC)  5. Bilateral claudication of lower limb (HCC)  6. Atherosclerosis of aorta (HCC)    DATE OF OPERATION:  10/22/2012  SURGEON:  Pily Lee M.D  Angiogram     IMPRESSION:  1.  Patent bilateral renal arteries.  2.  The infrarenal abdominal aorta is chronically occluded at [id number] part.  The iliac systems are also occluded.  There is reconstitution of the lower   extremity circulation via collateral flow.  3.  Patent right common femoral artery with significant posterior plaque   formation.  The right profunda femoral artery is patent.  The right   superficial femoral artery is occluded at mid-thigh.  There is reconstitution   of the above knee popliteal artery via collateral flow.  The right peroneal   artery is chronically occluded.  The right posterior tibial artery is the   dominant runoff vessel.  4.  Patent left common femoral artery with plaque formation.  The left   profunda and superficial femoral arteries are patent.  The popliteal artery is   also patent.  The peroneal artery is occluded.  The anterior tibial artery   is the dominant runoff vessels.  5.  Patent aortic arch.  There is a moderate stenosis seen in the proximal   left subclavian artery.  There also appeared to be mild stenosis   seen in the proximal left internal carotid artery.     -Based on the angiogram result, an endovascular intervention would not be appropriate.  The patient would need to undergo an aortobifemoral bypass grafting.      Per chart review:  Pt continues to decline intervention for his PVD d/t time and cost of the procedure. Pt states he is on a limited income.  -Patient is compliant with aspirin, Plavix, Lipitor,  -Patient is follow-up with cardiology  LIPID:  Lab Results   Component Value Date/Time     CHOLSTRLTOT 103 06/27/2019 06:22 AM    CHOLSTRLTOT 104 06/27/2019 06:22 AM    LDL 53 06/27/2019 06:22 AM    LDL 53 06/27/2019 06:22 AM    HDL 27 (A) 06/27/2019 06:22 AM    HDL 29 (A) 06/27/2019 06:22 AM    TRIGLYCERIDE 116 06/27/2019 06:22 AM    TRIGLYCERIDE 111 06/27/2019 06:22 AM           3. Essential hypertension  -Controlled, compliant with lisinopril 10 mg daily, metoprolol SR 25 mg daily    4. Diabetes mellitus with peripheral vascular disease (HCC)  7. Overweight  Microalbuminuria>> based on lab in August 2018  A1c:   Lab Results   Component Value Date/Time    HBA1C 7.2 (A) 05/12/2020 1555    HBA1C 7.5 (H) 06/27/2019 0622    HBA1C 6.5 (H) 08/15/2018 1437    AVGLUC 169 06/27/2019 0622    AVGLUC 140 08/15/2018 1437    AVGLUC 128 01/17/2018 0631   -Patient check blood glucose before breakfast range from 1 86-1 20, average 140s to 150s  - Mention he was on insulin before, currently not on any medication, mention at one point he was taking metformin however developed hypoglycemia, symptoms  - Mention follow-up with eye doctor annually, pending to be seen this year  - Chronic tingling numbness of feet and legs secondary to diabetes as well as peripheral vascular disease per Patient    -Breakfast with oatmeal, eggs, muffin, sandwich  -Lunch with salad, skipped September, first with hamburger  -Dinner with salad, spaghetti, chicken fried steak, bread, pasta, potato salad,  -Snack with crackers, peanut butter, cereals, Cheerios  -Drink with 2% milk, coffee with Stevia      Chronic hepatitis C without hepatic coma (CMS-HCC)  Pt prefers not to have any treatment at this time as he is 69 yrs. last lab work done in 2017 positive for RNA load and reactive for hepatitis C.  -Per chart review: Referral to infectious disease for treatment of hepatitis C given his viral load but patient refuses to go for any treatment at this time because he reported asymptomatic and wants to leave it alone. Patient has been seen by GI and  declines treatment per chart.         Chart review:  DATE OF OPERATION:  05/06/2014  POSTOPERATIVE DIAGNOSIS:  Palatal squamous cell carcinoma.  PROCEDURE:  Wide local excision with frozen section evaluation.  Total specimen size approximately 1 cm.  SURGEON:  Nicholas Govea MD    Patient Active Problem List    Diagnosis Date Noted   • Overweight 05/12/2020   • Chronic right shoulder pain 09/18/2019   • Alcohol dependence in remission (HCC) 04/19/2019   • Cervical radiculopathy, chronic 04/05/2018   • Essential hypertension 04/05/2018   • Chronic hepatitis C without hepatic coma (HCC) 05/18/2017   • Cervical disc disease 05/18/2017   • History of tobacco abuse 01/31/2017   • Atherosclerosis of aorta (HCC) 01/31/2017   • Diabetes mellitus with peripheral vascular disease (HCC) 11/15/2016   • Microalbuminuria due to type 2 diabetes mellitus (HCC) 11/15/2016   • Bruit 11/01/2016   • Stress-induced cardiomyopathy 03/10/2015   • Peripheral vascular disease (HCC) 01/21/2015   • H/O colonoscopy with polypectomy 05/07/2013   • Bilateral claudication of lower limb (HCC) 10/22/2012       Current Outpatient Medications on File Prior to Visit   Medication Sig Dispense Refill   • FREESTYLE LITE strip      • Multiple Vitamins-Minerals (CENTRUM ADULTS PO) Take  by mouth.     • VITAMIN D PO Take 10,000 Units by mouth.     • Glucosamine-Chondroit-Vit C-Mn (GLUCOSAMINE 1500 COMPLEX PO) Take  by mouth.     • Blood Glucose Test Strips Test strips order: Test strips for Freestyle Trang meter. Sig: use before breakfast and prn ssx high or low sugar #100 RF x 3 (Patient taking differently: Test strips order: Test strips for Freestyle Lite meter. Sig: use before breakfast and prn ssx high or low sugar #100 RF x 3) 100 Strip 3   • aspirin (ASA) 81 MG Chew Tab chewable tablet Take 81 mg by mouth every day.     • lisinopril (PRINIVIL) 10 MG Tab Take 1 Tab by mouth every day. 100 Tab 3   • clopidogrel (PLAVIX) 75 MG Tab Take 1 Tab by mouth  "every day. 100 Tab 1   • metoprolol SR (TOPROL XL) 25 MG TABLET SR 24 HR Take 1 Tab by mouth every day. 100 Tab 1   • ibuprofen (MOTRIN) 800 MG Tab TAKE 1 TABLET BY MOUTH EVERY 8 HOURS AS NEEDED. 30 Tab 1     No current facility-administered medications on file prior to visit.        Allergies, past medical history, past surgical history, family history, social history reviewed and updated      ROS:     - Constitutional: Negative for fever, chills,    - Eye: Negative for blurry vision    -ENT: Negative for ear pain    - Respiratory: Negative for cough, hemoptysis    - Cardiovascular: Negative for chest pain     - Gastrointestinal: Negative for abdominal pain    - Genitourinary: Negative for dysuria    - Musculoskeletal: Negative for joint swelling    - Skin: Negative for itching    - Neurological: Negative for focal weakness     - Psychiatric/Behavioral: Negative for depression        Physical Exam:     /72 (BP Location: Left arm, Patient Position: Sitting, BP Cuff Size: Adult)   Pulse 95   Temp 36.7 °C (98 °F) (Temporal)   Ht 1.778 m (5' 10\") Comment: patient stated  Wt 83 kg (183 lb)   SpO2 96%   BMI 26.26 kg/m²   General: Normal appearing. No distress.  ENT: oropharynx without exudates.    Eyes: conjunctiva clear lids without ptosis  Pulmonary: Clear to ausculation.  Normal effort.   Cardiovascular: Regular rate and rhythm  Abdomen: Soft, nontender,  Lymph: No cervical or supraclavicular palpable lymph nodes  Psych: Normal mood and affect.     I have reviewed pertinent labs and diagnostic tests associated with this visit with specific comments listed under the assessment and plan below      Assessment and Plan:     70 y.o. male with the following issues.    2. Peripheral vascular disease (HCC)  5. Bilateral claudication of lower limb (HCC)  6. Atherosclerosis of aorta (HCC)  - atorvastatin (LIPITOR) 80 MG tablet; Take 1 Tab by mouth every day.  Dispense: 100 Tab; Refill: 1  -Continue aspirin, " Plavix,  -Continue follow-up with cardiology      3. Essential hypertension  -Continue blood pressure medication as above    4. Diabetes mellitus with peripheral vascular disease (HCC)  7. Overweight  - atorvastatin (LIPITOR) 80 MG tablet; Take 1 Tab by mouth every day.  Dispense: 100 Tab; Refill: 1  - POCT Hemoglobin A1C>> 7.2  -Discussed diabetic diet in detail, educated regarding management of hypoglycemia, advised regular exercise, educated disease management and weight goals as well as feet care and frequent check of feet.  -Patient to check early morning fasting blood glucose with goal discussed.  - asked to have a BG log with daily fasting and 2 hr postprandial after biggest meal and bring to next visit or send through my chart  -She would like to do lifestyle changes, deny metformin no other medication for diabetes currently      Patient was seen for a total of 40  minutes face-to-face by myself, with more than half of the time spent on counseling treatment, risks/benefits, reviewing records, and educating patient about the aforementioned problem.   Follow Up:      Return in about 4 weeks (around 6/9/2020) for follow up DMII, , weight issues and healthy lifestyle.    Please note that this dictation was created using voice recognition software. I have made every reasonable attempt to correct obvious errors, but I expect that there are errors of grammar and possibly content that I did not discover before finalizing the note.    Signed by: Asuncion Roland M.D.

## 2020-06-25 ENCOUNTER — TELEPHONE (OUTPATIENT)
Dept: MEDICAL GROUP | Facility: PHYSICIAN GROUP | Age: 70
End: 2020-06-25

## 2020-06-25 NOTE — TELEPHONE ENCOUNTER
ESTABLISHED PATIENT PRE-VISIT PLANNING     Patient was NOT contacted to complete PVP.     Note: Patient will not be contacted if there is no indication to call.     1.  Reviewed notes from the last few office visits within the medical group: Yes    2.  If any orders were placed at last visit or intended to be done for this visit (i.e. 6 mos follow-up), do we have Results/Consult Notes?        •  Labs - Labs ordered, NOT completed. Patient advised to complete prior to next appointment.  ATTEMPTED TO CALL PT, NO ANSWER       •  Imaging - Imaging was not ordered at last office visit.       •  Referrals - No referrals were ordered at last office visit.    3. Is this appointment scheduled as a Hospital Follow-Up? No    4.  Immunizations were updated in [place] using WebIZ?: Yes       •  Web Iz Recommendations: FLU, TD, VARICELLA (Chicken Pox)  and SHINGRIX (Shingles)    5.  Patient is due for the following Health Maintenance Topics:   Health Maintenance Due   Topic Date Due   • IMM HEP B VACCINE (1 of 3 - Risk 3-dose series) 01/19/1968   • IMM ZOSTER VACCINES (2 of 3) 01/10/2017   • URINE ACR / MICROALBUMIN  08/15/2019   • COLONOSCOPY  10/03/2019   • LUNG CANCER SCREENING  05/15/2020   • FASTING LIPID PROFILE  06/27/2020       6. Orders for overdue Health Maintenance topics pended in Pre-Charting? NO    7.  AHA (MDX) form printed for Provider? No, already completed    8.  Patient was NOT informed to arrive 15 min prior to their scheduled appointment and bring in their medication bottles.

## 2020-06-29 ENCOUNTER — OFFICE VISIT (OUTPATIENT)
Dept: MEDICAL GROUP | Facility: PHYSICIAN GROUP | Age: 70
End: 2020-06-29
Payer: MEDICARE

## 2020-06-29 VITALS
TEMPERATURE: 97.3 F | SYSTOLIC BLOOD PRESSURE: 126 MMHG | HEIGHT: 70 IN | DIASTOLIC BLOOD PRESSURE: 60 MMHG | BODY MASS INDEX: 26.24 KG/M2 | HEART RATE: 80 BPM | WEIGHT: 183.3 LBS | OXYGEN SATURATION: 95 %

## 2020-06-29 DIAGNOSIS — E11.29 MICROALBUMINURIA DUE TO TYPE 2 DIABETES MELLITUS (HCC): ICD-10-CM

## 2020-06-29 DIAGNOSIS — E66.3 OVERWEIGHT: ICD-10-CM

## 2020-06-29 DIAGNOSIS — R80.9 MICROALBUMINURIA DUE TO TYPE 2 DIABETES MELLITUS (HCC): ICD-10-CM

## 2020-06-29 DIAGNOSIS — E11.51 DIABETES MELLITUS WITH PERIPHERAL VASCULAR DISEASE (HCC): ICD-10-CM

## 2020-06-29 PROCEDURE — 99214 OFFICE O/P EST MOD 30 MIN: CPT | Performed by: INTERNAL MEDICINE

## 2020-06-29 ASSESSMENT — FIBROSIS 4 INDEX: FIB4 SCORE: 4.37

## 2020-06-29 NOTE — PATIENT INSTRUCTIONS
LIFESTYLE MEDICINE:       1) Make SMART lifestyle changes: The lifestyle changes that you need to make are with regards to: nutrition, cardiovascular exercise, sleep, stress management.         2) Nutrition:     1- Reduce carbohydrates to 5% or less, no added sugar at all, try to avoid hidden carbohydrates (including breads, pasta, rice, cereals/oatmeal, potato).  Avoid soda, processed carbs and sugars including cookies, candies, donuts, jellies, avoid all added sugar beverages including avoiding diet soda/Gatorade, Powerade, Kervin-Aid, ice tea.  Avoid all the sweeteners.    2- Eat vegetables (organic is much better to avoid herbicide/insecticide): including green leafy vegetables, steamed or cooked with healthy oils such as olive oil, avocado oil or coconut oil.  Avoid vegetable oil (sunflower, soy, corn, canola, sufflower, cottonseed or peanut oil).  Include cruciferous vegetables in your diets.    3-Try to minimize Fruits that has a lot of Fructose (including apples, banana, grapes), best fruits that are rich with vitamin/minerals as well as antioxidants are berries (you could take up to 1 cup of Berries a day). Avoid fruit juices ( even worse than SODA).     4-Eat healthy fat and meats from grass fed animals (grass fed cow meat with healthy fat, grass fed chicken/poultry with skin, wild-caught fish and seafood) as well as pasteurized eggs from grass fed chicken    5-when you eat dairy food, eat as a whole fat, avoid skimmed milk/free fat milk/2% milk    6-Avoid snacks between meals. Eat when you feel hungry. Avoid meals if you don't feel hungry if you can.    7- Mix one glass of water with lemon and 1-2 tablespoon of apple cider vinegar>> very helpful    8-INTERMITTENT FASTING is very very very powerful           3) Cardiovascular Exercise: The center for disease control recommends a minimum of 150 minutes per week of moderate intensity cardiovascular exercise for weight maintenance and cardiovascular health.   Set this as your initial goal, with at least 30 minutes per session. Types of exercise can include 30 minutes of elliptical, 30 minutes of decently fast jog, 30 minutes of swimming, 30 minutes of heavy gardening (lifting big bags of fertilizer, digging deep holes/ditches).  You can cut down the minute requirements to half, by doing higher intensity sports such as a game of tennis, or soccer.        4) Sleep:    A) Goal: Obtain a minimum of 7-8hours of continuous, uninterrupted, restful sleep per night.    B) Tips for Sleep Hygiene:    I) Go to bed and wake up at consistent times whether work/school day or not.   II) Keep room dark, quiet, and comfortable.  Increase exposure to sunlight during awake times and avoid bright lights (especially anything with a backlight) at least the last 1-2hours before going to sleep.     III) Don't nap.     IV) Avoid stimulant or caffeine use more than 4 hours after wake time.      5) Stress Management: You cannot change the stresses of life necessarily, but you can change how he responds to them. One good way to manage stress is to write things down in order to help you process how to approach things in general or specifically. Another good way is to talk it out with someone you trusts, specifically your significant other or good friend. A definite great way to deal with stress is to have cardiovascular exercise!

## 2020-06-29 NOTE — PROGRESS NOTES
Established Patient    Chief Complaint   Patient presents with   • Diabetes Follow-up   • Nutrition Counseling     weight and healthy lifestyle       Subjective:     HPI:   Chung presents today with the following.    1. Microalbuminuria due to type 2 diabetes mellitus (HCC)  2. Diabetes mellitus with peripheral vascular disease (HCC)  A1c:   Lab Results   Component Value Date/Time    HBA1C 7.2 (A) 05/12/2020 1555    HBA1C 7.5 (H) 06/27/2019 0622    HBA1C 6.5 (H) 08/15/2018 1437    AVGLUC 169 06/27/2019 0622    AVGLUC 140 08/15/2018 1437    AVGLUC 128 01/17/2018 0631   >> Patient is compliant with healthy dietary diabetic diet, however he has access to some sugar and complex carbs including bread  - Patient check blood glucose before breakfast with range of 130s to 140s  - Patient again declined any oral hypoglycemic agent including metformin and would like to continue healthy diabetic diet regimen      Patient Active Problem List    Diagnosis Date Noted   • Overweight 05/12/2020   • Transaminitis 05/12/2020   • Chronic right shoulder pain 09/18/2019   • Alcohol dependence in remission (HCC) 04/19/2019   • Cervical radiculopathy, chronic 04/05/2018   • Essential hypertension 04/05/2018   • Chronic hepatitis C without hepatic coma (HCC) 05/18/2017   • Cervical disc disease 05/18/2017   • History of tobacco abuse 01/31/2017   • Atherosclerosis of aorta (HCC) 01/31/2017   • Diabetes mellitus with peripheral vascular disease (HCC) 11/15/2016   • Microalbuminuria due to type 2 diabetes mellitus (HCC) 11/15/2016   • Bruit 11/01/2016   • Stress-induced cardiomyopathy 03/10/2015   • Peripheral vascular disease (HCC) 01/21/2015   • H/O colonoscopy with polypectomy 05/07/2013   • Bilateral claudication of lower limb (HCC) 10/22/2012       Current Outpatient Medications on File Prior to Visit   Medication Sig Dispense Refill   • clopidogrel (PLAVIX) 75 MG Tab TAKE ONE TABLET BY MOUTH EVERY DAY 30 Tab 0   • FREESTYLE LITE  "strip      • Multiple Vitamins-Minerals (CENTRUM ADULTS PO) Take  by mouth.     • VITAMIN D PO Take 10,000 Units by mouth.     • Glucosamine-Chondroit-Vit C-Mn (GLUCOSAMINE 1500 COMPLEX PO) Take  by mouth.     • atorvastatin (LIPITOR) 80 MG tablet Take 1 Tab by mouth every day. 100 Tab 1   • Blood Glucose Test Strips Test strips order: Test strips for Freestyle Trang meter. Sig: use before breakfast and prn ssx high or low sugar #100 RF x 3 (Patient taking differently: Test strips order: Test strips for Freestyle Lite meter. Sig: use before breakfast and prn ssx high or low sugar #100 RF x 3) 100 Strip 3   • ibuprofen (MOTRIN) 800 MG Tab TAKE 1 TABLET BY MOUTH EVERY 8 HOURS AS NEEDED. 30 Tab 1   • aspirin (ASA) 81 MG Chew Tab chewable tablet Take 81 mg by mouth every day.     • lisinopril (PRINIVIL) 10 MG Tab Take 1 Tab by mouth every day. 100 Tab 3   • metoprolol SR (TOPROL XL) 25 MG TABLET SR 24 HR Take 1 Tab by mouth every day. 100 Tab 1     No current facility-administered medications on file prior to visit.        Allergies, past medical history, past surgical history, family history, social history reviewed and updated    ROS:     - Constitutional: Negative for fever, chills,    - Eye: Negative for blurry vision    - Cardiovascular: Negative for chest pain      Physical Exam:     /60 (BP Location: Left arm, Patient Position: Sitting, BP Cuff Size: Adult)   Pulse 80   Temp 36.3 °C (97.3 °F) (Temporal)   Ht 1.778 m (5' 10\")   Wt 83.1 kg (183 lb 4.8 oz)   SpO2 95%   BMI 26.30 kg/m²   General: Normal appearing. No distress.  ENT: oropharynx without exudates.    Eyes: conjunctiva clear lids without ptosis  Pulmonary: Clear to ausculation.  Normal effort.   Cardiovascular: Regular rate and rhythm  Abdomen: Soft, nontender,  Lymph: No cervical or supraclavicular palpable lymph nodes  Psych: Normal mood and affect.     I have reviewed pertinent labs and diagnostic tests associated with this visit with " specific comments listed under the assessment and plan below      Assessment and Plan:     70 y.o. male with the following issues.    1. Microalbuminuria due to type 2 diabetes mellitus (HCC)  2. Diabetes mellitus with peripheral vascular disease (HCC)  Overweight  >> Continue lisinopril for both blood pressure and microalbuminuria  -Continue statin, aspirin, Plavix for peripheral vascular disease associated with diabetes  >> Patient refused metformin for now.    -Discussed diabetic diet in detail, educated regarding management of hypoglycemia, advised regular exercise, educated disease management and weight goals as well as feet care and frequent check of feet.  -Patient to check early morning fasting blood glucose with goal discussed.  - asked to have a BG log with daily fasting and 2 hr postprandial after biggest meal and bring to next visit or send through my chart  -Discussed side effects of medication. Patient confirmed understanding of information.      Follow Up:      Return in about 2 months (around 8/29/2020) for follow up with diabetes, obesity, weight issues and healthy lifestyle.    Please note that this dictation was created using voice recognition software. I have made every reasonable attempt to correct obvious errors, but I expect that there are errors of grammar and possibly content that I did not discover before finalizing the note.    Signed by: Asuncion Roland M.D.

## 2020-07-06 DIAGNOSIS — M54.2 CERVICAL PAIN: ICD-10-CM

## 2020-07-08 DIAGNOSIS — M54.2 CERVICAL PAIN: ICD-10-CM

## 2020-07-08 RX ORDER — IBUPROFEN 800 MG/1
TABLET ORAL
Qty: 30 TAB | Refills: 1 | Status: SHIPPED | OUTPATIENT
Start: 2020-07-08 | End: 2020-10-01

## 2020-07-10 RX ORDER — IBUPROFEN 800 MG/1
TABLET ORAL
Qty: 30 TAB | Refills: 1 | OUTPATIENT
Start: 2020-07-10

## 2020-07-24 RX ORDER — CLOPIDOGREL BISULFATE 75 MG/1
TABLET ORAL
Qty: 30 TAB | Refills: 0 | Status: SHIPPED | OUTPATIENT
Start: 2020-07-24 | End: 2020-08-25

## 2020-08-04 DIAGNOSIS — I10 ESSENTIAL HYPERTENSION: ICD-10-CM

## 2020-08-04 DIAGNOSIS — I25.2 HISTORY OF MI (MYOCARDIAL INFARCTION): ICD-10-CM

## 2020-08-06 RX ORDER — METOPROLOL SUCCINATE 25 MG/1
TABLET, EXTENDED RELEASE ORAL
Qty: 30 TAB | Refills: 0 | Status: SHIPPED | OUTPATIENT
Start: 2020-08-06 | End: 2020-09-08 | Stop reason: SDUPTHER

## 2020-08-12 ENCOUNTER — OFFICE VISIT (OUTPATIENT)
Dept: MEDICAL GROUP | Facility: PHYSICIAN GROUP | Age: 70
End: 2020-08-12
Payer: MEDICARE

## 2020-08-12 ENCOUNTER — HOSPITAL ENCOUNTER (EMERGENCY)
Facility: MEDICAL CENTER | Age: 70
End: 2020-08-12
Attending: EMERGENCY MEDICINE
Payer: MEDICARE

## 2020-08-12 VITALS
DIASTOLIC BLOOD PRESSURE: 81 MMHG | WEIGHT: 176.15 LBS | TEMPERATURE: 98.1 F | SYSTOLIC BLOOD PRESSURE: 149 MMHG | RESPIRATION RATE: 18 BRPM | OXYGEN SATURATION: 97 % | BODY MASS INDEX: 25.22 KG/M2 | HEART RATE: 57 BPM | HEIGHT: 70 IN

## 2020-08-12 VITALS
TEMPERATURE: 97.9 F | OXYGEN SATURATION: 96 % | SYSTOLIC BLOOD PRESSURE: 130 MMHG | HEIGHT: 70 IN | BODY MASS INDEX: 25.2 KG/M2 | DIASTOLIC BLOOD PRESSURE: 60 MMHG | HEART RATE: 76 BPM | WEIGHT: 176 LBS

## 2020-08-12 DIAGNOSIS — R01.1 NEWLY RECOGNIZED HEART MURMUR: ICD-10-CM

## 2020-08-12 DIAGNOSIS — M54.9 UPPER BACK PAIN ON LEFT SIDE: ICD-10-CM

## 2020-08-12 DIAGNOSIS — M54.50 LUMBAR BACK PAIN: ICD-10-CM

## 2020-08-12 DIAGNOSIS — I73.9 PERIPHERAL VASCULAR DISEASE (HCC): ICD-10-CM

## 2020-08-12 DIAGNOSIS — I70.0 ATHEROSCLEROSIS OF AORTA (HCC): ICD-10-CM

## 2020-08-12 PROCEDURE — 99214 OFFICE O/P EST MOD 30 MIN: CPT | Performed by: INTERNAL MEDICINE

## 2020-08-12 PROCEDURE — 99282 EMERGENCY DEPT VISIT SF MDM: CPT

## 2020-08-12 RX ORDER — IBUPROFEN 800 MG/1
800 TABLET ORAL EVERY 8 HOURS PRN
Qty: 30 TAB | Refills: 0 | Status: SHIPPED
Start: 2020-08-12 | End: 2020-09-08

## 2020-08-12 RX ORDER — IBUPROFEN 800 MG/1
800 TABLET ORAL EVERY 8 HOURS PRN
Qty: 30 TAB | Refills: 0 | Status: SHIPPED | OUTPATIENT
Start: 2020-08-12 | End: 2020-08-12 | Stop reason: SDUPTHER

## 2020-08-12 RX ORDER — CYCLOBENZAPRINE HCL 5 MG
5-10 TABLET ORAL 2 TIMES DAILY PRN
Qty: 30 TAB | Refills: 0 | Status: SHIPPED
Start: 2020-08-12 | End: 2020-08-31

## 2020-08-12 ASSESSMENT — FIBROSIS 4 INDEX
FIB4 SCORE: 4.37
FIB4 SCORE: 4.37

## 2020-08-12 ASSESSMENT — PATIENT HEALTH QUESTIONNAIRE - PHQ9: CLINICAL INTERPRETATION OF PHQ2 SCORE: 0

## 2020-08-12 NOTE — PROGRESS NOTES
Established Patient    Chief Complaint   Patient presents with   • Follow-Up     back pain       Subjective:     HPI:   Chung presents today with the following.    1. Atherosclerosis of aorta (HCC)  2. Peripheral vascular disease (HCC)  3. Upper back pain on left side  New recognized heart murmur    Patient with vascular disease and atherosclerosis of the aorta coming to the office today for worsening mild upper back pain mainly on the left side, patient going on for a month however worsening severely over few days, mention constant, sometimes worse with changing position, sometimes by cough and sneezing patient is taking Motrin 80 mg every night with little with relief,  >> Patient also has new recognized heart murmur all over cardiac areas including radiation to bilateral carotid arteries, systolic  >> Patient denied having chest pain, shortness of breath, dizziness, however he looks pale and he is in severe pain    US 2013 Moderate amount of distal abdominal aortic plaque without evidence of aneurysm. Significant amount of plaque in the left common iliac artery with inability to identify blood flow      DATE OF OPERATION:  10/22/2012  SURGEON:  Pily Lee M.D  Angiogram      IMPRESSION:  1.  Patent bilateral renal arteries.  2.  The infrarenal abdominal aorta is chronically occluded at [id number] part.  The iliac systems are also occluded.  There is reconstitution of the lower   extremity circulation via collateral flow.  3.  Patent right common femoral artery with significant posterior plaque   formation.  The right profunda femoral artery is patent.  The right   superficial femoral artery is occluded at mid-thigh.  There is reconstitution   of the above knee popliteal artery via collateral flow.  The right peroneal   artery is chronically occluded.  The right posterior tibial artery is the   dominant runoff vessel.  4.  Patent left common femoral artery with plaque formation.  The left   profunda and  superficial femoral arteries are patent.  The popliteal artery is   also patent.  The peroneal artery is occluded.  The anterior tibial artery   is the dominant runoff vessels.  5.  Patent aortic arch.  There is a moderate stenosis seen in the proximal   left subclavian artery.  There also appeared to be mild stenosis   seen in the proximal left internal carotid artery.     -Based on the angiogram result, an endovascular intervention would not be appropriate.  The patient would need to undergo an aortobifemoral bypass grafting.       Pt continues to decline intervention for his PVD d/t time and cost of the procedure. Pt states he is on a limited income.  -Patient is compliant with aspirin, Plavix, Lipitor,  -Patient is follow-up with cardiology      Patient Active Problem List    Diagnosis Date Noted   • Upper back pain on left side 08/12/2020   • Overweight 05/12/2020   • Transaminitis 05/12/2020   • Chronic right shoulder pain 09/18/2019   • Alcohol dependence in remission (HCC) 04/19/2019   • Cervical radiculopathy, chronic 04/05/2018   • Essential hypertension 04/05/2018   • Chronic hepatitis C without hepatic coma (HCC) 05/18/2017   • Cervical disc disease 05/18/2017   • History of tobacco abuse 01/31/2017   • Atherosclerosis of aorta (HCC) 01/31/2017   • Diabetes mellitus with peripheral vascular disease ([facility]) 11/15/2016   • Microalbuminuria due to type 2 diabetes mellitus ([facility]) 11/15/2016   • Bruit 11/01/2016   • Stress-induced cardiomyopathy 03/10/2015   • Peripheral vascular disease (HCC) 01/21/2015   • H/O colonoscopy with polypectomy 05/07/2013   • Bilateral claudication of lower limb ([facility]) 10/22/2012       Current Outpatient Medications on File Prior to Visit   Medication Sig Dispense Refill   • metoprolol SR (TOPROL XL) 25 MG TABLET SR 24 HR TAKE ONE TABLET BY MOUTH EVERY DAY 30 Tab 0   • clopidogrel (PLAVIX) 75 MG Tab TAKE 1 TABLET BY MOUTH EVERY DAY **MUST MAKE APPT** 30 Tab 0   • ibuprofen (MOTRIN)  "800 MG Tab TAKE 1 TABLET BY MOUTH EVERY 8 HOURS AS NEEDED. 30 Tab 1   • FREESTYLE LITE strip      • Multiple Vitamins-Minerals (CENTRUM ADULTS PO) Take  by mouth.     • VITAMIN D PO Take 10,000 Units by mouth.     • Glucosamine-Chondroit-Vit C-Mn (GLUCOSAMINE 1500 COMPLEX PO) Take  by mouth.     • atorvastatin (LIPITOR) 80 MG tablet Take 1 Tab by mouth every day. 100 Tab 1   • Blood Glucose Test Strips Test strips order: Test strips for Freestyle Trang meter. Sig: use before breakfast and prn ssx high or low sugar #100 RF x 3 (Patient taking differently: Test strips order: Test strips for Freestyle Lite meter. Sig: use before breakfast and prn ssx high or low sugar #100 RF x 3) 100 Strip 3   • aspirin (ASA) 81 MG Chew Tab chewable tablet Take 81 mg by mouth every day.     • lisinopril (PRINIVIL) 10 MG Tab Take 1 Tab by mouth every day. 100 Tab 3     No current facility-administered medications on file prior to visit.        Allergies, past medical history, past surgical history, family history, social history reviewed and updated      ROS:     - Constitutional: Negative for fever, chills,    - Eye: Negative for blurry vision    - Cardiovascular: Negative for chest pain      Physical Exam:     /60 (BP Location: Left arm, Patient Position: Sitting, BP Cuff Size: Adult)   Pulse 76   Temp 36.6 °C (97.9 °F) (Temporal)   Ht 1.778 m (5' 10\")   Wt 79.8 kg (176 lb)   SpO2 96%   BMI 25.25 kg/m²   General: Pale appearing.  Moderate distress.  ENT: oropharynx without exudates.    Eyes: conjunctiva clear lids without ptosis  Pulmonary: Clear to ausculation.  Normal effort.   Cardiovascular: Regular rate and rhythm, new recognized heart murmur all over cardiac areas including radiation to bilateral carotid arteries, systolic  Abdomen: Soft, nontender,  Lymph: No cervical or supraclavicular palpable lymph nodes  Psych: Normal mood and affect.     I have reviewed pertinent labs and diagnostic tests associated with this " visit with specific comments listed under the assessment and plan below      Assessment and Plan:     70 y.o. male with the following issues.    1. Atherosclerosis of aorta (HCC)  2. Peripheral vascular disease (HCC)  3. Upper back pain on left side  New recognized heart murmur  >> Sent to ER immediately to rule out any dissection of the aorta or other etiologies that could contribute to this new recognized systolic murmur as well as upper back pain  >> Other differential could be musculoskeletal pain, however need first to rule out dissection of the aorta.   >> Patient understand all the risks and complication, patient agreed to go immediately to ER, patient refused ambulance and would like to drive by himself.    Follow Up:     August 31, 2020    Please note that this dictation was created using voice recognition software. I have made every reasonable attempt to correct obvious errors, but I expect that there are errors of grammar and possibly content that I did not discover before finalizing the note.    Signed by: Asuncion Roland M.D.

## 2020-08-12 NOTE — ED NOTES
Pt ambulated to room from lobby. Pt appears to be in no acute distress. Pt awaiting to be seen by ERP.

## 2020-08-12 NOTE — ED TRIAGE NOTES
Pt to ED with complaints of mid to low back pain, chronic for 1 month to 6 weeks. He reports it feels like a knuckle in his spine. Worse with activity. No usually numbness or tingling. No loss of bowel or bladder control. Pain does not radiate. Denies trauma.     Denies symptoms of Covid 19. Denies recent sick contacts.     Pt educated on ED process and asked to wait in lobby. Patient educated on importance of alerting staff to new or worsening symptoms or concerns.

## 2020-08-12 NOTE — ED PROVIDER NOTES
ED Provider Note    CHIEF COMPLAINT  Chief Complaint   Patient presents with   • Back Pain       HPI  Chung Limon is a 70 y.o. male here for evaluation of 6 to 8 weeks of left lower back pain.  Patient states that he has been having this intermittently since its onset, and states that it is much better in the evening when he lays down and stretches, and he states it is gone after doing so.  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  He has no incontinence of bowel bladder, no urinary retention, no saddle anesthesia.  He has no radiation of pain to his abdomen or chest.  He takes over-the-counter medications for the same, and this does help him.  He has no vomiting, fever or chills.  And no chest pain or shortness of breath.    ROS;  Please see HPI  O/W negative     PAST MEDICAL HISTORY   has a past medical history of Benign neoplasm of soft palate (2018), Cancer (HCC), Claudication (HCC) (2012), Dental disorder (2019), Dermoid cyst of ear, left (7/3/2019), Diabetes (HCC) (2019), H/O colonoscopy with polypectomy (2013), Hayfever, Healthcare maintenance (2018), Hepatitis C, History of alcoholism (HCC) (2012), [place] (hard of hearing) (2019), Hyperlipidemia, Hypertension, Insulin dependent diabetes mellitus (2019), and S/P excision of lipoma (2019).    SOCIAL HISTORY  Social History     Tobacco Use   • Smoking status: Former Smoker     Packs/day: 1.00     Years: 58.00     Pack years: 58.00     Types: Cigarettes     Start date: 1960     Quit date: 2011     Years since quittin.9   • Smokeless tobacco: Never Used   • Tobacco comment: avoid all tobacco products   Substance and Sexual Activity   • Alcohol use: No     Alcohol/week: 0.0 oz     Comment: quit , drank 2 times since   • Drug use: Yes     Types:  "Inhaled     Comment: marijuana (last use 2 weeks ago per pt)   • Sexual activity: Never       SURGICAL HISTORY   has a past surgical history that includes recovery (10/22/2012); wide excision (5/6/2014); flap graft (5/6/2014); colonoscopy (2017); and mass excision general (Right, 7/31/2019).    CURRENT MEDICATIONS  Home Medications     Reviewed by Khushbu Martin R.N. (Registered Nurse) on 08/12/20 at 1445  Med List Status: Complete   Medication Last Dose Status   aspirin (ASA) 81 MG Chew Tab chewable tablet  Active   atorvastatin (LIPITOR) 80 MG tablet  Active   Blood Glucose Test Strips  Active   clopidogrel (PLAVIX) 75 MG Tab  Active   FREESTYLE LITE strip  Active   Glucosamine-Chondroit-Vit C-Mn (GLUCOSAMINE 1500 COMPLEX PO)  Active   ibuprofen (MOTRIN) 800 MG Tab  Active   lisinopril (PRINIVIL) 10 MG Tab  Active   metoprolol SR (TOPROL XL) 25 MG TABLET SR 24 HR  Active   Multiple Vitamins-Minerals (CENTRUM ADULTS PO)  Active   VITAMIN D PO not taking Active                ALLERGIES  No Known Allergies    REVIEW OF SYSTEMS  See HPI for further details. Review of systems as above, otherwise all other systems are negative.     PHYSICAL EXAM  VITAL SIGNS: /69   Pulse 71   Temp 36.9 °C (98.4 °F) (Temporal)   Resp 16   Ht 1.778 m (5' 10\")   Wt 79.9 kg (176 lb 2.4 oz)   SpO2 96%   BMI 25.27 kg/m²     Constitutional: Well developed, well nourished. No acute distress.  HEENT: Normocephalic, atraumatic. MMM.  Neck: Supple, Full range of motion   Chest/Pulmonary:  No respiratory distress.  Equal expansion   Abdomen; no pulsatile mass, no tenderness  Musculoskeletal: No deformity, no edema, neurovascular intact.   Back; tenderness to the left of L2 midline, reproducible with range of motion and pinpoint tenderness, no obvious step-off or deformity midline.  Neuro: Clear speech, appropriate, cooperative, cranial nerves II-XII grossly intact.  Psych: Normal mood and affect      PROCEDURES     MEDICAL " RECORD  I have reviewed patient's medical record and pertinent results are listed.    COURSE & MEDICAL DECISION MAKING  I have reviewed any medical record information, laboratory studies and radiographic results as noted above.    The patient is nontoxic-appearing, afebrile, and appears comfortable.  He has no incontinence of bowel bladder, no urinary retention, and no saddle anesthesia.  He denies any fall or trauma.  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  He states it is reproducible with range of motion, and improved with remaining still.  In addition, he has no radiation of any pain or discomfort to his chest or abdomen.  This leads me to believe it is more musculoskeletal pain.  I reviewed his recent previous CT scans he shows no evidence of aneurysm.  Patient states that again he is comfortable with the plan.  He will take the Flexeril as directed, and pause before standing up to ensure he does not have any dizziness or fall down.    I you have had any blood pressure issues while here in the emergency department, please see your doctor for a further evaluation or work up.    Differential diagnoses include but not limited to: AAA, dissection, musculoskeletal strain.  Epidural abscess, cauda equina.  Fracture, muscle strain    This patient presents with back strain.  At this time, I have counseled the patient/family regarding their medications, pain control, and follow up.  They will continue their medications, if any, as prescribed.  They will return immediately for any worsening symptoms and/or any other medical concerns.  They will see their doctor, or contact the doctor provided, in 1-2 days for follow up.       FINAL IMPRESSION  Back strain      Electronically signed by: Bowen Anand D.O., 8/12/2020 4:03 PM

## 2020-08-24 ENCOUNTER — PATIENT OUTREACH (OUTPATIENT)
Dept: HEALTH INFORMATION MANAGEMENT | Facility: OTHER | Age: 70
End: 2020-08-24

## 2020-08-25 RX ORDER — CLOPIDOGREL BISULFATE 75 MG/1
TABLET ORAL
Qty: 100 TAB | Refills: 0 | Status: SHIPPED | OUTPATIENT
Start: 2020-08-25 | End: 2020-12-17

## 2020-08-27 ENCOUNTER — TELEPHONE (OUTPATIENT)
Dept: MEDICAL GROUP | Facility: PHYSICIAN GROUP | Age: 70
End: 2020-08-27

## 2020-08-27 NOTE — TELEPHONE ENCOUNTER
ESTABLISHED PATIENT PRE-VISIT PLANNING     Patient was NOT contacted to complete PVP.    1.  Reviewed notes from the last few office visits within the medical group: Yes    2.  If any orders were placed at last visit or intended to be done for this visit (i.e. 6 mos follow-up), do we have Results/Consult Notes?        •  Labs - Labs ordered, NOT completed. Patient advised to complete prior to next appointment.       •  Imaging - Imaging was not ordered at last office visit.       •  Referrals - No referrals were ordered at last office visit.    3. Is this appointment scheduled as a Hospital Follow-Up? No    4.  Immunizations were updated in Epic using WebIZ?: unable to check webiz       •  Web Iz Recommendations:     5.  Patient is due for the following Health Maintenance Topics:   Health Maintenance Due   Topic Date Due   • IMM HEP B VACCINE (1 of 3 - Risk 3-dose series) 01/19/1969   • IMM ZOSTER VACCINES (2 of 3) 01/10/2017   • URINE ACR / MICROALBUMIN  08/15/2019   • COLONOSCOPY  10/03/2019   • LUNG CANCER SCREENING  05/15/2020   • FASTING LIPID PROFILE  06/27/2020   • SERUM CREATININE  07/26/2020   • RETINAL SCREENING  08/23/2020   • DIABETES MONOFILAMENT / LE EXAM  09/18/2020           6. Orders for overdue Health Maintenance topics pended in Pre-Charting? NO    7.  AHA (MDX) form printed for Provider? No, already completed    8.  Patient was NOT informed to arrive 15 min prior to their scheduled appointment and bring in their medication bottles.

## 2020-08-31 ENCOUNTER — OFFICE VISIT (OUTPATIENT)
Dept: MEDICAL GROUP | Facility: PHYSICIAN GROUP | Age: 70
End: 2020-08-31
Payer: MEDICARE

## 2020-08-31 VITALS
OXYGEN SATURATION: 97 % | SYSTOLIC BLOOD PRESSURE: 102 MMHG | HEART RATE: 60 BPM | HEIGHT: 70 IN | DIASTOLIC BLOOD PRESSURE: 60 MMHG | TEMPERATURE: 97.9 F | BODY MASS INDEX: 24.9 KG/M2 | WEIGHT: 173.9 LBS

## 2020-08-31 DIAGNOSIS — Z86.010 H/O COLONOSCOPY WITH POLYPECTOMY: ICD-10-CM

## 2020-08-31 DIAGNOSIS — E11.51 DIABETES MELLITUS WITH PERIPHERAL VASCULAR DISEASE (HCC): ICD-10-CM

## 2020-08-31 DIAGNOSIS — I10 ESSENTIAL HYPERTENSION: ICD-10-CM

## 2020-08-31 DIAGNOSIS — I73.9 BILATERAL CLAUDICATION OF LOWER LIMB (HCC): ICD-10-CM

## 2020-08-31 DIAGNOSIS — I73.9 PERIPHERAL VASCULAR DISEASE (HCC): ICD-10-CM

## 2020-08-31 DIAGNOSIS — R74.01 TRANSAMINITIS: ICD-10-CM

## 2020-08-31 DIAGNOSIS — R01.1 NEWLY RECOGNIZED HEART MURMUR: ICD-10-CM

## 2020-08-31 DIAGNOSIS — I70.0 ATHEROSCLEROSIS OF AORTA (HCC): ICD-10-CM

## 2020-08-31 DIAGNOSIS — M54.9 UPPER BACK PAIN ON LEFT SIDE: ICD-10-CM

## 2020-08-31 DIAGNOSIS — Z98.890 H/O COLONOSCOPY WITH POLYPECTOMY: ICD-10-CM

## 2020-08-31 LAB
HBA1C MFR BLD: 6 % (ref 0–5.6)
INT CON NEG: ABNORMAL
INT CON POS: ABNORMAL

## 2020-08-31 PROCEDURE — 99214 OFFICE O/P EST MOD 30 MIN: CPT | Performed by: INTERNAL MEDICINE

## 2020-08-31 PROCEDURE — 83036 HEMOGLOBIN GLYCOSYLATED A1C: CPT | Performed by: INTERNAL MEDICINE

## 2020-08-31 ASSESSMENT — FIBROSIS 4 INDEX: FIB4 SCORE: 4.37

## 2020-08-31 NOTE — PROGRESS NOTES
Established Patient    Chief Complaint   Patient presents with   • Follow-Up       Subjective:     HPI:   Chung presents today with the following.    1. Transaminitis  >> Secondary to chronic hepatitis C, discussed treatment option with patient today in detail, patient refused treatment for now admission he is fine      4. Essential hypertension  Chronic, stable, compliant with lisinopril 10 mg daily, metoprolol SR 25 mg daily    5. Diabetes mellitus with peripheral vascular disease (HCC)  A1c:   Lab Results   Component Value Date/Time    HBA1C 6.0 (A) 08/31/2020 0810    HBA1C 7.2 (A) 05/12/2020 1555    HBA1C 7.5 (H) 06/27/2019 0622    AVGLUC 169 06/27/2019 0622    AVGLUC 140 08/15/2018 1437    AVGLUC 128 01/17/2018 0631   >> Patient A1c is improving, patient is compliant with diabetic diet, regular exercise, he is losing weight actively      Patient Active Problem List    Diagnosis Date Noted   • Upper back pain on left side 08/12/2020   • Newly recognized heart murmur 08/12/2020   • Overweight 05/12/2020   • Transaminitis 05/12/2020   • Chronic right shoulder pain 09/18/2019   • Alcohol dependence in remission (HCC) 04/19/2019   • Cervical radiculopathy, chronic 04/05/2018   • Essential hypertension 04/05/2018   • Chronic hepatitis C without hepatic coma (HCC) 05/18/2017   • Cervical disc disease 05/18/2017   • History of tobacco abuse 01/31/2017   • Atherosclerosis of aorta (HCC) 01/31/2017   • Diabetes mellitus with peripheral vascular disease (HCC) 11/15/2016   • Microalbuminuria due to type 2 diabetes mellitus (HCC) 11/15/2016   • Bruit 11/01/2016   • Stress-induced cardiomyopathy 03/10/2015   • Peripheral vascular disease (HCC) 01/21/2015   • H/O colonoscopy with polypectomy 05/07/2013   • Bilateral claudication of lower limb (HCC) 10/22/2012       Current Outpatient Medications on File Prior to Visit   Medication Sig Dispense Refill   • clopidogrel (PLAVIX) 75 MG Tab TAKE ONE TABLET BY MOUTH EVERY   "Tab 0   • ibuprofen (MOTRIN) 800 MG Tab Take 1 Tab by mouth every 8 hours as needed. 30 Tab 0   • metoprolol SR (TOPROL XL) 25 MG TABLET SR 24 HR TAKE ONE TABLET BY MOUTH EVERY DAY 30 Tab 0   • ibuprofen (MOTRIN) 800 MG Tab TAKE 1 TABLET BY MOUTH EVERY 8 HOURS AS NEEDED. 30 Tab 1   • FREESTYLE LITE strip      • Multiple Vitamins-Minerals (CENTRUM ADULTS PO) Take  by mouth.     • VITAMIN D PO Take 10,000 Units by mouth.     • Glucosamine-Chondroit-Vit C-Mn (GLUCOSAMINE 1500 COMPLEX PO) Take  by mouth.     • atorvastatin (LIPITOR) 80 MG tablet Take 1 Tab by mouth every day. 100 Tab 1   • Blood Glucose Test Strips Test strips order: Test strips for Freestyle Trang meter. Sig: use before breakfast and prn ssx high or low sugar #100 RF x 3 (Patient taking differently: Test strips order: Test strips for Freestyle Lite meter. Sig: use before breakfast and prn ssx high or low sugar #100 RF x 3) 100 Strip 3   • aspirin (ASA) 81 MG Chew Tab chewable tablet Take 81 mg by mouth every day.     • lisinopril (PRINIVIL) 10 MG Tab Take 1 Tab by mouth every day. 100 Tab 3     No current facility-administered medications on file prior to visit.        Allergies, past medical history, past surgical history, family history, social history reviewed and updated    ROS:  All other systems reviewed and are negative except as stated in the HPI     Physical Exam:     /60 (BP Location: Left arm, Patient Position: Sitting, BP Cuff Size: Adult)   Pulse 60   Temp 36.6 °C (97.9 °F) (Temporal)   Ht 1.778 m (5' 10\")   Wt 78.9 kg (173 lb 14.4 oz)   SpO2 97%   BMI 24.95 kg/m²   General: Normal appearing. No distress  ENT: oropharynx without exudates.    Eyes: conjunctiva clear lids without ptosis  Pulmonary: Clear to ausculation.  Normal effort.   Cardiovascular: Regular rate and rhythm  Abdomen: Soft, nontender,  Lymph: No cervical or supraclavicular palpable lymph nodes  Psych: Normal mood and affect.     I have reviewed pertinent labs " and diagnostic tests associated with this visit with specific comments listed under the assessment and plan below      Assessment and Plan:     70 y.o. male with the following issues.    1. Transaminitis  Secondary to hep C currently, refused treatment for now      4. Essential hypertension  Continue above blood pressure medication    5. Diabetes mellitus with peripheral vascular disease (HCC)  - POCT Hemoglobin A1C>> 6 today, improved from last month  -Continue diabetic diet and lose weight and regular exercise    6. Newly recognized heart murmur  - EC-ECHOCARDIOGRAM COMPLETE W/O CONT; Future  - DX-CHEST-2 VIEWS; Future  -Continue follow-up with cardiology      2. Peripheral vascular disease (HCC)  7. Atherosclerosis of aorta (HCC)  8. Bilateral claudication of lower limb (HCC)  - REFERRAL TO VASCULAR MEDICINE Reason for Referral? Established Vascular Disease  > Patient would like to postpone imaging for now as well as labs-continue aspirin as well as Plavix and statin 80 mg daily    Follow Up:      Return in about 3 months (around 11/30/2020).  For diabetes, second    Please note that this dictation was created using voice recognition software. I have made every reasonable attempt to correct obvious errors, but I expect that there are errors of grammar and possibly content that I did not discover before finalizing the note.    Signed by: Asuncion Roland M.D.

## 2020-09-08 ENCOUNTER — OFFICE VISIT (OUTPATIENT)
Dept: CARDIOLOGY | Facility: MEDICAL CENTER | Age: 70
End: 2020-09-08
Payer: MEDICARE

## 2020-09-08 VITALS
BODY MASS INDEX: 24.62 KG/M2 | DIASTOLIC BLOOD PRESSURE: 48 MMHG | WEIGHT: 172 LBS | HEIGHT: 70 IN | HEART RATE: 74 BPM | OXYGEN SATURATION: 92 % | SYSTOLIC BLOOD PRESSURE: 100 MMHG

## 2020-09-08 DIAGNOSIS — I51.81 STRESS-INDUCED CARDIOMYOPATHY: ICD-10-CM

## 2020-09-08 DIAGNOSIS — I25.2 HISTORY OF MI (MYOCARDIAL INFARCTION): ICD-10-CM

## 2020-09-08 DIAGNOSIS — R09.89 BRUIT: ICD-10-CM

## 2020-09-08 DIAGNOSIS — B18.2 CHRONIC HEPATITIS C WITHOUT HEPATIC COMA (HCC): ICD-10-CM

## 2020-09-08 DIAGNOSIS — F10.21 ALCOHOL DEPENDENCE IN REMISSION (HCC): ICD-10-CM

## 2020-09-08 DIAGNOSIS — I70.0 ATHEROSCLEROSIS OF AORTA (HCC): ICD-10-CM

## 2020-09-08 DIAGNOSIS — I10 ESSENTIAL HYPERTENSION: ICD-10-CM

## 2020-09-08 DIAGNOSIS — R01.1 NEWLY RECOGNIZED HEART MURMUR: ICD-10-CM

## 2020-09-08 DIAGNOSIS — I73.9 PERIPHERAL VASCULAR DISEASE (HCC): ICD-10-CM

## 2020-09-08 DIAGNOSIS — Z87.891 HISTORY OF TOBACCO ABUSE: ICD-10-CM

## 2020-09-08 DIAGNOSIS — E11.51 DIABETES MELLITUS WITH PERIPHERAL VASCULAR DISEASE (HCC): ICD-10-CM

## 2020-09-08 DIAGNOSIS — I73.9 BILATERAL CLAUDICATION OF LOWER LIMB (HCC): ICD-10-CM

## 2020-09-08 PROCEDURE — 99214 OFFICE O/P EST MOD 30 MIN: CPT | Performed by: INTERNAL MEDICINE

## 2020-09-08 RX ORDER — LISINOPRIL 10 MG/1
10 TABLET ORAL DAILY
Qty: 100 TAB | Refills: 3 | Status: SHIPPED | OUTPATIENT
Start: 2020-09-08 | End: 2022-02-09

## 2020-09-08 RX ORDER — METOPROLOL SUCCINATE 25 MG/1
25 TABLET, EXTENDED RELEASE ORAL
Qty: 90 TAB | Refills: 3 | Status: SHIPPED | OUTPATIENT
Start: 2020-09-08 | End: 2021-12-22

## 2020-09-08 ASSESSMENT — ENCOUNTER SYMPTOMS
DIZZINESS: 0
CLAUDICATION: 0
COUGH: 0
BRUISES/BLEEDS EASILY: 0
NEUROLOGICAL NEGATIVE: 1
WHEEZING: 0
SPUTUM PRODUCTION: 0
PALPITATIONS: 0
WEAKNESS: 0
CHILLS: 0
CARDIOVASCULAR NEGATIVE: 1
LOSS OF CONSCIOUSNESS: 0
CONSTITUTIONAL NEGATIVE: 1
PND: 0
GASTROINTESTINAL NEGATIVE: 1
HEMOPTYSIS: 0
RESPIRATORY NEGATIVE: 1
STRIDOR: 0
MUSCULOSKELETAL NEGATIVE: 1
FEVER: 0
ORTHOPNEA: 0
EYES NEGATIVE: 1
SHORTNESS OF BREATH: 0
SORE THROAT: 0

## 2020-09-08 ASSESSMENT — FIBROSIS 4 INDEX: FIB4 SCORE: 4.37

## 2020-09-08 NOTE — PROGRESS NOTES
"Chief Complaint   Patient presents with   • Aortic Atherosclerosis       Subjective:   Tuan Limon is a 70 y.o. male who presents today as a follow-up for his hypertension hyperlipidemia peripheral vascular disease and heart failure in the past.  We did notice a murmur on his last exam however he never got an echocardiogram.  Her blood pressures been controlled.  He said no peripheral vascular disease chest pain palpitations or PND.  His last LDL was at goal.    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 states checks his blood sugar daily. Not currently taking medication for.   • H/O colonoscopy with polypectomy 5/7/2013   • Hayfever    • Healthcare maintenance 4/5/2018   • Hepatitis C     no treatment   • History of alcoholism (HCC) 8/22/2012   • [place] (hard of hearing) 07/26/2019   • Hyperlipidemia    • Hypertension    • Insulin dependent diabetes mellitus 07/26/2019    Pt. states used to take Insulin 2 years ago.   • S/P excision of lipoma 9/18/2019     Past Surgical History:   Procedure Laterality Date   • MASS EXCISION GENERAL Right 7/31/2019    Procedure: EXCISION, MASS, GENERAL - 15 CM LIPOMA OF SHOULDER;  Surgeon: Augustin Rosales M.D.;  Location: SURGERY [facility];  Service: General   • COLONOSCOPY  2017   • WIDE EXCISION  5/6/2014    Performed by Nicholas Govea M.D. at SURGERY SAME DAY [facility]   • FLAP GRAFT  5/6/2014    Performed by Nicholas Govea M.D. at SURGERY SAME DAY [facility]   • RECOVERY  10/22/2012    Performed by Pily Lee M.D. at SURGERY [facility]     Family History   Problem Relation Age of Onset   • Heart Disease Father    • Arthritis Mother    • No Known Problems Sister    • Cancer " Brother         type unknown   • Cancer Brother         skin    • No Known Problems Maternal Grandmother    • No Known Problems Maternal Grandfather    • No Known Problems Paternal Grandmother    • No Known Problems Paternal Grandfather    • Diabetes Neg Hx    • Hypertension Neg Hx    • Stroke Neg Hx    • Hyperlipidemia Neg Hx      Social History     Socioeconomic History   • Marital status: Single     Spouse name: Not on file   • Number of children: Not on file   • Years of education: Not on file   • Highest education level: Not on file   Occupational History   • Not on file   Social Needs   • Financial resource strain: Not on file   • Food insecurity     Worry: Not on file     Inability: Not on file   • Transportation needs     Medical: Not on file     Non-medical: Not on file   Tobacco Use   • Smoking status: Former Smoker     Packs/day: 1.00     Years: 58.00     Pack years: 58.00     Types: Cigarettes     Start date: 1960     Quit date: 2011     Years since quittin.0   • Smokeless tobacco: Never Used   • Tobacco comment: avoid all tobacco products   Substance and Sexual Activity   • Alcohol use: No     Alcohol/week: 0.0 oz     Comment: quit , drank 2 times since   • Drug use: Yes     Types: Inhaled     Comment: marijuana (last use 2 weeks ago per pt)   • Sexual activity: Never   Lifestyle   • Physical activity     Days per week: Not on file     Minutes per session: Not on file   • Stress: Not on file   Relationships   • Social connections     Talks on phone: Not on file     Gets together: Not on file     Attends Rastafarian service: Not on file     Active member of club or organization: Not on file     Attends meetings of clubs or organizations: Not on file     Relationship status: Not on file   • Intimate partner violence     Fear of current or ex partner: Not on file     Emotionally abused: Not on file     Physically abused: Not on file     Forced sexual activity: Not on file   Other Topics  Concern   • Not on file   Social History Narrative   • Not on file     No Known Allergies  Outpatient Encounter Medications as of 9/8/2020   Medication Sig Dispense Refill   • B Complex Vitamins (B COMPLEX PO) Take  by mouth.     • lisinopril (PRINIVIL) 10 MG Tab Take 1 Tab by mouth every day. 100 Tab 3   • metoprolol SR (TOPROL XL) 25 MG TABLET SR 24 HR Take 1 Tab by mouth every day. 90 Tab 3   • clopidogrel (PLAVIX) 75 MG Tab TAKE ONE TABLET BY MOUTH EVERY  Tab 0   • ibuprofen (MOTRIN) 800 MG Tab TAKE 1 TABLET BY MOUTH EVERY 8 HOURS AS NEEDED. 30 Tab 1   • Multiple Vitamins-Minerals (CENTRUM ADULTS PO) Take  by mouth.     • atorvastatin (LIPITOR) 80 MG tablet Take 1 Tab by mouth every day. 100 Tab 1   • aspirin (ASA) 81 MG Chew Tab chewable tablet Take 81 mg by mouth every day.     • [DISCONTINUED] ibuprofen (MOTRIN) 800 MG Tab Take 1 Tab by mouth every 8 hours as needed. 30 Tab 0   • [DISCONTINUED] metoprolol SR (TOPROL XL) 25 MG TABLET SR 24 HR TAKE ONE TABLET BY MOUTH EVERY DAY 30 Tab 0   • FREESTYLE LITE strip      • [DISCONTINUED] VITAMIN D PO Take 10,000 Units by mouth.     • [DISCONTINUED] Glucosamine-Chondroit-Vit C-Mn (GLUCOSAMINE 1500 COMPLEX PO) Take  by mouth.     • Blood Glucose Test Strips Test strips order: Test strips for Freestyle Trang meter. Sig: use before breakfast and prn ssx high or low sugar #100 RF x 3 (Patient taking differently: Test strips order: Test strips for Freestyle Lite meter. Sig: use before breakfast and prn ssx high or low sugar #100 RF x 3) 100 Strip 3   • [DISCONTINUED] lisinopril (PRINIVIL) 10 MG Tab Take 1 Tab by mouth every day. 100 Tab 3     No facility-administered encounter medications on file as of 9/8/2020.      Review of Systems   Constitutional: Negative.  Negative for chills, fever and malaise/fatigue.   HENT: Negative.  Negative for sore throat.    Eyes: Negative.    Respiratory: Negative.  Negative for cough, hemoptysis, sputum production, shortness of  "breath, wheezing and stridor.    Cardiovascular: Negative.  Negative for chest pain, palpitations, orthopnea, claudication, leg swelling and PND.   Gastrointestinal: Negative.    Genitourinary: Negative.    Musculoskeletal: Negative.    Skin: Negative.    Neurological: Negative.  Negative for dizziness, loss of consciousness and weakness.   Endo/Heme/Allergies: Negative.  Does not bruise/bleed easily.   All other systems reviewed and are negative.       Objective:   /48 (BP Location: Left arm, Patient Position: Sitting, BP Cuff Size: Adult)   Pulse 74   Ht 1.778 m (5' 10\")   Wt 78 kg (172 lb)   SpO2 92%   BMI 24.68 kg/m²     Physical Exam   Constitutional: He appears well-developed and well-nourished. No distress.   HENT:   Head: Normocephalic and atraumatic.   Right Ear: External ear normal.   Left Ear: External ear normal.   Nose: Nose normal.   Mouth/Throat: No oropharyngeal exudate.   Eyes: Pupils are equal, round, and reactive to light. Conjunctivae and EOM are normal. Right eye exhibits no discharge. Left eye exhibits no discharge. No scleral icterus.   Neck: Neck supple. No JVD present.   Cardiovascular: Normal rate, regular rhythm and intact distal pulses. Exam reveals no gallop and no friction rub.   Murmur heard.   Systolic murmur is present with a grade of 3/6.  Pulmonary/Chest: Effort normal. No stridor. No respiratory distress. He has no wheezes. He has no rales. He exhibits no tenderness.   Abdominal: Soft. He exhibits no distension. There is no guarding.   Musculoskeletal: Normal range of motion.         General: No tenderness, deformity or edema.   Neurological: He is alert. He has normal reflexes. He displays normal reflexes. No cranial nerve deficit. He exhibits normal muscle tone. Coordination normal.   Skin: Skin is warm and dry. No rash noted. He is not diaphoretic. No erythema. No pallor.   Psychiatric: He has a normal mood and affect. His behavior is normal. Judgment and thought " content normal.   Nursing note and vitals reviewed.    Carotid duplex: Dated 11/16/2016 personally interpreted myself showing 50% on the right and 50 to 69% on the left.    EKG: Dated 7/26/2019 personally interpreted myself showing normal sinus rhythm with PACs.    Lab Results   Component Value Date/Time    CHOLSTRLTOT 103 06/27/2019 06:22 AM    CHOLSTRLTOT 104 06/27/2019 06:22 AM    LDL 53 06/27/2019 06:22 AM    LDL 53 06/27/2019 06:22 AM    HDL 27 (A) 06/27/2019 06:22 AM    HDL 29 (A) 06/27/2019 06:22 AM    TRIGLYCERIDE 116 06/27/2019 06:22 AM    TRIGLYCERIDE 111 06/27/2019 06:22 AM       Lab Results   Component Value Date/Time    SODIUM 137 07/26/2019 10:29 AM    POTASSIUM 4.5 07/26/2019 10:29 AM    CHLORIDE 105 07/26/2019 10:29 AM    CO2 25 07/26/2019 10:29 AM    GLUCOSE 141 (H) 07/26/2019 10:29 AM    BUN 21 07/26/2019 10:29 AM    CREATININE 1.01 07/26/2019 10:29 AM     Lab Results   Component Value Date/Time    ALKPHOSPHAT 56 07/26/2019 10:29 AM    ASTSGOT 83 (H) 07/26/2019 10:29 AM    ALTSGPT 120 (H) 07/26/2019 10:29 AM    TBILIRUBIN 1.0 07/26/2019 10:29 AM          Assessment:     1. Newly recognized heart murmur  EC-ECHOCARDIOGRAM COMPLETE W/O CONT   2. Peripheral vascular disease (HCC)  lisinopril (PRINIVIL) 10 MG Tab   3. Stress-induced cardiomyopathy     4. History of tobacco abuse     5. Essential hypertension  EC-ECHOCARDIOGRAM COMPLETE W/O CONT    metoprolol SR (TOPROL XL) 25 MG TABLET SR 24 HR   6. Diabetes mellitus with peripheral vascular disease (HCC)     7. Chronic hepatitis C without hepatic coma (HCC)     8. Bilateral claudication of lower limb (HCC)     9. Bruit     10. Atherosclerosis of aorta (HCC)  EC-ECHOCARDIOGRAM COMPLETE W/O CONT   11. Alcohol dependence in remission (HCC)     12. History of MI (myocardial infarction)  metoprolol SR (TOPROL XL) 25 MG TABLET SR 24 HR       Medical Decision Making:  Today's Assessment / Status / Plan:     70-year-old male with heart failure peripheral  vascular his carotid stenosis and a aortic stenosis murmur.  We will get an echocardiogram to further assess his aortic valve.  We will also follow-up his heart failure function.  I refilled his heart failure medications as well as his blood pressure medications.  We can see him back in 6 months.    1. Aortic stenosis murmur    - echo    2. Carotid Stenosis    - clinical follow up    3. HTN    - cont metop XL 25    - cont lisin 10    4. HLD    - cont atorva 80    5. Claudication    - stable    6. Stress CHF in 2015    - meds per above

## 2020-09-17 ENCOUNTER — HOSPITAL ENCOUNTER (OUTPATIENT)
Dept: CARDIOLOGY | Facility: MEDICAL CENTER | Age: 70
End: 2020-09-17
Attending: INTERNAL MEDICINE
Payer: MEDICARE

## 2020-09-17 DIAGNOSIS — I70.0 ATHEROSCLEROSIS OF AORTA (HCC): ICD-10-CM

## 2020-09-17 DIAGNOSIS — I10 ESSENTIAL HYPERTENSION: ICD-10-CM

## 2020-09-17 DIAGNOSIS — R01.1 NEWLY RECOGNIZED HEART MURMUR: ICD-10-CM

## 2020-09-17 LAB
LV EJECT FRACT  99904: 65
LV EJECT FRACT MOD 2C 99903: 68.5
LV EJECT FRACT MOD 4C 99902: 67.04
LV EJECT FRACT MOD BP 99901: 67.39

## 2020-09-17 PROCEDURE — 93306 TTE W/DOPPLER COMPLETE: CPT

## 2020-09-17 PROCEDURE — 93306 TTE W/DOPPLER COMPLETE: CPT | Mod: 26 | Performed by: INTERNAL MEDICINE

## 2020-09-21 ENCOUNTER — OFFICE VISIT (OUTPATIENT)
Dept: VASCULAR LAB | Facility: MEDICAL CENTER | Age: 70
End: 2020-09-21
Attending: FAMILY MEDICINE
Payer: MEDICARE

## 2020-09-21 VITALS
WEIGHT: 175 LBS | BODY MASS INDEX: 25.05 KG/M2 | HEIGHT: 70 IN | HEART RATE: 71 BPM | SYSTOLIC BLOOD PRESSURE: 94 MMHG | DIASTOLIC BLOOD PRESSURE: 50 MMHG

## 2020-09-21 DIAGNOSIS — I73.9 PAD (PERIPHERAL ARTERY DISEASE) (HCC): ICD-10-CM

## 2020-09-21 DIAGNOSIS — I74.09 CHRONIC DISTAL AORTIC OCCLUSION (HCC): ICD-10-CM

## 2020-09-21 DIAGNOSIS — I74.5 BILATERAL ILIAC ARTERY OCCLUSION (HCC): ICD-10-CM

## 2020-09-21 DIAGNOSIS — I70.218 CLAUDICATION OF BOTH UPPER EXTREMITIES DUE TO ATHEROSCLEROSIS (HCC): ICD-10-CM

## 2020-09-21 DIAGNOSIS — R80.9 MICROALBUMINURIA DUE TO TYPE 2 DIABETES MELLITUS (HCC): ICD-10-CM

## 2020-09-21 DIAGNOSIS — Z87.891 HISTORY OF TOBACCO ABUSE: ICD-10-CM

## 2020-09-21 DIAGNOSIS — E11.29 MICROALBUMINURIA DUE TO TYPE 2 DIABETES MELLITUS (HCC): ICD-10-CM

## 2020-09-21 DIAGNOSIS — E78.5 DYSLIPIDEMIA: ICD-10-CM

## 2020-09-21 DIAGNOSIS — F10.21 ALCOHOL DEPENDENCE IN REMISSION (HCC): ICD-10-CM

## 2020-09-21 DIAGNOSIS — I65.22 CAROTID ARTERY STENOSIS, ASYMPTOMATIC, LEFT: ICD-10-CM

## 2020-09-21 DIAGNOSIS — B18.2 CHRONIC HEPATITIS C WITHOUT HEPATIC COMA (HCC): ICD-10-CM

## 2020-09-21 DIAGNOSIS — E11.51 DIABETES MELLITUS WITH PERIPHERAL VASCULAR DISEASE (HCC): ICD-10-CM

## 2020-09-21 PROCEDURE — 99204 OFFICE O/P NEW MOD 45 MIN: CPT | Performed by: FAMILY MEDICINE

## 2020-09-21 PROCEDURE — 99212 OFFICE O/P EST SF 10 MIN: CPT

## 2020-09-21 RX ORDER — CILOSTAZOL 100 MG/1
100 TABLET ORAL 2 TIMES DAILY
Qty: 180 TAB | Refills: 3 | Status: SHIPPED | OUTPATIENT
Start: 2020-09-21 | End: 2021-09-16

## 2020-09-21 ASSESSMENT — ENCOUNTER SYMPTOMS
DOUBLE VISION: 0
CHILLS: 0
FALLS: 0
WEAKNESS: 0
BLOOD IN STOOL: 0
WHEEZING: 0
CLAUDICATION: 1
DIZZINESS: 0
TREMORS: 0
PALPITATIONS: 0
MYALGIAS: 1
PND: 0
BRUISES/BLEEDS EASILY: 0
NAUSEA: 0
FEVER: 0
BLURRED VISION: 0
FOCAL WEAKNESS: 0
DIARRHEA: 0
COUGH: 0
VOMITING: 0
ABDOMINAL PAIN: 0
ORTHOPNEA: 0
SHORTNESS OF BREATH: 0
TINGLING: 0
HEADACHES: 0
SEIZURES: 0
NERVOUS/ANXIOUS: 0
INSOMNIA: 0
SORE THROAT: 0
HEMOPTYSIS: 0
DEPRESSION: 0

## 2020-09-21 ASSESSMENT — FIBROSIS 4 INDEX: FIB4 SCORE: 4.37

## 2020-09-21 NOTE — PROGRESS NOTES
INITIAL VASCULAR VISIT  Subjective:   Chung Limon is a 70 y.o. y.o. male  who presents today 09/21/20  for   Chief Complaint   Patient presents with   • New Patient     Peripheral vascular disease, Atherosclerosis of aorta, Bilateral claudication of lower limb      initially referred by Asuncion Roland M.D. for eval and med mgmt of PAD, Ao atherosclerosis      HPI:    PAD:   Current sx: after 100yds feels thigh and calf pain that stops him. Takes a few minutes to improve.  Gets cramping in legs in calf and thighs with walking.  Occasional nighttime sx and relieved with hot showers  Pain free walking distance estimated to be 150ft  Total walking distance prior to stopping estimated to be 100yds  Had aortography and LE arteriography by Dr. Lee in 2012 and told needed to have Ao-bifem BPG, pt declined.    Currently believes about the same symptoms as prior.   Denies acute ischemic signs such as nonhealing wounds on lower legs or rest pain.   2012 - abd aortography by Dr. Lee.   Has recently been seen by cardiology for ongoing care.  No meds adjusted at that visit.   Had echo completed due to Ao stenosis.    No VTE  Current treatments include DAPT, statin therapy, smoking cessation, home-based graduated exercise    HTN:  Current HTN concerns: Denies   Current ADRs: No  HTN sx:  No current blurred or changed vision, chest pain, shortness of breath, headache, nausea, dizziness/vertigo   Home BP log: not checking   24h ABPM completed: not tested  Adherence to current HTN meds: compliant all of the time    Hyperlipidemia:    Stable, no current concerns  Current treatment: High intensity statin   Atorvastatin   Myalgias? No  Other adverse drug reactions? No  Last lipid profile: reviewed with patient, as noted below     Antiplatelet/anticoagulation:   Yes, Details: DAPT, no bleeding noted     T2D:  Stable. No current symptoms reported.   Tolerating meds and no recent med changes.   Home blood sugars stable,  "reports no lows.   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 states checks his blood sugar daily. Not currently taking medication for.   • H/O colonoscopy with polypectomy 5/7/2013   • Hayfever    • Healthcare maintenance 4/5/2018   • Hepatitis C     no treatment   • History of alcoholism (HCC) 8/22/2012   • [place] (hard of hearing) 07/26/2019   • Hyperlipidemia    • Hypertension    • Insulin dependent diabetes mellitus 07/26/2019    Pt. states used to take Insulin 2 years ago.   • S/P excision of lipoma 9/18/2019     Past Surgical History:   Procedure Laterality Date   • MASS EXCISION GENERAL Right 7/31/2019    Procedure: EXCISION, MASS, GENERAL - 15 CM LIPOMA OF SHOULDER;  Surgeon: Augustin Rosales M.D.;  Location: SURGERY [facility];  Service: General   • COLONOSCOPY  2017   • WIDE EXCISION  5/6/2014    Performed by Nicholas Govea M.D. at SURGERY SAME DAY [facility]   • FLAP GRAFT  5/6/2014    Performed by Nicholas Govea M.D. at SURGERY SAME DAY [facility]   • RECOVERY  10/22/2012    Performed by Pily Lee M.D. at SURGERY [facility]      Current Outpatient Medications on File Prior to Visit   Medication Sig " Dispense Refill   • B Complex Vitamins (B COMPLEX PO) Take  by mouth.     • lisinopril (PRINIVIL) 10 MG Tab Take 1 Tab by mouth every day. 100 Tab 3   • metoprolol SR (TOPROL XL) 25 MG TABLET SR 24 HR Take 1 Tab by mouth every day. 90 Tab 3   • clopidogrel (PLAVIX) 75 MG Tab TAKE ONE TABLET BY MOUTH EVERY  Tab 0   • ibuprofen (MOTRIN) 800 MG Tab TAKE 1 TABLET BY MOUTH EVERY 8 HOURS AS NEEDED. 30 Tab 1   • FREESTYLE LITE strip      • Multiple Vitamins-Minerals (CENTRUM ADULTS PO) Take  by mouth.     • atorvastatin (LIPITOR) 80 MG tablet Take 1 Tab by mouth every day. 100 Tab 1   • Blood Glucose Test Strips Test strips order: Test strips for Freestyle Trang meter. Sig: use before breakfast and prn ssx high or low sugar #100 RF x 3 (Patient taking differently: Test strips order: Test strips for Freestyle Lite meter. Sig: use before breakfast and prn ssx high or low sugar #100 RF x 3) 100 Strip 3   • aspirin (ASA) 81 MG Chew Tab chewable tablet Take 81 mg by mouth every day.       No current facility-administered medications on file prior to visit.      No Known Allergies    Family History   Problem Relation Age of Onset   • Heart Disease Father    • Arthritis Mother    • No Known Problems Sister    • Cancer Brother         type unknown   • Cancer Brother         skin    • No Known Problems Maternal Grandmother    • No Known Problems Maternal Grandfather    • No Known Problems Paternal Grandmother    • No Known Problems Paternal Grandfather    • Diabetes Neg Hx    • Hypertension Neg Hx    • Stroke Neg Hx    • Hyperlipidemia Neg Hx         Social History     Tobacco Use   • Smoking status: Former Smoker     Packs/day: 1.00     Years: 58.00     Pack years: 58.00     Types: Cigarettes     Start date: 1960     Quit date: 2011     Years since quittin.0   • Smokeless tobacco: Never Used   • Tobacco comment: avoid all tobacco products   Substance Use Topics   • Alcohol use: No     Alcohol/week: 0.0 oz      "Comment: quit 1985, drank 2 times since   • Drug use: Yes     Types: Inhaled     Comment: marijuana (last use 2 weeks ago per pt)     DIET AND EXERCISE:  Weight Change:stable   BMI Readings from Last 5 Encounters:   09/21/20 25.11 kg/m²   09/08/20 24.68 kg/m²   08/31/20 24.95 kg/m²   08/12/20 25.27 kg/m²   08/12/20 25.25 kg/m²      Diet: common adult  Exercise: no regular exercise program   Review of Systems   Constitutional: Negative for chills, fever and malaise/fatigue.   HENT: Negative for nosebleeds, sore throat and tinnitus.    Eyes: Negative for blurred vision and double vision.   Respiratory: Negative for cough, hemoptysis, shortness of breath and wheezing.    Cardiovascular: Positive for claudication. Negative for chest pain, palpitations, orthopnea, leg swelling and PND.   Gastrointestinal: Negative for abdominal pain, blood in stool, diarrhea, melena, nausea and vomiting.   Genitourinary: Negative for hematuria.   Musculoskeletal: Positive for myalgias. Negative for falls and joint pain.   Skin: Negative for itching and rash.   Neurological: Negative for dizziness, tingling, tremors, focal weakness, seizures, weakness and headaches.   Endo/Heme/Allergies: Does not bruise/bleed easily.   Psychiatric/Behavioral: Negative for depression. The patient is not nervous/anxious and does not have insomnia.       Objective:     Vitals:    09/21/20 1346   BP: (!) 94/50   BP Location: Left arm   Patient Position: Sitting   BP Cuff Size: Adult   Pulse: 71   Weight: 79.4 kg (175 lb)   Height: 1.778 m (5' 10\")      BP Readings from Last 5 Encounters:   09/21/20 (!) 94/50   09/08/20 100/48   08/31/20 102/60   08/12/20 149/81   08/12/20 130/60      Body mass index is 25.11 kg/m².  Physical Exam  Vitals signs reviewed.   Constitutional:       Appearance: Normal appearance.   HENT:      Head: Normocephalic and atraumatic.      Nose: Nose normal.      Mouth/Throat:      Mouth: Mucous membranes are moist.      Pharynx: " Oropharynx is clear.   Eyes:      Extraocular Movements: Extraocular movements intact.      Conjunctiva/sclera: Conjunctivae normal.   Neck:      Musculoskeletal: Normal range of motion and neck supple.   Cardiovascular:      Rate and Rhythm: Normal rate and regular rhythm.      Pulses:           Carotid pulses are 2+ on the right side and 2+ on the left side.       Radial pulses are 2+ on the right side and 2+ on the left side.        Dorsalis pedis pulses are 0 on the right side and 0 on the left side.        Posterior tibial pulses are 0 on the right side and 0 on the left side.      Heart sounds: Normal heart sounds.      Comments:    Spider telangectasia:       RLE:  None      LLE: none   Varicosities:           RLE: none      LLE: none   Corona phlebectatica:      RLE:  None        LLE:  None   Cording:         RLE:  None     LLE: None     Pulmonary:      Effort: Pulmonary effort is normal.      Breath sounds: Normal breath sounds.   Abdominal:      General: Abdomen is flat. Bowel sounds are normal.      Palpations: Abdomen is soft.   Musculoskeletal: Normal range of motion.      Right lower leg: No edema.      Left lower leg: No edema.   Skin:     General: Skin is warm and dry.      Capillary Refill: Capillary refill takes 2 to 3 seconds.      Coloration: Skin is not pale.      Findings: No erythema, lesion or rash.   Neurological:      General: No focal deficit present.      Mental Status: He is alert and oriented to person, place, and time. Mental status is at baseline.      Sensory: No sensory deficit.      Coordination: Coordination normal.      Gait: Gait normal.   Psychiatric:         Mood and Affect: Mood normal.         Behavior: Behavior normal.         Lab Results   Component Value Date    CHOLSTRLTOT 103 06/27/2019    CHOLSTRLTOT 104 06/27/2019    LDL 53 06/27/2019    LDL 53 06/27/2019    HDL 27 (A) 06/27/2019    HDL 29 (A) 06/27/2019    TRIGLYCERIDE 116 06/27/2019    TRIGLYCERIDE 111 06/27/2019            Lab Results   Component Value Date    HBA1C 6.0 (A) 2020      Lab Results   Component Value Date    SODIUM 137 2019    POTASSIUM 4.5 2019    CHLORIDE 105 2019    CO2 25 2019    GLUCOSE 141 (H) 2019    BUN 21 2019    CREATININE 1.01 2019    IFAFRICA >60 2019    IFNOTAFR >60 2019        Lab Results   Component Value Date    WBC 5.1 2019    RBC 4.81 2019    HEMOGLOBIN 14.7 2019    HEMATOCRIT 43.3 2019    MCV 90.0 2019    MCH 30.6 2019    MCHC 33.9 2019    MPV 12.4 2019       VASCULAR IMAGING:   Last EKG:   Results for orders placed or performed in visit on 19   EKG   Result Value Ref Range    Report       [facility]    Test Date:  2019  Pt Name:    BI CHOI                Department: [facility]  MRN:        [id number]                      Room:  Gender:     Male                         Technician: [facility]  :        1950                   Requested By:LAI BUCK  Order #:    [id number]                    Reading MD: Crow Ghosh MD    Measurements  Intervals                                Axis  Rate:       71                           P:          68  KY:         264                          QRS:        51  QRSD:       86                           T:          50  QT:         404  QTc:        439    Interpretive Statements  SINUS RHYTHM  ATRIAL PREMATURE COMPLEX  FIRST DEGREE AV BLOCK  LOW VOLTAGE IN FRONTAL LEADS  Compared to ECG 2015 01:06:41  First degree AV block now present  T-wave abnormality no longer present  Possible ischemia no longer present  Prolonged QT interval no longer present    Electronically Signed On 2019 16:37:36 PDT by Crow Ghosh MD        IR abdominal aortography  (Dr. Lee)  IMPRESSION:  1.  Patent bilateral renal arteries.  2.  The infrarenal abdominal aorta is chronically occluded at [id number]   part.  The iliac systems are also  occluded.  There is reconstitution of the lower   extremity circulation via collateral flow.  3.  Patent right common femoral artery with significant posterior plaque   formation.  The right profunda femoral artery is patent.  The right   superficial femoral artery is occluded at mid-thigh.  There is reconstitution   of the above knee popliteal artery via collateral flow.  The right peroneal   artery is chronically occluded.  The right posterior tibial artery is the   dominant runoff vessel.  4.  Patent left common femoral artery with plaque formation.  The left   profunda and superficial femoral arteries are patent.  The popliteal artery is   also patent.  The peroneal artery is occluded.  The anterior tibial artery   is the dominant runoff vessels.  5.  Patent aortic arch.  There is a moderate stenosis seen in the proximal   left subclavian artery.  There also appeared to be mild stenosis   seen in the proximal left internal carotid artery.     Based on the angiogram result, an endovascular intervention would not be   appropriate.  The patient would need to undergo an aortobifemoral bypass   grafting.  The patient will be seen back in the office and surgical bypass   options will be discussed with him.    BLE art duplex 2015   1.  Low velocity monophasic flow in the bilateral common femoral arteries    without stenosis from inguinal ligament to the ankle, indicitive of aorto-    iliac occlusion.     Carotid 2016   Mild stenosis of the right internal carotid (< 50%).    Moderate stenosis of the left internal carotid (50-69%).    Flow within both subclavian arteries appears to be within normal limits.      Antegrade flow, bilateral vertebral arteries.    No prior study is available for comparison..    LDCT chest 5/2019   There are atherosclerotic calcifications of the aorta and its branch vessels including the coronary arteries. There is no mediastinal, hilar or axillary adenopathy.    ECho 9/2020  Compared to the  images of the prior study done on 06/08/15, there is   now mild to moderate aortic stenosis.  Left ventricular ejection fraction is visually estimated to be 65%.  Mild to moderate aortic stenosis.    Medical Decision Making:  Today's Assessment / Status / Plan:     1. PAD (peripheral artery disease) (HCC)  US-EXTREMITY ARTERY LOWER BILAT W/RIMA (COMBO)    US-AORTA/ILIACS DUPLEX COMPLETE    cilostazol (PLETAL) 100 MG Tab   2. Carotid artery stenosis, asymptomatic, left  US-CAROTID DOPPLER BILAT   3. Chronic distal aortic occlusion (HCC)     4. 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HCC)     5. Diabetes mellitus with peripheral vascular disease (HCC)  HEMOGLOBIN A1C    CBC WITHOUT DIFFERENTIAL    MICROALBUMIN CREAT RATIO URINE   6. Microalbuminuria due to type 2 diabetes mellitus (HCC)     7. History of tobacco abuse     8. Chronic hepatitis C without hepatic coma (HCC)     9. Alcohol dependence in remission (HCC)     10. Claudication of both upper extremities due to atherosclerosis (HCC)     11. Dyslipidemia  Comp Metabolic Panel    Lipid Profile    LIPOPROTEIN A       Patient Type: Secondary Prevention    Etiology of Established CVD if Present:   1) moderate L carotid a stenosis, 2016  2) PAD - monophasic diffusely   3) Abdominal Aorto-bililiac occlusive disease, 2012   4) proximal L subclav a stenosis, 2012     Antithrombotic therapy:  Indication: PAD   Anti-Platelet/Anti-Coagulant Tx: yes  Antithrombotic therapy plan:  - continue DAPT     Lipid Management: Qualifies for Statin Therapy Based on 2018 ACC/AHA Guidelines: yes  Calculated 10-Year Risk of ASCVD (if LDL <189, no CKD G3b/4/5): N/A  Currently on Statin: Yes  NLA/ACC/AHA risk category:   Very high:  Evidence of ASCVD, T2D   Tx goal: non-HDL-C <100,  LDL-C <70  (optional: apoB <80)  At goal:  yes  Plan:  - reinforced ongoing TLC measures   - update lipids and lp(a)  - continue atorva 80mg daily     Blood Pressure Management:Goal: ACC/AHA (2017) goal <130/80  Home  BP at goal:  yes  Office BP at goal:  yes  Inidications of end organ damage:   Echo/EKG: abnormal Ao stenosis, no LVH     UACR: abnormal A2    Renal parenchymal disease:  normal   Ophthalmo: N/A    Device candidate? no  Severe PAD, continue RAAS blockers, consider CCB for vasodilation   Plan:   Monitoring:   - start/continue home BP monitoring, reviewed correct technique, provide BP log and instructions  - order 24h ABPM:  NO  - monitor lytes/gfr routinely   - contact office if BP consistently >140/>90 to discussion of tx adjustments   Medications:  ACEi/ARB: continue lisinopril 10mg daily   DHP-CCB: add as next agent   Thiazide: none   Sonny-receptor Antagonist: not indicated at this time   Other:   Peripheral Alpha Blocker: not indicated   Loop: not indicated   BB: continue metoprolol 25mg ER daily     Glycemic Status: Diabetic  Goal A1c < 7.0   Last A1c    Lab Results   Component Value Date    HBA1C 6.0 (A) 08/31/2020    UACR: abnormal: A2, 2018   Plan:  - continue current medication plan   - recommmend for routine care with PCP (or endocrine) to include regular A1c monitoring, annual albumin/creatinine ratio (ACR), annual diabetic retinopathy screening, foot exams, annual flu vaccine, and updates to pneumonia vaccines as appropriate     Smoking: maintenance stage.     reports that he quit smoking about 9 years ago. His smoking use included cigarettes. He started smoking about 60 years ago. He has a 58.00 pack-year smoking history. He has never used smokeless tobacco.   - continued complete avoidance of all tobacco products     Physical Activity: continue healthy activity to improve CV fitness, see care instructions for additional details     Weight Management and Nutrition: Dietary plan was discussed with patient at this visit including DASH, low sodium and/or as outlined in care instructions     Other:   1) PAD, hx of severe Ao/bi-iliac occlusions with bilat Fem aa disease.  Combination inflow/outflow w/o  indication of chronic limb ischemia or acute limb ischemic changes at this time  - update RIMA/art duplex and Ao/iliac assessment  - unlikely to need CT Ao with run-off as exam shows good perfusions - presumed collaterals   - continue graduated exercise   - continue high dose statin  - continue DAPT but consider switch to ASA + xarelto in the future   - add cilostazol 100mg BID, no hx of CHF or major bleeding, advised to monitor for bleeding  - monitor walking distances and symptoms    2) hx of moderate carotid artery stenosis, L, no symptoms or hx of CVA   - continue med mgmt   - update duplex     3) moderate Ao stenosis, no symptoms, seeing cardiology   - defer mgmt and surveillance to cardiology     4) chronic HCV+, elevated liver enzymes   Declines anti-HCV therapy at this time   - defer care to GI for surveillance and ongoing care   - adivsed for etoh cessation     Instructed to follow-up with PCP for remainder of adult medical needs: yes  We will partner with other providers in the management of established vascular disease and cardiometabolic risk factors.    Studies to Be Obtained: RIMA/BLE art duplex, Ao/iliac, carotid - now, ordered, aprn to track.  consider CT with run-off  Labs to Be Obtained: as noted above     Follow up in: 6 weeks     Bharat Caicedo M.D.  [facility]   [facility]

## 2020-09-24 ENCOUNTER — TELEPHONE (OUTPATIENT)
Dept: VASCULAR LAB | Facility: MEDICAL CENTER | Age: 70
End: 2020-09-24

## 2020-09-24 NOTE — TELEPHONE ENCOUNTER
Called pt to remind that the  Carotid, Ao/Iliac duplex anr LE art sheron are due now  for surveillance. Scheduling office and our office phone numbers provided. EZ Rogers.

## 2020-09-28 ENCOUNTER — HOSPITAL ENCOUNTER (OUTPATIENT)
Dept: LAB | Facility: MEDICAL CENTER | Age: 70
End: 2020-09-28
Attending: FAMILY MEDICINE
Payer: MEDICARE

## 2020-09-28 DIAGNOSIS — E78.5 DYSLIPIDEMIA: ICD-10-CM

## 2020-09-28 DIAGNOSIS — E11.51 DIABETES MELLITUS WITH PERIPHERAL VASCULAR DISEASE (HCC): ICD-10-CM

## 2020-09-28 LAB
CREAT UR-MCNC: 135.66 MG/DL
ERYTHROCYTE [DISTWIDTH] IN BLOOD BY AUTOMATED COUNT: 42.3 FL (ref 35.9–50)
EST. AVERAGE GLUCOSE BLD GHB EST-MCNC: 128 MG/DL
HBA1C MFR BLD: 6.1 % (ref 0–5.6)
HCT VFR BLD AUTO: 37.6 % (ref 42–52)
HGB BLD-MCNC: 12.9 G/DL (ref 14–18)
MCH RBC QN AUTO: 31.2 PG (ref 27–33)
MCHC RBC AUTO-ENTMCNC: 34.3 G/DL (ref 33.7–35.3)
MCV RBC AUTO: 91 FL (ref 81.4–97.8)
MICROALBUMIN UR-MCNC: 7.4 MG/DL
MICROALBUMIN/CREAT UR: 55 MG/G (ref 0–30)
PLATELET # BLD AUTO: 114 K/UL (ref 164–446)
PMV BLD AUTO: 11.9 FL (ref 9–12.9)
RBC # BLD AUTO: 4.13 M/UL (ref 4.7–6.1)
WBC # BLD AUTO: 4.6 K/UL (ref 4.8–10.8)

## 2020-09-28 PROCEDURE — 83036 HEMOGLOBIN GLYCOSYLATED A1C: CPT

## 2020-09-28 PROCEDURE — 36415 COLL VENOUS BLD VENIPUNCTURE: CPT

## 2020-09-28 PROCEDURE — 85027 COMPLETE CBC AUTOMATED: CPT

## 2020-09-28 PROCEDURE — 82570 ASSAY OF URINE CREATININE: CPT

## 2020-09-28 PROCEDURE — 82043 UR ALBUMIN QUANTITATIVE: CPT

## 2020-09-28 PROCEDURE — 83695 ASSAY OF LIPOPROTEIN(A): CPT

## 2020-09-29 ENCOUNTER — HOSPITAL ENCOUNTER (OUTPATIENT)
Dept: LAB | Facility: MEDICAL CENTER | Age: 70
End: 2020-09-29
Attending: FAMILY MEDICINE
Payer: MEDICARE

## 2020-09-29 DIAGNOSIS — E78.5 DYSLIPIDEMIA: ICD-10-CM

## 2020-09-29 LAB
ALBUMIN SERPL BCP-MCNC: 4.3 G/DL (ref 3.2–4.9)
ALBUMIN/GLOB SERPL: 1.3 G/DL
ALP SERPL-CCNC: 84 U/L (ref 30–99)
ALT SERPL-CCNC: 92 U/L (ref 2–50)
ANION GAP SERPL CALC-SCNC: 11 MMOL/L (ref 7–16)
AST SERPL-CCNC: 73 U/L (ref 12–45)
BILIRUB SERPL-MCNC: 0.4 MG/DL (ref 0.1–1.5)
BUN SERPL-MCNC: 28 MG/DL (ref 8–22)
CALCIUM SERPL-MCNC: 9.8 MG/DL (ref 8.5–10.5)
CHLORIDE SERPL-SCNC: 105 MMOL/L (ref 96–112)
CHOLEST SERPL-MCNC: 106 MG/DL (ref 100–199)
CO2 SERPL-SCNC: 23 MMOL/L (ref 20–33)
CREAT SERPL-MCNC: 0.87 MG/DL (ref 0.5–1.4)
FASTING STATUS PATIENT QL REPORTED: NORMAL
GLOBULIN SER CALC-MCNC: 3.3 G/DL (ref 1.9–3.5)
GLUCOSE SERPL-MCNC: 117 MG/DL (ref 65–99)
HDLC SERPL-MCNC: 34 MG/DL
LDLC SERPL CALC-MCNC: 47 MG/DL
POTASSIUM SERPL-SCNC: 4.3 MMOL/L (ref 3.6–5.5)
PROT SERPL-MCNC: 7.6 G/DL (ref 6–8.2)
SODIUM SERPL-SCNC: 139 MMOL/L (ref 135–145)
TRIGL SERPL-MCNC: 123 MG/DL (ref 0–149)

## 2020-09-29 PROCEDURE — 80053 COMPREHEN METABOLIC PANEL: CPT

## 2020-09-29 PROCEDURE — 80061 LIPID PANEL: CPT

## 2020-09-29 PROCEDURE — 36415 COLL VENOUS BLD VENIPUNCTURE: CPT

## 2020-09-29 NOTE — PROGRESS NOTES
Called member to introduce myself as their SCP  and for ED List, left a message to return my call. Left direct number for future contact [phone]

## 2020-09-30 LAB — LPA SERPL-MCNC: 25 MG/DL

## 2020-10-01 ENCOUNTER — HOSPITAL ENCOUNTER (OUTPATIENT)
Dept: CARDIOLOGY | Facility: MEDICAL CENTER | Age: 70
End: 2020-10-01
Attending: INTERNAL MEDICINE
Payer: MEDICARE

## 2020-10-01 DIAGNOSIS — M54.2 CERVICAL PAIN: ICD-10-CM

## 2020-10-01 RX ORDER — IBUPROFEN 800 MG/1
TABLET ORAL
Qty: 30 TAB | Refills: 1 | Status: SHIPPED | OUTPATIENT
Start: 2020-10-01 | End: 2020-12-29

## 2020-10-09 ENCOUNTER — HOSPITAL ENCOUNTER (OUTPATIENT)
Dept: RADIOLOGY | Facility: MEDICAL CENTER | Age: 70
End: 2020-10-09
Attending: FAMILY MEDICINE
Payer: MEDICARE

## 2020-10-09 ENCOUNTER — TELEPHONE (OUTPATIENT)
Dept: VASCULAR LAB | Facility: MEDICAL CENTER | Age: 70
End: 2020-10-09

## 2020-10-09 DIAGNOSIS — I73.9 PAD (PERIPHERAL ARTERY DISEASE) (HCC): ICD-10-CM

## 2020-10-09 DIAGNOSIS — I65.22 CAROTID ARTERY STENOSIS, ASYMPTOMATIC, LEFT: ICD-10-CM

## 2020-10-09 PROCEDURE — 93925 LOWER EXTREMITY STUDY: CPT

## 2020-10-09 PROCEDURE — 93922 UPR/L XTREMITY ART 2 LEVELS: CPT | Mod: 26 | Performed by: INTERNAL MEDICINE

## 2020-10-09 PROCEDURE — 93880 EXTRACRANIAL BILAT STUDY: CPT

## 2020-10-09 PROCEDURE — 93880 EXTRACRANIAL BILAT STUDY: CPT | Mod: 26 | Performed by: INTERNAL MEDICINE

## 2020-10-09 PROCEDURE — 93925 LOWER EXTREMITY STUDY: CPT | Mod: 26 | Performed by: INTERNAL MEDICINE

## 2020-10-09 PROCEDURE — 93978 VASCULAR STUDY: CPT

## 2020-10-09 PROCEDURE — 93978 VASCULAR STUDY: CPT | Mod: 26 | Performed by: INTERNAL MEDICINE

## 2020-10-09 PROCEDURE — 93922 UPR/L XTREMITY ART 2 LEVELS: CPT

## 2020-10-10 NOTE — TELEPHONE ENCOUNTER
Reviewed multiple vascular duplex studies.   Complex array of stenoses in bilat iliacs and fem aa indicating bilat inflow/outflow disease.   Moderate carotid art stenosis.   Anticipate repeat surveillance of all studies in 1 year, sooner if worsening sx. aprn to track   Will review in detail at f/u appt.

## 2020-10-23 ENCOUNTER — TELEPHONE (OUTPATIENT)
Dept: VASCULAR LAB | Facility: MEDICAL CENTER | Age: 70
End: 2020-10-23

## 2020-10-23 NOTE — TELEPHONE ENCOUNTER
Patient called in stating that he has stopped his Cilostazol as it was making him feel paranoid and uneasy. Message sent to Dr. Caicedo and let patient know we will most likely be calling him back Monday. Patient states understanding and is ok with a call back Monday.

## 2020-10-26 ENCOUNTER — TELEPHONE (OUTPATIENT)
Dept: VASCULAR LAB | Facility: MEDICAL CENTER | Age: 70
End: 2020-10-26

## 2020-10-26 ENCOUNTER — APPOINTMENT (OUTPATIENT)
Dept: VASCULAR LAB | Facility: MEDICAL CENTER | Age: 70
End: 2020-10-26
Payer: MEDICARE

## 2020-10-26 SDOH — ECONOMIC STABILITY: FOOD INSECURITY: WITHIN THE PAST 12 MONTHS, YOU WORRIED THAT YOUR FOOD WOULD RUN OUT BEFORE YOU GOT MONEY TO BUY MORE.: NEVER TRUE

## 2020-10-26 SDOH — ECONOMIC STABILITY: TRANSPORTATION INSECURITY
IN THE PAST 12 MONTHS, HAS THE LACK OF TRANSPORTATION KEPT YOU FROM MEDICAL APPOINTMENTS OR FROM GETTING MEDICATIONS?: NO

## 2020-10-26 SDOH — ECONOMIC STABILITY: FOOD INSECURITY: WITHIN THE PAST 12 MONTHS, THE FOOD YOU BOUGHT JUST DIDN'T LAST AND YOU DIDN'T HAVE MONEY TO GET MORE.: NEVER TRUE

## 2020-10-26 SDOH — ECONOMIC STABILITY: TRANSPORTATION INSECURITY
IN THE PAST 12 MONTHS, HAS LACK OF TRANSPORTATION KEPT YOU FROM MEETINGS, WORK, OR FROM GETTING THINGS NEEDED FOR DAILY LIVING?: NO

## 2020-10-26 NOTE — PROGRESS NOTES
1. Attempt #:1    2. HealthConnect Verified: yes    3. Verify PCP: yes    4. Review Care Team: yes      6. Reviewed/Updated the following with patient:       •   Communication Preference Obtained? YES       •   Preferred Pharmacy? YES       •   Preferred Lab? YES       •   Family History (document living status of immediate family members and if + hx of cancer, diabetes, hypertension, hyperlipidemia, heart attack, stroke) YES    7. Annual Wellness Visit Scheduling  · Scheduling Status:Scheduled     8. Care Gap Scheduling (Attempt to Schedule EACH Overdue Care Gap!)     Health Maintenance Due   Topic Date Due   • PAP SMEAR  01/19/1970   • MAMMOGRAM  01/19/1989   • BONE DENSITY  01/19/2014   • COLONOSCOPY  10/03/2019   • LUNG CANCER SCREENING  05/15/2020   • RETINAL SCREENING  08/23/2020   • DIABETES MONOFILAMENT / LE EXAM  09/18/2020        Scheduled patient for Annual Wellness Visit and Diabetes Monofilament Exam     9. [employer] Activation: already active      13. Patient was advised: “This is a free wellness visit. The provider will screen for medical conditions to help you stay healthy. If you have other concerns to address you may be asked to discuss these at a separate visit or there may be an additional fee.”     14. Patient was informed to arrive 15 min prior to their scheduled appointment and bring in their medication bottles.      Verified 3 forms of HIPAA with member.   Called member to introduce myself as their SCP  and scheduled AHA/AWV.  Member had no questions at this time, direct phone number given for future contact.   Systems used: [employer]/Health Connect/Epic

## 2020-10-27 ENCOUNTER — TELEPHONE (OUTPATIENT)
Dept: VASCULAR LAB | Facility: MEDICAL CENTER | Age: 70
End: 2020-10-27

## 2020-10-27 NOTE — TELEPHONE ENCOUNTER
Left message for patient to call back regarding his Cilostazol , Dr. Caicedo would like patient to reduce to 1/2 tab once daily for a week, then 1/2 tab 2 times daily and continue at that dosage if tolerating and we can review further at next appointment.

## 2020-10-27 NOTE — TELEPHONE ENCOUNTER
Tried to reach patient again regarding his Cilostazol, asked patient to call back to go over new regimen. Will wait for further patient contact.

## 2020-10-28 ENCOUNTER — TELEPHONE VIRTUAL CHECK IN (OUTPATIENT)
Dept: VASCULAR LAB | Facility: MEDICAL CENTER | Age: 70
End: 2020-10-28

## 2020-10-28 ENCOUNTER — TELEPHONE (OUTPATIENT)
Dept: VASCULAR LAB | Facility: MEDICAL CENTER | Age: 70
End: 2020-10-28

## 2020-10-28 NOTE — TELEPHONE ENCOUNTER
Patient called back regarding the Cilostazol, patient states that he is in agreement with the plan for the new regimen Dr. Caicedo recommended.

## 2020-11-12 ENCOUNTER — OFFICE VISIT (OUTPATIENT)
Dept: MEDICAL GROUP | Facility: PHYSICIAN GROUP | Age: 70
End: 2020-11-12
Payer: MEDICARE

## 2020-11-12 VITALS
DIASTOLIC BLOOD PRESSURE: 60 MMHG | HEIGHT: 70 IN | SYSTOLIC BLOOD PRESSURE: 110 MMHG | OXYGEN SATURATION: 94 % | TEMPERATURE: 98.2 F | WEIGHT: 176.8 LBS | HEART RATE: 96 BPM | BODY MASS INDEX: 25.31 KG/M2

## 2020-11-12 DIAGNOSIS — I10 ESSENTIAL HYPERTENSION: ICD-10-CM

## 2020-11-12 DIAGNOSIS — Z98.890 H/O COLONOSCOPY WITH POLYPECTOMY: ICD-10-CM

## 2020-11-12 DIAGNOSIS — Z00.00 MEDICARE ANNUAL WELLNESS VISIT, SUBSEQUENT: Primary | ICD-10-CM

## 2020-11-12 DIAGNOSIS — Z87.891 HISTORY OF TOBACCO ABUSE: ICD-10-CM

## 2020-11-12 DIAGNOSIS — Z86.010 H/O COLONOSCOPY WITH POLYPECTOMY: ICD-10-CM

## 2020-11-12 DIAGNOSIS — E11.51 DIABETES MELLITUS WITH PERIPHERAL VASCULAR DISEASE (HCC): ICD-10-CM

## 2020-11-12 DIAGNOSIS — I73.9 PAD (PERIPHERAL ARTERY DISEASE) (HCC): ICD-10-CM

## 2020-11-12 DIAGNOSIS — E66.3 OVERWEIGHT: ICD-10-CM

## 2020-11-12 DIAGNOSIS — I51.81 STRESS-INDUCED CARDIOMYOPATHY: ICD-10-CM

## 2020-11-12 PROCEDURE — 8041 PR SCP AHA: Performed by: INTERNAL MEDICINE

## 2020-11-12 PROCEDURE — G0439 PPPS, SUBSEQ VISIT: HCPCS | Performed by: INTERNAL MEDICINE

## 2020-11-12 ASSESSMENT — ENCOUNTER SYMPTOMS: GENERAL WELL-BEING: GOOD

## 2020-11-12 ASSESSMENT — ACTIVITIES OF DAILY LIVING (ADL): BATHING_REQUIRES_ASSISTANCE: 0

## 2020-11-12 ASSESSMENT — FIBROSIS 4 INDEX: FIB4 SCORE: 4.74

## 2020-11-12 ASSESSMENT — PATIENT HEALTH QUESTIONNAIRE - PHQ9: CLINICAL INTERPRETATION OF PHQ2 SCORE: 0

## 2020-11-12 NOTE — PATIENT INSTRUCTIONS
Vitamin B complex supplement  Magnesium supplement 400 mg daily  Vitamin D3 supplement   Essential oil supplement (cod liver oil)

## 2020-11-12 NOTE — PROGRESS NOTES
Chief Complaint   Patient presents with   • Annual Wellness Visit         HPI:  Chung Limon is a 70 y.o. here for Medicare Annual Wellness Visit     Patient denied having symptoms today,   -He is currently following up with vascular medicine as well as cardiology for his chronic problems    Patient Active Problem List    Diagnosis Date Noted   • 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HCC) 09/21/2020   • Chronic distal aortic occlusion (HCC) 09/21/2020   • Carotid artery stenosis, asymptomatic, left 09/21/2020   • Upper back pain on left side 08/12/2020   • Newly recognized heart murmur 08/12/2020   • Overweight 05/12/2020   • Transaminitis 05/12/2020   • Chronic right shoulder pain 09/18/2019   • Alcohol dependence in remission ([facility]) 04/19/2019   • Cervical radiculopathy, chronic 04/05/2018   • Essential hypertension 04/05/2018   • Chronic hepatitis C without hepatic coma ([facility]) 05/18/2017   • Cervical disc disease 05/18/2017   • History of tobacco abuse 01/31/2017   • Atherosclerosis of aorta ([facility]) 01/31/2017   • Diabetes mellitus with peripheral vascular disease ([facility]) 11/15/2016   • Microalbuminuria due to type 2 diabetes mellitus ([facility]) 11/15/2016   • Bruit 11/01/2016   • Stress-induced cardiomyopathy 03/10/2015   • PAD (peripheral artery disease) ([facility]) 01/21/2015   • H/O colonoscopy with polypectomy 05/07/2013   • Bilateral claudication of lower limb ([facility]) 10/22/2012       Current Outpatient Medications   Medication Sig Dispense Refill   • ibuprofen (MOTRIN) 800 MG Tab TAKE ONE TABLET BY MOUTH EVERY 8 HOURS AS NEEDED 30 Tab 1   • cilostazol (PLETAL) 100 MG Tab Take 1 Tab by mouth 2 times a day for 360 days. 180 Tab 3   • B Complex Vitamins (B COMPLEX PO) Take  by mouth.     • lisinopril (PRINIVIL) 10 MG Tab Take 1 Tab by mouth every day. 100 Tab 3   • metoprolol SR (TOPROL XL) 25 MG TABLET SR 24 HR Take 1 Tab by mouth every day. 90 Tab 3   • clopidogrel (PLAVIX) 75 MG Tab TAKE ONE TABLET BY MOUTH EVERY   Tab 0   • FREESTYLE LITE strip      • Multiple Vitamins-Minerals (CENTRUM ADULTS PO) Take  by mouth.     • atorvastatin (LIPITOR) 80 MG tablet Take 1 Tab by mouth every day. 100 Tab 1   • Blood Glucose Test Strips Test strips order: Test strips for Freestyle Trang meter. Sig: use before breakfast and prn ssx high or low sugar #100 RF x 3 (Patient taking differently: Test strips order: Test strips for Freestyle Lite meter. Sig: use before breakfast and prn ssx high or low sugar #100 RF x 3) 100 Strip 3   • aspirin (ASA) 81 MG Chew Tab chewable tablet Take 81 mg by mouth every day.       No current facility-administered medications for this visit.             Current supplements as per medication list.       Allergies: Patient has no known allergies.    Current social contact/activities: fishing, walking, camping    Tuan Limon  reports that Tuan Limon quit smoking about 9 years ago. Tuan Limon's smoking use included cigarettes. Tuan Limon started smoking about 60 years ago. Tuan Limon has a 58.00 pack-year smoking history. Tuan Limon has never used smokeless tobacco. Tuan Limon reports current drug use. Drug: Inhaled. Tuan Limon reports that Tuan Limon does not drink alcohol.  Counseling given: Not Answered  Comment: avoid all tobacco products      DPA/Advanced Directive:  Patient has Advanced Directive on file.     ROS:    Gait: Uses no assistive device  Ostomy: No  Other tubes: No  Amputations: No  Chronic oxygen use: No  Last eye exam: 11/2020  Wears hearing aids: No   : Denies any urinary leakage during the last 6 months    Screening:  Annual Health Assessment Questions:    1.  Are you currently engaging in any exercise or physical activity? Yes    2.  How would you describe your mood or emotional well-being today? good    3.  Have you had any falls in the last year? No    4.  Have you noticed any problems with your balance or had difficulty walking? No    5.  In the last six months have you  experienced any leakage of urine? No    6. DPA/Advanced Directive: Patient has Advanced Directive on file.     Depression Screening    Little interest or pleasure in doing things?  0 - not at all  Feeling down, depressed , or hopeless? 0 - not at all  Patient Health Questionnaire Score: 0     If depressive symptoms identified deferred to follow up visit unless specifically addressed in assessment and plan.    Interpretation of PHQ-9 Total Score   Score Severity   1-4 No Depression   5-9 Mild Depression   10-14 Moderate Depression   15-19 Moderately Severe Depression   20-27 Severe Depression    Screening for Cognitive Impairment    Three Minute Recall (river, hal, finger)  /3    Rashawn clock face with all 12 numbers and set the hands to show 10 past 11.       Cognitive concerns identified deferred for follow up unless specifically addressed in assessment and plan.    Fall Risk Assessment    Has the patient had two or more falls in the last year or any fall with injury in the last year?  No    Safety Assessment    Throw rugs on floor.     Handrails on all stairs.     Good lighting in all hallways.     Difficulty hearing.     Patient counseled about all safety risks that were identified.    Functional Assessment ADLs    Are there any barriers preventing you from cooking for yourself or meeting nutritional needs?   .    Are there any barriers preventing you from driving safely or obtaining transportation?   .    Are there any barriers preventing you from using a telephone or calling for help?   .    Are there any barriers preventing you from shopping?   .    Are there any barriers preventing you from taking care of your own finances?   .    Are there any barriers preventing you from managing your medications?   .    Are there any barriers preventing you from showering, bathing or dressing yourself?   .    Are you currently engaging in any exercise or physical activity?   .     What is your perception of your health?    .      Health Maintenance Summary                BONE DENSITY Overdue 1/19/2014     COLONOSCOPY Overdue 10/3/2019      Done 10/3/2016 REFERRAL TO GI FOR COLONOSCOPY     Patient has more history with this topic...    LUNG CANCER SCREENING Overdue 5/15/2020      Done 5/15/2019 CT-LUNG CANCER-SCREENING     Patient has more history with this topic...    RETINAL SCREENING Overdue 8/23/2020      Done 8/23/2019 REFERRAL FOR RETINAL SCREENING EXAM     Patient has more history with this topic...    DIABETES MONOFILAMENT / LE EXAM Overdue 9/18/2020      Done 9/18/2019 AMB DIABETIC MONOFILAMENT LOWER EXTREMITY EXAM     Patient has more history with this topic...    IMM ZOSTER VACCINES Postponed 3/26/2021 Originally 1/10/2017. Patient Refused     Done 11/15/2016 Imm Admin: Zoster Vaccine Live (ZVL) (Zostavax) - HISTORICAL DATA    IMM HEP B VACCINE Postponed 10/26/2021 Originally 1/19/1969. Patient Refused    IMM INFLUENZA Postponed 10/26/2021 Originally 9/1/2020. Patient Refused     Done 11/15/2016 Imm Admin: Influenza Vaccine Adult HD     Patient has more history with this topic...    Annual Wellness Visit Next Due 11/20/2020      Done 11/20/2019 SUBSEQUENT ANNUAL WELLNESS VISIT-INCLUDES PPPS ()     Patient has more history with this topic...    A1C SCREENING Next Due 3/28/2021      Done 9/28/2020 HEMOGLOBIN A1C     Patient has more history with this topic...    URINE ACR / MICROALBUMIN Next Due 9/28/2021      Done 9/28/2020 MICROALBUMIN CREAT RATIO URINE     Patient has more history with this topic...    FASTING LIPID PROFILE Next Due 9/29/2021      Done 9/29/2020 LIPID PROFILE     Patient has more history with this topic...    SERUM CREATININE Next Due 9/29/2021      Done 9/29/2020 COMP METABOLIC PANEL     Patient has more history with this topic...    IMM DTaP/Tdap/Td Vaccine Next Due 12/2/2024      Done 12/2/2014 Imm Admin: Tdap Vaccine          Patient Care Team:  Asuncion Roland M.D. as PCP - General (Internal  Medicine)  Manjit Gleason M.D. as Consulting Physician (Cardiology)  [facility] as Consulting Physician (Optometry)  Fabienne Scruggs C.N.A. as          Social History     Tobacco Use   • Smoking status: Former Smoker     Packs/day: 1.00     Years: 58.00     Pack years: 58.00     Types: Cigarettes     Start date: 1960     Quit date: 2011     Years since quittin.2   • Smokeless tobacco: Never Used   • Tobacco comment: avoid all tobacco products   Substance Use Topics   • Alcohol use: No     Alcohol/week: 0.0 oz     Comment: quit , drank 2 times since   • Drug use: Yes     Types: Inhaled     Comment: marijuana (last use 2 weeks ago per pt)     Family History   Problem Relation Age of Onset   • Heart Disease Father    • Arthritis Mother    • No Known Problems Sister    • Cancer Brother         type unknown   • Cancer Brother         skin    • No Known Problems Maternal Grandmother    • No Known Problems Maternal Grandfather    • No Known Problems Paternal Grandmother    • No Known Problems Paternal Grandfather    • Diabetes Neg Hx    • Hypertension Neg Hx    • Stroke Neg Hx    • Hyperlipidemia Neg Hx      Tuan Limon  has a past medical history of Benign neoplasm of soft palate (2018), Cancer (HCC), Claudication (HCC) (2012), Dental disorder (2019), Dermoid cyst of ear, left (7/3/2019), Diabetes (HCC) (2019), H/O colonoscopy with polypectomy (2013), Hayfever, Healthcare maintenance (2018), Hepatitis C, History of alcoholism (HCC) (2012), [place] (hard of hearing) (2019), Hyperlipidemia, Hypertension, Insulin dependent diabetes mellitus (2019), and S/P excision of lipoma (2019).   Past Surgical History:   Procedure Laterality Date   • MASS EXCISION GENERAL Right 2019    Procedure: EXCISION, MASS, GENERAL - 15 CM LIPOMA OF SHOULDER;  Surgeon: Augustin Rosales M.D.;  Location: SURGERY [facility];  Service:  General   • COLONOSCOPY  2017   • WIDE EXCISION  5/6/2014    Performed by Nicholas Govea M.D. at SURGERY SAME DAY [facility] ORS   • FLAP GRAFT  5/6/2014    Performed by Nicholas Govea M.D. at SURGERY SAME DAY [facility] ORS   • RECOVERY  10/22/2012    Performed by Pily Lee M.D. at SURGERY [facility] ORS       Exam:   There were no vitals taken for this visit. There is no height or weight on file to calculate BMI.    Hearing good.    Dentition good  Alert, oriented in no acute distress.  Eye contact is good, speech goal directed, affect calm    Assessment and Plan.     1. History of tobacco abuse  - CT-LUNG CANCER-SCREENING; Future    2. PAD (peripheral artery disease) (HCC)  Continue follow-up with vascular medicine, mentioned compliant with aspirin 81 mg daily, Lipitor 80 mg daily, Plavix 75 mg daily    3. Stress-induced cardiomyopathy  -Continue follow-up with cardiology, mention compliant with metoprolol 25 mg daily, as well as blood pressure medication    4. Overweight  Patient is happy of losing weight, he is trying to eat healthier option, regular exercise as tolerated    5. H/O colonoscopy with polypectomy  Patient had colonoscopy done in October 2016 with 12 polyps, based on the report mention follow-up in 3 to 5 years, patient would like to follow-up with GI for next colonoscopy in October 2021, currently denied having alarm GI signs/symptoms    6. Essential hypertension  Chronic, stable, mentioned compliant with lisinopril 10 mg daily as well as metoprolol 25 mg daily    7. Diabetes mellitus with peripheral vascular disease (HCC)  A1c:   Lab Results   Component Value Date/Time    HBA1C 6.1 (H) 09/28/2020 1338    HBA1C 6.0 (A) 08/31/2020 0810    HBA1C 7.2 (A) 05/12/2020 1555    AVGLUC 128 09/28/2020 1338    AVGLUC 169 06/27/2019 0622    AVGLUC 140 08/15/2018 1437   >> Mentioned compliant with diabetic diet, regular exercise, he is losing weight actively  - Diabetic Monofilament LE  Exam  Monofilament testing with a 10 gram force: sensation intact: intact bilaterally  Visual Inspection: Feet without maceration, ulcers, fissures.  Pedal pulses: decreased bilaterally  >> Mentioned recently seen by ophthalmologist and his eyes are fine, he is wearing eyeglasses for correction    Services suggested: No services needed at this time  Health Care Screening: Age-appropriate preventive services recommended by USPTF and ACIP covered by Medicare were discussed today. Services ordered if indicated and agreed upon by the patient.  Referrals offered: Community-based lifestyle interventions to reduce health risks and promote self-management and wellness, fall prevention, nutrition, physical activity, tobacco-use cessation, weight loss, and mental health services as per orders if indicated.    Discussion today about general wellness and lifestyle habits:    · Prevent falls and reduce trip hazards; Cautioned about securing or removing rugs.  · Have a working fire alarm and carbon monoxide detector;   · Engage in regular physical activity and social activities     Follow-up: Return in about 3 months (around 2/12/2021) for follow up, diabetes, hypertension.

## 2020-11-16 ENCOUNTER — TELEPHONE (OUTPATIENT)
Dept: VASCULAR LAB | Facility: MEDICAL CENTER | Age: 70
End: 2020-11-16

## 2020-11-16 NOTE — TELEPHONE ENCOUNTER
Chung Limon  No showed to follow-up with the vascular medicine clinic. Scheduling staff will contact to reschedule and remind patient of the importance of maintaining appointments as scheduled or to contact our office at least 24 hours in advance if they need to reschedule.

## 2020-12-02 ENCOUNTER — HOSPITAL ENCOUNTER (OUTPATIENT)
Dept: RADIOLOGY | Facility: MEDICAL CENTER | Age: 70
End: 2020-12-02
Attending: INTERNAL MEDICINE
Payer: MEDICARE

## 2020-12-02 DIAGNOSIS — I73.9 PERIPHERAL VASCULAR DISEASE (HCC): ICD-10-CM

## 2020-12-02 DIAGNOSIS — M54.9 UPPER BACK PAIN ON LEFT SIDE: ICD-10-CM

## 2020-12-02 DIAGNOSIS — I10 ESSENTIAL HYPERTENSION: ICD-10-CM

## 2020-12-02 DIAGNOSIS — Z87.891 HISTORY OF TOBACCO ABUSE: ICD-10-CM

## 2020-12-02 PROCEDURE — 71046 X-RAY EXAM CHEST 2 VIEWS: CPT

## 2020-12-02 PROCEDURE — G0297 LDCT FOR LUNG CA SCREEN: HCPCS

## 2020-12-17 RX ORDER — CLOPIDOGREL BISULFATE 75 MG/1
TABLET ORAL
Qty: 100 TAB | Refills: 3 | Status: SHIPPED | OUTPATIENT
Start: 2020-12-17 | End: 2022-05-04

## 2020-12-19 DIAGNOSIS — E11.51 DIABETES MELLITUS WITH PERIPHERAL VASCULAR DISEASE (HCC): ICD-10-CM

## 2020-12-19 DIAGNOSIS — I73.9 PERIPHERAL VASCULAR DISEASE (HCC): ICD-10-CM

## 2020-12-22 RX ORDER — ATORVASTATIN CALCIUM 80 MG/1
TABLET, FILM COATED ORAL
Qty: 100 TAB | Refills: 2 | Status: SHIPPED | OUTPATIENT
Start: 2020-12-22 | End: 2021-11-01

## 2020-12-22 NOTE — TELEPHONE ENCOUNTER
Pt has had OV within the 12 month protocol and lipid panel is current. 9 month supply sent to pharmacy.   Lab Results   Component Value Date/Time    CHOLSTRLTOT 106 09/29/2020 06:46 AM    LDL 47 09/29/2020 06:46 AM    HDL 34 (A) 09/29/2020 06:46 AM    TRIGLYCERIDE 123 09/29/2020 06:46 AM       Lab Results   Component Value Date/Time    SODIUM 139 09/29/2020 06:46 AM    POTASSIUM 4.3 09/29/2020 06:46 AM    CHLORIDE 105 09/29/2020 06:46 AM    CO2 23 09/29/2020 06:46 AM    GLUCOSE 117 (H) 09/29/2020 06:46 AM    BUN 28 (H) 09/29/2020 06:46 AM    CREATININE 0.87 09/29/2020 06:46 AM     Lab Results   Component Value Date/Time    ALKPHOSPHAT 84 09/29/2020 06:46 AM    ASTSGOT 73 (H) 09/29/2020 06:46 AM    ALTSGPT 92 (H) 09/29/2020 06:46 AM    TBILIRUBIN 0.4 09/29/2020 06:46 AM

## 2020-12-27 DIAGNOSIS — M54.2 CERVICAL PAIN: ICD-10-CM

## 2020-12-29 RX ORDER — IBUPROFEN 800 MG/1
TABLET ORAL
Qty: 30 TAB | Refills: 1 | Status: SHIPPED
Start: 2020-12-29 | End: 2021-01-04

## 2021-01-04 ENCOUNTER — OFFICE VISIT (OUTPATIENT)
Dept: MEDICAL GROUP | Facility: PHYSICIAN GROUP | Age: 71
End: 2021-01-04
Payer: MEDICARE

## 2021-01-04 ENCOUNTER — APPOINTMENT (OUTPATIENT)
Dept: RADIOLOGY | Facility: IMAGING CENTER | Age: 71
End: 2021-01-04
Attending: INTERNAL MEDICINE
Payer: MEDICARE

## 2021-01-04 VITALS
DIASTOLIC BLOOD PRESSURE: 58 MMHG | TEMPERATURE: 96.9 F | SYSTOLIC BLOOD PRESSURE: 120 MMHG | BODY MASS INDEX: 24.34 KG/M2 | WEIGHT: 169.6 LBS | OXYGEN SATURATION: 96 % | HEART RATE: 70 BPM

## 2021-01-04 DIAGNOSIS — R07.89 OTHER CHEST PAIN: ICD-10-CM

## 2021-01-04 DIAGNOSIS — M54.12 CERVICAL RADICULOPATHY, CHRONIC: ICD-10-CM

## 2021-01-04 DIAGNOSIS — I74.5 BILATERAL ILIAC ARTERY OCCLUSION (HCC): ICD-10-CM

## 2021-01-04 DIAGNOSIS — M54.6 CHRONIC BILATERAL THORACIC BACK PAIN: ICD-10-CM

## 2021-01-04 DIAGNOSIS — G89.29 CHRONIC BILATERAL THORACIC BACK PAIN: ICD-10-CM

## 2021-01-04 DIAGNOSIS — I74.09 CHRONIC DISTAL AORTIC OCCLUSION (HCC): ICD-10-CM

## 2021-01-04 DIAGNOSIS — M50.90 CERVICAL DISC DISEASE: ICD-10-CM

## 2021-01-04 PROBLEM — M54.9 UPPER BACK PAIN ON LEFT SIDE: Status: RESOLVED | Noted: 2020-08-12 | Resolved: 2021-01-04

## 2021-01-04 LAB — EKG 4674: NORMAL

## 2021-01-04 PROCEDURE — 72070 X-RAY EXAM THORAC SPINE 2VWS: CPT | Mod: TC | Performed by: INTERNAL MEDICINE

## 2021-01-04 PROCEDURE — 99214 OFFICE O/P EST MOD 30 MIN: CPT | Performed by: INTERNAL MEDICINE

## 2021-01-04 PROCEDURE — 72100 X-RAY EXAM L-S SPINE 2/3 VWS: CPT | Mod: TC | Performed by: INTERNAL MEDICINE

## 2021-01-04 PROCEDURE — 93000 ELECTROCARDIOGRAM COMPLETE: CPT | Performed by: INTERNAL MEDICINE

## 2021-01-04 RX ORDER — TRAMADOL HYDROCHLORIDE 50 MG/1
50 TABLET ORAL 2 TIMES DAILY PRN
Qty: 45 TAB | Refills: 0 | Status: SHIPPED | OUTPATIENT
Start: 2021-02-04 | End: 2021-02-08

## 2021-01-04 RX ORDER — TRAMADOL HYDROCHLORIDE 50 MG/1
50 TABLET ORAL 2 TIMES DAILY PRN
Qty: 45 TAB | Refills: 0 | Status: SHIPPED | OUTPATIENT
Start: 2021-01-04 | End: 2021-02-03

## 2021-01-04 RX ORDER — TRAMADOL HYDROCHLORIDE 50 MG/1
50 TABLET ORAL 2 TIMES DAILY PRN
Qty: 45 TAB | Refills: 0 | Status: SHIPPED | OUTPATIENT
Start: 2021-03-04 | End: 2021-04-03

## 2021-01-04 ASSESSMENT — FIBROSIS 4 INDEX: FIB4 SCORE: 4.74

## 2021-01-04 ASSESSMENT — PATIENT HEALTH QUESTIONNAIRE - PHQ9: CLINICAL INTERPRETATION OF PHQ2 SCORE: 0

## 2021-01-04 NOTE — PROGRESS NOTES
Established Patient    Chief Complaint   Patient presents with   • Gastrophageal Reflux     had too much to eat, experienced heart burn and vomitted in doing so chest now hurts since then, patient believes he pulled a muscle or something.       Subjective:     HPI:   Chung presents today with the following.      3. Cervical disc disease  4. Cervical radiculopathy, chronic  5. Chronic bilateral thoracic back pain    Cervical x-ray 2017  disc space narrowing at C3-4 and C5-6. There is multilevel uncovertebral joint arthropathy.    Cervical MRI 3/2017  Multilevel degenerative changes of the cervical spine as described above.  >> Mention chronic neck pain as well as midthoracic pain, almost daily, 5-7 over 10, worsening with activity especially when standing up, better on taking NSAIDs as well as rest  -Patient is currently taking Plavix, aspirin, as well as ibuprofen 4 pills a week for the above, he is at high risk for upper GI bleeding from side effect of medication, he is able to do daily activities    6. Other chest pain  -Patient is since last Christmas, mention on the day of Christmas he had some potato chips as well as steak and then developed some food regurgitation and vomiting, and then started to have some left-sided chest pain, superficial, worsening with cough, sneezing, movement of the chest as well as deep breathing, denied having shortness of breath, palpitation, leg swelling, dizziness, other typical symptoms of cardiac chest pain admission he is active and no chest pain on exertion    Patient Active Problem List    Diagnosis Date Noted   • Chronic bilateral thoracic back pain 01/04/2021   • Other chest pain 01/04/2021   • 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HCC) 09/21/2020   • Chronic distal aortic occlusion (HCC) 09/21/2020   • Carotid artery stenosis, asymptomatic, left 09/21/2020   • Newly recognized heart murmur 08/12/2020   • Overweight 05/12/2020   • Transaminitis 05/12/2020   • Chronic right  shoulder pain 09/18/2019   • Alcohol dependence in remission ([facility]) 04/19/2019   • Cervical radiculopathy, chronic 04/05/2018   • Essential hypertension 04/05/2018   • Chronic hepatitis C without hepatic coma ([facility]) 05/18/2017   • Cervical disc disease 05/18/2017   • History of tobacco abuse 01/31/2017   • Atherosclerosis of aorta ([facility]) 01/31/2017   • Diabetes mellitus with peripheral vascular disease ([facility]) 11/15/2016   • Microalbuminuria due to type 2 diabetes mellitus ([facility]) 11/15/2016   • Bruit 11/01/2016   • Stress-induced cardiomyopathy 03/10/2015   • PAD (peripheral artery disease) ([facility]) 01/21/2015   • H/O colonoscopy with polypectomy 05/07/2013   • Bilateral claudication of lower limb ([facility]) 10/22/2012       Current Outpatient Medications on File Prior to Visit   Medication Sig Dispense Refill   • atorvastatin (LIPITOR) 80 MG tablet TAKE 1 TABLET BY MOUTH EVERY  Tab 2   • clopidogrel (PLAVIX) 75 MG Tab TAKE 1 TABLET BY MOUTH EVERY  Tab 3   • cilostazol (PLETAL) 100 MG Tab Take 1 Tab by mouth 2 times a day for 360 days. 180 Tab 3   • B Complex Vitamins (B COMPLEX PO) Take  by mouth.     • lisinopril (PRINIVIL) 10 MG Tab Take 1 Tab by mouth every day. 100 Tab 3   • metoprolol SR (TOPROL XL) 25 MG TABLET SR 24 HR Take 1 Tab by mouth every day. 90 Tab 3   • Multiple Vitamins-Minerals (CENTRUM ADULTS PO) Take  by mouth.     • Blood Glucose Test Strips Test strips order: Test strips for Freestyle Trang meter. Sig: use before breakfast and prn ssx high or low sugar #100 RF x 3 (Patient taking differently: Test strips order: Test strips for Freestyle Lite meter. Sig: use before breakfast and prn ssx high or low sugar #100 RF x 3) 100 Strip 3   • aspirin (ASA) 81 MG Chew Tab chewable tablet Take 81 mg by mouth every day.     • FREESTYLE LITE strip        No current facility-administered medications on file prior to visit.        Allergies, past medical history, past surgical history, family history, social  history reviewed and updated    ROS:  All other systems reviewed and are negative except as stated in the HPI     Physical Exam:     /58 (BP Location: Right arm, Patient Position: Sitting, BP Cuff Size: Adult)   Pulse 70   Temp 36.1 °C (96.9 °F) (Temporal)   Wt 76.9 kg (169 lb 9.6 oz)   SpO2 96%   BMI 24.34 kg/m²   General: Normal appearing. No distress.  ENT: oropharynx without exudates.    Eyes: conjunctiva clear lids without ptosis  Pulmonary: Clear to ausculation.  Normal effort.   Cardiovascular: Regular rate and rhythm  Abdomen: Soft, nontender,  Lymph: No cervical or supraclavicular palpable lymph nodes  Psych: Normal mood and affect.     I have reviewed pertinent labs and diagnostic tests associated with this visit with specific comments listed under the assessment and plan below      Assessment and Plan:     70 y.o. adult with the following issues.      3. Cervical disc disease  4. Cervical radiculopathy, chronic  5. Chronic bilateral thoracic back pain  - traMADol (ULTRAM) 50 MG Tab; Take 1 Tab by mouth 2 times a day as needed for Moderate Pain for up to 30 days.  Dispense: 45 Tab; Refill: 0  - traMADol (ULTRAM) 50 MG Tab; Take 1 Tab by mouth 2 times a day as needed for Moderate Pain for up to 30 days.  Dispense: 45 Tab; Refill: 0  - traMADol (ULTRAM) 50 MG Tab; Take 1 Tab by mouth 2 times a day as needed for Moderate Pain for up to 30 days.  Dispense: 45 Tab; Refill: 0  - Controlled Substance Treatment Agreement    >> X-ray of lumbar and thoracic spine order placed  - REFERRAL TO PHYSICAL THERAPY    >> Advised to avoid NSAIDs forever secondary to chronic vascular disease and intake of aspirin as well as Plavix to reduce the risk of GI bleeding  >> Okay for taking tramadol for the above as needed  >> ORT score of 0, he denies taking marijuana or alcohol    6. Other chest pain  Likely musculoskeletal  - EKG today done in the office: Sinus rhythm, heart rate 67, duration within normal range,  borderline left axis deviation, no ST-T wave changes              Follow Up:   Follow-up in February 2021    Please note that this dictation was created using voice recognition software. I have made every reasonable attempt to correct obvious errors, but I expect that there are errors of grammar and possibly content that I did not discover before finalizing the note.    Signed by: Asuncion Roland M.D.

## 2021-01-15 DIAGNOSIS — Z23 NEED FOR VACCINATION: ICD-10-CM

## 2021-01-21 ENCOUNTER — PHYSICAL THERAPY (OUTPATIENT)
Dept: PHYSICAL THERAPY | Facility: REHABILITATION | Age: 71
End: 2021-01-21
Attending: INTERNAL MEDICINE
Payer: MEDICARE

## 2021-01-21 DIAGNOSIS — M54.6 CHRONIC BILATERAL THORACIC BACK PAIN: ICD-10-CM

## 2021-01-21 DIAGNOSIS — G89.29 CHRONIC BILATERAL THORACIC BACK PAIN: ICD-10-CM

## 2021-01-21 DIAGNOSIS — M54.12 CERVICAL RADICULOPATHY, CHRONIC: ICD-10-CM

## 2021-01-21 DIAGNOSIS — M50.90 CERVICAL DISC DISEASE: ICD-10-CM

## 2021-01-21 PROCEDURE — 97110 THERAPEUTIC EXERCISES: CPT

## 2021-01-21 PROCEDURE — 97162 PT EVAL MOD COMPLEX 30 MIN: CPT

## 2021-01-21 ASSESSMENT — ENCOUNTER SYMPTOMS
PAIN SCALE AT LOWEST: 2
PAIN LOCATION: POSTERIOR NECK
PAIN SCALE AT HIGHEST: 5
PAIN SCALE: 2
PAIN TIMING: CONSTANT
QUALITY: ACHING

## 2021-01-21 NOTE — OP THERAPY EVALUATION
"  Outpatient Physical Therapy  INITIAL EVALUATION    [facility] [address]  [address]  [place] [address]  Phone:  [phone]  Fax:  [phone]    Date of Evaluation: 01/21/2021    Patient: Tuan Limon  YOB: 1950  MRN: [id number]     Referring Provider: Asuncion Roland M.D.  [facility] [address]  [place] [address]   Referring Diagnosis Cervical disc disease [M50.90];Cervical radiculopathy, chronic [M54.12];Chronic bilateral thoracic back pain [M54.6, G89.29]     Time Calculation    Start time: 0830  Stop time: 0925 Time Calculation (min): 55 minutes         Chief Complaint: Back Problem    Visit Diagnoses     ICD-10-CM   1. Cervical disc disease  M50.90   2. Cervical radiculopathy, chronic  M54.12   3. Chronic bilateral thoracic back pain  M54.6    G89.29         Subjective:   History of Present Illness:     Mechanism of injury:  Patient is a 71 year old male with a Complex medical hx including B cladication lower limb, PAD, bruit, DM II, atherosclerosis of aorta and chronic hepatitis C, alcohol dependence in remission, carotid artery stenosis. Pt is currently following up with vascular medicine as well as cardiology for his chronic problems.    Pt presents to therapy with complaints of daily neck pain and mid thoracic pain,  Pt reports would like to initiate with neck complaints today. Reports neck pain started about 20 years ago. Pt reports was hit by a Magpie in the distant hx, additionally fell off a building a few times; no back or neck surgeries/treatments. Endorses central neck pain without radiation. Reports fingers tingle and \"buzz\" which initiated intermittently for a few years; reports it feels like \"they are not getting enough blood supply.\" Aggravating factors include: Sleeping, axial loaded positions. Relief with hot shower, tramadol, supine, pushing head forward with hands supporting neck. Denies pain with coughing, laughing, " sneezing in neck (positive for back).    Pt currently denies: Dizziness, diploplia, dysphasia, dysarthria, drop attacks, nausea, nystagmus, vision changes. Pt consenting to treatment.      Prior level of function:  Worked in [employer]   Headache comments: Headaches (1x per month avg)  Sleep disturbance:  Not disrupted (supine and prone)  Pain:     Current pain ratin    At best pain ratin    At worst pain ratin    Location:  Posterior neck     Quality:  Aching (Pressure)    Pain timing:  Constant    Pain Comments::  Aggravating: Sleeping, axial loaded positions.    Relieving: Relief with hot shower, tramadol, supine, pushing head forward with hands supporting neck.   Hand dominance:  Right  Diagnostic Tests:     Diagnostic Tests Comments:  M54.6,G89.29 (ICD-10-CM) - Chronic bilateral thoracic back pain    Complex medical hx including B cladication lower limb, PAD, bruit, DM II, atherosclerosis of aorta and chronic hepatitis C, alcohol dependence in remission, carotid artery stenosis. Pt is currently following up with vascular medicine as well as cardiology for his chronic problems    Daily neck pain and mid thoracic pain  Aggravating factors include: standing up, relieved with rest, NSAIDs    Cervical MRI 3/31/17:  FINDINGS:  There is minimal grade 1 retrolisthesis of C3 on 4 and C5 on 6. The cervical cord has a normal caliber, course and appearance.     Findings specific to each level are described below:     C2-3: No significant central canal or neural foraminal stenosis.  C3-4:  There is mild broad posterior disc bulge which effaces anterior thecal sac. There is moderate ligamentum flavum hypertrophy which deforms the dorsal surface of the cord. There is mild central canal stenosis. There is moderate bilateral neural foraminal stenosis.  C4-5:  There is mild to moderate broad posterior disc bulge which deforms the ventral surface of the cord. There is mild ligamentum flavum hypertrophy. There is  "mild central canal stenosis. There is moderate to severe left and mild right neural foraminal   stenosis.  C5-6: There is to moderate broad posterior disc bulge and endplate spurring. There is marked ligamentum flavum hypertrophy. There is mild to moderate central canal stenosis. There is moderate to severe bilateral neural foraminal stenosis.  C6-7: There is mild ligamentum flavum hypertrophy. No significant central canal or neural foraminal stenosis.  C7-T1: No significant central canal or neural foraminal stenosis.     The visualized prevertebral and posterior paraspinous soft tissues are grossly unremarkable.     IMPRESSION:     Multilevel degenerative changes of the cervical spine as described above.    Thoracic x ray 1/4/21:  FINDINGS:  The alignment is normal. There is no evidence of fracture.     There is mild to moderate loss of intervertebral disc height throughout the thoracic spine.     There is atherosclerotic calcification in the aortic arch.     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 states checks his blood sugar daily. Not currently taking medication for.   • H/O colonoscopy with polypectomy 5/7/2013   • Hayfever    • Healthcare maintenance 4/5/2018   • Hepatitis C     no treatment   • History of " alcoholism (HCC) 2012   • [place] (hard of hearing) 2019   • Hyperlipidemia    • Hypertension    • Insulin dependent diabetes mellitus 2019    Pt. states used to take Insulin 2 years ago.   • S/P excision of lipoma 2019     Past Surgical History:   Procedure Laterality Date   • MASS EXCISION GENERAL Right 2019    Procedure: EXCISION, MASS, GENERAL - 15 CM LIPOMA OF SHOULDER;  Surgeon: Augustin Rosales M.D.;  Location: SURGERY [facility];  Service: General   • COLONOSCOPY  2017   • WIDE EXCISION  2014    Performed by Nicholas Govea M.D. at SURGERY SAME DAY [facility]   • FLAP GRAFT  2014    Performed by Nicholas Govea M.D. at SURGERY SAME DAY [facility]   • RECOVERY  10/22/2012    Performed by Pily Lee M.D. at SURGERY [facility]     Social History     Tobacco Use   • Smoking status: Former Smoker     Packs/day: 1.00     Years: 58.00     Pack years: 58.00     Types: Cigarettes     Start date: 1960     Quit date: 2011     Years since quittin.3   • Smokeless tobacco: Never Used   • Tobacco comment: avoid all tobacco products   Substance Use Topics   • Alcohol use: No     Alcohol/week: 0.0 oz     Comment: quit , drank 2 times since     Family and Occupational History     Socioeconomic History   • Marital status: Single     Spouse name: Not on file   • Number of children: Not on file   • Years of education: Not on file   • Highest education level: Not on file   Occupational History   • Not on file       Objective     Postural Observations  Seated posture: poor  Standing posture: fair    Additional Postural Observation Details  Forward head and rounded shoulders; kyphosis    Shoulder Screen    Shoulder active range of motion within functional limits.  Shoulder range of motion within functional limits with the following exceptions: No reproduction of neck pain    Neurological Testing     Myotome testing   Cervical (left)   All left cervical myotomes  "within normal limits    Cervical (right)   All right cervical myotomes within normal limits    Dermatome testing   Cervical (left)   All left cervical dermatomes intact    Cervical (right)   All right cervical dermatomes intact    Additional Neurological Details  Pt reporting feels as if hands do not get enough blood supply and sometimes \"tingle and buzz\" at both hands. Pt does have a hx of DM II. No decreased sensation with light tough testing today.    Capillary refill <2sec    Palpation   Left   No palpable tenderness to the cervical paraspinals.     Right   No palpable tenderness to the cervical paraspinals.     Active Range of Motion     Cervical Spine   Flexion: within functional limits  Extension: decreased  Retraction: decreased  Left lateral flexion: decreased (24 deg)  Right lateral flexion: decreased (15 deg)  Left rotation: decreased (40 deg)  Right rotation: decreased (42 deg )    Joint Play   Spine     Central PA [place]        C2: hypomobile (No reproduction of neck pain with CPA to c/s)       C3: hypomobile       C4: hypomobile       C5: hypomobile       C6: hypomobile       C7: hypomobile       C8: hypomobile       T1: hypomobile       T2: hypomobile and painful (T2-4 Reproduce mid back complaints)       T3: hypomobile and painful       T4: hypomobile and painful       T5: hypomobile       T6: hypomobile        Tests   Cervical spine   Positive cervical spine distraction.  Additional testing details: Modified VBI seated: Negative bilaterally     General Comments     Spine Comments   Vitals: (On room air; assessed in sitting)     HR: 86 bpm   SpO2: 94%  *No signs of distress    Neck pain 0/10 with supine and prone lying            Therapeutic Exercises (CPT 00199):       Therapeutic Exercise Summary: Access Code: [email or web address]   URL: [email or web address]   Date: 01/21/2021   Prepared by: Steve Mota     Exercises  Supine Deep Neck Flexor Training - 15 reps - 2 sets - 2 hold - 3x daily - 7x " weekly  Shoulder External Rotation and Scapular Retraction with Resistance - 10 reps - 3 sets - 1x daily - 7x weekly  Correct Seated Posture - 10 reps - 3 sets - 1x daily - 7x weekly  Seated Thoracic Extension with Hands Behind Neck - 10 reps - 2 sets - 3x daily - 7x weekly    Pt performed these exercises with instruction and SPV.  Provided handout with these exercises for daily HEP.        Time-based treatments/modalities:    Physical Therapy Timed Treatment Charges  Therapeutic exercise minutes (CPT 22990): 13 minutes      Assessment, Response and Plan:   Impairments: abnormal or restricted ROM, impaired physical strength, lacks appropriate home exercise program and pain with function    Assessment details:  Pleasant and cooperative 71-year-old male with complex cardiovascular medical hx (is followed by cardio and vascular medicine). He presents to therapy with a 20 year hx of neck pain which initiated without clear mechanism of injury. Reports distant hx of injuries such as falling off buildings but denies neck/back surgeries. Additionally laborious hx of working in carpentry/building; now retired. Cervical MRI from 2017 remarkable for: minimal grade 1 retrolisthesis of C3 on 4 and C5 on 6 and various levels of central and foraminal stenosis throughout cervical spine; Mild to moderate degenerative changes of the thoracic spine.     Exam findings remarkable for poor postural awareness, DNF and posterior chain weakness, hypomobile c/s and t/s; demonstrates axial loading sensitivities with s/s completely relieved in prone and supine lying. Negative red flags and modified VBI testing, myotomes, shoulder AROM WFL, and  strength normal. Eval of c/s today with plans to evaluate mid back further in subsequent sessions; pt verbalized understanding. Pt may benefit from skilled physical therapy in order to address above impairments in order to improve QOL and return to reported ADL's.     Precautions:  [place]        Barriers to therapy:  Comorbidities  Other barriers to therapy:  Delay in healing secondary to comorbidities  Prognosis: fair    Goals:   Short Term Goals:   1. Pt will be independent with written HEP.  2. Pt will complete NDI questionnaire in subsequent sessions  3. Pt will demonstrate improved postural awareness without repeated cuing req.  4. Pt will receive pt education re: best sleeping postures for neck.    Short term goal time span:  2-4 weeks      Long Term Goals:    1. Pt will be independent with written HEP.  2. Pt will have an NDI score improvement >/= MCID/NIKOLAY.  3. Pt will have an RMQ score improvement >/= MCID/NIKOLAY.  4. Pt will demonstrate consistent improvements in erect posture.  5. Pt will report 40% or greater improvement in sleeping hygiene secondary to improved s/s.    Long term goal time span:  6-8 weeks    Plan:   Therapy options:  Physical therapy treatment to continue  Planned therapy interventions:  Neuromuscular Re-education (CPT 82596), Mechanical Traction (CPT 08343), E Stim Unattended (CPT 97999), Manual Therapy (CPT 12730), Therapeutic Exercise (CPT 02952) and Therapeutic Activities (CPT 56532)  Frequency: 1-2x/wk.  Duration in visits:  10  Discussed with:  Patient  Plan details:  UPOC: 3/19/21          Functional Assessment Used        Referring provider co-signature:  I have reviewed this plan of care and my co-signature certifies the need for services.    Certification Period: 01/21/2021 to 3/19/21    Physician Signature: ________________________________ Date: ______________

## 2021-01-28 ENCOUNTER — APPOINTMENT (OUTPATIENT)
Dept: PHYSICAL THERAPY | Facility: REHABILITATION | Age: 71
End: 2021-01-28
Attending: INTERNAL MEDICINE
Payer: MEDICARE

## 2021-02-05 DIAGNOSIS — M54.6 CHRONIC BILATERAL THORACIC BACK PAIN: ICD-10-CM

## 2021-02-05 DIAGNOSIS — M54.12 CERVICAL RADICULOPATHY, CHRONIC: ICD-10-CM

## 2021-02-05 DIAGNOSIS — G89.29 CHRONIC BILATERAL THORACIC BACK PAIN: ICD-10-CM

## 2021-02-05 DIAGNOSIS — M50.90 CERVICAL DISC DISEASE: ICD-10-CM

## 2021-02-08 RX ORDER — TRAMADOL HYDROCHLORIDE 50 MG/1
50 TABLET ORAL
Qty: 30 TAB | Refills: 0 | Status: SHIPPED | OUTPATIENT
Start: 2021-02-08 | End: 2021-03-10

## 2021-02-16 ENCOUNTER — TELEPHONE (OUTPATIENT)
Dept: MEDICAL GROUP | Facility: PHYSICIAN GROUP | Age: 71
End: 2021-02-16

## 2021-02-16 NOTE — TELEPHONE ENCOUNTER
ESTABLISHED PATIENT PRE-VISIT PLANNING     Patient was NOT contacted to complete PVP.     Note: Patient will not be contacted if there is no indication to call.     1.  Reviewed notes from the last few office visits within the medical group: Yes    2.  If any orders were placed at last visit or intended to be done for this visit (i.e. 6 mos follow-up), do we have Results/Consult Notes?         •  Labs - Labs were not ordered at last office visit.  Note: If patient appointment is for lab review and patient did not complete labs, check with provider if OK to reschedule patient until labs completed.       •  Imaging - Imaging ordered, completed and results are in chart.       •  Referrals - Referral ordered, patient was seen and consult notes are in chart. Care Teams updated  YES.    3. Is this appointment scheduled as a Hospital Follow-Up? No    4.  Immunizations were updated in Epic using Reconcile Outside Information activity? Yes    5.  Patient is due for the following Health Maintenance Topics:   Health Maintenance Due   Topic Date Due   • COVID-19 Vaccine (1 of 2) 01/19/1966         6.  AHA (Pulse[employer]) form printed for Provider? Yes

## 2021-03-17 ENCOUNTER — IMMUNIZATION (OUTPATIENT)
Dept: FAMILY PLANNING/WOMEN'S HEALTH CLINIC | Facility: IMMUNIZATION CENTER | Age: 71
End: 2021-03-17
Attending: INTERNAL MEDICINE
Payer: MEDICARE

## 2021-03-17 DIAGNOSIS — Z23 ENCOUNTER FOR VACCINATION: Primary | ICD-10-CM

## 2021-03-17 DIAGNOSIS — Z23 NEED FOR VACCINATION: ICD-10-CM

## 2021-03-17 PROCEDURE — 91300 PFIZER SARS-COV-2 VACCINE: CPT

## 2021-03-17 PROCEDURE — 0001A PFIZER SARS-COV-2 VACCINE: CPT

## 2021-03-22 DIAGNOSIS — M54.2 CERVICAL PAIN: ICD-10-CM

## 2021-03-23 RX ORDER — IBUPROFEN 800 MG/1
TABLET ORAL
Qty: 90 TABLET | Refills: 1 | Status: SHIPPED | OUTPATIENT
Start: 2021-03-23 | End: 2021-11-01

## 2021-04-08 ENCOUNTER — IMMUNIZATION (OUTPATIENT)
Dept: FAMILY PLANNING/WOMEN'S HEALTH CLINIC | Facility: IMMUNIZATION CENTER | Age: 71
End: 2021-04-08
Attending: INTERNAL MEDICINE
Payer: MEDICARE

## 2021-04-08 DIAGNOSIS — Z23 ENCOUNTER FOR VACCINATION: Primary | ICD-10-CM

## 2021-04-08 PROCEDURE — 91300 PFIZER SARS-COV-2 VACCINE: CPT

## 2021-04-08 PROCEDURE — 0002A PFIZER SARS-COV-2 VACCINE: CPT

## 2021-05-19 ENCOUNTER — PATIENT MESSAGE (OUTPATIENT)
Dept: HEALTH INFORMATION MANAGEMENT | Facility: OTHER | Age: 71
End: 2021-05-19

## 2021-06-21 ENCOUNTER — TELEPHONE (OUTPATIENT)
Dept: MEDICAL GROUP | Facility: PHYSICIAN GROUP | Age: 71
End: 2021-06-21

## 2021-06-21 ENCOUNTER — PATIENT OUTREACH (OUTPATIENT)
Dept: SCHEDULING | Facility: IMAGING CENTER | Age: 71
End: 2021-06-21

## 2021-07-20 ENCOUNTER — OFFICE VISIT (OUTPATIENT)
Dept: VASCULAR LAB | Facility: MEDICAL CENTER | Age: 71
End: 2021-07-20
Attending: INTERNAL MEDICINE
Payer: MEDICARE

## 2021-07-20 VITALS
HEART RATE: 73 BPM | WEIGHT: 180 LBS | SYSTOLIC BLOOD PRESSURE: 131 MMHG | HEIGHT: 70 IN | BODY MASS INDEX: 25.77 KG/M2 | DIASTOLIC BLOOD PRESSURE: 58 MMHG

## 2021-07-20 DIAGNOSIS — I74.5 BILATERAL ILIAC ARTERY OCCLUSION (HCC): ICD-10-CM

## 2021-07-20 DIAGNOSIS — R80.9 MICROALBUMINURIA DUE TO TYPE 2 DIABETES MELLITUS (HCC): ICD-10-CM

## 2021-07-20 DIAGNOSIS — Z79.02 ANTIPLATELET OR ANTITHROMBOTIC LONG-TERM USE: ICD-10-CM

## 2021-07-20 DIAGNOSIS — E78.5 DYSLIPIDEMIA: ICD-10-CM

## 2021-07-20 DIAGNOSIS — I10 ESSENTIAL HYPERTENSION: ICD-10-CM

## 2021-07-20 DIAGNOSIS — I73.9 PAD (PERIPHERAL ARTERY DISEASE) (HCC): ICD-10-CM

## 2021-07-20 DIAGNOSIS — E11.29 MICROALBUMINURIA DUE TO TYPE 2 DIABETES MELLITUS (HCC): ICD-10-CM

## 2021-07-20 DIAGNOSIS — R73.01 IFG (IMPAIRED FASTING GLUCOSE): ICD-10-CM

## 2021-07-20 PROCEDURE — 99212 OFFICE O/P EST SF 10 MIN: CPT | Performed by: NURSE PRACTITIONER

## 2021-07-20 PROCEDURE — 99214 OFFICE O/P EST MOD 30 MIN: CPT | Performed by: NURSE PRACTITIONER

## 2021-07-20 ASSESSMENT — FIBROSIS 4 INDEX: FIB4 SCORE: 4.81

## 2021-07-20 ASSESSMENT — ENCOUNTER SYMPTOMS
CLAUDICATION: 1
BRUISES/BLEEDS EASILY: 1
DOUBLE VISION: 0
SENSORY CHANGE: 0
MYALGIAS: 0
DIZZINESS: 0
FALLS: 0
ORTHOPNEA: 0
FOCAL WEAKNESS: 0
BLURRED VISION: 0
HEADACHES: 0
COUGH: 0
PALPITATIONS: 0
TREMORS: 0
HEMOPTYSIS: 0
WEAKNESS: 0
SHORTNESS OF BREATH: 0
WEIGHT LOSS: 0
NERVOUS/ANXIOUS: 0
BLOOD IN STOOL: 0
WHEEZING: 0
TINGLING: 0

## 2021-07-20 NOTE — PROGRESS NOTES
FOLLOW UP VASCULAR VISIT  Subjective:   Chung Limon is a 71 y.o. y.o. male  who presents today 7/20/21  for   Chief Complaint   Patient presents with   • Follow-Up     initially referred by Asuncion Roland M.D. for eval and med mgmt of PAD, Ao atherosclerosis      HPI:  Pt has not been seen since 9/2020, despite our recommendations and reminders.    PAD:   Current sx: after 100yds feels thigh and calf pain that stops him; calves morseo. Takes a few minutes to improve.  Gets cramping in legs in calf and thighs with walking.    Pain free walking distance estimated to be 100-150ft  Total walking distance prior to stopping estimated to be 100yds  Had aortography and LE arteriography by Dr. Lee in 2012 and told needed to have Ao-bifem BPG, pt declined.    Currently believes about the same symptoms as prior.   Denies acute ischemic signs such as nonhealing wounds on lower legs or rest pain.   No VTE  Current treatments include DAPT, statin therapy, smoking cessation, home-based graduated exercise    HTN:  Current HTN concerns: Denies   Current ADRs: No  HTN sx:  No current blurred or changed vision, chest pain, shortness of breath, headache, nausea, dizziness/vertigo   Home BP log: not checking   24h ABPM completed: not tested  Adherence to current HTN meds: compliant all of the time    Hyperlipidemia:    Stable, no current concerns  Current treatment: High intensity statin   Atorvastatin   Myalgias? No  Other adverse drug reactions? No  Last lipid profile: due for labwork    Antiplatelet/anticoagulation:   Yes, Details: DAPT, no bleeding noted, but does bruise easily    T2D:  Stable. No current symptoms reported.   Tolerating meds and no recent med changes.   Home blood sugars stable, reports no lows.   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 states checks his blood sugar daily. Not currently taking medication for.   • H/O colonoscopy with polypectomy 5/7/2013   • Hayfever    • Healthcare maintenance 4/5/2018   • Hepatitis C     no treatment   • History of alcoholism (HCC) 8/22/2012   • [place] (hard of hearing) 07/26/2019   • Hyperlipidemia    • Hypertension    • Insulin dependent diabetes mellitus 07/26/2019    Pt. states used to take Insulin 2 years ago.   • S/P excision of lipoma 9/18/2019     Past Surgical History:   Procedure Laterality Date   • MASS EXCISION GENERAL Right 7/31/2019    Procedure: EXCISION, MASS, GENERAL - 15 CM LIPOMA OF SHOULDER;  Surgeon: Augustin Rosales M.D.;  Location: [facility];  Service: General   • COLONOSCOPY  2017   • WIDE EXCISION  5/6/2014    Performed by Nicholas Govea M.D. at SUR[facility]ERY [facility]   • FLAP GRAFT  5/6/2014    Performed by Nicholas Govea M.D. at [facility]ERY [facility] DAY [facility]   • RECOVERY  10/22/2012    Performed by Pily Lee M.D. at SUR[facility]      Current Outpatient Medications on File Prior to Visit   Medication Sig Dispense Refill   • ibuprofen (MOTRIN) 800 MG Tab TAKE ONE TABLET BY MOUTH EVERY 8 HOURS AS NEEDED 90 tablet 1   • atorvastatin (LIPITOR) 80 MG tablet TAKE 1 TABLET BY MOUTH EVERY  Tab 2   • clopidogrel " (PLAVIX) 75 MG Tab TAKE 1 TABLET BY MOUTH EVERY  Tab 3   • cilostazol (PLETAL) 100 MG Tab Take 1 Tab by mouth 2 times a day for 360 days. 180 Tab 3   • B Complex Vitamins (B COMPLEX PO) Take  by mouth.     • lisinopril (PRINIVIL) 10 MG Tab Take 1 Tab by mouth every day. 100 Tab 3   • metoprolol SR (TOPROL XL) 25 MG TABLET SR 24 HR Take 1 Tab by mouth every day. 90 Tab 3   • FREESTYLE LITE strip      • Multiple Vitamins-Minerals (CENTRUM ADULTS PO) Take  by mouth.     • Blood Glucose Test Strips Test strips order: Test strips for Freestyle Trang meter. Sig: use before breakfast and prn ssx high or low sugar #100 RF x 3 (Patient taking differently: Test strips order: Test strips for Freestyle Lite meter. Sig: use before breakfast and prn ssx high or low sugar #100 RF x 3) 100 Strip 3   • aspirin (ASA) 81 MG Chew Tab chewable tablet Take 81 mg by mouth every day.       No current facility-administered medications on file prior to visit.     No Known Allergies    Family History   Problem Relation Age of Onset   • Heart Disease Father    • Arthritis Mother    • No Known Problems Sister    • Cancer Brother         type unknown   • Cancer Brother         skin    • No Known Problems Maternal Grandmother    • No Known Problems Maternal Grandfather    • No Known Problems Paternal Grandmother    • No Known Problems Paternal Grandfather    • Diabetes Neg Hx    • Hypertension Neg Hx    • Stroke Neg Hx    • Hyperlipidemia Neg Hx         Social History     Tobacco Use   • Smoking status: Former Smoker     Packs/day: 1.00     Years: 58.00     Pack years: 58.00     Types: Cigarettes     Start date: 1960     Quit date: 2011     Years since quittin.8   • Smokeless tobacco: Never Used   • Tobacco comment: avoid all tobacco products   Vaping Use   • Vaping Use: Never used   Substance Use Topics   • Alcohol use: No     Alcohol/week: 0.0 oz     Comment: quit , drank 2 times since   • Drug use: Yes     Types: Inhaled  "    Comment: marijuana (last use 2 weeks ago per pt)     DIET AND EXERCISE:  Weight Change: fluctuates; up about 10lbs at present, from [place]  BMI Readings from Last 5 Encounters:   07/20/21 25.83 kg/m²   01/04/21 24.34 kg/m²   11/12/20 25.37 kg/m²   09/21/20 25.11 kg/m²   09/08/20 24.68 kg/m²      Diet: common adult  Exercise: no regular exercise program     Review of Systems   Constitutional: Negative for malaise/fatigue and weight loss.   HENT: Negative for nosebleeds.    Eyes: Negative for blurred vision and double vision.   Respiratory: Negative for cough, hemoptysis, shortness of breath and wheezing.    Cardiovascular: Positive for claudication. Negative for chest pain, palpitations, orthopnea and leg swelling.   Gastrointestinal: Negative for blood in stool and melena.   Genitourinary: Negative for hematuria.   Musculoskeletal: Positive for joint pain. Negative for falls and myalgias.   Skin: Negative for rash.   Neurological: Negative for dizziness, tingling, tremors, sensory change, focal weakness, weakness and headaches.   Endo/Heme/Allergies: Bruises/bleeds easily.   Psychiatric/Behavioral: The patient is not nervous/anxious.       Objective:     Vitals:    07/20/21 1058   BP: 131/58   BP Location: Left arm   Patient Position: Sitting   BP Cuff Size: Adult   Pulse: 73   Weight: 81.6 kg (180 lb)   Height: 1.778 m (5' 10\")      BP Readings from Last 5 Encounters:   07/20/21 131/58   01/04/21 120/58   11/12/20 110/60   09/21/20 (!) 94/50   09/08/20 100/48      Body mass index is 25.83 kg/m².  Physical Exam  Constitutional:       Appearance: He is normal weight.   Eyes:      Pupils: Pupils are equal, round, and reactive to light.   Cardiovascular:      Rate and Rhythm: Normal rate and regular rhythm.      Pulses:           Radial pulses are 2+ on the right side and 2+ on the left side.        Dorsalis pedis pulses are 0 on the right side and 0 on the left side.        Posterior tibial pulses are 0 on the right " side and 0 on the left side.      Heart sounds: Murmur heard.     Pulmonary:      Effort: Pulmonary effort is normal. No respiratory distress.      Breath sounds: Normal breath sounds.   Musculoskeletal:         General: No swelling. Normal range of motion.      Cervical back: Normal range of motion.      Right lower leg: No edema.      Left lower leg: No edema.   Skin:     General: Skin is warm and dry.   Neurological:      Mental Status: He is alert and oriented to person, place, and time.      Motor: No weakness.   Psychiatric:         Mood and Affect: Mood normal.         Behavior: Behavior normal.         Thought Content: Thought content normal.         Lab Results   Component Value Date    CHOLSTRLTOT 106 09/29/2020    LDL 47 09/29/2020    HDL 34 (A) 09/29/2020    TRIGLYCERIDE 123 09/29/2020           Lab Results   Component Value Date    HBA1C 6.1 (H) 09/28/2020      Lab Results   Component Value Date    SODIUM 139 09/29/2020    POTASSIUM 4.3 09/29/2020    CHLORIDE 105 09/29/2020    CO2 23 09/29/2020    GLUCOSE 117 (H) 09/29/2020    BUN 28 (H) 09/29/2020    CREATININE 0.87 09/29/2020    IFAFRICA >60 09/29/2020    IFNOTAFR >60 09/29/2020        Lab Results   Component Value Date    WBC 4.6 (L) 09/28/2020    RBC 4.13 (L) 09/28/2020    HEMOGLOBIN 12.9 (L) 09/28/2020    HEMATOCRIT 37.6 (L) 09/28/2020    MCV 91.0 09/28/2020    MCH 31.2 09/28/2020    MCHC 34.3 09/28/2020    MPV 11.9 09/28/2020       VASCULAR IMAGING:   Last EKG:   Results for orders placed or performed in visit on 01/04/21   EKG   Result Value Ref Range    EKG       IR abdominal aortography 2012 (Dr. Lee)  IMPRESSION:  1.  Patent bilateral renal arteries.  2.  The infrarenal abdominal aorta is chronically occluded at uum-cb-fleffy   part.  The iliac systems are also occluded.  There is reconstitution of the lower   extremity circulation via collateral flow.  3.  Patent right common femoral artery with significant posterior plaque   formation.   The right profunda femoral artery is patent.  The right   superficial femoral artery is occluded at mid-thigh.  There is reconstitution   of the above knee popliteal artery via collateral flow.  The right peroneal   artery is chronically occluded.  The right posterior tibial artery is the   dominant runoff vessel.  4.  Patent left common femoral artery with plaque formation.  The left   profunda and superficial femoral arteries are patent.  The popliteal artery is   also patent.  The peroneal artery is occluded.  The anterior tibial artery   is the dominant runoff vessels.  5.  Patent aortic arch.  There is a moderate stenosis seen in the proximal   left subclavian artery.  There also appeared to be mild stenosis   seen in the proximal left internal carotid artery.     Based on the angiogram result, an endovascular intervention would not be   appropriate.  The patient would need to undergo an aortobifemoral bypass   grafting.  The patient will be seen back in the office and surgical bypass   options will be discussed with him.    BLE art duplex 2015   1.  Low velocity monophasic flow in the bilateral common femoral arteries    without stenosis from inguinal ligament to the ankle, indicitive of aorto-    iliac occlusion.     Carotid 2016   Mild stenosis of the right internal carotid (< 50%).    Moderate stenosis of the left internal carotid (50-69%).    Flow within both subclavian arteries appears to be within normal limits.      Antegrade flow, bilateral vertebral arteries.    No prior study is available for comparison..    LDCT chest 5/2019   There are atherosclerotic calcifications of the aorta and its branch vessels including the coronary arteries. There is no mediastinal, hilar or axillary adenopathy.    Echo 9/2020  Compared to the images of the prior study done on 06/08/15, there is   now mild to moderate aortic stenosis.  Left ventricular ejection fraction is visually estimated to be 65%.  Mild to moderate  aortic stenosis.    Carotid 10/9/2020   CONCLUSIONS   Mild stenosis of the right internal carotid (< 50%).    Moderate stenosis of the left internal carotid (50-69%).     10/9/2020 art/rima   Vascular Laboratory   CONCLUSIONS   50-75% stenosis in RIGHT proximal profunda femoral artery.   Heavy plaque in the RIGHT femoral artery with no flow seen in the distal    femoral which appears to be occluded.   1 vessel run off in the RIGHT leg.      50-75% stenosis in LEFT common femoral artery .   Greater than 75% stenosis in the  LEFT proximal profunda femoral artery.   The LEFT femoral artery occluded in the proximal and mid thigh with flow    reconstitution seen distally.   1 vessel run off seen in the LEFT lower leg.     Findings   Right-   Ankle pressures are not accurately measured due to calcification and    noncompressibility of the tibial vessels.    Doppler waveform of the common femoral is abnormally dampened and    monophasic suggesting more proximal aorta/iliac stenosis or occlusion   Doppler waveforms at the ankle are monophasic with moderate amplitude.        Left-   Ankle pressures are not accurately measured due to calcification and    noncompressibility of the tibial vessels.    Doppler waveform of the common femoral is abnormally dampened and    monophasic consistent with known arterial occlusive disease in the iliac    artery.    Doppler waveforms at the ankle are monophasic with low amplitude.     10/9/2020 aortoiliac duplex   Vascular Laboratory   CONCLUSIONS   No evidence of aortic aneurysm.    Right external iliac artery is occluded.   50-75% stenosis in the right proximal common iliac artery.   Left external iliac artery is occluded.   Left common iliac artery appears occluded yet is poorly visualized.    Medical Decision Making:  Today's Assessment / Status / Plan:     1. PAD (peripheral artery disease) ([facility])  US-AORTA/ILIACS DUPLEX COMPLETE    US-EXTREMITY ARTERY LOWER BILAT W/RIMA (COMBO)     "LIPOPROTEIN A   2. 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HCC)  US-AORTA/ILIACS DUPLEX COMPLETE    LIPOPROTEIN A   3. Microalbuminuria due to type 2 diabetes mellitus (HCC)  MICROALBUMIN CREAT RATIO URINE   4. Dyslipidemia  Lipid Profile    Comp Metabolic Panel    LIPOPROTEIN A   5. Essential hypertension  Comp Metabolic Panel    MICROALBUMIN CREAT RATIO URINE   6. IFG (impaired fasting glucose)  HEMOGLOBIN A1C (Glycohemoglobin GHB Total/A1C with MBG Estimate)   7. Antiplatelet or antithrombotic long-term use  CBC WITHOUT DIFFERENTIAL       Patient Type: Secondary Prevention    Etiology of Established CVD if Present:   1) moderate L carotid a stenosis  2) PAD - monophasic diffusely   3) Abdominal Aorto-bililiac occlusive disease  4) proximal L subclav a stenosis    Antithrombotic therapy:  Indication: PAD   Anti-Platelet/Anti-Coagulant Tx: yes  Antithrombotic therapy plan:  - continue DAPT     Lipid Management: Qualifies for Statin Therapy Based on 2018 ACC/AHA Guidelines: yes  Calculated 10-Year Risk of ASCVD (if LDL <189, no CKD G3b/4/5): N/A  Currently on Statin: Yes  NLA/ACC/AHA risk category:   Very high:  Evidence of ASCVD, T2D   Tx goal: non-HDL-C <100,  LDL-C <70  (optional: apoB <80)  At goal:  Yes, previously, but due for blood work  Plan:  - reinforced ongoing TLC measures   - update lipids and lp(a)  - continue atorva 80mg daily     Blood Pressure Management:Goal: ACC/AHA (2017) goal <130/80  Home BP at goal:  unclear  Office BP at goal:  Mildly elevated sys only  Inidications of end organ damage:   Echo/EKG: abnormal Ao stenosis, no LVH     UACR: abnormal A2    Renal parenchymal disease:  normal   Ophthalmo: N/A    Device candidate? no  Severe PAD, continue RAAS blockers, consider CCB for vasodilation   Plan:   Monitoring:   - start/continue home BP monitoring, reviewed correct technique, provide BP log and instructions (pt states, \"I most likely won't do it.\" )  - order 24h ABPM:  NO  - monitor lytes/gfr " "routinely   - contact office if BP consistently >140/>90 to discussion of tx adjustments   Medications:  ACEi/ARB: continue lisinopril 10mg daily   DHP-CCB: add as next agent   Thiazide: none   Sonny-receptor Antagonist: not indicated at this time   Other:   Peripheral Alpha Blocker: not indicated   Loop: not indicated   BB: continue metoprolol 25mg ER daily     Glycemic Status: Diabetic  Goal A1c < 7.0   Last A1c    Lab Results   Component Value Date    HBA1C 6.1 (H) 09/28/2020    UACR: abnormal: A2, 2018   Plan:  - continue current medication plan   - recommmend for routine care with PCP (or endocrine) to include regular A1c monitoring, annual albumin/creatinine ratio (ACR), annual diabetic retinopathy screening, foot exams, annual flu vaccine, and updates to pneumonia vaccines as appropriate     Smoking: maintenance stage.     reports that he quit smoking about 9 years ago. His smoking use included cigarettes. He started smoking about 61 years ago. He has a 58.00 pack-year smoking history. He has never used smokeless tobacco.   - continued complete avoidance of all tobacco products     Physical Activity: encouraged healthy activity to improve CV fitness    Weight Management and Nutrition: Dietary plan was discussed with patient at this visit including DASH, low sodium    Other:   1) PAD, hx of severe Ao/bi-iliac occlusions with bilat Fem aa disease.  Combination inflow/outflow w/o indication of chronic limb ischemia or acute limb ischemic changes at this time  -  RIMA/art duplex and Ao/iliac in Oct, ordered today  - continue graduated exercise   - continue high dose statin  - continue DAPT but consider switch to ASA + xarelto in the future   - continue cilostazol 50mg BID, no hx of CHF or major bleeding, advised to monitor for bleeding- felt, \"paranoid and uneasy,\" at higher dose  - monitor walking distances and symptoms    2) hx of moderate carotid artery stenosis, L, no symptoms or hx of CVA   - continue med mgmt "     3) moderate Ao stenosis, no symptoms, seeing cardiology   - defer mgmt and surveillance to cardiology; pt due for f/u, gave phone #    4) chronic HCV+, elevated liver enzymes   Declines anti-HCV therapy at this time   - defer care to GI for surveillance and ongoing care   - adivsed for etoh cessation     Instructed to follow-up with PCP for remainder of adult medical needs: yes  We will partner with other providers in the management of established vascular disease and cardiometabolic risk factors.    Studies to Be Obtained: RIMA/BLE art duplex, Ao/iliac duplex 10/2021- ordered today  Labs to Be Obtained: a1c, lipids, cmp, urine for ma, cbc    Follow up in: 12wks, after imaging    ASHLEY Juarez  [facility]   [facility]      CC:  Dr. Rosibel Roland

## 2021-07-22 ENCOUNTER — OFFICE VISIT (OUTPATIENT)
Dept: MEDICAL GROUP | Facility: PHYSICIAN GROUP | Age: 71
End: 2021-07-22
Payer: MEDICARE

## 2021-07-22 VITALS
HEART RATE: 91 BPM | BODY MASS INDEX: 25.65 KG/M2 | HEIGHT: 70 IN | DIASTOLIC BLOOD PRESSURE: 66 MMHG | OXYGEN SATURATION: 95 % | TEMPERATURE: 97.7 F | WEIGHT: 179.2 LBS | SYSTOLIC BLOOD PRESSURE: 104 MMHG

## 2021-07-22 DIAGNOSIS — J34.89 INTERNAL NASAL LESION: ICD-10-CM

## 2021-07-22 DIAGNOSIS — E11.51 DIABETES MELLITUS WITH PERIPHERAL VASCULAR DISEASE (HCC): ICD-10-CM

## 2021-07-22 LAB
HBA1C MFR BLD: 6.7 % (ref 0–5.6)
INT CON NEG: NEGATIVE
INT CON POS: POSITIVE

## 2021-07-22 PROCEDURE — 83036 HEMOGLOBIN GLYCOSYLATED A1C: CPT | Performed by: INTERNAL MEDICINE

## 2021-07-22 PROCEDURE — 99214 OFFICE O/P EST MOD 30 MIN: CPT | Performed by: INTERNAL MEDICINE

## 2021-07-22 ASSESSMENT — FIBROSIS 4 INDEX: FIB4 SCORE: 4.81

## 2021-07-22 NOTE — PROGRESS NOTES
Established Patient    Chief Complaint   Patient presents with   • Diabetes Follow-up       Subjective:     HPI:   Chung presents today with the following.    1. Diabetes mellitus with peripheral vascular disease (HCC)  A1c:   Lab Results   Component Value Date/Time    HBA1C 6.7 (A) 07/22/2021 0900    HBA1C 6.1 (H) 09/28/2020 1338    HBA1C 6.0 (A) 08/31/2020 0810    AVGLUC 128 09/28/2020 1338    AVGLUC 169 06/27/2019 0622    AVGLUC 140 08/15/2018 1437   And sedated with Versed from previously, mention has some access to simple sugar and refined carbs especially when he was on vacation in [place] for few weeks, he is trying to go back to healthy lifestyle and healthy diabetic diet with avoiding sugar again, otherwise patient denied having other related symptoms, patient is following up with ophthalmology as well, denied any numbness of the feet      2. Internal nasal lesion  Chronic issue, reported possibility of warts and other lesion inside his right nostril, associated with bleeding and sometimes sinusitis according to the patient, patient was seen by ENT before and required to have a cauterization of the nose, patient refused that, patient also is on dual antiplatelet therapy for his vascular disease      Patient Active Problem List    Diagnosis Date Noted   • Internal nasal lesion 07/22/2021   • Chronic bilateral thoracic back pain 01/04/2021   • Other chest pain 01/04/2021   • 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HCC) 09/21/2020   • Chronic distal aortic occlusion (HCC) 09/21/2020   • Carotid artery stenosis, asymptomatic, left 09/21/2020   • Newly recognized heart murmur 08/12/2020   • Overweight 05/12/2020   • Transaminitis 05/12/2020   • Chronic right shoulder pain 09/18/2019   • Alcohol dependence in remission (HCC) 04/19/2019   • Cervical radiculopathy, chronic 04/05/2018   • Essential hypertension 04/05/2018   • Chronic hepatitis C without hepatic coma (HCC) 05/18/2017   • Cervical disc disease 05/18/2017    • History of tobacco abuse 01/31/2017   • Atherosclerosis of aorta ([facility]) 01/31/2017   • Diabetes mellitus with peripheral vascular disease ([facility]) 11/15/2016   • Microalbuminuria due to type 2 diabetes mellitus ([facility]) 11/15/2016   • Bruit 11/01/2016   • Stress-induced cardiomyopathy 03/10/2015   • PAD (peripheral artery disease) ([facility]) 01/21/2015   • H/O colonoscopy with polypectomy 05/07/2013   • Bilateral claudication of lower limb ([facility]) 10/22/2012       Current Outpatient Medications on File Prior to Visit   Medication Sig Dispense Refill   • ibuprofen (MOTRIN) 800 MG Tab TAKE ONE TABLET BY MOUTH EVERY 8 HOURS AS NEEDED 90 tablet 1   • atorvastatin (LIPITOR) 80 MG tablet TAKE 1 TABLET BY MOUTH EVERY  Tab 2   • clopidogrel (PLAVIX) 75 MG Tab TAKE 1 TABLET BY MOUTH EVERY  Tab 3   • cilostazol (PLETAL) 100 MG Tab Take 1 Tab by mouth 2 times a day for 360 days. 180 Tab 3   • B Complex Vitamins (B COMPLEX PO) Take  by mouth.     • lisinopril (PRINIVIL) 10 MG Tab Take 1 Tab by mouth every day. 100 Tab 3   • metoprolol SR (TOPROL XL) 25 MG TABLET SR 24 HR Take 1 Tab by mouth every day. 90 Tab 3   • FREESTYLE LITE strip      • Multiple Vitamins-Minerals (CENTRUM ADULTS PO) Take  by mouth.     • Blood Glucose Test Strips Test strips order: Test strips for Freestyle Trang meter. Sig: use before breakfast and prn ssx high or low sugar #100 RF x 3 (Patient taking differently: Test strips order: Test strips for Freestyle Lite meter. Sig: use before breakfast and prn ssx high or low sugar #100 RF x 3) 100 Strip 3   • aspirin (ASA) 81 MG Chew Tab chewable tablet Take 81 mg by mouth every day.       No current facility-administered medications on file prior to visit.       Allergies, past medical history, past surgical history, family history, social history reviewed and updated    ROS:  All other systems reviewed and are negative except as stated in the HPI     Physical Exam:     /66 (BP Location: Left arm,  "Patient Position: Sitting, BP Cuff Size: Adult)   Pulse 91   Temp 36.5 °C (97.7 °F) (Temporal)   Ht 1.778 m (5' 10\")   Wt 81.3 kg (179 lb 3.2 oz)   SpO2 95%   BMI 25.71 kg/m²   General: Normal appearing. No distress.  ENT: oropharynx without exudates.    Eyes: conjunctiva clear lids without ptosis  Pulmonary: Clear to ausculation.  Normal effort.   Cardiovascular: Regular rate and rhythm  Abdomen: Soft, nontender,  Lymph: No cervical or supraclavicular palpable lymph nodes  Psych: Normal mood and affect.     I have reviewed pertinent labs and diagnostic tests associated with this visit with specific comments listed under the assessment and plan below      Assessment and Plan:     71 y.o. male with the following issues.    1. Diabetes mellitus with peripheral vascular disease (HCC)  - POCT Hemoglobin A1C    -Discussed diabetic diet in detail, educated regarding management of hypoglycemia, advised regular exercise, educated disease management and weight goals as well as feet care and frequent check of feet.  -Patient to check early morning fasting blood glucose with goal discussed.      2. Internal nasal lesion  - REFERRAL TO ENT    >> Patient has lab order from vascular medicine as well, patient is aware of that.    Follow Up:      Return in about 6 months (around 1/22/2022).  Diabetes, A1c,  Please note that this dictation was created using voice recognition software. I have made every reasonable attempt to correct obvious errors, but I expect that there are errors of grammar and possibly content that I did not discover before finalizing the note.    Signed by: Asuncion Roland M.D.    "

## 2021-08-13 ENCOUNTER — OFFICE VISIT (OUTPATIENT)
Dept: URGENT CARE | Facility: PHYSICIAN GROUP | Age: 71
End: 2021-08-13
Payer: MEDICARE

## 2021-08-13 VITALS
HEIGHT: 69 IN | DIASTOLIC BLOOD PRESSURE: 94 MMHG | HEART RATE: 93 BPM | RESPIRATION RATE: 18 BRPM | WEIGHT: 180 LBS | TEMPERATURE: 97.2 F | OXYGEN SATURATION: 95 % | BODY MASS INDEX: 26.66 KG/M2 | SYSTOLIC BLOOD PRESSURE: 108 MMHG

## 2021-08-13 DIAGNOSIS — S05.01XA ABRASION OF RIGHT CORNEA, INITIAL ENCOUNTER: ICD-10-CM

## 2021-08-13 PROCEDURE — 99213 OFFICE O/P EST LOW 20 MIN: CPT | Performed by: NURSE PRACTITIONER

## 2021-08-13 RX ORDER — ERYTHROMYCIN 5 MG/G
1 OINTMENT OPHTHALMIC 4 TIMES DAILY
Qty: 1 G | Refills: 0 | Status: SHIPPED | OUTPATIENT
Start: 2021-08-13 | End: 2021-08-20

## 2021-08-13 ASSESSMENT — ENCOUNTER SYMPTOMS
CHILLS: 0
EYE PAIN: 0
DIZZINESS: 0
FEVER: 0
HEADACHES: 0
EYE DISCHARGE: 0
DOUBLE VISION: 0
PHOTOPHOBIA: 0
EYE REDNESS: 1
BLURRED VISION: 0
COUGH: 0

## 2021-08-13 ASSESSMENT — FIBROSIS 4 INDEX: FIB4 SCORE: 4.81

## 2021-08-13 NOTE — PROGRESS NOTES
"Subjective     Tuan Limon is a 71 y.o. male who presents with Eye Problem (right eye,painful,x 2 days.)            HPI  States feels like something is in his right eye x 2 days. Unknown object. States has been around puppies recently, \"possibly a hair, possible dust or a leaf\". No noted vision change or eyeball pressure. Admits to rubbing eyes. Has tried to rinse eye out with water. Has used over the counter refresh eye gel at night.     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  MEDS:   Current Outpatient Medications:   •  erythromycin 5 MG/GM Ointment, Apply 1 Application to right eye 4 times a day for 7 days., Disp: 1 g, Rfl: 0  •  ibuprofen (MOTRIN) 800 MG Tab, TAKE ONE TABLET BY MOUTH EVERY 8 HOURS AS NEEDED, Disp: 90 tablet, Rfl: 1  •  atorvastatin (LIPITOR) 80 MG tablet, TAKE 1 TABLET BY MOUTH EVERY DAY, Disp: 100 Tab, Rfl: 2  •  clopidogrel (PLAVIX) 75 MG Tab, TAKE 1 TABLET BY MOUTH EVERY DAY, Disp: 100 Tab, Rfl: 3  •  cilostazol (PLETAL) 100 MG Tab, Take 1 Tab by mouth 2 times a day for 360 days., Disp: 180 Tab, Rfl: 3  •  B Complex Vitamins (B COMPLEX PO), Take  by mouth., Disp: , Rfl:   •  lisinopril (PRINIVIL) 10 MG Tab, Take 1 Tab by mouth every day., Disp: 100 Tab, Rfl: 3  •  metoprolol SR (TOPROL XL) 25 MG TABLET SR 24 HR, Take 1 Tab by mouth every day., Disp: 90 Tab, Rfl: 3  •  FREESTYLE LITE strip, , Disp: , Rfl:   •  Blood Glucose Test Strips, Test strips order: Test strips for Freestyle Trang meter. Sig: use before " breakfast and prn ssx high or low sugar #100 RF x 3 (Patient taking differently: Test strips order: Test strips for Freestyle Lite meter. Sig: use before breakfast and prn ssx high or low sugar #100 RF x 3), Disp: 100 Strip, Rfl: 3  •  aspirin (ASA) 81 MG Chew Tab chewable tablet, Take 81 mg by mouth every day., Disp: , Rfl:   ALLERGIES: No Known Allergies  SURGHX:   Past Surgical History:   Procedure Laterality Date   • MASS EXCISION GENERAL Right 7/31/2019    Procedure: EXCISION, MASS, GENERAL - 15 CM LIPOMA OF SHOULDER;  Surgeon: Augustin Rosales M.D.;  Location: SURGERY [facility];  Service: General   • COLONOSCOPY  2017   • WIDE EXCISION  5/6/2014    Performed by Nicholas Govea M.D. at SURGERY SAME DAY [facility]   • FLAP GRAFT  5/6/2014    Performed by Nicholas Govea M.D. at SURGERY SAME DAY [facility]   • RECOVERY  10/22/2012    Performed by Pily Lee M.D. at SURGERY [facility]     SOCHX:  reports that he quit smoking about 9 years ago. His smoking use included cigarettes. He started smoking about 61 years ago. He has a 58.00 pack-year smoking history. He has never used smokeless tobacco. He reports current drug use. Drug: Inhaled. He reports that he does not drink alcohol.  FH: Family history was reviewed, no pertinent findings to report    Review of Systems   Constitutional: Negative for chills, fever and malaise/fatigue.   Eyes: Positive for redness. Negative for blurred vision, double vision, photophobia, pain and discharge.        Sensation of foreign material in right eye.   Respiratory: Negative for cough.    Skin: Negative for itching and rash.   Neurological: Negative for dizziness and headaches.   Endo/Heme/Allergies: Negative for environmental allergies.   All other systems reviewed and are negative.             Objective     /94 (BP Location: Right arm, Patient Position: Sitting, BP Cuff Size: Adult)   Pulse 93   Temp 36.2 °C (97.2 °F) (Temporal)   Resp 18   Ht  "1.753 m (5' 9\")   Wt 81.6 kg (180 lb)   SpO2 95%   BMI 26.58 kg/m²      Physical Exam  Vitals reviewed.   Constitutional:       General: He is awake. He is not in acute distress.     Appearance: Normal appearance. He is well-developed. He is not ill-appearing, toxic-appearing or diaphoretic.   HENT:      Head: Normocephalic.   Eyes:      General: Lids are normal.         Right eye: No foreign body, discharge or hordeolum.      Extraocular Movements: Extraocular movements intact.      Conjunctiva/sclera:      Right eye: Right conjunctiva is injected.        Comments: Procedure:   Eye stain. Applied one drop Proparacaine to right eye. Apllied Fluorescein stain to right eye. Small uptake seen with lamp.        Cardiovascular:      Rate and Rhythm: Normal rate.   Pulmonary:      Effort: Pulmonary effort is normal.   Musculoskeletal:         General: Normal range of motion.      Cervical back: Normal range of motion and neck supple.   Skin:     General: Skin is warm and dry.   Neurological:      Mental Status: He is alert and oriented to person, place, and time.   Psychiatric:         Attention and Perception: Attention normal.         Mood and Affect: Mood normal.         Speech: Speech normal.         Behavior: Behavior normal. Behavior is cooperative.                             Assessment & Plan        1. Abrasion of right cornea, initial encounter    - erythromycin 5 MG/GM Ointment; Apply 1 Application to right eye 4 times a day for 7 days.  Dispense: 1 g; Refill: 0    -May use cool compresses for any eye swelling  -Avoid touching/rubbing eyes  -May clean eyes with mild dilute soap along eyelash line with eyes closed, rinse with plenty of water  -May use saline eye rinse or eye irrigation solution as needed for eye dryness  -Monitor for increase in redness or swelling, vision change, eye pain, eye discharge, headache, dizziness- need re-evaluation               "

## 2021-09-14 ENCOUNTER — DOCUMENTATION (OUTPATIENT)
Dept: VASCULAR LAB | Facility: MEDICAL CENTER | Age: 71
End: 2021-09-14

## 2021-09-14 DIAGNOSIS — I65.22 CAROTID ARTERY STENOSIS, ASYMPTOMATIC, LEFT: ICD-10-CM

## 2021-10-01 ENCOUNTER — HOSPITAL ENCOUNTER (OUTPATIENT)
Dept: RADIOLOGY | Facility: MEDICAL CENTER | Age: 71
End: 2021-10-01
Attending: NURSE PRACTITIONER
Payer: MEDICARE

## 2021-10-01 DIAGNOSIS — I65.22 CAROTID ARTERY STENOSIS, ASYMPTOMATIC, LEFT: ICD-10-CM

## 2021-10-01 DIAGNOSIS — I73.9 PAD (PERIPHERAL ARTERY DISEASE) (HCC): ICD-10-CM

## 2021-10-01 DIAGNOSIS — I74.5 BILATERAL ILIAC ARTERY OCCLUSION (HCC): ICD-10-CM

## 2021-10-01 PROCEDURE — 93922 UPR/L XTREMITY ART 2 LEVELS: CPT

## 2021-10-01 PROCEDURE — 93880 EXTRACRANIAL BILAT STUDY: CPT

## 2021-10-01 PROCEDURE — 93925 LOWER EXTREMITY STUDY: CPT

## 2021-10-01 PROCEDURE — 93978 VASCULAR STUDY: CPT

## 2021-10-01 PROCEDURE — 93922 UPR/L XTREMITY ART 2 LEVELS: CPT | Mod: 26 | Performed by: INTERNAL MEDICINE

## 2021-10-04 ENCOUNTER — DOCUMENTATION (OUTPATIENT)
Dept: VASCULAR LAB | Facility: MEDICAL CENTER | Age: 71
End: 2021-10-04

## 2021-10-04 PROCEDURE — 93925 LOWER EXTREMITY STUDY: CPT | Mod: 26 | Performed by: INTERNAL MEDICINE

## 2021-10-04 PROCEDURE — 93978 VASCULAR STUDY: CPT | Mod: 26 | Performed by: INTERNAL MEDICINE

## 2021-10-04 PROCEDURE — 93880 EXTRACRANIAL BILAT STUDY: CPT | Mod: 26 | Performed by: INTERNAL MEDICINE

## 2021-10-04 NOTE — PROGRESS NOTES
Noninvasive vascular studies reviewed    Carotid duplex demonstrates stable left moderate ICA stenosis.  Will recheck in 1 year    In the lower extremity, both ABIs have worsened from previous.  He continues to have bilateral SFA occlusions and although he does have two-vessel runoff in the calf it does appear that he has worsening inflow disease.  Will discuss with patient at follow-up visit.  Would suggest vascular surgery reevaluation.  Pending further recommendations vascular surgery we will plan on repeat imaging in 1 year    Michael Bloch, MD  Vascular Medicine

## 2021-10-28 ENCOUNTER — APPOINTMENT (OUTPATIENT)
Dept: VASCULAR LAB | Facility: MEDICAL CENTER | Age: 71
End: 2021-10-28
Payer: MEDICARE

## 2021-11-01 DIAGNOSIS — I73.9 PERIPHERAL VASCULAR DISEASE (HCC): ICD-10-CM

## 2021-11-01 DIAGNOSIS — M54.2 CERVICAL PAIN: ICD-10-CM

## 2021-11-01 DIAGNOSIS — E11.51 DIABETES MELLITUS WITH PERIPHERAL VASCULAR DISEASE (HCC): ICD-10-CM

## 2021-11-01 RX ORDER — IBUPROFEN 800 MG/1
TABLET ORAL
Qty: 90 TABLET | Refills: 1 | Status: SHIPPED | OUTPATIENT
Start: 2021-11-01 | End: 2022-07-19

## 2021-11-01 RX ORDER — ATORVASTATIN CALCIUM 80 MG/1
TABLET, FILM COATED ORAL
Qty: 100 TABLET | Refills: 2 | Status: SHIPPED | OUTPATIENT
Start: 2021-11-01 | End: 2022-03-02 | Stop reason: SDUPTHER

## 2021-12-10 ENCOUNTER — PATIENT MESSAGE (OUTPATIENT)
Dept: HEALTH INFORMATION MANAGEMENT | Facility: OTHER | Age: 71
End: 2021-12-10

## 2021-12-12 ENCOUNTER — PATIENT MESSAGE (OUTPATIENT)
Dept: HEALTH INFORMATION MANAGEMENT | Facility: OTHER | Age: 71
End: 2021-12-12

## 2021-12-23 ENCOUNTER — TELEPHONE (OUTPATIENT)
Dept: HEALTH INFORMATION MANAGEMENT | Facility: OTHER | Age: 71
End: 2021-12-23

## 2021-12-23 NOTE — TELEPHONE ENCOUNTER
Pt has had OV within the 12 month protocol and lipid panel is current. 6 month supply sent to pharmacy.   Lab Results   Component Value Date/Time    CHOLSTRLTOT 86 (L) 01/17/2018 06:31 AM    LDL 34 01/17/2018 06:31 AM    HDL 29 (A) 01/17/2018 06:31 AM    TRIGLYCERIDE 117 01/17/2018 06:31 AM       Lab Results   Component Value Date/Time    SODIUM 141 01/17/2018 06:31 AM    POTASSIUM 4.2 01/17/2018 06:31 AM    CHLORIDE 109 01/17/2018 06:31 AM    CO2 25 01/17/2018 06:31 AM    GLUCOSE 116 (H) 01/17/2018 06:31 AM    BUN 18 01/17/2018 06:31 AM    CREATININE 0.79 01/17/2018 06:31 AM     Lab Results   Component Value Date/Time    ALKPHOSPHAT 44 01/17/2018 06:31 AM    ASTSGOT 44 01/17/2018 06:31 AM    ALTSGPT 61 (H) 01/17/2018 06:31 AM    TBILIRUBIN 0.7 01/17/2018 06:31 AM           Positive

## 2022-01-05 ENCOUNTER — APPOINTMENT (OUTPATIENT)
Dept: URGENT CARE | Facility: PHYSICIAN GROUP | Age: 72
End: 2022-01-05
Payer: MEDICARE

## 2022-01-17 ENCOUNTER — TELEPHONE (OUTPATIENT)
Dept: MEDICAL GROUP | Facility: PHYSICIAN GROUP | Age: 72
End: 2022-01-17

## 2022-01-17 NOTE — TELEPHONE ENCOUNTER
ESTABLISHED PATIENT PRE-VISIT PLANNING     Patient was NOT contacted to complete PVP.     Note: Patient will not be contacted if there is no indication to call.     1.  Reviewed notes from the last few office visits within the medical group: Yes    2.  If any orders were placed at last visit or intended to be done for this visit (i.e. 6 mos follow-up), do we have Results/Consult Notes?         •  Labs - Labs were not ordered at last office visit.  Note: If patient appointment is for lab review and patient did not complete labs, check with provider if OK to reschedule patient until labs completed.       •  Imaging - Imaging was not ordered at last office visit.       •  Referrals - Referral ordered, patient was seen and consult notes are in chart. Care Teams updated  YES.    3. Is this appointment scheduled as a Hospital Follow-Up? No    4.  Immunizations were updated in Epic using Reconcile Outside Information activity? Yes    5.  Patient is due for the following Health Maintenance Topics:   Health Maintenance Due   Topic Date Due   • IMM HEP B VACCINE (1 of 3 - Risk 3-dose series) Never done   • IMM ZOSTER VACCINES (2 of 3) 01/10/2017   • URINE ACR / MICROALBUMIN  09/28/2021   • FASTING LIPID PROFILE  09/29/2021   • SERUM CREATININE  09/29/2021   • COLORECTAL CANCER SCREENING  10/03/2021   • RETINAL SCREENING  11/12/2021   • DIABETES MONOFILAMENT / LE EXAM  11/12/2021   • LUNG CANCER SCREENING  12/02/2021   • A1C SCREENING  01/22/2022       6.  AHA (Pulse8) form printed for Provider? Yes

## 2022-02-09 DIAGNOSIS — I73.9 PERIPHERAL VASCULAR DISEASE (HCC): ICD-10-CM

## 2022-02-09 RX ORDER — LISINOPRIL 10 MG/1
TABLET ORAL
Qty: 100 TABLET | Refills: 0 | Status: SHIPPED | OUTPATIENT
Start: 2022-02-09 | End: 2022-03-02 | Stop reason: SDUPTHER

## 2022-03-02 ENCOUNTER — OFFICE VISIT (OUTPATIENT)
Dept: CARDIOLOGY | Facility: MEDICAL CENTER | Age: 72
End: 2022-03-02
Payer: MEDICARE

## 2022-03-02 VITALS
WEIGHT: 184.4 LBS | SYSTOLIC BLOOD PRESSURE: 130 MMHG | HEIGHT: 70 IN | BODY MASS INDEX: 26.4 KG/M2 | DIASTOLIC BLOOD PRESSURE: 80 MMHG | OXYGEN SATURATION: 96 % | RESPIRATION RATE: 14 BRPM | HEART RATE: 91 BPM

## 2022-03-02 DIAGNOSIS — F10.21 ALCOHOL DEPENDENCE IN REMISSION (HCC): ICD-10-CM

## 2022-03-02 DIAGNOSIS — I25.2 HISTORY OF MI (MYOCARDIAL INFARCTION): ICD-10-CM

## 2022-03-02 DIAGNOSIS — I74.09 CHRONIC DISTAL AORTIC OCCLUSION (HCC): ICD-10-CM

## 2022-03-02 DIAGNOSIS — I35.0 NONRHEUMATIC AORTIC VALVE STENOSIS: ICD-10-CM

## 2022-03-02 DIAGNOSIS — I65.22 CAROTID ARTERY STENOSIS, ASYMPTOMATIC, LEFT: ICD-10-CM

## 2022-03-02 DIAGNOSIS — E11.29 MICROALBUMINURIA DUE TO TYPE 2 DIABETES MELLITUS (HCC): ICD-10-CM

## 2022-03-02 DIAGNOSIS — I70.0 ATHEROSCLEROSIS OF AORTA (HCC): ICD-10-CM

## 2022-03-02 DIAGNOSIS — R09.89 BRUIT: ICD-10-CM

## 2022-03-02 DIAGNOSIS — E11.51 DIABETES MELLITUS WITH PERIPHERAL VASCULAR DISEASE (HCC): ICD-10-CM

## 2022-03-02 DIAGNOSIS — I73.9 PAD (PERIPHERAL ARTERY DISEASE) (HCC): ICD-10-CM

## 2022-03-02 DIAGNOSIS — I73.9 PERIPHERAL VASCULAR DISEASE (HCC): ICD-10-CM

## 2022-03-02 DIAGNOSIS — I73.9 BILATERAL CLAUDICATION OF LOWER LIMB (HCC): ICD-10-CM

## 2022-03-02 DIAGNOSIS — M54.6 CHRONIC BILATERAL THORACIC BACK PAIN: ICD-10-CM

## 2022-03-02 DIAGNOSIS — B18.2 CHRONIC HEPATITIS C WITHOUT HEPATIC COMA (HCC): ICD-10-CM

## 2022-03-02 DIAGNOSIS — Z87.891 HISTORY OF TOBACCO ABUSE: ICD-10-CM

## 2022-03-02 DIAGNOSIS — I10 ESSENTIAL HYPERTENSION: ICD-10-CM

## 2022-03-02 DIAGNOSIS — I74.5 BILATERAL ILIAC ARTERY OCCLUSION (HCC): ICD-10-CM

## 2022-03-02 DIAGNOSIS — R01.1 NEWLY RECOGNIZED HEART MURMUR: ICD-10-CM

## 2022-03-02 DIAGNOSIS — R80.9 MICROALBUMINURIA DUE TO TYPE 2 DIABETES MELLITUS (HCC): ICD-10-CM

## 2022-03-02 DIAGNOSIS — G89.29 CHRONIC BILATERAL THORACIC BACK PAIN: ICD-10-CM

## 2022-03-02 DIAGNOSIS — I51.81 STRESS-INDUCED CARDIOMYOPATHY: ICD-10-CM

## 2022-03-02 PROCEDURE — 99214 OFFICE O/P EST MOD 30 MIN: CPT | Performed by: INTERNAL MEDICINE

## 2022-03-02 RX ORDER — METOPROLOL SUCCINATE 25 MG/1
25 TABLET, EXTENDED RELEASE ORAL
Qty: 100 TABLET | Refills: 3 | Status: SHIPPED | OUTPATIENT
Start: 2022-03-02 | End: 2022-12-06 | Stop reason: SDUPTHER

## 2022-03-02 RX ORDER — ATORVASTATIN CALCIUM 80 MG/1
80 TABLET, FILM COATED ORAL
Qty: 100 TABLET | Refills: 3 | Status: SHIPPED | OUTPATIENT
Start: 2022-03-02 | End: 2022-12-06 | Stop reason: SDUPTHER

## 2022-03-02 RX ORDER — LISINOPRIL 10 MG/1
10 TABLET ORAL
Qty: 100 TABLET | Refills: 3 | Status: SHIPPED
Start: 2022-03-02 | End: 2022-12-06

## 2022-03-02 ASSESSMENT — ENCOUNTER SYMPTOMS
DIZZINESS: 0
RESPIRATORY NEGATIVE: 1
ORTHOPNEA: 0
COUGH: 0
SORE THROAT: 0
HEMOPTYSIS: 0
LOSS OF CONSCIOUSNESS: 0
STRIDOR: 0
CARDIOVASCULAR NEGATIVE: 1
PND: 0
FEVER: 0
SHORTNESS OF BREATH: 0
CHILLS: 0
CONSTITUTIONAL NEGATIVE: 1
BRUISES/BLEEDS EASILY: 0
WEAKNESS: 0
NEUROLOGICAL NEGATIVE: 1
MUSCULOSKELETAL NEGATIVE: 1
WHEEZING: 0
GASTROINTESTINAL NEGATIVE: 1
SPUTUM PRODUCTION: 0
PALPITATIONS: 0
CLAUDICATION: 0
EYES NEGATIVE: 1

## 2022-03-02 ASSESSMENT — FIBROSIS 4 INDEX: FIB4 SCORE: 4.87

## 2022-03-02 NOTE — PROGRESS NOTES
"Chief Complaint   Patient presents with   • Aortic Atherosclerosis     F/V DX: Atherosclerosis of aorta (HCC)       Subjective:   Tuan Limon is a 70 y.o. male who presents today as a follow-up for his hypertension hyperlipidemia peripheral vascular disease and heart failure in the past.    Since he was last seen we did an echocardiogram showing mild to moderate aortic stenosis.  He continues to have claudication or exertional fatigue symptoms have her approximate 75 guards.  His blood pressures been controlled.  He has not had the labs done.  Time.  Otherwise has been well.    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 states checks his blood sugar daily. Not currently taking medication for.   • H/O colonoscopy with polypectomy 5/7/2013   • Hayfever    • Healthcare maintenance 4/5/2018   • Hepatitis C     no treatment   • History of alcoholism (HCC) 8/22/2012   • Kiana (hard of hearing) 07/26/2019   • Hyperlipidemia    • Hypertension    • Insulin dependent diabetes mellitus 07/26/2019    Pt. states used to take Insulin 2 years ago.   • S/P excision of lipoma 9/18/2019     Past Surgical History:   Procedure Laterality Date   • MASS EXCISION GENERAL Right 7/31/2019    Procedure: EXCISION, MASS, GENERAL - 15 CM LIPOMA OF SHOULDER;  Surgeon: Augustin Rosales M.D.;  Location: SURGERY [facility];  Service: General   • COLONOSCOPY  2017   • WIDE EXCISION  5/6/2014    Performed by Nicholas Govea M.D. at SURGERY SAME DAY [facility]   • FLAP GRAFT  5/6/2014    Performed by Nicholas Govea M.D. at SURGERY SAME DAY [facility]   • RECOVERY  10/22/2012    Performed by Pily Lee M.D. at SURGERY [facility]     Family History "   Problem Relation Age of Onset   • Heart Disease Father    • Arthritis Mother    • No Known Problems Sister    • Cancer Brother         type unknown   • Cancer Brother         skin    • No Known Problems Maternal Grandmother    • No Known Problems Maternal Grandfather    • No Known Problems Paternal Grandmother    • No Known Problems Paternal Grandfather    • Diabetes Neg Hx    • Hypertension Neg Hx    • Stroke Neg Hx    • Hyperlipidemia Neg Hx      Social History     Socioeconomic History   • Marital status: Single     Spouse name: Not on file   • Number of children: Not on file   • Years of education: Not on file   • Highest education level: Not on file   Occupational History   • Not on file   Tobacco Use   • Smoking status: Former Smoker     Packs/day: 1.00     Years: 58.00     Pack years: 58.00     Types: Cigarettes     Start date: 1/1/1960     Quit date: 9/1/2011     Years since quitting: 10.5   • Smokeless tobacco: Never Used   • Tobacco comment: avoid all tobacco products   Vaping Use   • Vaping Use: Never used   Substance and Sexual Activity   • Alcohol use: No     Alcohol/week: 0.0 oz     Comment: quit 1985, drank 2 times since   • Drug use: Yes     Types: Inhaled     Comment: marijuana (last use 2 weeks ago per pt)   • Sexual activity: Never   Other Topics Concern   • Not on file   Social History Narrative   • Not on file     Social Determinants of Health     Financial Resource Strain: Not on file   Food Insecurity: Not on file   Transportation Needs: Not on file   Physical Activity: Not on file   Stress: Not on file   Social Connections: Not on file   Intimate Partner Violence: Not on file   Housing Stability: Not on file     No Known Allergies  Outpatient Encounter Medications as of 3/2/2022   Medication Sig Dispense Refill   • lisinopril (PRINIVIL) 10 MG Tab Take 1 Tablet by mouth every day. 100 Tablet 3   • metoprolol SR (TOPROL XL) 25 MG TABLET SR 24 HR Take 1 Tablet by mouth every day. 100 Tablet 3    • atorvastatin (LIPITOR) 80 MG tablet Take 1 Tablet by mouth every day. 100 Tablet 3   • ibuprofen (MOTRIN) 800 MG Tab TAKE ONE TABLET BY MOUTH EVERY 8 HOURS AS NEEDED 90 Tablet 1   • clopidogrel (PLAVIX) 75 MG Tab TAKE 1 TABLET BY MOUTH EVERY  Tab 3   • B Complex Vitamins (B COMPLEX PO) Take  by mouth.     • FREESTYLE LITE strip      • Blood Glucose Test Strips Test strips order: Test strips for Freestyle Trang meter. Sig: use before breakfast and prn ssx high or low sugar #100 RF x 3 (Patient taking differently: Test strips order: Test strips for Freestyle Lite meter. Sig: use before breakfast and prn ssx high or low sugar #100 RF x 3) 100 Strip 3   • aspirin (ASA) 81 MG Chew Tab chewable tablet Take 81 mg by mouth every day.     • [DISCONTINUED] lisinopril (PRINIVIL) 10 MG Tab TAKE ONE TABLET BY MOUTH EVERY  Tablet 0   • [DISCONTINUED] metoprolol SR (TOPROL XL) 25 MG TABLET SR 24 HR Take 1 Tablet by mouth every day. Please call [phone] to schedule a follow up appointment for further refills. Thank you. 100 Tablet 0   • [DISCONTINUED] atorvastatin (LIPITOR) 80 MG tablet TAKE 1 TABLET BY MOUTH EVERY DAY. 100 Tablet 2     No facility-administered encounter medications on file as of 3/2/2022.     Review of Systems   Constitutional: Negative.  Negative for chills, fever and malaise/fatigue.   HENT: Negative.  Negative for sore throat.    Eyes: Negative.    Respiratory: Negative.  Negative for cough, hemoptysis, sputum production, shortness of breath, wheezing and stridor.    Cardiovascular: Negative.  Negative for chest pain, palpitations, orthopnea, claudication, leg swelling and PND.   Gastrointestinal: Negative.    Genitourinary: Negative.    Musculoskeletal: Negative.    Skin: Negative.    Neurological: Negative.  Negative for dizziness, loss of consciousness and weakness.   Endo/Heme/Allergies: Negative.  Does not bruise/bleed easily.   All other systems reviewed and are negative.        "Objective:   /80 (BP Location: Left arm, Patient Position: Sitting, BP Cuff Size: Adult)   Pulse 91   Resp 14   Ht 1.778 m (5' 10\")   Wt 83.6 kg (184 lb 6.4 oz)   SpO2 96%   BMI 26.46 kg/m²     Physical Exam  Vitals and nursing note reviewed.   Constitutional:       General: He is not in acute distress.     Appearance: He is well-developed. He is not diaphoretic.   HENT:      Head: Normocephalic and atraumatic.      Right Ear: External ear normal.      Left Ear: External ear normal.      Nose: Nose normal.      Mouth/Throat:      Pharynx: No oropharyngeal exudate.   Eyes:      General: No scleral icterus.        Right eye: No discharge.         Left eye: No discharge.      Conjunctiva/sclera: Conjunctivae normal.      Pupils: Pupils are equal, round, and reactive to light.   Neck:      Vascular: No JVD.   Cardiovascular:      Rate and Rhythm: Normal rate and regular rhythm.      Heart sounds: Murmur heard.    Systolic murmur is present with a grade of 3/6.    No friction rub. No gallop.   Pulmonary:      Effort: Pulmonary effort is normal. No respiratory distress.      Breath sounds: No stridor. No wheezing or rales.   Chest:      Chest wall: No tenderness.   Abdominal:      General: There is no distension.      Palpations: Abdomen is soft.      Tenderness: There is no guarding.   Musculoskeletal:         General: No tenderness or deformity. Normal range of motion.      Cervical back: Neck supple.   Skin:     General: Skin is warm and dry.      Coloration: Skin is not pale.      Findings: No erythema or rash.   Neurological:      Mental Status: He is alert.      Cranial Nerves: No cranial nerve deficit.      Motor: No abnormal muscle tone.      Coordination: Coordination normal.      Deep Tendon Reflexes: Reflexes are normal and symmetric. Reflexes normal.   Psychiatric:         Behavior: Behavior normal.         Thought Content: Thought content normal.         Judgment: Judgment normal.       Carotid " duplex: Dated 11/16/2016 personally interpreted myself showing 50% on the right and 50 to 69% on the left.    EKG: Dated 7/26/2019 personally interpreted myself showing normal sinus rhythm with PACs.    Lab Results   Component Value Date/Time    CHOLSTRLTOT 106 09/29/2020 06:46 AM    LDL 47 09/29/2020 06:46 AM    HDL 34 (A) 09/29/2020 06:46 AM    TRIGLYCERIDE 123 09/29/2020 06:46 AM       Lab Results   Component Value Date/Time    SODIUM 139 09/29/2020 06:46 AM    POTASSIUM 4.3 09/29/2020 06:46 AM    CHLORIDE 105 09/29/2020 06:46 AM    CO2 23 09/29/2020 06:46 AM    GLUCOSE 117 (H) 09/29/2020 06:46 AM    BUN 28 (H) 09/29/2020 06:46 AM    CREATININE 0.87 09/29/2020 06:46 AM     Lab Results   Component Value Date/Time    ALKPHOSPHAT 84 09/29/2020 06:46 AM    ASTSGOT 73 (H) 09/29/2020 06:46 AM    ALTSGPT 92 (H) 09/29/2020 06:46 AM    TBILIRUBIN 0.4 09/29/2020 06:46 AM          Assessment:     1. Bilateral claudication of lower limb (HCC)     2. PAD (peripheral artery disease) (formerly [facility])  Lipid Profile   3. Stress-induced cardiomyopathy  CBC W/ DIFF W/O PLATELETS    Lipid Profile   4. Bruit  CBC W/ DIFF W/O PLATELETS    EC-ECHOCARDIOGRAM COMPLETE W/O CONT   5. Diabetes mellitus with peripheral vascular disease (HCC)  atorvastatin (LIPITOR) 80 MG tablet    CBC W/ DIFF W/O PLATELETS    Lipid Profile    EC-ECHOCARDIOGRAM COMPLETE W/O CONT   6. Microalbuminuria due to type 2 diabetes mellitus (HCC)  Lipid Profile   7. History of tobacco abuse  Comp Metabolic Panel    CBC W/ DIFF W/O PLATELETS    EC-ECHOCARDIOGRAM COMPLETE W/O CONT   8. Atherosclerosis of aorta (HCC)  Comp Metabolic Panel   9. Chronic hepatitis C without hepatic coma (HCC)  Comp Metabolic Panel    Lipid Profile    EC-ECHOCARDIOGRAM COMPLETE W/O CONT   10. Essential hypertension  metoprolol SR (TOPROL XL) 25 MG TABLET SR 24 HR    Comp Metabolic Panel    Lipid Profile   11. Alcohol dependence in remission (HCC)  Comp Metabolic Panel   12. Bilateral iliac artery  occlusion (HCC)  CBC W/ DIFF W/O PLATELETS   13. Newly recognized heart murmur  CBC W/ DIFF W/O PLATELETS   14. Chronic distal aortic occlusion (HCC)     15. Carotid artery stenosis, asymptomatic, left     16. Chronic bilateral thoracic back pain     17. Nonrheumatic aortic valve stenosis     18. Peripheral vascular disease (HCC)  lisinopril (PRINIVIL) 10 MG Tab    atorvastatin (LIPITOR) 80 MG tablet   19. History of MI (myocardial infarction)  metoprolol SR (TOPROL XL) 25 MG TABLET SR 24 HR       Medical Decision Making:  Today's Assessment / Status / Plan:     72-year-old male with heart failure peripheral vascular his carotid stenosis and a aortic stenosis murmur.  I will repeat his echocardiogram.  We will get his labs repeated as well.  He will stay on same medications with no changes.  I refilled the manual see him back in 6 months.    1. Aortic stenosis murmur    - echo    2. Carotid Stenosis    - clinical follow up    3. HTN    - cont metop XL 25    - cont lisin 10    4. HLD    - cont atorva 80    5. Claudication    - stable    6. Stress CHF in 2015    - meds per above

## 2022-03-15 PROBLEM — D72.819 LEUKOPENIA: Status: ACTIVE | Noted: 2022-03-15

## 2022-03-15 PROBLEM — M51.36 DEGENERATIVE DISC DISEASE, LUMBAR: Status: ACTIVE | Noted: 2022-03-15

## 2022-03-15 PROBLEM — M51.369 DEGENERATIVE DISC DISEASE, LUMBAR: Status: ACTIVE | Noted: 2022-03-15

## 2022-03-15 PROBLEM — D61.818 PANCYTOPENIA (HCC): Status: ACTIVE | Noted: 2022-03-15

## 2022-03-15 PROBLEM — E78.5 DYSLIPIDEMIA: Status: ACTIVE | Noted: 2022-03-15

## 2022-03-15 PROBLEM — D69.6 THROMBOCYTOPENIA (HCC): Status: ACTIVE | Noted: 2022-03-15

## 2022-04-21 ENCOUNTER — TELEPHONE (OUTPATIENT)
Dept: PHYSICAL THERAPY | Facility: MEDICAL CENTER | Age: 72
End: 2022-04-21

## 2022-04-21 ENCOUNTER — OFFICE VISIT (OUTPATIENT)
Dept: MEDICAL GROUP | Facility: PHYSICIAN GROUP | Age: 72
End: 2022-04-21
Payer: MEDICARE

## 2022-04-21 VITALS
HEIGHT: 69 IN | BODY MASS INDEX: 26.51 KG/M2 | WEIGHT: 179 LBS | DIASTOLIC BLOOD PRESSURE: 64 MMHG | SYSTOLIC BLOOD PRESSURE: 116 MMHG | TEMPERATURE: 98 F | OXYGEN SATURATION: 95 % | HEART RATE: 107 BPM | RESPIRATION RATE: 16 BRPM

## 2022-04-21 DIAGNOSIS — E78.5 DYSLIPIDEMIA: ICD-10-CM

## 2022-04-21 DIAGNOSIS — R74.01 TRANSAMINITIS: ICD-10-CM

## 2022-04-21 DIAGNOSIS — E55.9 VITAMIN D DEFICIENCY: ICD-10-CM

## 2022-04-21 DIAGNOSIS — E11.29 MICROALBUMINURIA DUE TO TYPE 2 DIABETES MELLITUS (HCC): ICD-10-CM

## 2022-04-21 DIAGNOSIS — R80.9 MICROALBUMINURIA DUE TO TYPE 2 DIABETES MELLITUS (HCC): ICD-10-CM

## 2022-04-21 DIAGNOSIS — E11.51 TYPE 2 DIABETES MELLITUS WITH DIABETIC PERIPHERAL ANGIOPATHY WITHOUT GANGRENE, WITHOUT LONG-TERM CURRENT USE OF INSULIN (HCC): ICD-10-CM

## 2022-04-21 DIAGNOSIS — Z13.29 SCREENING FOR THYROID DISORDER: ICD-10-CM

## 2022-04-21 DIAGNOSIS — B18.2 CHRONIC HEPATITIS C WITHOUT HEPATIC COMA (HCC): ICD-10-CM

## 2022-04-21 DIAGNOSIS — I10 ESSENTIAL HYPERTENSION: ICD-10-CM

## 2022-04-21 DIAGNOSIS — Z13.21 ENCOUNTER FOR VITAMIN DEFICIENCY SCREENING: ICD-10-CM

## 2022-04-21 PROCEDURE — 99214 OFFICE O/P EST MOD 30 MIN: CPT | Performed by: INTERNAL MEDICINE

## 2022-04-21 RX ORDER — LANCETS 30 GAUGE
EACH MISCELLANEOUS
Qty: 100 EACH | Refills: 3 | Status: SHIPPED | OUTPATIENT
Start: 2022-04-21

## 2022-04-21 RX ORDER — GLUCOSAMINE HCL/CHONDROITIN SU 500-400 MG
CAPSULE ORAL
Qty: 100 EACH | Refills: 3 | Status: SHIPPED | OUTPATIENT
Start: 2022-04-21

## 2022-04-21 ASSESSMENT — FIBROSIS 4 INDEX: FIB4 SCORE: 4.87

## 2022-04-21 NOTE — PROGRESS NOTES
Established Patient    Chief Complaint   Patient presents with   • Medication Refill     Test strips        Subjective:     HPI:   Chung presents today with the following.    Patient Active Problem List    Diagnosis Date Noted   • Vitamin D deficiency 04/21/2022   • Pancytopenia (HCC) 03/15/2022   • Thrombocytopenia (Formerly [facility]) 03/15/2022   • Dyslipidemia 03/15/2022   • Degenerative disc disease, lumbar 03/15/2022   • Nonrheumatic aortic valve stenosis 03/02/2022   • Internal nasal lesion 07/22/2021   • Chronic bilateral thoracic back pain 01/04/2021   • Other chest pain 01/04/2021   • 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Former[facility]) 09/21/2020   • Chronic distal aortic occlusion (Former[facility]) 09/21/2020   • Carotid artery stenosis, asymptomatic, left 09/21/2020   • BMI 27.0-27.9,adult 05/12/2020   • Transaminitis 05/12/2020   • Chronic right shoulder pain 09/18/2019   • Alcohol dependence in remission (Formerly [facility]) 04/19/2019   • Cervical radiculopathy, chronic 04/05/2018   • Essential hypertension 04/05/2018   • Chronic hepatitis C without hepatic coma (Former[facility]) 05/18/2017   • Cervical disc disease 05/18/2017   • History of tobacco abuse 01/31/2017   • Atherosclerosis of aorta (Former[facility]) 01/31/2017   • Type 2 diabetes mellitus with diabetic peripheral angiopathy without gangrene, without long-term current use of insulin ([facility]) 11/15/2016   • Microalbuminuria due to type 2 diabetes mellitus ([facility]) 11/15/2016   • Bruit 11/01/2016   • Stress-induced cardiomyopathy 03/10/2015   • PAD (peripheral artery disease) (Formerly [facility]) 01/21/2015   • H/O colonoscopy with polypectomy 05/07/2013   • Atherosclerosis of native arteries of extremity with intermittent claudication (Formerly [facility]) 10/22/2012       Current Outpatient Medications on File Prior to Visit   Medication Sig Dispense Refill   • lisinopril (PRINIVIL) 10 MG Tab Take 1 Tablet by mouth every day. 100 Tablet 3   • metoprolol SR (TOPROL XL) 25 MG TABLET SR 24 HR Take 1 Tablet by mouth every day. 100 Tablet 3   •  "atorvastatin (LIPITOR) 80 MG tablet Take 1 Tablet by mouth every day. 100 Tablet 3   • ibuprofen (MOTRIN) 800 MG Tab TAKE ONE TABLET BY MOUTH EVERY 8 HOURS AS NEEDED 90 Tablet 1   • clopidogrel (PLAVIX) 75 MG Tab TAKE 1 TABLET BY MOUTH EVERY  Tab 3   • B Complex Vitamins (B COMPLEX PO) Take  by mouth.     • FREESTYLE LITE strip      • Blood Glucose Test Strips Test strips order: Test strips for Freestyle Trang meter. Sig: use before breakfast and prn ssx high or low sugar #100 RF x 3 (Patient taking differently: Test strips order: Test strips for Freestyle Lite meter. Sig: use before breakfast and prn ssx high or low sugar #100 RF x 3) 100 Strip 3   • aspirin (ASA) 81 MG Chew Tab chewable tablet Take 81 mg by mouth every day.       No current facility-administered medications on file prior to visit.       Allergies, past medical history, past surgical history, family history, social history reviewed and updated    ROS:  All other systems reviewed and are negative except as stated in the HPI       Physical Exam:     /64 (BP Location: Right arm, Patient Position: Sitting, BP Cuff Size: Adult)   Pulse (!) 107   Temp 36.7 °C (98 °F) (Temporal)   Resp 16   Ht 1.753 m (5' 9\")   Wt 81.2 kg (179 lb)   SpO2 95%   BMI 26.43 kg/m²   General: Normal appearing. No distress.  Pulmonary: Clear to ausculation.  Normal effort.   Cardiovascular: Regular rate and rhythm    Assessment and Plan:     72 y.o. male with the following issues.    2. Microalbuminuria due to type 2 diabetes mellitus ([facility])  1. Type 2 diabetes mellitus with diabetic peripheral angiopathy without gangrene, without long-term current use of insulin ([facility])  Reported checking blood sugar at home with fasting range higher than before sometimes 180s, he is not on any medication, before he was taking insulin, however he was doing well diet controlled before, currently denied any other related symptoms  - Blood Glucose Meter Kit; Test blood sugar as " recommended by provider.  Per insurance preference, blood glucose monitoring kit.  Dispense: 1 Kit; Refill: 0  - Blood Glucose Test Strips; Use one per insurance preference, strip to test blood sugar once daily early morning before first meal.  Dispense: 100 Strip; Refill: 6  - Lancets; Use one per insurance preference, lancet to test blood sugar once daily early morning before first meal.  Dispense: 100 Each; Refill: 3  - Alcohol Swabs; Wipe site with prep pad prior to injection.  Dispense: 100 Each; Refill: 3  - HEMOGLOBIN A1C; Future  - TSH; Future  - MICROALBUMIN CREAT RATIO URINE; Future    -Discussed diabetic diet in detail, educated regarding management of hypoglycemia, advised regular exercise, educated disease management and weight goals as well as feet care and frequent check of feet.  -Patient to check early morning fasting blood glucose with goal discussed.  - asked to have a BG log with daily fasting and 2 hr postprandial after biggest meal and bring to next visit or send through my chart  -Discussed side effects of medication. Patient confirmed understanding of information.    3. Essential hypertension  Well-controlled, reported compliance with lisinopril 10 mg daily,    4. Dyslipidemia  Lipid panel pending, reported compliant with Lipitor 80 mg daily, denies any side effect    5. Screening for thyroid disorder  - FREE THYROXINE; Future  - T3 FREE; Future  - TSH; Future  - THYROID PEROXIDASE  (TPO) AB; Future    6. Encounter for vitamin deficiency screening  - VITAMIN B12; Future    7. Vitamin D deficiency  - VITAMIN D,25 HYDROXY; Future    Transaminitis  8. Chronic hepatitis C without hepatic coma (HCC)  Have not been treated for hep C, patient is okay to see GI for further evaluation  - Referral to Gastroenterology    Follow Up:      Return in about 2 weeks (around 5/5/2022).  Labs, diabetes,  Please note that this dictation was created using voice recognition software. I have made every reasonable  attempt to correct obvious errors, but I expect that there are errors of grammar and possibly content that I did not discover before finalizing the note.    Signed by: Asuncion Roland M.D.

## 2022-04-21 NOTE — OP THERAPY DISCHARGE SUMMARY
Outpatient Physical Therapy  DISCHARGE SUMMARY NOTE      [facility]  [address]  [place] [address]  Phone:  [phone]  Fax:  [phone]    Date of Visit: 04/21/2022    Patient: Chung Limon  YOB: 1950  MRN: [id number]     Referring Provider: No referring provider defined for this encounter.   Referring Diagnosis No admission diagnoses are documented for this encounter.         Functional Assessment Used        Your patient is being discharged from Physical Therapy with the following comments:   · Patient has failed to schedule or reschedule follow-up visits    Comments:  Pt seen for evaluation 1/21/21. Has not scheduled follow ups.      Limitations Remaining:  Please see evaluation; current limitations unknown.    Recommendations:  Pt has not been seen since initial evaluation >30 days. Pt has failed to schedule additional follow ups. Per policy, pt will need to be seen by PCP or acquire another referral prior to initiating skilled physical therapy. Pt is being discharged at this time.      Steve Mota, PT    Date: 4/21/2022

## 2022-05-04 RX ORDER — CLOPIDOGREL BISULFATE 75 MG/1
TABLET ORAL
Qty: 100 TABLET | Refills: 3 | Status: SHIPPED | OUTPATIENT
Start: 2022-05-04 | End: 2023-06-15 | Stop reason: SDUPTHER

## 2022-05-13 ENCOUNTER — HOSPITAL ENCOUNTER (OUTPATIENT)
Dept: LAB | Facility: MEDICAL CENTER | Age: 72
End: 2022-05-13
Attending: INTERNAL MEDICINE
Payer: MEDICARE

## 2022-05-13 DIAGNOSIS — Z13.21 ENCOUNTER FOR VITAMIN DEFICIENCY SCREENING: ICD-10-CM

## 2022-05-13 DIAGNOSIS — E11.29 MICROALBUMINURIA DUE TO TYPE 2 DIABETES MELLITUS (HCC): ICD-10-CM

## 2022-05-13 DIAGNOSIS — Z13.29 SCREENING FOR THYROID DISORDER: ICD-10-CM

## 2022-05-13 DIAGNOSIS — E11.51 TYPE 2 DIABETES MELLITUS WITH DIABETIC PERIPHERAL ANGIOPATHY WITHOUT GANGRENE, WITHOUT LONG-TERM CURRENT USE OF INSULIN (HCC): ICD-10-CM

## 2022-05-13 DIAGNOSIS — I10 ESSENTIAL HYPERTENSION: ICD-10-CM

## 2022-05-13 DIAGNOSIS — R80.9 MICROALBUMINURIA DUE TO TYPE 2 DIABETES MELLITUS (HCC): ICD-10-CM

## 2022-05-13 DIAGNOSIS — E55.9 VITAMIN D DEFICIENCY: ICD-10-CM

## 2022-05-13 DIAGNOSIS — E78.5 DYSLIPIDEMIA: ICD-10-CM

## 2022-05-13 LAB
25(OH)D3 SERPL-MCNC: 92 NG/ML (ref 30–100)
CREAT UR-MCNC: 108.68 MG/DL
EST. AVERAGE GLUCOSE BLD GHB EST-MCNC: 189 MG/DL
HBA1C MFR BLD: 8.2 % (ref 4–5.6)
MICROALBUMIN UR-MCNC: 98 MG/DL
MICROALBUMIN/CREAT UR: 902 MG/G (ref 0–30)
T3FREE SERPL-MCNC: 3.62 PG/ML (ref 2–4.4)
T4 FREE SERPL-MCNC: 1.19 NG/DL (ref 0.93–1.7)
THYROPEROXIDASE AB SERPL-ACNC: <9 IU/ML (ref 0–9)
TSH SERPL DL<=0.005 MIU/L-ACNC: 2.34 UIU/ML (ref 0.38–5.33)
VIT B12 SERPL-MCNC: 1104 PG/ML (ref 211–911)

## 2022-05-13 PROCEDURE — 82043 UR ALBUMIN QUANTITATIVE: CPT

## 2022-05-13 PROCEDURE — 36415 COLL VENOUS BLD VENIPUNCTURE: CPT

## 2022-05-13 PROCEDURE — 82607 VITAMIN B-12: CPT

## 2022-05-13 PROCEDURE — 82570 ASSAY OF URINE CREATININE: CPT

## 2022-05-13 PROCEDURE — 86376 MICROSOMAL ANTIBODY EACH: CPT

## 2022-05-13 PROCEDURE — 82306 VITAMIN D 25 HYDROXY: CPT

## 2022-05-13 PROCEDURE — 84443 ASSAY THYROID STIM HORMONE: CPT

## 2022-05-13 PROCEDURE — 84481 FREE ASSAY (FT-3): CPT

## 2022-05-13 PROCEDURE — 83036 HEMOGLOBIN GLYCOSYLATED A1C: CPT

## 2022-05-13 PROCEDURE — 84439 ASSAY OF FREE THYROXINE: CPT

## 2022-05-21 ENCOUNTER — TELEPHONE (OUTPATIENT)
Dept: MEDICAL GROUP | Facility: PHYSICIAN GROUP | Age: 72
End: 2022-05-21
Payer: MEDICARE

## 2022-05-21 NOTE — TELEPHONE ENCOUNTER
ESTABLISHED PATIENT PRE-VISIT PLANNING     Patient was NOT contacted to complete PVP.     Note: Patient will not be contacted if there is no indication to call.     1.  Reviewed notes from the last few office visits within the medical group: Yes    2.  If any orders were placed at last visit or intended to be done for this visit (i.e. 6 mos follow-up), do we have Results/Consult Notes?         •  Labs - Labs ordered, completed on 5/13/22 and results are in chart.  Note: If patient appointment is for lab review and patient did not complete labs, check with provider if OK to reschedule patient until labs completed.       •  Imaging - Imaging was not ordered at last office visit.       •  Referrals - Referral ordered, patient has NOT been seen.    3. Is this appointment scheduled as a Hospital Follow-Up? No    4.  Immunizations were updated in Epic using Reconcile Outside Information activity? Yes    5.  Patient is due for the following Health Maintenance Topics:   Health Maintenance Due   Topic Date Due   • IMM HEP B VACCINE (1 of 3 - Risk 3-dose series) Never done   • IMM ZOSTER VACCINES (2 of 3) 01/10/2017   • FASTING LIPID PROFILE  09/29/2021   • SERUM CREATININE  09/29/2021   • COLORECTAL CANCER SCREENING  10/03/2021   • RETINAL SCREENING  11/12/2021   • DIABETES MONOFILAMENT / LE EXAM  11/12/2021   • LUNG CANCER SCREENING  12/02/2021       6.  AHA (Pulse[employer]) form printed for Provider? Yes

## 2022-05-25 ENCOUNTER — OFFICE VISIT (OUTPATIENT)
Dept: MEDICAL GROUP | Facility: PHYSICIAN GROUP | Age: 72
End: 2022-05-25
Payer: MEDICARE

## 2022-05-25 VITALS
SYSTOLIC BLOOD PRESSURE: 132 MMHG | RESPIRATION RATE: 14 BRPM | BODY MASS INDEX: 25.91 KG/M2 | TEMPERATURE: 97.7 F | HEIGHT: 70 IN | WEIGHT: 181 LBS | DIASTOLIC BLOOD PRESSURE: 78 MMHG | HEART RATE: 74 BPM | OXYGEN SATURATION: 98 %

## 2022-05-25 DIAGNOSIS — E11.51 TYPE 2 DIABETES MELLITUS WITH DIABETIC PERIPHERAL ANGIOPATHY WITHOUT GANGRENE, WITHOUT LONG-TERM CURRENT USE OF INSULIN (HCC): ICD-10-CM

## 2022-05-25 PROCEDURE — 99214 OFFICE O/P EST MOD 30 MIN: CPT | Performed by: INTERNAL MEDICINE

## 2022-05-25 ASSESSMENT — FIBROSIS 4 INDEX: FIB4 SCORE: 4.87

## 2022-05-25 NOTE — PROGRESS NOTES
Established Patient    Chief Complaint   Patient presents with   • Follow-Up       Subjective:     HPI:   Chung presents today with the following.    Patient Active Problem List    Diagnosis Date Noted   • Vitamin D deficiency 04/21/2022   • Pancytopenia (HCC) 03/15/2022   • Thrombocytopenia ([facility]) 03/15/2022   • Dyslipidemia 03/15/2022   • Degenerative disc disease, lumbar 03/15/2022   • Nonrheumatic aortic valve stenosis 03/02/2022   • Internal nasal lesion 07/22/2021   • Chronic bilateral thoracic back pain 01/04/2021   • Other chest pain 01/04/2021   • 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[facility]) 09/21/2020   • Chronic distal aortic occlusion ([facility]) 09/21/2020   • Carotid artery stenosis, asymptomatic, left 09/21/2020   • BMI 27.0-27.9,adult 05/12/2020   • Transaminitis 05/12/2020   • Chronic right shoulder pain 09/18/2019   • Alcohol dependence in remission ([facility]) 04/19/2019   • Cervical radiculopathy, chronic 04/05/2018   • Essential hypertension 04/05/2018   • Chronic hepatitis C without hepatic coma ([facility]) 05/18/2017   • Cervical disc disease 05/18/2017   • History of tobacco abuse 01/31/2017   • Atherosclerosis of aorta ([facility]) 01/31/2017   • Type 2 diabetes mellitus with diabetic peripheral angiopathy without gangrene, without long-term current use of insulin ([facility]) 11/15/2016   • Microalbuminuria due to type 2 diabetes mellitus ([facility]) 11/15/2016   • Bruit 11/01/2016   • Stress-induced cardiomyopathy 03/10/2015   • PAD (peripheral artery disease) ([facility]) 01/21/2015   • H/O colonoscopy with polypectomy 05/07/2013   • Atherosclerosis of native arteries of extremity with intermittent claudication ([facility]) 10/22/2012       Current Outpatient Medications on File Prior to Visit   Medication Sig Dispense Refill   • clopidogrel (PLAVIX) 75 MG Tab TAKE ONE TABLET BY MOUTH ONCE DAILY 100 Tablet 3   • Blood Glucose Meter Kit Test blood sugar as recommended by provider.  Per insurance preference, blood glucose monitoring kit. 1 Kit 0  "  • Blood Glucose Test Strips Use one per insurance preference, strip to test blood sugar once daily early morning before first meal. 100 Strip 6   • Lancets Use one per insurance preference, lancet to test blood sugar once daily early morning before first meal. 100 Each 3   • Alcohol Swabs Wipe site with prep pad prior to injection. 100 Each 3   • lisinopril (PRINIVIL) 10 MG Tab Take 1 Tablet by mouth every day. 100 Tablet 3   • metoprolol SR (TOPROL XL) 25 MG TABLET SR 24 HR Take 1 Tablet by mouth every day. 100 Tablet 3   • atorvastatin (LIPITOR) 80 MG tablet Take 1 Tablet by mouth every day. 100 Tablet 3   • ibuprofen (MOTRIN) 800 MG Tab TAKE ONE TABLET BY MOUTH EVERY 8 HOURS AS NEEDED 90 Tablet 1   • B Complex Vitamins (B COMPLEX PO) Take  by mouth.     • FREESTYLE LITE strip      • Blood Glucose Test Strips Test strips order: Test strips for Freestyle Trang meter. Sig: use before breakfast and prn ssx high or low sugar #100 RF x 3 (Patient taking differently: Test strips order: Test strips for Freestyle Lite meter. Sig: use before breakfast and prn ssx high or low sugar #100 RF x 3) 100 Strip 3   • aspirin (ASA) 81 MG Chew Tab chewable tablet Take 81 mg by mouth every day.       No current facility-administered medications on file prior to visit.       Allergies, past medical history, past surgical history, family history, social history reviewed and updated    ROS:  All other systems reviewed and are negative except as stated in the HPI       Physical Exam:     /78 (BP Location: Left arm, Patient Position: Sitting, BP Cuff Size: Adult)   Pulse 74   Temp 36.5 °C (97.7 °F) (Temporal)   Resp 14   Ht 1.778 m (5' 10\")   Wt 82.1 kg (181 lb)   SpO2 98%   BMI 25.97 kg/m²   General: Normal appearing. No distress.  Pulmonary: Clear to ausculation.  Normal effort.   Cardiovascular: Regular rate and rhythm    Assessment and Plan:     72 y.o. male with the following issues.    1. Type 2 diabetes mellitus with " diabetic peripheral angiopathy without gangrene, without long-term current use of insulin (HCC)  A1c is getting worse, he is not on any diabetic medication, also Jardiance 10 mg daily, can increase to 25 mg daily, I will see him in 2-week, hopefully I will start with Trulicity as well at that time, advised to follow-up with ophthalmology as well,  -Patient check fasting blood sugar with average of 170s to 180s, patient also has access to sugar and refined carbs as well  - Empagliflozin 10 MG Tab; Take 1 Tablet by mouth every day.  Dispense: 30 Tablet; Refill: 1    -Discussed diabetic diet in detail, educated regarding management of hypoglycemia, advised regular exercise, educated disease management and weight goals as well as feet care and frequent check of feet.  -Patient to check early morning fasting blood glucose with goal discussed.  - asked to have a BG log with daily fasting and 2 hr postprandial after biggest meal and bring to next visit or send through my chart  -Discussed side effects of medication. Patient confirmed understanding of information.    Follow Up:      Return in about 2 weeks (around 6/8/2022).  Diabetes  Please note that this dictation was created using voice recognition software. I have made every reasonable attempt to correct obvious errors, but I expect that there are errors of grammar and possibly content that I did not discover before finalizing the note.    Signed by: Asuncion Roland M.D.

## 2022-06-03 ENCOUNTER — TELEPHONE (OUTPATIENT)
Dept: MEDICAL GROUP | Facility: PHYSICIAN GROUP | Age: 72
End: 2022-06-03
Payer: MEDICARE

## 2022-06-03 NOTE — TELEPHONE ENCOUNTER
ESTABLISHED PATIENT PRE-VISIT PLANNING     Patient was NOT contacted to complete PVP.     Note: Patient will not be contacted if there is no indication to call.     1.  Reviewed notes from the last few office visits within the medical group: Yes    2.  If any orders were placed at last visit or intended to be done for this visit (i.e. 6 mos follow-up), do we have Results/Consult Notes?         •  Labs - Labs were not ordered at last office visit.  Note: If patient appointment is for lab review and patient did not complete labs, check with provider if OK to reschedule patient until labs completed.       •  Imaging - Imaging was not ordered at last office visit.       •  Referrals - Referral ordered, patient has NOT been seen. Pt is scheduled with GI on 8/16/22    3. Is this appointment scheduled as a Hospital Follow-Up? No    4.  Immunizations were updated in Epic using Reconcile Outside Information activity? Yes    5.  Patient is due for the following Health Maintenance Topics:   Health Maintenance Due   Topic Date Due   • IMM HEP B VACCINE (1 of 3 - Risk 3-dose series) Never done   • IMM ZOSTER VACCINES (2 of 3) 01/10/2017   • FASTING LIPID PROFILE  09/29/2021   • SERUM CREATININE  09/29/2021   • COLORECTAL CANCER SCREENING  10/03/2021   • RETINAL SCREENING  11/12/2021   • DIABETES MONOFILAMENT / LE EXAM  11/12/2021   • LUNG CANCER SCREENING  12/02/2021       6.  AHA (Pulse8) form printed for Provider? Yes

## 2022-06-09 ENCOUNTER — OFFICE VISIT (OUTPATIENT)
Dept: MEDICAL GROUP | Facility: PHYSICIAN GROUP | Age: 72
End: 2022-06-09
Payer: MEDICARE

## 2022-06-09 VITALS
DIASTOLIC BLOOD PRESSURE: 64 MMHG | OXYGEN SATURATION: 96 % | BODY MASS INDEX: 25.48 KG/M2 | WEIGHT: 178 LBS | HEART RATE: 75 BPM | RESPIRATION RATE: 14 BRPM | TEMPERATURE: 97.8 F | SYSTOLIC BLOOD PRESSURE: 118 MMHG | HEIGHT: 70 IN

## 2022-06-09 DIAGNOSIS — E78.5 DYSLIPIDEMIA: ICD-10-CM

## 2022-06-09 DIAGNOSIS — E11.51 TYPE 2 DIABETES MELLITUS WITH DIABETIC PERIPHERAL ANGIOPATHY WITHOUT GANGRENE, WITHOUT LONG-TERM CURRENT USE OF INSULIN (HCC): ICD-10-CM

## 2022-06-09 DIAGNOSIS — E11.29 MICROALBUMINURIA DUE TO TYPE 2 DIABETES MELLITUS (HCC): ICD-10-CM

## 2022-06-09 DIAGNOSIS — R80.9 MICROALBUMINURIA DUE TO TYPE 2 DIABETES MELLITUS (HCC): ICD-10-CM

## 2022-06-09 PROCEDURE — 99214 OFFICE O/P EST MOD 30 MIN: CPT | Performed by: INTERNAL MEDICINE

## 2022-06-09 ASSESSMENT — FIBROSIS 4 INDEX: FIB4 SCORE: 4.87

## 2022-06-09 NOTE — PROGRESS NOTES
Established Patient    Chief Complaint   Patient presents with   • Follow-Up       Subjective:     HPI:   Chung presents today with the following.    Patient Active Problem List    Diagnosis Date Noted   • Vitamin D deficiency 04/21/2022   • Pancytopenia (HCC) 03/15/2022   • Thrombocytopenia ([facility]) 03/15/2022   • Dyslipidemia 03/15/2022   • Degenerative disc disease, lumbar 03/15/2022   • Nonrheumatic aortic valve stenosis 03/02/2022   • Internal nasal lesion 07/22/2021   • Chronic bilateral thoracic back pain 01/04/2021   • Other chest pain 01/04/2021   • 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[facility]) 09/21/2020   • Chronic distal aortic occlusion ([facility]) 09/21/2020   • Carotid artery stenosis, asymptomatic, left 09/21/2020   • BMI 27.0-27.9,adult 05/12/2020   • Transaminitis 05/12/2020   • Chronic right shoulder pain 09/18/2019   • Alcohol dependence in remission ([facility]) 04/19/2019   • Cervical radiculopathy, chronic 04/05/2018   • Essential hypertension 04/05/2018   • Chronic hepatitis C without hepatic coma ([facility]) 05/18/2017   • Cervical disc disease 05/18/2017   • History of tobacco abuse 01/31/2017   • Atherosclerosis of aorta ([facility]) 01/31/2017   • Type 2 diabetes mellitus with diabetic peripheral angiopathy without gangrene, without long-term current use of insulin ([facility]) 11/15/2016   • Microalbuminuria due to type 2 diabetes mellitus ([facility]) 11/15/2016   • Bruit 11/01/2016   • Stress-induced cardiomyopathy 03/10/2015   • PAD (peripheral artery disease) ([facility]) 01/21/2015   • H/O colonoscopy with polypectomy 05/07/2013   • Atherosclerosis of native arteries of extremity with intermittent claudication ([facility]) 10/22/2012       Current Outpatient Medications on File Prior to Visit   Medication Sig Dispense Refill   • clopidogrel (PLAVIX) 75 MG Tab TAKE ONE TABLET BY MOUTH ONCE DAILY 100 Tablet 3   • Blood Glucose Meter Kit Test blood sugar as recommended by provider.  Per insurance preference, blood glucose monitoring kit. 1 Kit 0  "  • Blood Glucose Test Strips Use one per insurance preference, strip to test blood sugar once daily early morning before first meal. 100 Strip 6   • Lancets Use one per insurance preference, lancet to test blood sugar once daily early morning before first meal. 100 Each 3   • Alcohol Swabs Wipe site with prep pad prior to injection. 100 Each 3   • lisinopril (PRINIVIL) 10 MG Tab Take 1 Tablet by mouth every day. 100 Tablet 3   • metoprolol SR (TOPROL XL) 25 MG TABLET SR 24 HR Take 1 Tablet by mouth every day. 100 Tablet 3   • atorvastatin (LIPITOR) 80 MG tablet Take 1 Tablet by mouth every day. 100 Tablet 3   • ibuprofen (MOTRIN) 800 MG Tab TAKE ONE TABLET BY MOUTH EVERY 8 HOURS AS NEEDED 90 Tablet 1   • B Complex Vitamins (B COMPLEX PO) Take  by mouth.     • FREESTYLE LITE strip      • Blood Glucose Test Strips Test strips order: Test strips for Freestyle Trang meter. Sig: use before breakfast and prn ssx high or low sugar #100 RF x 3 (Patient taking differently: Test strips order: Test strips for Freestyle Lite meter. Sig: use before breakfast and prn ssx high or low sugar #100 RF x 3) 100 Strip 3   • aspirin (ASA) 81 MG Chew Tab chewable tablet Take 81 mg by mouth every day.       No current facility-administered medications on file prior to visit.       Allergies, past medical history, past surgical history, family history, social history reviewed and updated    ROS:  All other systems reviewed and are negative except as stated in the HPI       Physical Exam:     /64 (BP Location: Right arm, Patient Position: Sitting, BP Cuff Size: Adult)   Pulse 75   Temp 36.6 °C (97.8 °F) (Temporal)   Resp 14   Ht 1.778 m (5' 10\")   Wt 80.7 kg (178 lb)   SpO2 96%   BMI 25.54 kg/m²   General: Normal appearing. No distress.  Pulmonary: Clear to ausculation.  Normal effort.   Cardiovascular: Regular rate and rhythm    Assessment and Plan:     72 y.o. male with the following issues.    1. Type 2 diabetes mellitus " with diabetic peripheral angiopathy without gangrene, without long-term current use of insulin (HCC)  - Empagliflozin 25 MG Tab; Take 1 Each by mouth every day.  Dispense: 100 Tablet; Refill: 3  2. Microalbuminuria due to type 2 diabetes mellitus (HCC)  3. BMI 27.0-27.9,adult  4. Dyslipidemia  Patient checking fasting blood sugars range from 150s to 170s, try to eat healthier option, there is no schedule regular exercise for him, I increase Jardiance from 10 mg to 25 mg daily, patient tolerated that previous dose,    -Discussed diabetic diet in detail, educated regarding management of hypoglycemia, advised regular exercise, educated disease management and weight goals as well as feet care and frequent check of feet.  -Patient to check early morning fasting blood glucose with goal discussed.  - asked to have a BG log with daily fasting and 2 hr postprandial after biggest meal and bring to next visit or send through my chart  -Discussed side effects of medication. Patient confirmed understanding of information.    Follow Up:      Return in about 2 months (around 8/9/2022).  Diabetes, A1c,  Please note that this dictation was created using voice recognition software. I have made every reasonable attempt to correct obvious errors, but I expect that there are errors of grammar and possibly content that I did not discover before finalizing the note.    Signed by: Asuncion Roland M.D.

## 2022-07-18 DIAGNOSIS — M54.2 CERVICAL PAIN: ICD-10-CM

## 2022-07-19 RX ORDER — IBUPROFEN 800 MG/1
TABLET ORAL
Qty: 90 TABLET | Refills: 1 | Status: SHIPPED | OUTPATIENT
Start: 2022-07-19 | End: 2023-04-04

## 2022-08-11 ENCOUNTER — OFFICE VISIT (OUTPATIENT)
Dept: MEDICAL GROUP | Facility: PHYSICIAN GROUP | Age: 72
End: 2022-08-11
Payer: MEDICARE

## 2022-08-11 VITALS
RESPIRATION RATE: 20 BRPM | SYSTOLIC BLOOD PRESSURE: 128 MMHG | HEART RATE: 79 BPM | DIASTOLIC BLOOD PRESSURE: 72 MMHG | TEMPERATURE: 97.8 F | HEIGHT: 70 IN | WEIGHT: 180 LBS | OXYGEN SATURATION: 95 % | BODY MASS INDEX: 25.77 KG/M2

## 2022-08-11 DIAGNOSIS — E11.51 TYPE 2 DIABETES MELLITUS WITH DIABETIC PERIPHERAL ANGIOPATHY WITHOUT GANGRENE, WITHOUT LONG-TERM CURRENT USE OF INSULIN (HCC): ICD-10-CM

## 2022-08-11 DIAGNOSIS — E11.29 MICROALBUMINURIA DUE TO TYPE 2 DIABETES MELLITUS (HCC): ICD-10-CM

## 2022-08-11 DIAGNOSIS — R80.9 MICROALBUMINURIA DUE TO TYPE 2 DIABETES MELLITUS (HCC): ICD-10-CM

## 2022-08-11 DIAGNOSIS — I10 ESSENTIAL HYPERTENSION: ICD-10-CM

## 2022-08-11 PROCEDURE — 99214 OFFICE O/P EST MOD 30 MIN: CPT | Performed by: INTERNAL MEDICINE

## 2022-08-11 ASSESSMENT — FIBROSIS 4 INDEX: FIB4 SCORE: 4.87

## 2022-08-11 NOTE — PROGRESS NOTES
Established Patient    Chief Complaint   Patient presents with    Follow-Up     Diabetes, stopped taking medication       Subjective:     HPI:   Chung presents today with the following.    Patient Active Problem List    Diagnosis Date Noted    Vitamin D deficiency 04/21/2022    Pancytopenia (HCC) 03/15/2022    Thrombocytopenia (HCC) 03/15/2022    Dyslipidemia 03/15/2022    Degenerative disc disease, lumbar 03/15/2022    Nonrheumatic aortic valve stenosis 03/02/2022    Internal nasal lesion 07/22/2021    Chronic bilateral thoracic back pain 01/04/2021    Other chest pain 01/04/2021    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HCC) 09/21/2020    Chronic distal aortic occlusion (HCC) 09/21/2020    Carotid artery stenosis, asymptomatic, left 09/21/2020    BMI 27.0-27.9,adult 05/12/2020    Transaminitis 05/12/2020    Chronic right shoulder pain 09/18/2019    Alcohol dependence in remission ([facility]) 04/19/2019    Cervical radiculopathy, chronic 04/05/2018    Essential hypertension 04/05/2018    Chronic hepatitis C without hepatic coma (HCC) 05/18/2017    Cervical disc disease 05/18/2017    History of tobacco abuse 01/31/2017    Atherosclerosis of aorta (HCC) 01/31/2017    Type 2 diabetes mellitus with diabetic peripheral angiopathy without gangrene, without long-term current use of insulin (HCC) 11/15/2016    Microalbuminuria due to type 2 diabetes mellitus (HCC) 11/15/2016    Bruit 11/01/2016    Stress-induced cardiomyopathy 03/10/2015    PAD (peripheral artery disease) (HCC) 01/21/2015    H/O colonoscopy with polypectomy 05/07/2013    Atherosclerosis of native arteries of extremity with intermittent claudication ([facility]) 10/22/2012       Current Outpatient Medications on File Prior to Visit   Medication Sig Dispense Refill    VITAMIN D PO Take  by mouth.      Multiple Vitamins-Minerals (CENTRUM SILVER PO) Take  by mouth.      ibuprofen (MOTRIN) 800 MG Tab TAKE ONE TABLET BY MOUTH EVERY 8 HOURS AS NEEDED 90 Tablet 1    clopidogrel  "(PLAVIX) 75 MG Tab TAKE ONE TABLET BY MOUTH ONCE DAILY 100 Tablet 3    Blood Glucose Meter Kit Test blood sugar as recommended by provider.  Per insurance preference, blood glucose monitoring kit. 1 Kit 0    Blood Glucose Test Strips Use one per insurance preference, strip to test blood sugar once daily early morning before first meal. 100 Strip 6    Lancets Use one per insurance preference, lancet to test blood sugar once daily early morning before first meal. 100 Each 3    Alcohol Swabs Wipe site with prep pad prior to injection. 100 Each 3    lisinopril (PRINIVIL) 10 MG Tab Take 1 Tablet by mouth every day. 100 Tablet 3    metoprolol SR (TOPROL XL) 25 MG TABLET SR 24 HR Take 1 Tablet by mouth every day. 100 Tablet 3    atorvastatin (LIPITOR) 80 MG tablet Take 1 Tablet by mouth every day. 100 Tablet 3    B Complex Vitamins (B COMPLEX PO) Take  by mouth.      FREESTYLE LITE strip       aspirin (ASA) 81 MG Chew Tab chewable tablet Take 81 mg by mouth every day.      Empagliflozin 25 MG Tab Take 1 Each by mouth every day. (Patient not taking: Reported on 8/11/2022) 100 Tablet 3     No current facility-administered medications on file prior to visit.       Allergies, past medical history, past surgical history, family history, social history reviewed and updated    ROS:  All other systems reviewed and are negative except as stated in the HPI       Physical Exam:     /72 (BP Location: Left arm, Patient Position: Sitting, BP Cuff Size: Adult)   Pulse 79   Temp 36.6 °C (97.8 °F) (Temporal)   Resp 20   Ht 1.778 m (5' 10\")   Wt 81.6 kg (180 lb)   SpO2 95%   BMI 25.83 kg/m²   General: Normal appearing. No distress.  Pulmonary: Clear to ausculation.  Normal effort.   Cardiovascular: Regular rate and rhythm    Assessment and Plan:     72 y.o. male with the following issues.    1. Type 2 diabetes mellitus with diabetic peripheral angiopathy without gangrene, without long-term current use of insulin ([facility])  Patient " still checking fasting blood sugar with some higher numbers, he was taking Jardiance 25 mg daily, reported has some side effect including too much urination and frequency blood  Work-up multiple times at night, he discontinued Jardiance, he would like to start Trulicity injection once a week, he is little bit hesitant about metformin intake as he heard from his friend that metformin is not a good medication, educated regarding diabetic medication  - HEMOGLOBIN A1C; Future  - MICROALBUMIN CREAT RATIO URINE; Future    > Patient has pending labs for CBC, CMP, lipid panel, advised to perform those labs and labs above and follow-up with me next week to discuss about diabetic medications and prescription  2. Microalbuminuria due to type 2 diabetes mellitus (HCC)    3. Essential hypertension  Well-controlled, take lisinopril 10 mg daily, metoprolol 25 mg daily SR, denies having related symptoms    Follow Up:      Return in about 1 week (around 8/18/2022) for labs, diabetes.    Please note that this dictation was created using voice recognition software. I have made every reasonable attempt to correct obvious errors, but I expect that there are errors of grammar and possibly content that I did not discover before finalizing the note.    Signed by: Asuncion Roland M.D.

## 2022-08-12 ENCOUNTER — HOSPITAL ENCOUNTER (OUTPATIENT)
Dept: LAB | Facility: MEDICAL CENTER | Age: 72
End: 2022-08-12
Attending: INTERNAL MEDICINE
Payer: MEDICARE

## 2022-08-12 DIAGNOSIS — F10.21 ALCOHOL DEPENDENCE IN REMISSION (HCC): ICD-10-CM

## 2022-08-12 DIAGNOSIS — R01.1 NEWLY RECOGNIZED HEART MURMUR: ICD-10-CM

## 2022-08-12 DIAGNOSIS — I10 ESSENTIAL HYPERTENSION: ICD-10-CM

## 2022-08-12 DIAGNOSIS — I74.5 BILATERAL ILIAC ARTERY OCCLUSION (HCC): ICD-10-CM

## 2022-08-12 DIAGNOSIS — B18.2 CHRONIC HEPATITIS C WITHOUT HEPATIC COMA (HCC): ICD-10-CM

## 2022-08-12 DIAGNOSIS — I73.9 PAD (PERIPHERAL ARTERY DISEASE) (HCC): ICD-10-CM

## 2022-08-12 DIAGNOSIS — R09.89 BRUIT: ICD-10-CM

## 2022-08-12 DIAGNOSIS — E11.29 MICROALBUMINURIA DUE TO TYPE 2 DIABETES MELLITUS (HCC): ICD-10-CM

## 2022-08-12 DIAGNOSIS — E11.51 TYPE 2 DIABETES MELLITUS WITH DIABETIC PERIPHERAL ANGIOPATHY WITHOUT GANGRENE, WITHOUT LONG-TERM CURRENT USE OF INSULIN (HCC): ICD-10-CM

## 2022-08-12 DIAGNOSIS — I51.81 STRESS-INDUCED CARDIOMYOPATHY: ICD-10-CM

## 2022-08-12 DIAGNOSIS — I70.0 ATHEROSCLEROSIS OF AORTA (HCC): ICD-10-CM

## 2022-08-12 DIAGNOSIS — R80.9 MICROALBUMINURIA DUE TO TYPE 2 DIABETES MELLITUS (HCC): ICD-10-CM

## 2022-08-12 DIAGNOSIS — Z87.891 HISTORY OF TOBACCO ABUSE: ICD-10-CM

## 2022-08-12 DIAGNOSIS — E11.51 DIABETES MELLITUS WITH PERIPHERAL VASCULAR DISEASE (HCC): ICD-10-CM

## 2022-08-12 LAB
ALBUMIN SERPL BCP-MCNC: 4.4 G/DL (ref 3.2–4.9)
ALBUMIN/GLOB SERPL: 1.4 G/DL
ALP SERPL-CCNC: 65 U/L (ref 30–99)
ALT SERPL-CCNC: 104 U/L (ref 2–50)
ANION GAP SERPL CALC-SCNC: 11 MMOL/L (ref 7–16)
AST SERPL-CCNC: 85 U/L (ref 12–45)
BASOPHILS # BLD AUTO: 0.7 % (ref 0–1.8)
BASOPHILS # BLD: 0.03 K/UL (ref 0–0.12)
BILIRUB SERPL-MCNC: 0.6 MG/DL (ref 0.1–1.5)
BUN SERPL-MCNC: 19 MG/DL (ref 8–22)
CALCIUM SERPL-MCNC: 9.7 MG/DL (ref 8.5–10.5)
CHLORIDE SERPL-SCNC: 108 MMOL/L (ref 96–112)
CHOLEST SERPL-MCNC: 103 MG/DL (ref 100–199)
CO2 SERPL-SCNC: 21 MMOL/L (ref 20–33)
CREAT SERPL-MCNC: 0.95 MG/DL (ref 0.5–1.4)
CREAT UR-MCNC: 137.58 MG/DL
EOSINOPHIL # BLD AUTO: 0.17 K/UL (ref 0–0.51)
EOSINOPHIL NFR BLD: 3.9 % (ref 0–6.9)
ERYTHROCYTE [DISTWIDTH] IN BLOOD BY AUTOMATED COUNT: 42.4 FL (ref 35.9–50)
EST. AVERAGE GLUCOSE BLD GHB EST-MCNC: 160 MG/DL
FASTING STATUS PATIENT QL REPORTED: NORMAL
GFR SERPLBLD CREATININE-BSD FMLA CKD-EPI: 84 ML/MIN/1.73 M 2
GLOBULIN SER CALC-MCNC: 3.2 G/DL (ref 1.9–3.5)
GLUCOSE SERPL-MCNC: 154 MG/DL (ref 65–99)
HBA1C MFR BLD: 7.2 % (ref 4–5.6)
HCT VFR BLD AUTO: 42.1 % (ref 42–52)
HDLC SERPL-MCNC: 27 MG/DL
HGB BLD-MCNC: 14.6 G/DL (ref 14–18)
IMM GRANULOCYTES # BLD AUTO: 0.01 K/UL (ref 0–0.11)
IMM GRANULOCYTES NFR BLD AUTO: 0.2 % (ref 0–0.9)
LDLC SERPL CALC-MCNC: 46 MG/DL
LYMPHOCYTES # BLD AUTO: 1.87 K/UL (ref 1–4.8)
LYMPHOCYTES NFR BLD: 43.1 % (ref 22–41)
MCH RBC QN AUTO: 30.5 PG (ref 27–33)
MCHC RBC AUTO-ENTMCNC: 34.7 G/DL (ref 33.7–35.3)
MCV RBC AUTO: 87.9 FL (ref 81.4–97.8)
MICROALBUMIN UR-MCNC: 66.5 MG/DL
MICROALBUMIN/CREAT UR: 483 MG/G (ref 0–30)
MONOCYTES # BLD AUTO: 0.28 K/UL (ref 0–0.85)
MONOCYTES NFR BLD AUTO: 6.5 % (ref 0–13.4)
NEUTROPHILS # BLD AUTO: 1.98 K/UL (ref 1.82–7.42)
NEUTROPHILS NFR BLD: 45.6 % (ref 44–72)
NRBC # BLD AUTO: 0 K/UL
NRBC BLD-RTO: 0 /100 WBC
POTASSIUM SERPL-SCNC: 4.1 MMOL/L (ref 3.6–5.5)
PROT SERPL-MCNC: 7.6 G/DL (ref 6–8.2)
RBC # BLD AUTO: 4.79 M/UL (ref 4.7–6.1)
SODIUM SERPL-SCNC: 140 MMOL/L (ref 135–145)
TRIGL SERPL-MCNC: 152 MG/DL (ref 0–149)
WBC # BLD AUTO: 4.3 K/UL (ref 4.8–10.8)

## 2022-08-12 PROCEDURE — 80061 LIPID PANEL: CPT

## 2022-08-12 PROCEDURE — 83036 HEMOGLOBIN GLYCOSYLATED A1C: CPT

## 2022-08-12 PROCEDURE — 82570 ASSAY OF URINE CREATININE: CPT

## 2022-08-12 PROCEDURE — 85014 HEMATOCRIT: CPT

## 2022-08-12 PROCEDURE — 80053 COMPREHEN METABOLIC PANEL: CPT

## 2022-08-12 PROCEDURE — 82043 UR ALBUMIN QUANTITATIVE: CPT

## 2022-08-12 PROCEDURE — 85041 AUTOMATED RBC COUNT: CPT

## 2022-08-12 PROCEDURE — 36415 COLL VENOUS BLD VENIPUNCTURE: CPT

## 2022-08-12 PROCEDURE — 85018 HEMOGLOBIN: CPT

## 2022-08-12 PROCEDURE — 85048 AUTOMATED LEUKOCYTE COUNT: CPT

## 2022-08-14 ENCOUNTER — TELEPHONE (OUTPATIENT)
Dept: MEDICAL GROUP | Facility: PHYSICIAN GROUP | Age: 72
End: 2022-08-14
Payer: MEDICARE

## 2022-08-14 NOTE — TELEPHONE ENCOUNTER
ESTABLISHED PATIENT PRE-VISIT PLANNING     Patient was NOT contacted to complete PVP.     Note: Patient will not be contacted if there is no indication to call.     1.  Reviewed notes from the last few office visits within the medical group: Yes    2.  If any orders were placed at last visit or intended to be done for this visit (i.e. 6 mos follow-up), do we have Results/Consult Notes?           Labs - Labs ordered, completed on 8/12/22 and results are in chart.  Note: If patient appointment is for lab review and patient did not complete labs, check with provider if OK to reschedule patient until labs completed.         Imaging - Imaging was not ordered at last office visit.         Referrals - No referrals were ordered at last office visit.    3. Is this appointment scheduled as a Hospital Follow-Up? No    4.  Immunizations were updated in Epic using Reconcile Outside Information activity? Yes    5.  Patient is due for the following Health Maintenance Topics:   Health Maintenance Due   Topic Date Due    IMM HEP B VACCINE (1 of 3 - Risk 3-dose series) Never done    IMM ZOSTER VACCINES (2 of 3) 01/10/2017    COLORECTAL CANCER SCREENING  10/03/2021    RETINAL SCREENING  11/12/2021    DIABETES MONOFILAMENT / LE EXAM  11/12/2021    LUNG CANCER SCREENING  12/02/2021    COVID-19 Vaccine (4 - Booster for Pfizer series) 04/02/2022       6.  AHA (Pulse8) form printed for Provider? Yes

## 2022-08-18 ENCOUNTER — OFFICE VISIT (OUTPATIENT)
Dept: MEDICAL GROUP | Facility: PHYSICIAN GROUP | Age: 72
End: 2022-08-18
Payer: MEDICARE

## 2022-08-18 VITALS
DIASTOLIC BLOOD PRESSURE: 62 MMHG | SYSTOLIC BLOOD PRESSURE: 114 MMHG | WEIGHT: 177 LBS | RESPIRATION RATE: 16 BRPM | TEMPERATURE: 97.5 F | OXYGEN SATURATION: 95 % | HEIGHT: 70 IN | HEART RATE: 97 BPM | BODY MASS INDEX: 25.34 KG/M2

## 2022-08-18 DIAGNOSIS — E11.51 TYPE 2 DIABETES MELLITUS WITH DIABETIC PERIPHERAL ANGIOPATHY WITHOUT GANGRENE, WITHOUT LONG-TERM CURRENT USE OF INSULIN (HCC): ICD-10-CM

## 2022-08-18 DIAGNOSIS — E78.5 DYSLIPIDEMIA: ICD-10-CM

## 2022-08-18 PROCEDURE — 99214 OFFICE O/P EST MOD 30 MIN: CPT | Performed by: INTERNAL MEDICINE

## 2022-08-18 ASSESSMENT — FIBROSIS 4 INDEX: FIB4 SCORE: 5.34

## 2022-08-18 NOTE — PROGRESS NOTES
Established Patient    Chief Complaint   Patient presents with    Lab Results       Subjective:     HPI:   Chung presents today with the following.    Patient Active Problem List    Diagnosis Date Noted    Vitamin D deficiency 04/21/2022    Pancytopenia (HCC) 03/15/2022    Thrombocytopenia (HCC) 03/15/2022    Dyslipidemia 03/15/2022    Degenerative disc disease, lumbar 03/15/2022    Nonrheumatic aortic valve stenosis 03/02/2022    Internal nasal lesion 07/22/2021    Chronic bilateral thoracic back pain 01/04/2021    Other chest pain 01/04/2021    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HCC) 09/21/2020    Chronic distal aortic occlusion (HCC) 09/21/2020    Carotid artery stenosis, asymptomatic, left 09/21/2020    BMI 27.0-27.9,adult 05/12/2020    Transaminitis 05/12/2020    Chronic right shoulder pain 09/18/2019    Alcohol dependence in remission (HCC) 04/19/2019    Cervical radiculopathy, chronic 04/05/2018    Essential hypertension 04/05/2018    Chronic hepatitis C without hepatic coma (HCC) 05/18/2017    Cervical disc disease 05/18/2017    History of tobacco abuse 01/31/2017    Atherosclerosis of aorta (HCC) 01/31/2017    Type 2 diabetes mellitus with diabetic peripheral angiopathy without gangrene, without long-term current use of insulin (HCC) 11/15/2016    Microalbuminuria due to type 2 diabetes mellitus (HCC) 11/15/2016    Bruit 11/01/2016    Stress-induced cardiomyopathy 03/10/2015    PAD (peripheral artery disease) (HCC) 01/21/2015    H/O colonoscopy with polypectomy 05/07/2013    Atherosclerosis of native arteries of extremity with intermittent claudication ([facility]) 10/22/2012       Current Outpatient Medications on File Prior to Visit   Medication Sig Dispense Refill    VITAMIN D PO Take  by mouth.      Multiple Vitamins-Minerals (CENTRUM SILVER PO) Take  by mouth.      ibuprofen (MOTRIN) 800 MG Tab TAKE ONE TABLET BY MOUTH EVERY 8 HOURS AS NEEDED 90 Tablet 1    clopidogrel (PLAVIX) 75 MG Tab TAKE ONE TABLET BY  "MOUTH ONCE DAILY 100 Tablet 3    Blood Glucose Meter Kit Test blood sugar as recommended by provider.  Per insurance preference, blood glucose monitoring kit. 1 Kit 0    Blood Glucose Test Strips Use one per insurance preference, strip to test blood sugar once daily early morning before first meal. 100 Strip 6    Lancets Use one per insurance preference, lancet to test blood sugar once daily early morning before first meal. 100 Each 3    Alcohol Swabs Wipe site with prep pad prior to injection. 100 Each 3    lisinopril (PRINIVIL) 10 MG Tab Take 1 Tablet by mouth every day. 100 Tablet 3    metoprolol SR (TOPROL XL) 25 MG TABLET SR 24 HR Take 1 Tablet by mouth every day. 100 Tablet 3    atorvastatin (LIPITOR) 80 MG tablet Take 1 Tablet by mouth every day. 100 Tablet 3    B Complex Vitamins (B COMPLEX PO) Take  by mouth.      FREESTYLE LITE strip       aspirin (ASA) 81 MG Chew Tab chewable tablet Take 81 mg by mouth every day.       No current facility-administered medications on file prior to visit.       Allergies, past medical history, past surgical history, family history, social history reviewed and updated    ROS:  All other systems reviewed and are negative except as stated in the HPI       Physical Exam:     /62 (BP Location: Left arm, Patient Position: Sitting, BP Cuff Size: Adult)   Pulse 97   Temp 36.4 °C (97.5 °F) (Temporal)   Resp 16   Ht 1.778 m (5' 10\")   Wt 80.3 kg (177 lb)   SpO2 95%   BMI 25.40 kg/m²   General: Normal appearing. No distress.  Pulmonary: Clear to ausculation.  Normal effort.   Cardiovascular: Regular rate and rhythm    Assessment and Plan:     72 y.o. male with the following issues.    1. Type 2 diabetes mellitus with diabetic peripheral angiopathy without gangrene, without long-term current use of insulin (HCC)  - Diabetic Monofilament Lower Extremity Exam  - Dulaglutide 0.75 MG/0.5ML Solution Pen-injector; Inject 0.5 mL under the skin every 7 days.  Dispense: 1.96 mL; " Refill: 1  > Patient will call the office as a reminder to increase Trulicity to 1.5 mg weekly and go from there    2. Dyslipidemia  > Continue statin    Patient had blood work with improving A1c, cholesterol panel stable, patient denied with any numbness of the feet,    Monofilament testing with a 10 gram force: sensation intact: intact bilaterally  Visual Inspection: Feet without maceration, ulcers, fissures.  Pedal pulses: decreased bilaterally    -Discussed diabetic diet in detail, educated regarding management of hypoglycemia, advised regular exercise, educated disease management and weight goals as well as feet care and frequent check of feet.  -Patient to check early morning fasting blood glucose with goal discussed.  - asked to have a BG log with daily fasting and 2 hr postprandial after biggest meal and bring to next visit or send through my chart  -Discussed side effects of medication. Patient confirmed understanding of information.    Follow Up:      Return in about 3 months (around 11/18/2022) for diabetes, A1c.    Please note that this dictation was created using voice recognition software. I have made every reasonable attempt to correct obvious errors, but I expect that there are errors of grammar and possibly content that I did not discover before finalizing the note.    Signed by: Asuncion Roland M.D.

## 2022-08-22 ENCOUNTER — DOCUMENTATION (OUTPATIENT)
Dept: VASCULAR LAB | Facility: MEDICAL CENTER | Age: 72
End: 2022-08-22
Payer: MEDICARE

## 2022-08-22 DIAGNOSIS — I73.9 PAD (PERIPHERAL ARTERY DISEASE) (HCC): ICD-10-CM

## 2022-08-22 DIAGNOSIS — I74.5 BILATERAL ILIAC ARTERY OCCLUSION (HCC): ICD-10-CM

## 2022-08-22 DIAGNOSIS — I65.22 CAROTID ARTERY STENOSIS, ASYMPTOMATIC, LEFT: ICD-10-CM

## 2022-09-01 ENCOUNTER — OFFICE VISIT (OUTPATIENT)
Dept: CARDIOLOGY | Facility: MEDICAL CENTER | Age: 72
End: 2022-09-01
Payer: MEDICARE

## 2022-09-01 VITALS
WEIGHT: 179 LBS | OXYGEN SATURATION: 97 % | SYSTOLIC BLOOD PRESSURE: 112 MMHG | RESPIRATION RATE: 16 BRPM | HEIGHT: 70 IN | BODY MASS INDEX: 25.62 KG/M2 | HEART RATE: 79 BPM | DIASTOLIC BLOOD PRESSURE: 64 MMHG

## 2022-09-01 DIAGNOSIS — I10 ESSENTIAL HYPERTENSION: ICD-10-CM

## 2022-09-01 DIAGNOSIS — E78.5 DYSLIPIDEMIA: ICD-10-CM

## 2022-09-01 DIAGNOSIS — I74.5 BILATERAL ILIAC ARTERY OCCLUSION (HCC): ICD-10-CM

## 2022-09-01 DIAGNOSIS — Z98.890 H/O COLONOSCOPY WITH POLYPECTOMY: ICD-10-CM

## 2022-09-01 DIAGNOSIS — Z87.891 HISTORY OF TOBACCO ABUSE: ICD-10-CM

## 2022-09-01 DIAGNOSIS — I74.09 CHRONIC DISTAL AORTIC OCCLUSION (HCC): ICD-10-CM

## 2022-09-01 DIAGNOSIS — I65.22 CAROTID ARTERY STENOSIS, ASYMPTOMATIC, LEFT: ICD-10-CM

## 2022-09-01 DIAGNOSIS — R80.9 MICROALBUMINURIA DUE TO TYPE 2 DIABETES MELLITUS (HCC): ICD-10-CM

## 2022-09-01 DIAGNOSIS — I51.81 STRESS-INDUCED CARDIOMYOPATHY: ICD-10-CM

## 2022-09-01 DIAGNOSIS — D69.6 THROMBOCYTOPENIA (HCC): ICD-10-CM

## 2022-09-01 DIAGNOSIS — I70.0 ATHEROSCLEROSIS OF AORTA (HCC): ICD-10-CM

## 2022-09-01 DIAGNOSIS — F10.21 ALCOHOL DEPENDENCE IN REMISSION (HCC): ICD-10-CM

## 2022-09-01 DIAGNOSIS — I73.9 PAD (PERIPHERAL ARTERY DISEASE) (HCC): ICD-10-CM

## 2022-09-01 DIAGNOSIS — I70.213 ATHEROSCLEROSIS OF NATIVE ARTERY OF BOTH LOWER EXTREMITIES WITH INTERMITTENT CLAUDICATION (HCC): ICD-10-CM

## 2022-09-01 DIAGNOSIS — Z86.010 H/O COLONOSCOPY WITH POLYPECTOMY: ICD-10-CM

## 2022-09-01 DIAGNOSIS — E11.29 MICROALBUMINURIA DUE TO TYPE 2 DIABETES MELLITUS (HCC): ICD-10-CM

## 2022-09-01 DIAGNOSIS — E11.51 TYPE 2 DIABETES MELLITUS WITH DIABETIC PERIPHERAL ANGIOPATHY WITHOUT GANGRENE, WITHOUT LONG-TERM CURRENT USE OF INSULIN (HCC): ICD-10-CM

## 2022-09-01 DIAGNOSIS — I35.0 NONRHEUMATIC AORTIC VALVE STENOSIS: ICD-10-CM

## 2022-09-01 PROCEDURE — 99214 OFFICE O/P EST MOD 30 MIN: CPT | Performed by: INTERNAL MEDICINE

## 2022-09-01 RX ORDER — PENTOXIFYLLINE 400 MG/1
400 TABLET, EXTENDED RELEASE ORAL 2 TIMES DAILY
Qty: 60 TABLET | Refills: 11 | Status: SHIPPED | OUTPATIENT
Start: 2022-09-01

## 2022-09-01 ASSESSMENT — ENCOUNTER SYMPTOMS
ORTHOPNEA: 0
SPUTUM PRODUCTION: 0
CONSTITUTIONAL NEGATIVE: 1
EYES NEGATIVE: 1
LOSS OF CONSCIOUSNESS: 0
CLAUDICATION: 0
NEUROLOGICAL NEGATIVE: 1
MUSCULOSKELETAL NEGATIVE: 1
STRIDOR: 0
FEVER: 0
WEAKNESS: 0
COUGH: 0
RESPIRATORY NEGATIVE: 1
SORE THROAT: 0
PALPITATIONS: 0
PND: 0
DIZZINESS: 0
GASTROINTESTINAL NEGATIVE: 1
CHILLS: 0
CARDIOVASCULAR NEGATIVE: 1
SHORTNESS OF BREATH: 0
HEMOPTYSIS: 0
WHEEZING: 0
BRUISES/BLEEDS EASILY: 0

## 2022-09-01 ASSESSMENT — FIBROSIS 4 INDEX: FIB4 SCORE: 5.34

## 2022-09-01 NOTE — PROGRESS NOTES
"Chief Complaint   Patient presents with    Hypertension    Dyslipidemia       Subjective:   Tuan Limon is a 70 y.o. male who presents today as a follow-up for his hypertension hyperlipidemia peripheral vascular disease and heart failure in the past.      Since he was last seen he has been having leg pain when he walks due to his claudication.  He is not on medication for it.  He scheduled for a carotid and a repeat echocardiogram.  He walks about 2 to 300 feet and is limited by his claudication symptoms.  His blood pressures otherwise been controlled.  His most recent labs are unremarkable.    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 states checks his blood sugar daily. Not currently taking medication for.    H/O colonoscopy with polypectomy 5/7/2013    [facility] maintenance 4/5/2018    Hepatitis C     no treatment    History of alcoholism (HCC) 8/22/2012    [place] (hard of hearing) 07/26/2019    Hyperlipidemia     Hypertension     Insulin dependent diabetes mellitus 07/26/2019    Pt. states used to take Insulin 2 years ago.    S/P excision of lipoma 9/18/2019     Past Surgical History:   Procedure Laterality Date    MASS EXCISION GENERAL Right 7/31/2019    Procedure: EXCISION, MASS, GENERAL - 15 CM LIPOMA OF SHOULDER;  Surgeon: Augustin Rosales M.D.;  Location: SURGERY [facility];  Service: General    COLONOSCOPY  2017    WIDE EXCISION  5/6/2014    Performed by Nicholas Govea M.D. at SURGERY SAME DAY [facility]    FLAP GRAFT  5/6/2014    Performed by Nicholas Govea M.D. at SURGERY SAME DAY [facility]    RECOVERY  10/22/2012    Performed by Pily Lee M.D. at SURGERY [facility]     Family History "   Problem Relation Age of Onset    Heart Disease Father     Arthritis Mother     No Known Problems Sister     Cancer Brother         type unknown    Cancer Brother         skin     No Known Problems Maternal Grandmother     No Known Problems Maternal Grandfather     No Known Problems Paternal Grandmother     No Known Problems Paternal Grandfather     Diabetes Neg Hx     Hypertension Neg Hx     Stroke Neg Hx     Hyperlipidemia Neg Hx      Social History     Socioeconomic History    Marital status: Single     Spouse name: Not on file    Number of children: Not on file    Years of education: Not on file    Highest education level: Not on file   Occupational History    Not on file   Tobacco Use    Smoking status: Former     Packs/day: 1.00     Years: 58.00     Pack years: 58.00     Types: Cigarettes     Start date: 1960     Quit date: 2011     Years since quittin.0    Smokeless tobacco: Never    Tobacco comments:     avoid all tobacco products   Vaping Use    Vaping Use: Never used   Substance and Sexual Activity    Alcohol use: No     Alcohol/week: 0.0 oz     Comment: quit , drank 2 times since    Drug use: Yes     Types: Inhaled     Comment: marijuana (last use 2 weeks ago per pt)    Sexual activity: Never   Other Topics Concern    Not on file   Social History Narrative    Not on file     Social Determinants of Health     Financial Resource Strain: Not on file   Food Insecurity: Not on file   Transportation Needs: Not on file   Physical Activity: Not on file   Stress: Not on file   Social Connections: Not on file   Intimate Partner Violence: Not on file   Housing Stability: Not on file     No Known Allergies  Outpatient Encounter Medications as of 2022   Medication Sig Dispense Refill    pentoxifylline CR (TRENTAL) 400 MG CR tablet Take 1 Tablet by mouth 2 times a day. 60 Tablet 11    Dulaglutide 0.75 MG/0.5ML Solution Pen-injector Inject 0.5 mL under the skin every 7 days. 1.96 mL 1    VITAMIN D  PO Take  by mouth.      Multiple Vitamins-Minerals (CENTRUM SILVER PO) Take  by mouth.      ibuprofen (MOTRIN) 800 MG Tab TAKE ONE TABLET BY MOUTH EVERY 8 HOURS AS NEEDED 90 Tablet 1    clopidogrel (PLAVIX) 75 MG Tab TAKE ONE TABLET BY MOUTH ONCE DAILY 100 Tablet 3    Blood Glucose Meter Kit Test blood sugar as recommended by provider.  Per insurance preference, blood glucose monitoring kit. 1 Kit 0    Blood Glucose Test Strips Use one per insurance preference, strip to test blood sugar once daily early morning before first meal. 100 Strip 6    Lancets Use one per insurance preference, lancet to test blood sugar once daily early morning before first meal. 100 Each 3    Alcohol Swabs Wipe site with prep pad prior to injection. 100 Each 3    lisinopril (PRINIVIL) 10 MG Tab Take 1 Tablet by mouth every day. 100 Tablet 3    metoprolol SR (TOPROL XL) 25 MG TABLET SR 24 HR Take 1 Tablet by mouth every day. 100 Tablet 3    atorvastatin (LIPITOR) 80 MG tablet Take 1 Tablet by mouth every day. 100 Tablet 3    B Complex Vitamins (B COMPLEX PO) Take  by mouth.      FREESTYLE LITE strip       aspirin (ASA) 81 MG Chew Tab chewable tablet Take 81 mg by mouth every day.       No facility-administered encounter medications on file as of 9/1/2022.     Review of Systems   Constitutional: Negative.  Negative for chills, fever and malaise/fatigue.   HENT: Negative.  Negative for sore throat.    Eyes: Negative.    Respiratory: Negative.  Negative for cough, hemoptysis, sputum production, shortness of breath, wheezing and stridor.    Cardiovascular: Negative.  Negative for chest pain, palpitations, orthopnea, claudication, leg swelling and PND.   Gastrointestinal: Negative.    Genitourinary: Negative.    Musculoskeletal: Negative.    Skin: Negative.    Neurological: Negative.  Negative for dizziness, loss of consciousness and weakness.   Endo/Heme/Allergies: Negative.  Does not bruise/bleed easily.   All other systems reviewed and are  "negative.     Objective:   /64 (BP Location: Left arm, Patient Position: Sitting, BP Cuff Size: Adult)   Pulse 79   Resp 16   Ht 1.778 m (5' 10\")   Wt 81.2 kg (179 lb)   SpO2 97%   BMI 25.68 kg/m²     Physical Exam  Vitals and nursing note reviewed.   Constitutional:       General: He is not in acute distress.     Appearance: He is well-developed. He is not diaphoretic.   HENT:      Head: Normocephalic and atraumatic.      Right Ear: External ear normal.      Left Ear: External ear normal.      Nose: Nose normal.      Mouth/Throat:      Pharynx: No oropharyngeal exudate.   Eyes:      General: No scleral icterus.        Right eye: No discharge.         Left eye: No discharge.      Conjunctiva/sclera: Conjunctivae normal.      Pupils: Pupils are equal, round, and reactive to light.   Neck:      Vascular: No JVD.   Cardiovascular:      Rate and Rhythm: Normal rate and regular rhythm.      Heart sounds: Murmur heard.   Systolic murmur is present with a grade of 3/6.     No friction rub. No gallop.   Pulmonary:      Effort: Pulmonary effort is normal. No respiratory distress.      Breath sounds: No stridor. No wheezing or rales.   Chest:      Chest wall: No tenderness.   Abdominal:      General: There is no distension.      Palpations: Abdomen is soft.      Tenderness: There is no guarding.   Musculoskeletal:         General: No tenderness or deformity. Normal range of motion.      Cervical back: Neck supple.   Skin:     General: Skin is warm and dry.      Coloration: Skin is not pale.      Findings: No erythema or rash.   Neurological:      Mental Status: He is alert.      Cranial Nerves: No cranial nerve deficit.      Motor: No abnormal muscle tone.      Coordination: Coordination normal.      Deep Tendon Reflexes: Reflexes are normal and symmetric. Reflexes normal.   Psychiatric:         Behavior: Behavior normal.         Thought Content: Thought content normal.         Judgment: Judgment normal. "     Carotid duplex: Dated 11/16/2016 personally interpreted myself showing 50% on the right and 50 to 69% on the left.    EKG: Dated 7/26/2019 personally interpreted myself showing normal sinus rhythm with PACs.    Lab Results   Component Value Date/Time    CHOLSTRLTOT 103 08/12/2022 07:07 AM    LDL 46 08/12/2022 07:07 AM    HDL 27 (A) 08/12/2022 07:07 AM    TRIGLYCERIDE 152 (H) 08/12/2022 07:07 AM       Lab Results   Component Value Date/Time    SODIUM 140 08/12/2022 07:07 AM    POTASSIUM 4.1 08/12/2022 07:07 AM    CHLORIDE 108 08/12/2022 07:07 AM    CO2 21 08/12/2022 07:07 AM    GLUCOSE 154 (H) 08/12/2022 07:07 AM    BUN 19 08/12/2022 07:07 AM    CREATININE 0.95 08/12/2022 07:07 AM     Lab Results   Component Value Date/Time    ALKPHOSPHAT 65 08/12/2022 07:07 AM    ASTSGOT 85 (H) 08/12/2022 07:07 AM    ALTSGPT 104 (H) 08/12/2022 07:07 AM    TBILIRUBIN 0.6 08/12/2022 07:07 AM          Assessment:     1. Dyslipidemia        2. Thrombocytopenia ([facility])        3. Nonrheumatic aortic valve stenosis        4. Carotid artery stenosis, asymptomatic, left        5. Chronic distal aortic occlusion (HCC)        6. 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[facility])  pentoxifylline CR (TRENTAL) 400 MG CR tablet      7. Type 2 diabetes mellitus with diabetic peripheral angiopathy without gangrene, without long-term current use of insulin ([facility])  pentoxifylline CR (TRENTAL) 400 MG CR tablet      8. Stress-induced cardiomyopathy        9. PAD (peripheral artery disease) ([facility])  pentoxifylline CR (TRENTAL) 400 MG CR tablet      10. H/O colonoscopy with polypectomy        11. Atherosclerosis of native artery of both lower extremities with intermittent claudication ([facility])        12. Microalbuminuria due to type 2 diabetes mellitus ([facility])        13. History of tobacco abuse        14. Atherosclerosis of aorta ([facility])        15. Essential hypertension        16. Alcohol dependence in remission ([facility])            Medical Decision Making:  Today's Assessment  / Status / Plan:     72-year-old male with heart failure peripheral vascular his carotid stenosis and a aortic stenosis murmur.  I will start him on pentoxifylline 400 twice a day for claudication.  I continue to await his echocardiogram and his carotid duplex.  I will see him back after that has been completed.    1. Aortic stenosis murmur    - echo    2. Carotid Stenosis    - clinical follow up    3. HTN    - cont metop XL 25    - cont lisin 10    4. HLD    - cont atorva 80    5. Claudication    - stable    6. Stress CHF in 2015    - meds per above

## 2022-10-06 ENCOUNTER — OFFICE VISIT (OUTPATIENT)
Dept: URGENT CARE | Facility: PHYSICIAN GROUP | Age: 72
End: 2022-10-06
Payer: MEDICARE

## 2022-10-06 VITALS
HEIGHT: 70 IN | SYSTOLIC BLOOD PRESSURE: 116 MMHG | RESPIRATION RATE: 16 BRPM | DIASTOLIC BLOOD PRESSURE: 78 MMHG | TEMPERATURE: 98.4 F | OXYGEN SATURATION: 97 % | HEART RATE: 90 BPM | WEIGHT: 179 LBS | BODY MASS INDEX: 25.62 KG/M2

## 2022-10-06 DIAGNOSIS — E11.51 TYPE 2 DIABETES MELLITUS WITH DIABETIC PERIPHERAL ANGIOPATHY WITHOUT GANGRENE, WITHOUT LONG-TERM CURRENT USE OF INSULIN (HCC): ICD-10-CM

## 2022-10-06 DIAGNOSIS — L03.213 PERIORBITAL CELLULITIS OF RIGHT EYE: ICD-10-CM

## 2022-10-06 DIAGNOSIS — H00.11 CHALAZION OF RIGHT UPPER EYELID: ICD-10-CM

## 2022-10-06 PROCEDURE — 99213 OFFICE O/P EST LOW 20 MIN: CPT

## 2022-10-06 RX ORDER — AMOXICILLIN AND CLAVULANATE POTASSIUM 875; 125 MG/1; MG/1
1 TABLET, FILM COATED ORAL 2 TIMES DAILY
Qty: 14 TABLET | Refills: 0 | Status: SHIPPED | OUTPATIENT
Start: 2022-10-06 | End: 2022-10-13

## 2022-10-06 ASSESSMENT — VISUAL ACUITY: OU: 1

## 2022-10-06 NOTE — PROGRESS NOTES
Subjective:   Chung Limon is a 72 y.o. male who presents for Eye Problem (Something in right eye x1week )      HPI:  Patient presents to urgent care with concerns of foreign body sensation in his right eye. Sensation is intermittent. Denies vision changes. States there is a sharp sensation to the upper eyelid area. 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 He has been applying erythromycin ointment to his eye with slight improvement in condition. Denies traumatic ocular injury, headaches, dizziness, light sensitivity.     ROS as above     Medications:    Current Outpatient Medications on File Prior to Visit   Medication Sig Dispense Refill    pentoxifylline CR (TRENTAL) 400 MG CR tablet Take 1 Tablet by mouth 2 times a day. 60 Tablet 11    Dulaglutide 0.75 MG/0.5ML Solution Pen-injector Inject 0.5 mL under the skin every 7 days. 1.96 mL 1    VITAMIN D PO Take  by mouth.      Multiple Vitamins-Minerals (CENTRUM SILVER PO) Take  by mouth.      ibuprofen (MOTRIN) 800 MG Tab TAKE ONE TABLET BY MOUTH EVERY 8 HOURS AS NEEDED 90 Tablet 1    clopidogrel (PLAVIX) 75 MG Tab TAKE ONE TABLET BY MOUTH ONCE DAILY 100 Tablet 3    Blood Glucose Meter Kit Test blood sugar as recommended by provider.  Per insurance preference, blood glucose monitoring kit. 1 Kit 0    Blood Glucose Test Strips Use one per insurance preference, strip to test blood sugar once daily early morning before first meal. 100 Strip 6    Lancets Use one per insurance preference, lancet to test blood sugar once daily early morning before first meal. 100 Each 3    Alcohol Swabs Wipe site with prep pad prior to injection. 100 Each 3    lisinopril (PRINIVIL) 10 MG Tab Take 1 Tablet by mouth every day. 100 Tablet 3    metoprolol SR (TOPROL XL) 25 MG TABLET SR 24 HR Take 1 Tablet by mouth every day. 100 Tablet 3    atorvastatin (LIPITOR) 80 MG tablet Take 1 Tablet by mouth every day. 100 Tablet 3    B Complex Vitamins (B  COMPLEX PO) Take  by mouth.      FREESTYLE LITE strip       aspirin (ASA) 81 MG Chew Tab chewable tablet Take 81 mg by mouth every day.       No current facility-administered medications on file prior to visit.        Allergies:   Patient has no known allergies.    Problem List:   Patient Active Problem List   Diagnosis    Claudication (HCC)    H/O colonoscopy with polypectomy    PAD (peripheral artery disease) (HCC)    Stress-induced cardiomyopathy    Bruit    Type 2 diabetes mellitus with diabetic peripheral angiopathy without gangrene, without long-term current use of insulin (HCC)    Microalbuminuria due to type 2 diabetes mellitus (HCC)    History of tobacco abuse    Atherosclerosis of aorta (HCC)    Chronic hepatitis C without hepatic coma (HCC)    Cervical disc disease    Cervical radiculopathy, chronic    Essential hypertension    Alcohol dependence in remission (HCC)    Chronic right shoulder pain    BMI 27.0-27.9,adult    Transaminitis    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HCC)    Chronic distal aortic occlusion (HCC)    Carotid artery stenosis, asymptomatic, left    Chronic bilateral thoracic back pain    Other chest pain    Internal nasal lesion    Nonrheumatic aortic valve stenosis    Pancytopenia (HCC)    Thrombocytopenia (HCC)    Dyslipidemia    Degenerative disc disease, lumbar    Vitamin D deficiency        Surgical History:  Past Surgical History:   Procedure Laterality Date    MASS EXCISION GENERAL Right 7/31/2019    Procedure: EXCISION, MASS, GENERAL - 15 CM LIPOMA OF SHOULDER;  Surgeon: Augustin Rosales M.D.;  Location: SURGERY [facility];  Service: General    COLONOSCOPY  2017    WIDE EXCISION  5/6/2014    Performed by Nicholas Govea M.D. at SURGERY SAME DAY [facility]    FLAP GRAFT  5/6/2014    Performed by Nicholas Govea M.D. at SURGERY SAME DAY [facility]    RECOVERY  10/22/2012    Performed by Pily Lee M.D. at SURGERY [facility]       Past Social Hx:   Social History  "    Tobacco Use    Smoking status: Former     Packs/day: 1.00     Years: 58.00     Pack years: 58.00     Types: Cigarettes     Start date: 1960     Quit date: 2011     Years since quittin.1    Smokeless tobacco: Never    Tobacco comments:     avoid all tobacco products   Vaping Use    Vaping Use: Never used   Substance Use Topics    Alcohol use: No     Alcohol/week: 0.0 oz     Comment: quit , drank 2 times since    Drug use: Yes     Types: Inhaled     Comment: marijuana (last use 2 weeks ago per pt)          Problem list, medications, and allergies reviewed by myself today in Epic.     Objective:     /78 (BP Location: Left arm, Patient Position: Sitting, BP Cuff Size: Adult)   Pulse 90   Temp 36.9 °C (98.4 °F) (Temporal)   Resp 16   Ht 1.778 m (5' 10\")   Wt 81.2 kg (179 lb)   SpO2 97%   BMI 25.68 kg/m²     Physical Exam  Vitals and nursing note reviewed.   Constitutional:       General: He is not in acute distress.     Appearance: Normal appearance. He is not ill-appearing or diaphoretic.   HENT:      Head: Normocephalic and atraumatic.   Eyes:      General: Vision grossly intact. No visual field deficit.        Right eye: No foreign body, discharge or hordeolum.         Left eye: No foreign body, discharge or hordeolum.      Extraocular Movements: Extraocular movements intact.      Conjunctiva/sclera:      Right eye: Right conjunctiva is injected.      Pupils: Pupils are equal, round, and reactive to light.      Right eye: No fluorescein uptake.      Comments: Right eyelid is edematous, slightly tender to palpation, and warm. Right eyelid everted, there is a small area of fluctuance on the right upper eyelid just above the inner canthus.  Fluorescein examination performed, no corneal abrasion appreciated.    Cardiovascular:      Rate and Rhythm: Normal rate and regular rhythm.      Heart sounds: Normal heart sounds.   Pulmonary:      Effort: Pulmonary effort is normal.      Breath " sounds: Normal breath sounds.   Abdominal:      General: Abdomen is flat. Bowel sounds are normal. There is no distension.      Palpations: Abdomen is soft. There is no mass.      Tenderness: There is no abdominal tenderness. There is no right CVA tenderness, left CVA tenderness, guarding or rebound.      Hernia: No hernia is present.   Skin:     General: Skin is warm and dry.      Capillary Refill: Capillary refill takes less than 2 seconds.      Findings: No rash.   Neurological:      Mental Status: He is alert and oriented to person, place, and time.       Assessment/Plan:     Diagnosis and associated orders:   1. Periorbital cellulitis of right eye  - amoxicillin-clavulanate (AUGMENTIN) 875-125 MG Tab; Take 1 Tablet by mouth 2 times a day for 7 days.  Dispense: 14 Tablet; Refill: 0  - Referral to Ophthalmology    2. Chalazion of right upper eyelid  - amoxicillin-clavulanate (AUGMENTIN) 875-125 MG Tab; Take 1 Tablet by mouth 2 times a day for 7 days.  Dispense: 14 Tablet; Refill: 0  - Referral to Ophthalmology      Comments/MDM:     Warm compresses, artificial tears, wash eyes with unscented gentle soap, avoid rubbing eyes, continue with eye protection while working.  Referral to ophthalmology placed for evaluation if abscess can be drained.         Pt is clinically stable at today's acute urgent care visit.  No acute distress noted. Appropriate for outpatient management at this time.       Discussed DDx, management options (risks,benefits, and alternatives to planned treatment), natural progression and supportive care.  Expressed understanding and the treatment plan was agreed upon. Questions were encouraged and answered   Return to urgent care prn if new or worsening sx or if there is no improvement in condition prn.    Educated in Red flags and indications to immediately call 911 or present to the Emergency Department.   Advised the patient to follow-up with the primary care physician for recheck,  reevaluation, and consideration of further management.      Please note that this dictation was created using voice recognition software. I have made a reasonable attempt to correct obvious errors, but I expect that there are errors of grammar and possibly content that I did not discover before finalizing the note.    This note was electronically signed by Fabienne Michelle DNP

## 2022-10-10 RX ORDER — DULAGLUTIDE 0.75 MG/.5ML
INJECTION, SOLUTION SUBCUTANEOUS
Qty: 2 ML | Refills: 0 | Status: SHIPPED | OUTPATIENT
Start: 2022-10-10 | End: 2022-11-09

## 2022-10-19 ENCOUNTER — HOSPITAL ENCOUNTER (OUTPATIENT)
Dept: RADIOLOGY | Facility: MEDICAL CENTER | Age: 72
End: 2022-10-19
Attending: NURSE PRACTITIONER
Payer: MEDICARE

## 2022-10-19 DIAGNOSIS — I65.22 CAROTID ARTERY STENOSIS, ASYMPTOMATIC, LEFT: ICD-10-CM

## 2022-10-19 DIAGNOSIS — I73.9 PAD (PERIPHERAL ARTERY DISEASE) (HCC): ICD-10-CM

## 2022-10-19 DIAGNOSIS — I74.5 BILATERAL ILIAC ARTERY OCCLUSION (HCC): ICD-10-CM

## 2022-10-19 PROCEDURE — 93923 UPR/LXTR ART STDY 3+ LVLS: CPT | Mod: 26 | Performed by: INTERNAL MEDICINE

## 2022-10-19 PROCEDURE — 93925 LOWER EXTREMITY STUDY: CPT | Mod: 26 | Performed by: INTERNAL MEDICINE

## 2022-10-19 PROCEDURE — 93978 VASCULAR STUDY: CPT | Mod: 26 | Performed by: INTERNAL MEDICINE

## 2022-10-19 PROCEDURE — 93880 EXTRACRANIAL BILAT STUDY: CPT

## 2022-10-19 PROCEDURE — 93880 EXTRACRANIAL BILAT STUDY: CPT | Mod: 26 | Performed by: INTERNAL MEDICINE

## 2022-10-19 PROCEDURE — 93978 VASCULAR STUDY: CPT

## 2022-10-19 PROCEDURE — 93922 UPR/L XTREMITY ART 2 LEVELS: CPT

## 2022-10-19 PROCEDURE — 93925 LOWER EXTREMITY STUDY: CPT

## 2022-10-20 ENCOUNTER — DOCUMENTATION (OUTPATIENT)
Dept: VASCULAR LAB | Facility: MEDICAL CENTER | Age: 72
End: 2022-10-20
Payer: MEDICARE

## 2022-10-20 NOTE — PROGRESS NOTES
Spoke with patient to let him know that his carotid studies are stable.  But, his lower extremities appear to be worsening.  He is overdue for follow-up appointment. Patient states understanding and scheduled f/u for 11/1/22 with [facility].

## 2022-10-20 NOTE — PROGRESS NOTES
Noninvasive studies reviewed    Carotid duplex previously moderate now mild due to change in diagnostic criteria    Appears to have worsening lower extremity perfusion with significant inflow disease, bilateral superficial femoral artery occlusions, and monophasic one-vessel runoff    Patient is overdue for follow-up in vascular medicine.  We will ask our medical assistants to call patient and have him make an appointment to discuss options ASAP Michael Bloch, MD  Vascular Medicine    Cc: WILLIAM Roland

## 2022-11-01 ENCOUNTER — APPOINTMENT (OUTPATIENT)
Dept: VASCULAR LAB | Facility: MEDICAL CENTER | Age: 72
End: 2022-11-01
Payer: MEDICARE

## 2022-11-01 ENCOUNTER — TELEPHONE (OUTPATIENT)
Dept: VASCULAR LAB | Facility: MEDICAL CENTER | Age: 72
End: 2022-11-01
Payer: MEDICARE

## 2022-11-01 NOTE — TELEPHONE ENCOUNTER
Spoke with pt by phone.  He prefers to f/u for all care regarding imaging, HTN, PAD with Dr. Gleason, cardiology.  He has had close f/u over the last year and is seeing him in 4wks.      Will defer this pt's care to cardiology.    TRISTAN Boyd  [facility]

## 2022-11-09 DIAGNOSIS — E11.51 TYPE 2 DIABETES MELLITUS WITH DIABETIC PERIPHERAL ANGIOPATHY WITHOUT GANGRENE, WITHOUT LONG-TERM CURRENT USE OF INSULIN (HCC): ICD-10-CM

## 2022-11-09 RX ORDER — DULAGLUTIDE 0.75 MG/.5ML
INJECTION, SOLUTION SUBCUTANEOUS
Qty: 2 ML | Refills: 0 | Status: SHIPPED
Start: 2022-11-09 | End: 2022-11-28

## 2022-11-09 NOTE — TELEPHONE ENCOUNTER
Received request via: Pharmacy    Was the patient seen in the last year in this department? Yes    Does the patient have an active prescription (recently filled or refills available) for medication(s) requested? No    Does the patient have longterm Plus and need 100 day supply (blood pressure, diabetes and cholesterol meds only)? Not sure if medication can we rx'd for 100 days

## 2022-11-28 ENCOUNTER — OFFICE VISIT (OUTPATIENT)
Dept: MEDICAL GROUP | Facility: PHYSICIAN GROUP | Age: 72
End: 2022-11-28
Payer: MEDICARE

## 2022-11-28 VITALS
HEIGHT: 70 IN | WEIGHT: 177.6 LBS | DIASTOLIC BLOOD PRESSURE: 64 MMHG | SYSTOLIC BLOOD PRESSURE: 138 MMHG | OXYGEN SATURATION: 95 % | RESPIRATION RATE: 16 BRPM | HEART RATE: 88 BPM | BODY MASS INDEX: 25.43 KG/M2 | TEMPERATURE: 98.1 F

## 2022-11-28 DIAGNOSIS — Z23 NEED FOR VACCINATION: ICD-10-CM

## 2022-11-28 DIAGNOSIS — M50.90 CERVICAL DISC DISEASE: ICD-10-CM

## 2022-11-28 DIAGNOSIS — M54.12 CERVICAL RADICULOPATHY, CHRONIC: ICD-10-CM

## 2022-11-28 DIAGNOSIS — M54.6 CHRONIC BILATERAL THORACIC BACK PAIN: ICD-10-CM

## 2022-11-28 DIAGNOSIS — E11.51 TYPE 2 DIABETES MELLITUS WITH DIABETIC PERIPHERAL ANGIOPATHY WITHOUT GANGRENE, WITHOUT LONG-TERM CURRENT USE OF INSULIN (HCC): ICD-10-CM

## 2022-11-28 DIAGNOSIS — G89.29 CHRONIC BILATERAL THORACIC BACK PAIN: ICD-10-CM

## 2022-11-28 PROCEDURE — G0008 ADMIN INFLUENZA VIRUS VAC: HCPCS | Performed by: INTERNAL MEDICINE

## 2022-11-28 PROCEDURE — 99214 OFFICE O/P EST MOD 30 MIN: CPT | Mod: 25 | Performed by: INTERNAL MEDICINE

## 2022-11-28 PROCEDURE — 90662 IIV NO PRSV INCREASED AG IM: CPT | Performed by: INTERNAL MEDICINE

## 2022-11-28 RX ORDER — TRAMADOL HYDROCHLORIDE 50 MG/1
50 TABLET ORAL
Qty: 60 TABLET | Refills: 0 | Status: SHIPPED | OUTPATIENT
Start: 2022-11-28 | End: 2022-12-28

## 2022-11-29 NOTE — PROGRESS NOTES
Established Patient    Chief Complaint   Patient presents with    Follow-Up     3 month FV, Neck pain, pain radiated down L shoulder x3 weeks, rash L shoulder x2 months       Subjective:     HPI:   Chung presents today with the following.    Patient Active Problem List    Diagnosis Date Noted    Vitamin D deficiency 04/21/2022    Pancytopenia (HCC) 03/15/2022    Thrombocytopenia (HCC) 03/15/2022    Dyslipidemia 03/15/2022    Degenerative disc disease, lumbar 03/15/2022    Nonrheumatic aortic valve stenosis 03/02/2022    Internal nasal lesion 07/22/2021    Chronic bilateral thoracic back pain 01/04/2021    Other chest pain 01/04/2021    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HCC) 09/21/2020    Chronic distal aortic occlusion ([facility]) 09/21/2020    Carotid artery stenosis, asymptomatic, left 09/21/2020    BMI 27.0-27.9,adult 05/12/2020    Transaminitis 05/12/2020    Chronic right shoulder pain 09/18/2019    Alcohol dependence in remission ([facility]) 04/19/2019    Cervical radiculopathy, chronic 04/05/2018    Essential hypertension 04/05/2018    Chronic hepatitis C without hepatic coma ([facility]) 05/18/2017    Cervical disc disease 05/18/2017    History of tobacco abuse 01/31/2017    Atherosclerosis of aorta ([facility]) 01/31/2017    Type 2 diabetes mellitus with diabetic peripheral angiopathy without gangrene, without long-term current use of insulin ([facility]) 11/15/2016    Microalbuminuria due to type 2 diabetes mellitus ([facility]) 11/15/2016    Bruit 11/01/2016    Stress-induced cardiomyopathy 03/10/2015    PAD (peripheral artery disease) ([facility]) 01/21/2015    H/O colonoscopy with polypectomy 05/07/2013    Claudication ([facility]) 10/22/2012       Current Outpatient Medications on File Prior to Visit   Medication Sig Dispense Refill    pentoxifylline CR (TRENTAL) 400 MG CR tablet Take 1 Tablet by mouth 2 times a day. 60 Tablet 11    VITAMIN D PO Take  by mouth.      Multiple Vitamins-Minerals (CENTRUM SILVER PO) Take  by mouth.      ibuprofen (MOTRIN)  "800 MG Tab TAKE ONE TABLET BY MOUTH EVERY 8 HOURS AS NEEDED 90 Tablet 1    clopidogrel (PLAVIX) 75 MG Tab TAKE ONE TABLET BY MOUTH ONCE DAILY 100 Tablet 3    Blood Glucose Meter Kit Test blood sugar as recommended by provider.  Per insurance preference, blood glucose monitoring kit. 1 Kit 0    Blood Glucose Test Strips Use one per insurance preference, strip to test blood sugar once daily early morning before first meal. 100 Strip 6    Lancets Use one per insurance preference, lancet to test blood sugar once daily early morning before first meal. 100 Each 3    Alcohol Swabs Wipe site with prep pad prior to injection. 100 Each 3    lisinopril (PRINIVIL) 10 MG Tab Take 1 Tablet by mouth every day. 100 Tablet 3    metoprolol SR (TOPROL XL) 25 MG TABLET SR 24 HR Take 1 Tablet by mouth every day. 100 Tablet 3    atorvastatin (LIPITOR) 80 MG tablet Take 1 Tablet by mouth every day. 100 Tablet 3    B Complex Vitamins (B COMPLEX PO) Take  by mouth.      FREESTYLE LITE strip       aspirin (ASA) 81 MG Chew Tab chewable tablet Take 81 mg by mouth every day.       No current facility-administered medications on file prior to visit.       Allergies, past medical history, past surgical history, family history, social history reviewed and updated    ROS:  All other systems reviewed and are negative except as stated in the HPI       Physical Exam:     /64 (BP Location: Right arm, Patient Position: Sitting, BP Cuff Size: Adult)   Pulse 88   Temp 36.7 °C (98.1 °F) (Temporal)   Resp 16   Ht 1.778 m (5' 10\")   Wt 80.6 kg (177 lb 9.6 oz)   SpO2 95%   BMI 25.48 kg/m²   General: Normal appearing. No distress.  Pulmonary: Clear to ausculation.  Normal effort.   Cardiovascular: Regular rate and rhythm    Assessment and Plan:     72 y.o. male with the following issues.    1. Cervical disc disease  - Referral to Pain Clinic  - traMADol (ULTRAM) 50 MG Tab; Take 1 Tablet by mouth 2 times daily with meals as needed for Severe Pain " for up to 30 days.  Dispense: 60 Tablet; Refill: 0  - Consent for Opiate Prescription  - Controlled Substance Treatment Agreement    2. Cervical radiculopathy, chronic  - Referral to Pain Clinic  - traMADol (ULTRAM) 50 MG Tab; Take 1 Tablet by mouth 2 times daily with meals as needed for Severe Pain for up to 30 days.  Dispense: 60 Tablet; Refill: 0  - Consent for Opiate Prescription  - Controlled Substance Treatment Agreement    Chronic, is getting worse, denying alarm signs or symptoms for cervical radiculopathy, he is already taking aspirin and Plavix for vascular disease, also is taking Motrin 800 mg daily that increase risk for GI bleeding, Celebrex possibly is one of the option, however I will send to the PM&R for further evaluation about pain management    - Pain Management Screen; Future  - Controlled Substance Treatment Agreement  >>  checked, no risk for abuse, patient understand the risk factors and complications including even death if overdosing,  patient educated regarding side effects and management as well  -Urine drug screen ordered  -ORT score3 > history of alcohol dependence, currently in remission    3. Type 2 diabetes mellitus with diabetic peripheral angiopathy without gangrene, without long-term current use of insulin (HCC)  Well-controlled, increase Trulicity from 0.75 to 1.5 mg weekly,  - Dulaglutide 1.5 MG/0.5ML Solution Pen-injector; Inject 1 Each under the skin every 7 days.  Dispense: 2.24 mL; Refill: 3  -Patient check fasting blood sugar with normal range    -Discussed diabetic diet in detail, educated regarding management of hypoglycemia, advised regular exercise, educated disease management and weight goals as well as feet care and frequent check of feet.  -Patient to check early morning fasting blood glucose with goal discussed.  - asked to have a BG log with daily fasting and 2 hr postprandial after biggest meal and bring to next visit or send through my chart  -Discussed side  effects of medication. Patient confirmed understanding of information.    4. Need for vaccination  - INFLUENZA VACCINE, HIGH DOSE (65+ ONLY)    5. Chronic bilateral thoracic back pain  - traMADol (ULTRAM) 50 MG Tab; Take 1 Tablet by mouth 2 times daily with meals as needed for Severe Pain for up to 30 days.  Dispense: 60 Tablet; Refill: 0  - Consent for Opiate Prescription  - Controlled Substance Treatment Agreement    Follow Up:      Return in about 2 months (around 1/28/2023) for diabetes, A1c.    Please note that this dictation was created using voice recognition software. I have made every reasonable attempt to correct obvious errors, but I expect that there are errors of grammar and possibly content that I did not discover before finalizing the note.    Signed by: Asuncion Roland M.D.

## 2022-12-01 ENCOUNTER — TELEPHONE (OUTPATIENT)
Dept: MEDICAL GROUP | Facility: PHYSICIAN GROUP | Age: 72
End: 2022-12-01

## 2022-12-02 NOTE — TELEPHONE ENCOUNTER
VOICEMAIL  1. Caller Name: [facility]                       Call Back Number: [phone]    2. Message: Pharmacy is calling looking to get clarification on the direction and quantity of the pt's truilcity.    3. Patient approves office to leave a detailed voicemail/MyChart message: yes

## 2022-12-05 ENCOUNTER — OFFICE VISIT (OUTPATIENT)
Dept: PHYSICAL MEDICINE AND REHAB | Facility: MEDICAL CENTER | Age: 72
End: 2022-12-05
Payer: MEDICARE

## 2022-12-05 VITALS
DIASTOLIC BLOOD PRESSURE: 68 MMHG | WEIGHT: 178.57 LBS | HEART RATE: 87 BPM | TEMPERATURE: 96.8 F | BODY MASS INDEX: 27.06 KG/M2 | SYSTOLIC BLOOD PRESSURE: 116 MMHG | OXYGEN SATURATION: 94 % | HEIGHT: 68 IN

## 2022-12-05 DIAGNOSIS — I73.9 PAD (PERIPHERAL ARTERY DISEASE) (HCC): ICD-10-CM

## 2022-12-05 DIAGNOSIS — M50.30 DEGENERATIVE DISC DISEASE, CERVICAL: ICD-10-CM

## 2022-12-05 DIAGNOSIS — B18.2 CHRONIC HEPATITIS C WITHOUT HEPATIC COMA (HCC): ICD-10-CM

## 2022-12-05 DIAGNOSIS — E11.51 TYPE 2 DIABETES MELLITUS WITH DIABETIC PERIPHERAL ANGIOPATHY WITHOUT GANGRENE, WITHOUT LONG-TERM CURRENT USE OF INSULIN (HCC): ICD-10-CM

## 2022-12-05 DIAGNOSIS — M54.12 CERVICAL RADICULOPATHY: ICD-10-CM

## 2022-12-05 DIAGNOSIS — M47.812 CERVICAL SPONDYLOSIS: ICD-10-CM

## 2022-12-05 DIAGNOSIS — F10.21 ALCOHOL DEPENDENCE IN REMISSION (HCC): ICD-10-CM

## 2022-12-05 DIAGNOSIS — M48.02 CERVICAL SPINAL STENOSIS: ICD-10-CM

## 2022-12-05 PROCEDURE — 99204 OFFICE O/P NEW MOD 45 MIN: CPT | Performed by: PHYSICAL MEDICINE & REHABILITATION

## 2022-12-05 RX ORDER — GABAPENTIN 100 MG/1
100 CAPSULE ORAL 3 TIMES DAILY PRN
Qty: 90 CAPSULE | Refills: 5 | Status: SHIPPED
Start: 2022-12-05 | End: 2022-12-19

## 2022-12-05 ASSESSMENT — PATIENT HEALTH QUESTIONNAIRE - PHQ9: CLINICAL INTERPRETATION OF PHQ2 SCORE: 0

## 2022-12-05 ASSESSMENT — PAIN SCALES - GENERAL: PAINLEVEL: 8=MODERATE-SEVERE PAIN

## 2022-12-05 NOTE — PROGRESS NOTES
"New patient note    Interventional spine and Pain  Physiatry (physical medicine and  Rehabilitation)     Date of service: See epic    Chief complaint:   Chief Complaint   Patient presents with    New Patient     Neck pain        Referring provider: Asuncion Roland M.D.     HISTORY    HPI: Chung Limon 72 y.o.  who presents today with Diagnoses of Cervical radiculopathy, Cervical spinal stenosis, Degenerative disc disease, cervical, Cervical spondylosis, Alcohol dependence in remission (HCC), Chronic hepatitis C without hepatic coma (HCC), Type 2 diabetes mellitus with diabetic peripheral angiopathy without gangrene, without long-term current use of insulin (HCC), and PAD (peripheral artery disease) (HCC) were pertinent to this visit.    HPI    Acute on chronic left-sided shoulder pain for many years.  This is worse significantly over the past 2 months.  Now with severe pain radiating to the left shoulder with associated numbness.  8/10 intensity.  Worse with neck movements.    Conservative treatments include provider driven home exercise program including the past several months.    Medications tried in the past include tramadol, motrin 800mg, not on tylenol because of hepatitis.  He has a mild improvement in pain with tramadol but he tries to avoid taking this because of side effects including nausea.       Medical records review:  I reviewed the note from the referring provider Asuncion Roland M.D. including the note dated 11/28/2022 with neck pain, cervical radiculopathy, on tramadol, chronic and worsening..           ROS:   Red Flags ROS:   Fever, Chills, Sweats: Denies  Involuntary Weight Loss: Denies  Bladder Incontinence: Denies  Bowel Incontinence: denies  Saddle Anesthesia: Denies    All other systems reviewed and negative.       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 states checks his blood sugar daily. Not currently taking medication for.    H/O colonoscopy with polypectomy 5/7/2013    [facility] maintenance 4/5/2018    Hepatitis C     no treatment    History of alcoholism ([facility]) 8/22/2012    [place] (hard of hearing) 07/26/2019    Hyperlipidemia     Hypertension     Insulin dependent diabetes mellitus 07/26/2019    Pt. states used to take Insulin 2 years ago.    S/P excision of lipoma 9/18/2019         Current Outpatient Medications on File Prior to Visit   Medication Sig Dispense Refill    Dulaglutide 1.5 MG/0.5ML Solution Pen-injector Inject 1 Each under the skin every 7 days. 2.24 mL 3    traMADol (ULTRAM) 50 MG Tab Take 1 Tablet by mouth 2 times daily with meals as needed for Severe Pain for up to 30 days. 60 Tablet 0    pentoxifylline CR (TRENTAL) 400 MG CR tablet Take 1 Tablet by mouth 2 times a day. 60 Tablet 11    VITAMIN D PO Take  by mouth.      Multiple Vitamins-Minerals (CENTRUM SILVER PO) Take  by mouth.      ibuprofen (MOTRIN) 800 MG Tab TAKE ONE TABLET BY MOUTH EVERY 8 HOURS AS NEEDED 90 Tablet 1    clopidogrel (PLAVIX) 75 MG Tab TAKE ONE TABLET BY MOUTH ONCE DAILY 100 Tablet 3    Blood Glucose Meter Kit Test blood sugar as recommended by provider.  Per insurance preference, blood glucose monitoring kit. 1 Kit 0    Blood Glucose Test Strips Use one per insurance preference, strip to test blood sugar once daily early morning before first meal. 100 Strip 6    Lancets Use one per insurance preference, lancet to test blood sugar once daily early morning before first meal. 100 Each 3    Alcohol Swabs Wipe site with prep pad prior to injection. 100 Each 3    lisinopril (PRINIVIL) 10 MG Tab Take 1 Tablet by mouth every day. 100 Tablet 3    metoprolol SR (TOPROL XL) 25 MG TABLET SR 24 HR Take 1 " Tablet by mouth every day. 100 Tablet 3    atorvastatin (LIPITOR) 80 MG tablet Take 1 Tablet by mouth every day. 100 Tablet 3    B Complex Vitamins (B COMPLEX PO) Take  by mouth.      FREESTYLE LITE strip       aspirin (ASA) 81 MG Chew Tab chewable tablet Take 81 mg by mouth every day.       No current facility-administered medications on file prior to visit.        PSHx:   Past Surgical History:   Procedure Laterality Date    MASS EXCISION GENERAL Right 7/31/2019    Procedure: EXCISION, MASS, GENERAL - 15 CM LIPOMA OF SHOULDER;  Surgeon: Augustin Rosales M.D.;  Location: SURGERY [facility];  Service: General    COLONOSCOPY  2017    WIDE EXCISION  5/6/2014    Performed by Nicholas Govea M.D. at SURGERY SAME DAY [facility]    FLAP GRAFT  5/6/2014    Performed by Nicholas Govea M.D. at SURGERY SAME DAY [facility] ORS    RECOVERY  10/22/2012    Performed by Pily Lee M.D. at SURGERY [facility]       Family history   Family History   Problem Relation Age of Onset    Heart Disease Father     Arthritis Mother     No Known Problems Sister     Cancer Brother         type unknown    Cancer Brother         skin     No Known Problems Maternal Grandmother     No Known Problems Maternal Grandfather     No Known Problems Paternal Grandmother     No Known Problems Paternal Grandfather     Diabetes Neg Hx     Hypertension Neg Hx     Stroke Neg Hx     Hyperlipidemia Neg Hx          Medications: reviewed on epic.   Outpatient Medications Marked as Taking for the 12/5/22 encounter (Office Visit) with Ehsan Monique M.D.   Medication Sig Dispense Refill    gabapentin (NEURONTIN) 100 MG Cap Take 1 Capsule by mouth 3 times a day as needed (pain). 90 Capsule 5    Dulaglutide 1.5 MG/0.5ML Solution Pen-injector Inject 1 Each under the skin every 7 days. 2.24 mL 3    traMADol (ULTRAM) 50 MG Tab Take 1 Tablet by mouth 2 times daily with meals as needed for Severe Pain for up to 30 days. 60 Tablet 0    pentoxifylline CR  (TRENTAL) 400 MG CR tablet Take 1 Tablet by mouth 2 times a day. 60 Tablet 11    VITAMIN D PO Take  by mouth.      Multiple Vitamins-Minerals (CENTRUM SILVER PO) Take  by mouth.      ibuprofen (MOTRIN) 800 MG Tab TAKE ONE TABLET BY MOUTH EVERY 8 HOURS AS NEEDED 90 Tablet 1    clopidogrel (PLAVIX) 75 MG Tab TAKE ONE TABLET BY MOUTH ONCE DAILY 100 Tablet 3    Blood Glucose Meter Kit Test blood sugar as recommended by provider.  Per insurance preference, blood glucose monitoring kit. 1 Kit 0    Blood Glucose Test Strips Use one per insurance preference, strip to test blood sugar once daily early morning before first meal. 100 Strip 6    Lancets Use one per insurance preference, lancet to test blood sugar once daily early morning before first meal. 100 Each 3    Alcohol Swabs Wipe site with prep pad prior to injection. 100 Each 3    lisinopril (PRINIVIL) 10 MG Tab Take 1 Tablet by mouth every day. 100 Tablet 3    metoprolol SR (TOPROL XL) 25 MG TABLET SR 24 HR Take 1 Tablet by mouth every day. 100 Tablet 3    atorvastatin (LIPITOR) 80 MG tablet Take 1 Tablet by mouth every day. 100 Tablet 3    B Complex Vitamins (B COMPLEX PO) Take  by mouth.      FREESTYLE LITE strip       aspirin (ASA) 81 MG Chew Tab chewable tablet Take 81 mg by mouth every day.          Allergies:   No Known Allergies    Social Hx:   Social History     Socioeconomic History    Marital status: Single     Spouse name: Not on file    Number of children: Not on file    Years of education: Not on file    Highest education level: Not on file   Occupational History    Not on file   Tobacco Use    Smoking status: Former     Packs/day: 1.00     Years: 58.00     Pack years: 58.00     Types: Cigarettes     Start date: 1960     Quit date: 2011     Years since quittin.2    Smokeless tobacco: Never    Tobacco comments:     avoid all tobacco products   Vaping Use    Vaping Use: Never used   Substance and Sexual Activity    Alcohol use: No      "Alcohol/week: 0.0 oz     Comment: quit 1985, drank 2 times since    Drug use: Yes     Types: Inhaled     Comment: marijuana (last use 2 weeks ago per pt)    Sexual activity: Never   Other Topics Concern    Not on file   Social History Narrative    Not on file     Social Determinants of Health     Financial Resource Strain: Not on file   Food Insecurity: Not on file   Transportation Needs: Not on file   Physical Activity: Not on file   Stress: Not on file   Social Connections: Not on file   Intimate Partner Violence: Not on file   Housing Stability: Not on file         EXAMINATION     Physical Exam:   Vitals: /68 (BP Location: Right arm, Patient Position: Sitting, BP Cuff Size: Adult)   Pulse 87   Temp 36 °C (96.8 °F) (Temporal)   Ht 1.727 m (5' 8\")   Wt 81 kg (178 lb 9.2 oz)   SpO2 94%     Constitutional:   Body Habitus: Body mass index is 27.15 kg/m².  Cooperation: Fully cooperates with exam  Appearance: Well-groomed, well-nourished, not disheveled     Eyes: No scleral icterus to suggest severe liver disease, no proptosis to suggest severe hyperthyroid    ENT -no obvious auditory deficits, no obvious tongue lesions, tongue midline, no facial droop     Skin -no rashes or lesions noted     Respiratory-  breathing comfortable on room air, no audible wheezing    Cardiovascular- capillary refills less than 2 seconds.     Psychiatric- alert and oriented ×3. Normal affect.     Gait - normal gait, no use of ambulatory device, nonantalgic.     Musculoskeletal and Neuro -       Cervical spine   Inspection: No deformities of the skin over the cervical spine. No rashes or lesions.    decreased  active range of motion in all directions, with  pain      Spurling’s sign: negative right, positive left for reproducing severe pain    No tenderness to palpation of the cervical spine      Key points for the international standards for neurological classification of spinal cord injury (ISNCSCI) to light touch.     Dermatome " R L   C4 2 2   C5 2 1   C6 2 2   C7 2 2   C8 2 2   T1 2 2   T2 2 2                                     Motor Exam Upper Extremities   ? Myotome R L   Shoulder flexion C5 5 5   Elbow flexion C5 5 5   Wrist extension C6 5 5   Elbow extension C7 5 5   Finger flexion C8 5 5   Finger abduction T1 5 5     Reflexes    Maxwell’s sign negative bilaterally                MEDICAL DECISION MAKING    Medical records review: see under HPI section.     DATA    Labs:   Lab Results   Component Value Date/Time    SODIUM 140 08/12/2022 07:07 AM    POTASSIUM 4.1 08/12/2022 07:07 AM    CHLORIDE 108 08/12/2022 07:07 AM    CO2 21 08/12/2022 07:07 AM    ANION 11.0 08/12/2022 07:07 AM    GLUCOSE 154 (H) 08/12/2022 07:07 AM    BUN 19 08/12/2022 07:07 AM    CREATININE 0.95 08/12/2022 07:07 AM    CALCIUM 9.7 08/12/2022 07:07 AM    ASTSGOT 85 (H) 08/12/2022 07:07 AM    ALTSGPT 104 (H) 08/12/2022 07:07 AM    TBILIRUBIN 0.6 08/12/2022 07:07 AM    ALBUMIN 4.4 08/12/2022 07:07 AM    TOTPROTEIN 7.6 08/12/2022 07:07 AM    GLOBULIN 3.2 08/12/2022 07:07 AM    AGRATIO 1.4 08/12/2022 07:07 AM   ]    Lab Results   Component Value Date/Time    PROTHROMBTM 13.4 06/09/2017 02:07 PM    INR 0.99 06/09/2017 02:07 PM        Lab Results   Component Value Date/Time    WBC 4.3 (L) 08/12/2022 07:07 AM    RBC 4.79 08/12/2022 07:07 AM    HEMOGLOBIN 14.6 08/12/2022 07:07 AM    HEMATOCRIT 42.1 08/12/2022 07:07 AM    MCV 87.9 08/12/2022 07:07 AM    MCH 30.5 08/12/2022 07:07 AM    MCHC 34.7 08/12/2022 07:07 AM    MPV 11.9 09/28/2020 01:38 PM    NEUTSPOLYS 45.60 08/12/2022 07:07 AM    LYMPHOCYTES 43.10 (H) 08/12/2022 07:07 AM    MONOCYTES 6.50 08/12/2022 07:07 AM    EOSINOPHILS 3.90 08/12/2022 07:07 AM    BASOPHILS 0.70 08/12/2022 07:07 AM    HYPOCHROMIA 1+ 01/09/2013 08:50 AM        Lab Results   Component Value Date/Time    HBA1C 7.2 (H) 08/12/2022 07:07 AM        Imaging:   I personally reviewed following images, these are my reads  MRI cervical spine 3/31/2017  Central  canal stenosis worst at C4-5 and C5-6 where this is moderate at C5-6.  Neuroforaminal stenosis at multiple levels in the cervical spine worst at C5-6.        IMAGING radiology reads. I reviewed the following radiology reads                  Results for orders placed during the hospital encounter of 03/31/17    MR-CERVICAL SPINE-W/O  C2-3: No significant central canal or neural foraminal stenosis.  C3-4:  There is mild broad posterior disc bulge which effaces anterior thecal sac. There is moderate ligamentum flavum hypertrophy which deforms the dorsal surface of the cord. There is mild central canal stenosis. There is moderate bilateral neural   foraminal stenosis.  C4-5:  There is mild to moderate broad posterior disc bulge which deforms the ventral surface of the cord. There is mild ligamentum flavum hypertrophy. There is mild central canal stenosis. There is moderate to severe left and mild right neural foraminal   stenosis.  C5-6: There is to moderate broad posterior disc bulge and endplate spurring. There is marked ligamentum flavum hypertrophy. There is mild to moderate central canal stenosis. There is moderate to severe bilateral neural foraminal stenosis.  C6-7: There is mild ligamentum flavum hypertrophy. No significant central canal or neural foraminal stenosis.  C7-T1: No significant central canal or neural foraminal stenosis.     Impression  Multilevel degenerative changes of the cervical spine as described above.                                                 Results for orders placed in visit on 03/06/17    DX-CERVICAL SPINE-2 OR 3 VIEWS    Impression  Disc space narrowing and uncovertebral joint joint arthropathy.    Carotid artery atherosclerotic disease.     Results for orders placed during the hospital encounter of 12/02/20    DX-CHEST-2 VIEWS    Impression  No evidence of acute cardiopulmonary disease                      Results for orders placed in visit on 01/04/21    DX-LUMBAR SPINE-2 OR 3  VIEWS    Impression  Multilevel multifactorial degenerative changes             Results for orders placed in visit on 09/18/19    DX-SHOULDER 2+ RIGHT    Impression  1.  There is marked degenerative change in the right glenohumeral joint.  2.  There are probably intra-articular bodies as well as changes of subcoracoid calcific bursitis.    Results for orders placed in visit on 01/04/21    DX-THORACIC SPINE-2 VIEWS    Impression  Mild to moderate degenerative changes of the thoracic spine            Diagnosis   Visit Diagnoses     ICD-10-CM   1. Cervical radiculopathy  M54.12   2. Cervical spinal stenosis  M48.02   3. Degenerative disc disease, cervical  M50.30   4. Cervical spondylosis  M47.812   5. Alcohol dependence in remission (HCC)  F10.21   6. Chronic hepatitis C without hepatic coma (HCC)  B18.2   7. Type 2 diabetes mellitus with diabetic peripheral angiopathy without gangrene, without long-term current use of insulin (HCC)  E11.51   8. PAD (peripheral artery disease) (HCC)  I73.9           ASSESSMENT AND PLAN:  Chung Limon 72 y.o. male      Chung was seen today for new patient.    Diagnoses and all orders for this visit:    Cervical radiculopathy  -     MR-CERVICAL SPINE-W/O; Future  -     gabapentin (NEURONTIN) 100 MG Cap; Take 1 Capsule by mouth 3 times a day as needed (pain).    Cervical spinal stenosis  -     MR-CERVICAL SPINE-W/O; Future  -     gabapentin (NEURONTIN) 100 MG Cap; Take 1 Capsule by mouth 3 times a day as needed (pain).    Degenerative disc disease, cervical  -     MR-CERVICAL SPINE-W/O; Future  -     gabapentin (NEURONTIN) 100 MG Cap; Take 1 Capsule by mouth 3 times a day as needed (pain).    Cervical spondylosis  -     MR-CERVICAL SPINE-W/O; Future  -     gabapentin (NEURONTIN) 100 MG Cap; Take 1 Capsule by mouth 3 times a day as needed (pain).    Alcohol dependence in remission (HCC)    Chronic hepatitis C without hepatic coma (HCC)    Type 2 diabetes mellitus with diabetic  peripheral angiopathy without gangrene, without long-term current use of insulin (HCC)    PAD (peripheral artery disease) (HCC)    Severe pain with numbness and burning sensation from left side of the neck rating the left shoulder likely secondary to cervical radiculopathy which has worsened since the last time the patient has been imaged.  No work-up has been done for the cervical spine since 2017.  Given the severity of pain with Spurling's maneuver I do not believe the patient will be able to tolerate physical therapy at this time.  We discussed emergency precautions.    Diagnostic workup: As above    Medications:   Trial of transition to gabapentin  to treat the patient's pain.  We will start at a low dose given the patient's sensitivity to medications in the past.  Gabapentin 100 mg 3 times daily as needed.  Plan to increase to 300 mg 3 times daily as needed at the next visit.    Given the patient's history of alcohol abuse, I recommend to the patient's PCP to get a urine drug screen prior to additional prescriptions of tramadol.    Interventional program: I would consider the patient for an epidural steroid injection depending on the results of the above         Follow-up: After the above diagnostic studies           Please note that this dictation was created using voice recognition software. I have made every reasonable attempt to correct obvious errors but there may be errors of grammar and content that I may have overlooked prior to finalization of this note.      Ehsan Monique MD  Physical Medicine and Rehabilitation  Interventional Spine and Sports Physiatry  [facility]           Asucnion Nichole M.D.

## 2022-12-06 ENCOUNTER — OFFICE VISIT (OUTPATIENT)
Dept: CARDIOLOGY | Facility: MEDICAL CENTER | Age: 72
End: 2022-12-06
Payer: MEDICARE

## 2022-12-06 DIAGNOSIS — D69.6 THROMBOCYTOPENIA (HCC): ICD-10-CM

## 2022-12-06 DIAGNOSIS — I73.9 PERIPHERAL VASCULAR DISEASE (HCC): ICD-10-CM

## 2022-12-06 DIAGNOSIS — I73.9 PAD (PERIPHERAL ARTERY DISEASE) (HCC): ICD-10-CM

## 2022-12-06 DIAGNOSIS — I25.2 HISTORY OF MI (MYOCARDIAL INFARCTION): ICD-10-CM

## 2022-12-06 DIAGNOSIS — I74.09 CHRONIC DISTAL AORTIC OCCLUSION (HCC): ICD-10-CM

## 2022-12-06 DIAGNOSIS — E11.51 TYPE 2 DIABETES MELLITUS WITH DIABETIC PERIPHERAL ANGIOPATHY WITHOUT GANGRENE, WITHOUT LONG-TERM CURRENT USE OF INSULIN (HCC): ICD-10-CM

## 2022-12-06 DIAGNOSIS — I70.0 ATHEROSCLEROSIS OF AORTA (HCC): ICD-10-CM

## 2022-12-06 DIAGNOSIS — I51.81 STRESS-INDUCED CARDIOMYOPATHY: ICD-10-CM

## 2022-12-06 DIAGNOSIS — B18.2 CHRONIC HEPATITIS C WITHOUT HEPATIC COMA (HCC): ICD-10-CM

## 2022-12-06 DIAGNOSIS — M54.6 CHRONIC BILATERAL THORACIC BACK PAIN: ICD-10-CM

## 2022-12-06 DIAGNOSIS — E11.51 DIABETES MELLITUS WITH PERIPHERAL VASCULAR DISEASE (HCC): ICD-10-CM

## 2022-12-06 DIAGNOSIS — G89.29 CHRONIC BILATERAL THORACIC BACK PAIN: ICD-10-CM

## 2022-12-06 DIAGNOSIS — I74.5 BILATERAL ILIAC ARTERY OCCLUSION (HCC): ICD-10-CM

## 2022-12-06 DIAGNOSIS — I10 ESSENTIAL HYPERTENSION: ICD-10-CM

## 2022-12-06 DIAGNOSIS — Z87.891 HISTORY OF TOBACCO ABUSE: ICD-10-CM

## 2022-12-06 DIAGNOSIS — I73.9 CLAUDICATION (HCC): ICD-10-CM

## 2022-12-06 DIAGNOSIS — Z98.890 H/O COLONOSCOPY WITH POLYPECTOMY: ICD-10-CM

## 2022-12-06 DIAGNOSIS — Z86.010 H/O COLONOSCOPY WITH POLYPECTOMY: ICD-10-CM

## 2022-12-06 DIAGNOSIS — F10.21 ALCOHOL DEPENDENCE IN REMISSION (HCC): ICD-10-CM

## 2022-12-06 DIAGNOSIS — I65.22 CAROTID ARTERY STENOSIS, ASYMPTOMATIC, LEFT: ICD-10-CM

## 2022-12-06 DIAGNOSIS — D61.818 PANCYTOPENIA (HCC): ICD-10-CM

## 2022-12-06 PROCEDURE — 99214 OFFICE O/P EST MOD 30 MIN: CPT | Performed by: INTERNAL MEDICINE

## 2022-12-06 RX ORDER — METOPROLOL SUCCINATE 25 MG/1
25 TABLET, EXTENDED RELEASE ORAL
Qty: 100 TABLET | Refills: 3 | Status: SHIPPED | OUTPATIENT
Start: 2022-12-06 | End: 2023-06-15 | Stop reason: SDUPTHER

## 2022-12-06 RX ORDER — ATORVASTATIN CALCIUM 80 MG/1
80 TABLET, FILM COATED ORAL
Qty: 100 TABLET | Refills: 3 | Status: SHIPPED | OUTPATIENT
Start: 2022-12-06 | End: 2023-06-15 | Stop reason: SDUPTHER

## 2022-12-06 RX ORDER — LISINOPRIL 20 MG/1
20 TABLET ORAL DAILY
Qty: 90 TABLET | Refills: 3 | Status: SHIPPED | OUTPATIENT
Start: 2022-12-06 | End: 2023-06-15 | Stop reason: SDUPTHER

## 2022-12-06 ASSESSMENT — ENCOUNTER SYMPTOMS
ORTHOPNEA: 0
PND: 0
SHORTNESS OF BREATH: 0
CHILLS: 0
LOSS OF CONSCIOUSNESS: 0
BRUISES/BLEEDS EASILY: 0
WEAKNESS: 0
FEVER: 0
SPUTUM PRODUCTION: 0
STRIDOR: 0
MUSCULOSKELETAL NEGATIVE: 1
HEMOPTYSIS: 0
CONSTITUTIONAL NEGATIVE: 1
COUGH: 0
WHEEZING: 0
EYES NEGATIVE: 1
CARDIOVASCULAR NEGATIVE: 1
RESPIRATORY NEGATIVE: 1
PALPITATIONS: 0
DIZZINESS: 0
GASTROINTESTINAL NEGATIVE: 1
NEUROLOGICAL NEGATIVE: 1
CLAUDICATION: 0
SORE THROAT: 0

## 2022-12-06 NOTE — PROGRESS NOTES
"Chief Complaint   Patient presents with    Dyslipidemia    Hypertension    Aortic Stenosis     F/v Dx: Nonrheumatic aortic valve stenosis             Subjective:   Tuan Limon is a 70 y.o. male who presents today as a follow-up for his hypertension hyperlipidemia peripheral vascular disease and heart failure in the past.      Since he was last seen he has not completed his echocardiogram yet.  Is scheduled for next month.  His carotid duplex showed less than 69% with less than 50% on the contralateral side.  We started him on a medication for his claudication which he thinks has been effective.  Otherwise has been doing well.    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 states checks his blood sugar daily. Not currently taking medication for.    H/O colonoscopy with polypectomy 5/7/2013    [facility] 4/5/2018    Hepatitis C     no treatment    History of alcoholism (HCC) 8/22/2012    [place] [facility] (hard of hearing) 07/26/2019    Hyperlipidemia     Hypertension     Insulin dependent diabetes mellitus 07/26/2019    Pt. states used to take Insulin 2 years ago.    S/P excision of lipoma 9/18/2019     Past Surgical History:   Procedure Laterality Date    MASS EXCISION GENERAL Right 7/31/2019    Procedure: EXCISION, MASS, GENERAL - 15 CM LIPOMA OF SHOULDER;  Surgeon: Augustin Rosales M.D.;  Location: [facility];  Service: General    COLONOSCOPY  2017    WIDE EXCISION  5/6/2014    Performed by Nicholas Govea M.D. at SURGERY SAME DAY [facility]    FLAP GRAFT  5/6/2014    Performed by Nicholas Govea M.D. at SURGERY SAME DAY [facility]    RECOVERY  10/22/2012    Performed by Pily Lee M.D. at [facility]" [facility]     Family History   Problem Relation Age of Onset    Heart Disease Father     Arthritis Mother     No Known Problems Sister     Cancer Brother         type unknown    Cancer Brother         skin     No Known Problems Maternal Grandmother     No Known Problems Maternal Grandfather     No Known Problems Paternal Grandmother     No Known Problems Paternal Grandfather     Diabetes Neg Hx     Hypertension Neg Hx     Stroke Neg Hx     Hyperlipidemia Neg Hx      Social History     Socioeconomic History    Marital status: Single     Spouse name: Not on file    Number of children: Not on file    Years of education: Not on file    Highest education level: Not on file   Occupational History    Not on file   Tobacco Use    Smoking status: Former     Packs/day: 1.00     Years: 58.00     Pack years: 58.00     Types: Cigarettes     Start date: 1960     Quit date: 2011     Years since quittin.3    Smokeless tobacco: Never    Tobacco comments:     avoid all tobacco products   Vaping Use    Vaping Use: Never used   Substance and Sexual Activity    Alcohol use: No     Alcohol/week: 0.0 oz     Comment: quit , drank 2 times since    Drug use: Yes     Types: Inhaled     Comment: marijuana (last use 2 weeks ago per pt)    Sexual activity: Never   Other Topics Concern    Not on file   Social History Narrative    Not on file     Social Determinants of Health     Financial Resource Strain: Not on file   Food Insecurity: Not on file   Transportation Needs: Not on file   Physical Activity: Not on file   Stress: Not on file   Social Connections: Not on file   Intimate Partner Violence: Not on file   Housing Stability: Not on file     No Known Allergies  Outpatient Encounter Medications as of 2022   Medication Sig Dispense Refill    lisinopril (PRINIVIL) 20 MG Tab Take 1 Tablet by mouth every day. 90 Tablet 3    atorvastatin (LIPITOR) 80 MG tablet Take 1 Tablet by mouth every day. 100 Tablet 3    metoprolol SR  (TOPROL XL) 25 MG TABLET SR 24 HR Take 1 Tablet by mouth every day. 100 Tablet 3    [DISCONTINUED] gabapentin (NEURONTIN) 100 MG Cap Take 1 Capsule by mouth 3 times a day as needed (pain). 90 Capsule 5    Dulaglutide 1.5 MG/0.5ML Solution Pen-injector Inject 1 Each under the skin every 7 days. 2.24 mL 3    traMADol (ULTRAM) 50 MG Tab Take 1 Tablet by mouth 2 times daily with meals as needed for Severe Pain for up to 30 days. 60 Tablet 0    pentoxifylline CR (TRENTAL) 400 MG CR tablet Take 1 Tablet by mouth 2 times a day. 60 Tablet 11    VITAMIN D PO Take  by mouth.      Multiple Vitamins-Minerals (CENTRUM SILVER PO) Take  by mouth.      ibuprofen (MOTRIN) 800 MG Tab TAKE ONE TABLET BY MOUTH EVERY 8 HOURS AS NEEDED 90 Tablet 1    clopidogrel (PLAVIX) 75 MG Tab TAKE ONE TABLET BY MOUTH ONCE DAILY 100 Tablet 3    Blood Glucose Meter Kit Test blood sugar as recommended by provider.  Per insurance preference, blood glucose monitoring kit. 1 Kit 0    Blood Glucose Test Strips Use one per insurance preference, strip to test blood sugar once daily early morning before first meal. 100 Strip 6    B Complex Vitamins (B COMPLEX PO) Take  by mouth.      aspirin (ASA) 81 MG Chew Tab chewable tablet Take 81 mg by mouth every day.      Lancets Use one per insurance preference, lancet to test blood sugar once daily early morning before first meal. 100 Each 3    Alcohol Swabs Wipe site with prep pad prior to injection. 100 Each 3    [DISCONTINUED] lisinopril (PRINIVIL) 10 MG Tab Take 1 Tablet by mouth every day. 100 Tablet 3    [DISCONTINUED] metoprolol SR (TOPROL XL) 25 MG TABLET SR 24 HR Take 1 Tablet by mouth every day. 100 Tablet 3    [DISCONTINUED] atorvastatin (LIPITOR) 80 MG tablet Take 1 Tablet by mouth every day. 100 Tablet 3    FREESTYLE LITE strip        No facility-administered encounter medications on file as of 12/6/2022.     Review of Systems   Constitutional: Negative.  Negative for chills, fever and malaise/fatigue.  "  HENT: Negative.  Negative for sore throat.    Eyes: Negative.    Respiratory: Negative.  Negative for cough, hemoptysis, sputum production, shortness of breath, wheezing and stridor.    Cardiovascular: Negative.  Negative for chest pain, palpitations, orthopnea, claudication, leg swelling and PND.   Gastrointestinal: Negative.    Genitourinary: Negative.    Musculoskeletal: Negative.    Skin: Negative.    Neurological: Negative.  Negative for dizziness, loss of consciousness and weakness.   Endo/Heme/Allergies: Negative.  Does not bruise/bleed easily.   All other systems reviewed and are negative.     Objective:   /82   Pulse 77   Resp 14   Ht 1.778 m (5' 10\")   Wt 82.1 kg (181 lb)   SpO2 95%   BMI 25.97 kg/m²     Physical Exam  Vitals and nursing note reviewed.   Constitutional:       General: He is not in acute distress.     Appearance: He is well-developed. He is not diaphoretic.   HENT:      Head: Normocephalic and atraumatic.      Right Ear: External ear normal.      Left Ear: External ear normal.      Nose: Nose normal.      Mouth/Throat:      Pharynx: No oropharyngeal exudate.   Eyes:      General: No scleral icterus.        Right eye: No discharge.         Left eye: No discharge.      Conjunctiva/sclera: Conjunctivae normal.      Pupils: Pupils are equal, round, and reactive to light.   Neck:      Vascular: No JVD.   Cardiovascular:      Rate and Rhythm: Normal rate and regular rhythm.      Heart sounds: Murmur heard.   Systolic murmur is present with a grade of 3/6.     No friction rub. No gallop.   Pulmonary:      Effort: Pulmonary effort is normal. No respiratory distress.      Breath sounds: No stridor. No wheezing or rales.   Chest:      Chest wall: No tenderness.   Abdominal:      General: There is no distension.      Palpations: Abdomen is soft.      Tenderness: There is no guarding.   Musculoskeletal:         General: No tenderness or deformity. Normal range of motion.      Cervical " back: Neck supple.   Skin:     General: Skin is warm and dry.      Coloration: Skin is not pale.      Findings: No erythema or rash.   Neurological:      Mental Status: He is alert.      Cranial Nerves: No cranial nerve deficit.      Motor: No abnormal muscle tone.      Coordination: Coordination normal.      Deep Tendon Reflexes: Reflexes are normal and symmetric. Reflexes normal.   Psychiatric:         Behavior: Behavior normal.         Thought Content: Thought content normal.         Judgment: Judgment normal.     Carotid duplex: Dated 11/16/2016 personally interpreted myself showing 50% on the right and 50 to 69% on the left.    EKG: Dated 7/26/2019 personally interpreted myself showing normal sinus rhythm with PACs.    Lab Results   Component Value Date/Time    CHOLSTRLTOT 103 08/12/2022 07:07 AM    LDL 46 08/12/2022 07:07 AM    HDL 27 (A) 08/12/2022 07:07 AM    TRIGLYCERIDE 152 (H) 08/12/2022 07:07 AM       Lab Results   Component Value Date/Time    SODIUM 140 08/12/2022 07:07 AM    POTASSIUM 4.1 08/12/2022 07:07 AM    CHLORIDE 108 08/12/2022 07:07 AM    CO2 21 08/12/2022 07:07 AM    GLUCOSE 154 (H) 08/12/2022 07:07 AM    BUN 19 08/12/2022 07:07 AM    CREATININE 0.95 08/12/2022 07:07 AM     Lab Results   Component Value Date/Time    ALKPHOSPHAT 65 08/12/2022 07:07 AM    ASTSGOT 85 (H) 08/12/2022 07:07 AM    ALTSGPT 104 (H) 08/12/2022 07:07 AM    TBILIRUBIN 0.6 08/12/2022 07:07 AM          Assessment:     1. Claudication (HCC)        2. H/O colonoscopy with polypectomy        3. PAD (peripheral artery disease) (HCC)        4. Stress-induced cardiomyopathy        5. Type 2 diabetes mellitus with diabetic peripheral angiopathy without gangrene, without long-term current use of insulin (HCC)        6. History of tobacco abuse  lisinopril (PRINIVIL) 20 MG Tab      7. Atherosclerosis of aorta (HCC)  lisinopril (PRINIVIL) 20 MG Tab      8. Chronic hepatitis C without hepatic coma (HCC)        9. Essential  hypertension  lisinopril (PRINIVIL) 20 MG Tab    metoprolol SR (TOPROL XL) 25 MG TABLET SR 24 HR      10. Alcohol dependence in remission (HCC)        11. 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HCC)        12. Chronic distal aortic occlusion (HCC)        13. Carotid artery stenosis, asymptomatic, left        14. Chronic bilateral thoracic back pain        15. Pancytopenia (HCC)        16. Thrombocytopenia (HCC)        17. Diabetes mellitus with peripheral vascular disease (HCC)  atorvastatin (LIPITOR) 80 MG tablet      18. Peripheral vascular disease (HCC)  atorvastatin (LIPITOR) 80 MG tablet      19. History of MI (myocardial infarction)  metoprolol SR (TOPROL XL) 25 MG TABLET SR 24 HR          Medical Decision Making:  Today's Assessment / Status / Plan:     72-year-old male with heart failure peripheral vascular his carotid stenosis and a aortic stenosis murmur.  For his murmur I will rate his echocardiogram.  For his carotid stenosis we will continue him on aspirin and atorvastatin.  For his claudication he will stay on the medications.  I will see him back in 6 months.    1. Aortic stenosis murmur    - echo    2. Carotid Stenosis    - clinical follow up    3. HTN    - cont metop XL 25    - cont lisin 10    4. HLD    - cont atorva 80    5. Claudication    - stable    6. Stress CHF in 2015    - meds per above

## 2022-12-09 ENCOUNTER — HOSPITAL ENCOUNTER (OUTPATIENT)
Dept: RADIOLOGY | Facility: MEDICAL CENTER | Age: 72
End: 2022-12-09
Attending: PHYSICAL MEDICINE & REHABILITATION
Payer: MEDICARE

## 2022-12-09 DIAGNOSIS — M54.12 CERVICAL RADICULOPATHY: ICD-10-CM

## 2022-12-09 DIAGNOSIS — M47.812 CERVICAL SPONDYLOSIS: ICD-10-CM

## 2022-12-09 DIAGNOSIS — M50.30 DEGENERATIVE DISC DISEASE, CERVICAL: ICD-10-CM

## 2022-12-09 DIAGNOSIS — M48.02 CERVICAL SPINAL STENOSIS: ICD-10-CM

## 2022-12-09 PROCEDURE — 72141 MRI NECK SPINE W/O DYE: CPT

## 2022-12-13 ENCOUNTER — HOSPITAL ENCOUNTER (OUTPATIENT)
Dept: RADIOLOGY | Facility: MEDICAL CENTER | Age: 72
End: 2022-12-13
Attending: PHYSICIAN ASSISTANT
Payer: MEDICARE

## 2022-12-13 DIAGNOSIS — R74.8 ELEVATED LIVER ENZYMES: ICD-10-CM

## 2022-12-13 DIAGNOSIS — B18.2 CARRIER OF VIRAL HEPATITIS C (HCC): ICD-10-CM

## 2022-12-13 PROCEDURE — 76705 ECHO EXAM OF ABDOMEN: CPT

## 2022-12-19 ENCOUNTER — OFFICE VISIT (OUTPATIENT)
Dept: PHYSICAL MEDICINE AND REHAB | Facility: MEDICAL CENTER | Age: 72
End: 2022-12-19
Payer: MEDICARE

## 2022-12-19 VITALS
HEART RATE: 95 BPM | BODY MASS INDEX: 27.36 KG/M2 | DIASTOLIC BLOOD PRESSURE: 72 MMHG | SYSTOLIC BLOOD PRESSURE: 142 MMHG | WEIGHT: 184.75 LBS | HEIGHT: 69 IN | TEMPERATURE: 97.3 F | OXYGEN SATURATION: 97 %

## 2022-12-19 DIAGNOSIS — M50.30 DEGENERATIVE DISC DISEASE, CERVICAL: ICD-10-CM

## 2022-12-19 DIAGNOSIS — M47.812 CERVICAL SPONDYLOSIS: ICD-10-CM

## 2022-12-19 DIAGNOSIS — M54.12 CERVICAL RADICULOPATHY: ICD-10-CM

## 2022-12-19 DIAGNOSIS — M48.02 CERVICAL SPINAL STENOSIS: ICD-10-CM

## 2022-12-19 PROCEDURE — 99214 OFFICE O/P EST MOD 30 MIN: CPT | Performed by: PHYSICAL MEDICINE & REHABILITATION

## 2022-12-19 RX ORDER — GABAPENTIN 100 MG/1
100 CAPSULE ORAL 3 TIMES DAILY PRN
Qty: 90 CAPSULE | Refills: 11 | Status: SHIPPED | OUTPATIENT
Start: 2022-12-19 | End: 2023-06-15

## 2022-12-19 ASSESSMENT — PATIENT HEALTH QUESTIONNAIRE - PHQ9: CLINICAL INTERPRETATION OF PHQ2 SCORE: 0

## 2022-12-19 ASSESSMENT — PAIN SCALES - GENERAL: PAINLEVEL: 3=SLIGHT PAIN

## 2022-12-20 NOTE — PROGRESS NOTES
"Follow up patient note  Interventional spine and Pain  Physiatry (physical medicine and  Rehabilitation)       Chief complaint:   Chief Complaint   Patient presents with    Follow-Up     Neck pain          HISTORY    Please see new patient note by Dr Monique,  for more details.     HPI  Patient identification: Chung Limon ,  1950,   With Diagnoses of Cervical radiculopathy, Cervical spinal stenosis, Degenerative disc disease, cervical, and Cervical spondylosis were pertinent to this visit.     The patient has significant provement in pain and function after the additional exercises as well as with gabapentin.  Improved range of motion the cervical spine.  Pain is now more tolerable.  He reports pain is 3 of 10 intensity intermittent in the left side of the neck radiating towards the left shoulder.  Aching in quality.  Denies numbness tingling or weakness.       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 states checks his blood sugar daily. Not currently taking medication for.    H/O colonoscopy with polypectomy 2013    [facility] 2018    Hepatitis C     no treatment    History of alcoholism (HCC) 2012    Jena (hard of hearing) 2019    Hyperlipidemia     Hypertension     Insulin dependent diabetes mellitus 2019    Pt. states used to take Insulin 2 years ago.    S/P excision of lipoma 2019       PSHx:   Past Surgical History:   Procedure Laterality Date    MASS EXCISION GENERAL Right 2019    Procedure: EXCISION, " MASS, GENERAL - 15 CM LIPOMA OF SHOULDER;  Surgeon: Augustin Rosales M.D.;  Location: SURGERY [facility];  Service: General    COLONOSCOPY  2017    WIDE EXCISION  5/6/2014    Performed by Nicholas Govea M.D. at SURGERY SAME DAY [facility] ORS    FLAP GRAFT  5/6/2014    Performed by Nicholas Govea M.D. at SURGERY SAME DAY [facility] ORS    RECOVERY  10/22/2012    Performed by Pily Lee M.D. at SURGERY [facility]       Family history   Family History   Problem Relation Age of Onset    Heart Disease Father     Arthritis Mother     No Known Problems Sister     Cancer Brother         type unknown    Cancer Brother         skin     No Known Problems Maternal Grandmother     No Known Problems Maternal Grandfather     No Known Problems Paternal Grandmother     No Known Problems Paternal Grandfather     Diabetes Neg Hx     Hypertension Neg Hx     Stroke Neg Hx     Hyperlipidemia Neg Hx          Medications:   Outpatient Medications Marked as Taking for the 12/19/22 encounter (Office Visit) with Ehsan Monique M.D.   Medication Sig Dispense Refill    gabapentin (NEURONTIN) 100 MG Cap Take 1 Capsule by mouth 3 times a day as needed (pain). 90 Capsule 11    lisinopril (PRINIVIL) 20 MG Tab Take 1 Tablet by mouth every day. 90 Tablet 3    atorvastatin (LIPITOR) 80 MG tablet Take 1 Tablet by mouth every day. 100 Tablet 3    metoprolol SR (TOPROL XL) 25 MG TABLET SR 24 HR Take 1 Tablet by mouth every day. 100 Tablet 3    Dulaglutide 1.5 MG/0.5ML Solution Pen-injector Inject 1 Each under the skin every 7 days. 2.24 mL 3    traMADol (ULTRAM) 50 MG Tab Take 1 Tablet by mouth 2 times daily with meals as needed for Severe Pain for up to 30 days. 60 Tablet 0    pentoxifylline CR (TRENTAL) 400 MG CR tablet Take 1 Tablet by mouth 2 times a day. 60 Tablet 11    VITAMIN D PO Take  by mouth.      Multiple Vitamins-Minerals (CENTRUM SILVER PO) Take  by mouth.      ibuprofen (MOTRIN) 800 MG Tab TAKE ONE TABLET BY MOUTH EVERY 8  HOURS AS NEEDED 90 Tablet 1    clopidogrel (PLAVIX) 75 MG Tab TAKE ONE TABLET BY MOUTH ONCE DAILY 100 Tablet 3    Blood Glucose Meter Kit Test blood sugar as recommended by provider.  Per insurance preference, blood glucose monitoring kit. 1 Kit 0    Blood Glucose Test Strips Use one per insurance preference, strip to test blood sugar once daily early morning before first meal. 100 Strip 6    Lancets Use one per insurance preference, lancet to test blood sugar once daily early morning before first meal. 100 Each 3    Alcohol Swabs Wipe site with prep pad prior to injection. 100 Each 3    B Complex Vitamins (B COMPLEX PO) Take  by mouth.      FREESTYLE LITE strip       aspirin (ASA) 81 MG Chew Tab chewable tablet Take 81 mg by mouth every day.          Current Outpatient Medications on File Prior to Visit   Medication Sig Dispense Refill    lisinopril (PRINIVIL) 20 MG Tab Take 1 Tablet by mouth every day. 90 Tablet 3    atorvastatin (LIPITOR) 80 MG tablet Take 1 Tablet by mouth every day. 100 Tablet 3    metoprolol SR (TOPROL XL) 25 MG TABLET SR 24 HR Take 1 Tablet by mouth every day. 100 Tablet 3    Dulaglutide 1.5 MG/0.5ML Solution Pen-injector Inject 1 Each under the skin every 7 days. 2.24 mL 3    traMADol (ULTRAM) 50 MG Tab Take 1 Tablet by mouth 2 times daily with meals as needed for Severe Pain for up to 30 days. 60 Tablet 0    pentoxifylline CR (TRENTAL) 400 MG CR tablet Take 1 Tablet by mouth 2 times a day. 60 Tablet 11    VITAMIN D PO Take  by mouth.      Multiple Vitamins-Minerals (CENTRUM SILVER PO) Take  by mouth.      ibuprofen (MOTRIN) 800 MG Tab TAKE ONE TABLET BY MOUTH EVERY 8 HOURS AS NEEDED 90 Tablet 1    clopidogrel (PLAVIX) 75 MG Tab TAKE ONE TABLET BY MOUTH ONCE DAILY 100 Tablet 3    Blood Glucose Meter Kit Test blood sugar as recommended by provider.  Per insurance preference, blood glucose monitoring kit. 1 Kit 0    Blood Glucose Test Strips Use one per insurance preference, strip to test  blood sugar once daily early morning before first meal. 100 Strip 6    Lancets Use one per insurance preference, lancet to test blood sugar once daily early morning before first meal. 100 Each 3    Alcohol Swabs Wipe site with prep pad prior to injection. 100 Each 3    B Complex Vitamins (B COMPLEX PO) Take  by mouth.      FREESTYLE LITE strip       aspirin (ASA) 81 MG Chew Tab chewable tablet Take 81 mg by mouth every day.       No current facility-administered medications on file prior to visit.         Allergies:   No Known Allergies    Social Hx:   Social History     Socioeconomic History    Marital status: Single     Spouse name: Not on file    Number of children: Not on file    Years of education: Not on file    Highest education level: Not on file   Occupational History    Not on file   Tobacco Use    Smoking status: Former     Packs/day: 1.00     Years: 58.00     Pack years: 58.00     Types: Cigarettes     Start date: 1960     Quit date: 2011     Years since quittin.3    Smokeless tobacco: Never    Tobacco comments:     avoid all tobacco products   Vaping Use    Vaping Use: Never used   Substance and Sexual Activity    Alcohol use: No     Alcohol/week: 0.0 oz     Comment: quit , drank 2 times since    Drug use: Yes     Types: Inhaled     Comment: marijuana (last use 2 weeks ago per pt)    Sexual activity: Never   Other Topics Concern    Not on file   Social History Narrative    Not on file     Social Determinants of Health     Financial Resource Strain: Not on file   Food Insecurity: Not on file   Transportation Needs: Not on file   Physical Activity: Not on file   Stress: Not on file   Social Connections: Not on file   Intimate Partner Violence: Not on file   Housing Stability: Not on file         EXAMINATION     Physical Exam:   Vitals: BP (!) 142/72 (BP Location: Left arm, Patient Position: Sitting, BP Cuff Size: Adult)   Pulse 95   Temp 36.3 °C (97.3 °F) (Temporal)   Ht 1.74 m (5'  "8.5\")   Wt 83.8 kg (184 lb 11.9 oz)   SpO2 97%     Constitutional:   Body Habitus: Body mass index is 27.68 kg/m².  Cooperation: Fully cooperates with exam  Appearance: Well-groomed no disheveled    Respiratory-  breathing comfortable on room air, no audible wheezing  Cardiovascular- No lower extremity edema is noted.   Psychiatric- alert and oriented ×3. Normal affect.    MSK and Neuro: -    Cervical spine      decreased active range of motion with flexion, lateral flexion, and rotation bilaterally.   There is decreased active range of motion with cervical extension.      Palpation:   Tenderness to palpation throughout the cervical spine: negative bilaterally        Cervical spine Special tests  Neuro tension  Spurling's maneuver negative bilaterally           Key points for the international standards for neurological classification of spinal cord injury (ISNCSCI) to light touch.     Dermatome R L   C4 2 2   C5 2 2   C6 2 2   C7 2 2   C8 2 2   T1 2 2   T2 2 2                                         Motor Exam Upper Extremities   ? Myotome R L   Shoulder flexion C5 5 5   Elbow flexion C5 5 5   Wrist extension C6 5 5   Elbow extension C7 5 5   Finger flexion C8 5 5   Finger abduction T1 5 5               MEDICAL DECISION MAKING    DATA    Labs:   Lab Results   Component Value Date/Time    SODIUM 140 08/12/2022 07:07 AM    POTASSIUM 4.1 08/12/2022 07:07 AM    CHLORIDE 108 08/12/2022 07:07 AM    CO2 21 08/12/2022 07:07 AM    GLUCOSE 154 (H) 08/12/2022 07:07 AM    BUN 19 08/12/2022 07:07 AM    CREATININE 0.95 08/12/2022 07:07 AM        Lab Results   Component Value Date/Time    PROTHROMBTM 13.4 06/09/2017 02:07 PM    INR 0.99 06/09/2017 02:07 PM        Lab Results   Component Value Date/Time    WBC 4.3 (L) 08/12/2022 07:07 AM    RBC 4.79 08/12/2022 07:07 AM    HEMOGLOBIN 14.6 08/12/2022 07:07 AM    HEMATOCRIT 42.1 08/12/2022 07:07 AM    MCV 87.9 08/12/2022 07:07 AM    MCH 30.5 08/12/2022 07:07 AM    MCHC 34.7 " 08/12/2022 07:07 AM    MPV 11.9 09/28/2020 01:38 PM    NEUTSPOLYS 45.60 08/12/2022 07:07 AM    LYMPHOCYTES 43.10 (H) 08/12/2022 07:07 AM    MONOCYTES 6.50 08/12/2022 07:07 AM    EOSINOPHILS 3.90 08/12/2022 07:07 AM    BASOPHILS 0.70 08/12/2022 07:07 AM    HYPOCHROMIA 1+ 01/09/2013 08:50 AM        Lab Results   Component Value Date/Time    HBA1C 7.2 (H) 08/12/2022 07:07 AM          Imaging:   I personally reviewed following images      MRI cervical spine 12/9/2022  Severe degenerative disc disease at C5-6.  Moderate central canal stenosis at C4-5 and C5-6 in the central canal.  Severe neuroforaminal stenosis at C5-6 bilaterally.      I reviewed the following radiology reports                     Results for orders placed during the hospital encounter of 12/09/22    MR-CERVICAL SPINE-W/O    Impression  Degenerative changes in cervical spine as described above. There is moderate canal narrowing at C4-5 and C5-6 with slight cord deformity.    There is severe left and moderate right foraminal narrowing at C4-5.    At C5-6, there is bilateral severe neural foraminal narrowing.    Incidental note made of a lipoma in the right submandibular region, also stable in appearance from previous study.    Overall, the findings are stable compared to the previous examination.                                                                                 Results for orders placed during the hospital encounter of 01/15/15    CT-ABDOMEN-PELVIS W/ IV Contrast (R/O acute, uncomplicated appendicitis or diverticulitis, bowel ischemia) FOR HIGH BMI PATIENTS    Impression  1.  No acute intra-abdominal findings.    2.  Hepatic steatosis.    3.  Diverticulosis.                    Results for orders placed in visit on 03/06/17    DX-CERVICAL SPINE-2 OR 3 VIEWS    Impression  Disc space narrowing and uncovertebral joint joint arthropathy.    Carotid artery atherosclerotic disease.     Results for orders placed during the hospital encounter of  20    DX-CHEST-2 VIEWS    Impression  No evidence of acute cardiopulmonary disease                      Results for orders placed in visit on 21    DX-LUMBAR SPINE-2 OR 3 VIEWS    Impression  Multilevel multifactorial degenerative changes             Results for orders placed in visit on 19    DX-SHOULDER 2+ RIGHT    Impression  1.  There is marked degenerative change in the right glenohumeral joint.  2.  There are probably intra-articular bodies as well as changes of subcoracoid calcific bursitis.    Results for orders placed in visit on 21    DX-THORACIC SPINE-2 VIEWS    Impression  Mild to moderate degenerative changes of the thoracic spine            DIAGNOSIS   Visit Diagnoses     ICD-10-CM   1. Cervical radiculopathy  M54.12   2. Cervical spinal stenosis  M48.02   3. Degenerative disc disease, cervical  M50.30   4. Cervical spondylosis  M47.812         ASSESSMENT and PLAN:     Chung Limon  1950 male      Chung was seen today for follow-up.    Diagnoses and all orders for this visit:    Cervical radiculopathy  -     Referral to Physical Therapy  -     gabapentin (NEURONTIN) 100 MG Cap; Take 1 Capsule by mouth 3 times a day as needed (pain).    Cervical spinal stenosis  -     Referral to Physical Therapy  -     gabapentin (NEURONTIN) 100 MG Cap; Take 1 Capsule by mouth 3 times a day as needed (pain).    Degenerative disc disease, cervical  -     Referral to Physical Therapy  -     gabapentin (NEURONTIN) 100 MG Cap; Take 1 Capsule by mouth 3 times a day as needed (pain).    Cervical spondylosis  -     Referral to Physical Therapy  -     gabapentin (NEURONTIN) 100 MG Cap; Take 1 Capsule by mouth 3 times a day as needed (pain).      Improvement in pain and function with home exercise program and with gabapentin.  We discussed physical therapy in addition to the home exercise program.    We discussed the risks of cervical spinal stenosis.  The patient understands emergency  precautions.    We discussed an epidural steroid injection but decided against this given the improvement in pain and function.  We discussed a surgical referral but the patient declined.    Follow up: 1 year or sooner as needed    Thank you for allowing me to participate in the care of this patient. If you have any questions please not hesitate to contact me.             Please note that this dictation was created using voice recognition software. I have made every reasonable attempt to correct obvious errors but there may be errors of grammar and content that I may have overlooked prior to finalization of this note.      Ehsan Monique MD  Interventional Spine and Sports Physiatry  Physical Medicine and Rehabilitation  [facility]

## 2022-12-28 VITALS
HEIGHT: 70 IN | WEIGHT: 181 LBS | HEART RATE: 77 BPM | SYSTOLIC BLOOD PRESSURE: 139 MMHG | OXYGEN SATURATION: 95 % | RESPIRATION RATE: 14 BRPM | DIASTOLIC BLOOD PRESSURE: 82 MMHG | BODY MASS INDEX: 25.91 KG/M2

## 2022-12-29 ENCOUNTER — HOSPITAL ENCOUNTER (OUTPATIENT)
Dept: LAB | Facility: MEDICAL CENTER | Age: 72
End: 2022-12-29
Attending: PHYSICIAN ASSISTANT
Payer: MEDICARE

## 2022-12-29 LAB
ALBUMIN SERPL BCP-MCNC: 4.2 G/DL (ref 3.2–4.9)
ALBUMIN/GLOB SERPL: 1.3 G/DL
ALP SERPL-CCNC: 69 U/L (ref 30–99)
ALT SERPL-CCNC: 85 U/L (ref 2–50)
ANION GAP SERPL CALC-SCNC: 12 MMOL/L (ref 7–16)
AST SERPL-CCNC: 66 U/L (ref 12–45)
BASOPHILS # BLD AUTO: 0.7 % (ref 0–1.8)
BASOPHILS # BLD: 0.03 K/UL (ref 0–0.12)
BILIRUB SERPL-MCNC: 0.9 MG/DL (ref 0.1–1.5)
BUN SERPL-MCNC: 22 MG/DL (ref 8–22)
CALCIUM ALBUM COR SERPL-MCNC: 9.3 MG/DL (ref 8.5–10.5)
CALCIUM SERPL-MCNC: 9.5 MG/DL (ref 8.5–10.5)
CHLORIDE SERPL-SCNC: 107 MMOL/L (ref 96–112)
CO2 SERPL-SCNC: 23 MMOL/L (ref 20–33)
CREAT SERPL-MCNC: 0.96 MG/DL (ref 0.5–1.4)
EOSINOPHIL # BLD AUTO: 0.23 K/UL (ref 0–0.51)
EOSINOPHIL NFR BLD: 5.7 % (ref 0–6.9)
ERYTHROCYTE [DISTWIDTH] IN BLOOD BY AUTOMATED COUNT: 42.9 FL (ref 35.9–50)
GFR SERPLBLD CREATININE-BSD FMLA CKD-EPI: 83 ML/MIN/1.73 M 2
GLOBULIN SER CALC-MCNC: 3.3 G/DL (ref 1.9–3.5)
GLUCOSE SERPL-MCNC: 135 MG/DL (ref 65–99)
HBV CORE AB SERPL QL IA: REACTIVE
HBV SURFACE AB SERPL IA-ACNC: <3.5 MIU/ML (ref 0–10)
HBV SURFACE AG SER QL: NORMAL
HCT VFR BLD AUTO: 42.4 % (ref 42–52)
HGB BLD-MCNC: 14.5 G/DL (ref 14–18)
HIV 1+2 AB+HIV1 P24 AG SERPL QL IA: NORMAL
IMM GRANULOCYTES # BLD AUTO: 0.01 K/UL (ref 0–0.11)
IMM GRANULOCYTES NFR BLD AUTO: 0.2 % (ref 0–0.9)
LYMPHOCYTES # BLD AUTO: 2.12 K/UL (ref 1–4.8)
LYMPHOCYTES NFR BLD: 52.1 % (ref 22–41)
MCH RBC QN AUTO: 30.3 PG (ref 27–33)
MCHC RBC AUTO-ENTMCNC: 34.2 G/DL (ref 33.7–35.3)
MCV RBC AUTO: 88.5 FL (ref 81.4–97.8)
MONOCYTES # BLD AUTO: 0.26 K/UL (ref 0–0.85)
MONOCYTES NFR BLD AUTO: 6.4 % (ref 0–13.4)
NEUTROPHILS # BLD AUTO: 1.42 K/UL (ref 1.82–7.42)
NEUTROPHILS NFR BLD: 34.9 % (ref 44–72)
NRBC # BLD AUTO: 0 K/UL
NRBC BLD-RTO: 0 /100 WBC
PLATELET # BLD AUTO: 123 K/UL (ref 164–446)
PLATELET # BLD AUTO: 131 K/UL (ref 164–446)
PMV BLD AUTO: 12.1 FL (ref 9–12.9)
POTASSIUM SERPL-SCNC: 4.1 MMOL/L (ref 3.6–5.5)
PROT SERPL-MCNC: 7.5 G/DL (ref 6–8.2)
RBC # BLD AUTO: 4.79 M/UL (ref 4.7–6.1)
SODIUM SERPL-SCNC: 142 MMOL/L (ref 135–145)
WBC # BLD AUTO: 4.1 K/UL (ref 4.8–10.8)

## 2022-12-29 PROCEDURE — 87522 HEPATITIS C REVRS TRNSCRPJ: CPT

## 2022-12-29 PROCEDURE — 87340 HEPATITIS B SURFACE AG IA: CPT

## 2022-12-29 PROCEDURE — 80053 COMPREHEN METABOLIC PANEL: CPT

## 2022-12-29 PROCEDURE — 86706 HEP B SURFACE ANTIBODY: CPT

## 2022-12-29 PROCEDURE — 83883 ASSAY NEPHELOMETRY NOT SPEC: CPT

## 2022-12-29 PROCEDURE — 82977 ASSAY OF GGT: CPT

## 2022-12-29 PROCEDURE — 36415 COLL VENOUS BLD VENIPUNCTURE: CPT

## 2022-12-29 PROCEDURE — 86708 HEPATITIS A ANTIBODY: CPT

## 2022-12-29 PROCEDURE — 85025 COMPLETE CBC W/AUTO DIFF WBC: CPT

## 2022-12-29 PROCEDURE — 84520 ASSAY OF UREA NITROGEN: CPT | Mod: XU

## 2022-12-29 PROCEDURE — 86704 HEP B CORE ANTIBODY TOTAL: CPT

## 2022-12-29 PROCEDURE — 87902 NFCT AGT GNTYP ALYS HEP C: CPT

## 2022-12-29 PROCEDURE — 87389 HIV-1 AG W/HIV-1&-2 AB AG IA: CPT

## 2022-12-29 PROCEDURE — 84450 TRANSFERASE (AST) (SGOT): CPT | Mod: XU

## 2022-12-29 PROCEDURE — 84460 ALANINE AMINO (ALT) (SGPT): CPT | Mod: XU

## 2022-12-29 PROCEDURE — 85049 AUTOMATED PLATELET COUNT: CPT | Mod: XU

## 2022-12-30 LAB
HCV RNA SERPL NAA+PROBE-ACNC: ABNORMAL IU/ML
HCV RNA SERPL NAA+PROBE-LOG IU: 7.07 LOG IU/ML
HCV RNA SERPL QL NAA+PROBE: DETECTED

## 2022-12-31 LAB — HAV AB SER QL IA: POSITIVE

## 2023-01-02 LAB — HCV GENTYP SERPL NAA+PROBE: NORMAL

## 2023-01-03 LAB
A2 MACROGLOB SERPL-MCNC: 471 MG/DL (ref 131–293)
ALT SERPL-CCNC: 103 U/L (ref 5–50)
ANNOTATION COMMENT IMP: ABNORMAL
AST SERPL-CCNC: 75 U/L (ref 9–50)
BUN SERPL-SCNC: 23 MG/DL (ref 7–20)
CIRRHOMETER PT SCORE Q4850: 0.49
FIBROSIS STAGE SERPL QL: ABNORMAL
GGT SERPL-CCNC: 151 U/L (ref 7–51)
INFLAMETER META CLASS Q4853: ABNORMAL
INFLAMETER PT SCORE Q4852: 0.84
LIVER FIBR SCORE SERPL CALC.FIBROMETER: 0.97
PATHOLOGY STUDY: ABNORMAL
PROTHROM ACT/NOR PPP: 96 % (ref 90–120)

## 2023-01-05 DIAGNOSIS — E11.51 TYPE 2 DIABETES MELLITUS WITH DIABETIC PERIPHERAL ANGIOPATHY WITHOUT GANGRENE, WITHOUT LONG-TERM CURRENT USE OF INSULIN (HCC): ICD-10-CM

## 2023-01-09 ENCOUNTER — HOSPITAL ENCOUNTER (OUTPATIENT)
Dept: CARDIOLOGY | Facility: MEDICAL CENTER | Age: 73
End: 2023-01-09
Attending: INTERNAL MEDICINE
Payer: MEDICARE

## 2023-01-09 DIAGNOSIS — E11.51 DIABETES MELLITUS WITH PERIPHERAL VASCULAR DISEASE (HCC): ICD-10-CM

## 2023-01-09 DIAGNOSIS — R09.89 BRUIT: ICD-10-CM

## 2023-01-09 DIAGNOSIS — Z87.891 HISTORY OF TOBACCO ABUSE: ICD-10-CM

## 2023-01-09 DIAGNOSIS — B18.2 CHRONIC HEPATITIS C WITHOUT HEPATIC COMA (HCC): ICD-10-CM

## 2023-01-09 LAB
LV EJECT FRACT  99904: 60
LV EJECT FRACT MOD 2C 99903: 65.07
LV EJECT FRACT MOD 4C 99902: 51.25
LV EJECT FRACT MOD BP 99901: 58.45

## 2023-01-09 PROCEDURE — 93306 TTE W/DOPPLER COMPLETE: CPT

## 2023-01-09 PROCEDURE — 93306 TTE W/DOPPLER COMPLETE: CPT | Mod: 26 | Performed by: INTERNAL MEDICINE

## 2023-01-10 ENCOUNTER — TELEPHONE (OUTPATIENT)
Dept: HEALTH INFORMATION MANAGEMENT | Facility: OTHER | Age: 73
End: 2023-01-10
Payer: MEDICARE

## 2023-01-10 ENCOUNTER — HOSPITAL ENCOUNTER (OUTPATIENT)
Dept: LAB | Facility: MEDICAL CENTER | Age: 73
End: 2023-01-10
Attending: PHYSICIAN ASSISTANT
Payer: MEDICARE

## 2023-01-10 PROCEDURE — 87517 HEPATITIS B DNA QUANT: CPT

## 2023-01-10 PROCEDURE — 86707 HEPATITIS BE ANTIBODY: CPT

## 2023-01-10 PROCEDURE — 36415 COLL VENOUS BLD VENIPUNCTURE: CPT

## 2023-01-10 PROCEDURE — 87350 HEPATITIS BE AG IA: CPT

## 2023-01-12 LAB
HBV DNA SERPL NAA+PROBE-ACNC: NOT DETECTED IU/ML
HBV DNA SERPL NAA+PROBE-LOG IU: NOT DETECTED LOG IU/ML
HBV DNA SERPL QL NAA+PROBE: NOT DETECTED
HBV E AB SERPL QL IA: NEGATIVE
HBV E AG SERPL QL IA: NEGATIVE

## 2023-01-20 ENCOUNTER — TELEPHONE (OUTPATIENT)
Dept: CARDIOLOGY | Facility: MEDICAL CENTER | Age: 73
End: 2023-01-20
Payer: MEDICARE

## 2023-01-20 NOTE — TELEPHONE ENCOUNTER
"Your patient has been flagged through our echocardiogram surveillance with the following echocardiogram results:    \"Likely Low Flow Low Gradient Severe Aortic Stenosis\"    Cardiologist: Dr. Gleason    Current Plan of Care for Structural Heart Disease: \"Consider dobutamine stress echo\" Not ordered/scheduled    Next Echo date: None    Next Follow up appointment: PCP appt scheduled 1/23/2023    Please place Referral to Cardiology, sub-specialty Structural Heart Program, if warranted for echo surveillance and valvular heart disease education.  "

## 2023-01-23 ENCOUNTER — OFFICE VISIT (OUTPATIENT)
Dept: MEDICAL GROUP | Facility: PHYSICIAN GROUP | Age: 73
End: 2023-01-23
Payer: MEDICARE

## 2023-01-23 VITALS
TEMPERATURE: 98.2 F | RESPIRATION RATE: 14 BRPM | HEART RATE: 87 BPM | DIASTOLIC BLOOD PRESSURE: 62 MMHG | WEIGHT: 180 LBS | SYSTOLIC BLOOD PRESSURE: 100 MMHG | OXYGEN SATURATION: 97 % | BODY MASS INDEX: 26.66 KG/M2 | HEIGHT: 69 IN

## 2023-01-23 DIAGNOSIS — I10 ESSENTIAL HYPERTENSION: ICD-10-CM

## 2023-01-23 DIAGNOSIS — B18.2 CHRONIC HEPATITIS C WITHOUT HEPATIC COMA (HCC): ICD-10-CM

## 2023-01-23 DIAGNOSIS — H57.9 ITCHY, WATERY, AND RED EYE: ICD-10-CM

## 2023-01-23 DIAGNOSIS — E11.51 TYPE 2 DIABETES MELLITUS WITH DIABETIC PERIPHERAL ANGIOPATHY WITHOUT GANGRENE, WITHOUT LONG-TERM CURRENT USE OF INSULIN (HCC): ICD-10-CM

## 2023-01-23 LAB
HBA1C MFR BLD: 5.8 % (ref 0–5.6)
INT CON NEG: NEGATIVE
INT CON POS: POSITIVE

## 2023-01-23 PROCEDURE — 83036 HEMOGLOBIN GLYCOSYLATED A1C: CPT | Performed by: INTERNAL MEDICINE

## 2023-01-23 PROCEDURE — 99214 OFFICE O/P EST MOD 30 MIN: CPT | Performed by: INTERNAL MEDICINE

## 2023-01-23 RX ORDER — AZELASTINE HYDROCHLORIDE 0.5 MG/ML
1 SOLUTION/ DROPS OPHTHALMIC 2 TIMES DAILY
Qty: 6 ML | Refills: 0 | Status: SHIPPED | OUTPATIENT
Start: 2023-01-23

## 2023-01-23 ASSESSMENT — FIBROSIS 4 INDEX: FIB4 SCORE: 4.45

## 2023-01-23 NOTE — PROGRESS NOTES
Established Patient    Chief Complaint   Patient presents with    Follow-Up       Subjective:     HPI:   Chung presents today with the following.    Patient Active Problem List    Diagnosis Date Noted    Vitamin D deficiency 04/21/2022    Pancytopenia (HCC) 03/15/2022    Thrombocytopenia (HCC) 03/15/2022    Dyslipidemia 03/15/2022    Degenerative disc disease, lumbar 03/15/2022    Nonrheumatic aortic valve stenosis 03/02/2022    Internal nasal lesion 07/22/2021    Chronic bilateral thoracic back pain 01/04/2021    Other chest pain 01/04/2021    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HCC) 09/21/2020    Chronic distal aortic occlusion (HCC) 09/21/2020    Carotid artery stenosis, asymptomatic, left 09/21/2020    BMI 26.0-26.9,adult 05/12/2020    Transaminitis 05/12/2020    Chronic right shoulder pain 09/18/2019    Alcohol dependence in remission (HCC) 04/19/2019    Cervical radiculopathy, chronic 04/05/2018    Essential hypertension 04/05/2018    Chronic hepatitis C without hepatic coma (HCC) 05/18/2017    Cervical disc disease 05/18/2017    History of tobacco abuse 01/31/2017    Atherosclerosis of aorta (HCC) 01/31/2017    Type 2 diabetes mellitus with diabetic peripheral angiopathy without gangrene, without long-term current use of insulin (HCC) 11/15/2016    Microalbuminuria due to type 2 diabetes mellitus (HCC) 11/15/2016    Bruit 11/01/2016    Stress-induced cardiomyopathy 03/10/2015    PAD (peripheral artery disease) (HCC) 01/21/2015    H/O colonoscopy with polypectomy 05/07/2013    Claudication ([facility]) 10/22/2012       Current Outpatient Medications on File Prior to Visit   Medication Sig Dispense Refill    Dulaglutide 1.5 MG/0.5ML Solution Pen-injector Inject 1 Each under the skin every 7 days. 2.24 mL 3    gabapentin (NEURONTIN) 100 MG Cap Take 1 Capsule by mouth 3 times a day as needed (pain). 90 Capsule 11    lisinopril (PRINIVIL) 20 MG Tab Take 1 Tablet by mouth every day. 90 Tablet 3    atorvastatin (LIPITOR)  "80 MG tablet Take 1 Tablet by mouth every day. 100 Tablet 3    metoprolol SR (TOPROL XL) 25 MG TABLET SR 24 HR Take 1 Tablet by mouth every day. 100 Tablet 3    pentoxifylline CR (TRENTAL) 400 MG CR tablet Take 1 Tablet by mouth 2 times a day. 60 Tablet 11    VITAMIN D PO Take  by mouth.      Multiple Vitamins-Minerals (CENTRUM SILVER PO) Take  by mouth.      ibuprofen (MOTRIN) 800 MG Tab TAKE ONE TABLET BY MOUTH EVERY 8 HOURS AS NEEDED 90 Tablet 1    clopidogrel (PLAVIX) 75 MG Tab TAKE ONE TABLET BY MOUTH ONCE DAILY 100 Tablet 3    Blood Glucose Meter Kit Test blood sugar as recommended by provider.  Per insurance preference, blood glucose monitoring kit. 1 Kit 0    Blood Glucose Test Strips Use one per insurance preference, strip to test blood sugar once daily early morning before first meal. 100 Strip 6    Lancets Use one per insurance preference, lancet to test blood sugar once daily early morning before first meal. 100 Each 3    Alcohol Swabs Wipe site with prep pad prior to injection. 100 Each 3    B Complex Vitamins (B COMPLEX PO) Take  by mouth.      FREESTYLE LITE strip       aspirin (ASA) 81 MG Chew Tab chewable tablet Take 81 mg by mouth every day.       No current facility-administered medications on file prior to visit.       Allergies, past medical history, past surgical history, family history, social history reviewed and updated    ROS:  All other systems reviewed and are negative except as stated in the HPI       Physical Exam:     /62 (BP Location: Left arm, Patient Position: Sitting, BP Cuff Size: Adult)   Pulse 87   Temp 36.8 °C (98.2 °F) (Temporal)   Resp 14   Ht 1.74 m (5' 8.5\")   Wt 81.6 kg (180 lb)   SpO2 97%   BMI 26.97 kg/m²   General: Normal appearing. No distress.  Pulmonary: Clear to ausculation.  Normal effort.   Cardiovascular: Regular rate and rhythm    Assessment and Plan:     73 y.o. male with the following issues.    1. Type 2 diabetes mellitus with diabetic peripheral " angiopathy without gangrene, without long-term current use of insulin (HCC)  Patient is taking only Trulicity 1.5 mg weekly, reported blood pressures more regulated with diet, patient refused metformin, also is not interested in Jardiance due to side effect including too much nocturia according to the patient, reported was seen by ophthalmology few months ago, advised to get records, mention that retina was fine of both eyes  - POCT Hemoglobin A1C> 5.8, improving, continue Trulicity for now    -Discussed diabetic diet in detail, educated regarding management of hypoglycemia, advised regular exercise, educated disease management and weight goals as well as feet care and frequent check of feet.  -Patient to check early morning fasting blood glucose with goal discussed.  - asked to have a BG log with daily fasting and 2 hr postprandial after biggest meal and bring to next visit or send through my chart  -Discussed side effects of medication. Patient confirmed understanding of information.    2. Chronic hepatitis C without hepatic coma (HCC)  Reported will follow-up with GI for hep C treatment with Epclusa for 12-week, Need regular surveillance as well    3. Essential hypertension  Well-controlled, patient take lisinopril 10 mg daily, as well as metoprolol 25 mg SR daily, denies any symptoms related    Follow Up:      Return in about 4 months (around 5/23/2023) for diabetes, A1c.    Please note that this dictation was created using voice recognition software. I have made every reasonable attempt to correct obvious errors, but I expect that there are errors of grammar and possibly content that I did not discover before finalizing the note.    Signed by: Asuncion Roland M.D.

## 2023-01-31 ENCOUNTER — DOCUMENTATION (OUTPATIENT)
Dept: HEALTH INFORMATION MANAGEMENT | Facility: OTHER | Age: 73
End: 2023-01-31
Payer: MEDICARE

## 2023-02-28 DIAGNOSIS — I73.9 PAD (PERIPHERAL ARTERY DISEASE) (HCC): ICD-10-CM

## 2023-02-28 DIAGNOSIS — I74.5 BILATERAL ILIAC ARTERY OCCLUSION (HCC): ICD-10-CM

## 2023-03-01 NOTE — PROGRESS NOTES
Orders placed for vascular surveillance imaging.  Beryllium message sent to pt to remind that they are due for their surveillance vascular imaging.  Scheduling numbers provided.    Evelia ARGUETA  [facility]    no

## 2023-03-31 DIAGNOSIS — E11.51 TYPE 2 DIABETES MELLITUS WITH DIABETIC PERIPHERAL ANGIOPATHY WITHOUT GANGRENE, WITHOUT LONG-TERM CURRENT USE OF INSULIN (HCC): ICD-10-CM

## 2023-04-03 RX ORDER — DULAGLUTIDE 1.5 MG/.5ML
INJECTION, SOLUTION SUBCUTANEOUS
Qty: 2 ML | Refills: 3 | Status: SHIPPED | OUTPATIENT
Start: 2023-04-03 | End: 2023-05-26 | Stop reason: SDUPTHER

## 2023-04-04 DIAGNOSIS — M54.2 CERVICAL PAIN: ICD-10-CM

## 2023-04-04 RX ORDER — IBUPROFEN 800 MG/1
TABLET ORAL
Qty: 90 TABLET | Refills: 1 | Status: SHIPPED | OUTPATIENT
Start: 2023-04-04 | End: 2023-11-01

## 2023-04-14 ENCOUNTER — APPOINTMENT (OUTPATIENT)
Dept: RADIOLOGY | Facility: MEDICAL CENTER | Age: 73
End: 2023-04-14
Payer: MEDICARE

## 2023-04-28 ENCOUNTER — TELEPHONE (OUTPATIENT)
Dept: VASCULAR LAB | Facility: MEDICAL CENTER | Age: 73
End: 2023-04-28
Payer: MEDICARE

## 2023-04-29 NOTE — TELEPHONE ENCOUNTER
Left voicemail for pt to remind that they are due for vascular imaging.  Scheduling numbers provided.  Evelia ARGUETA  [facility]

## 2023-05-16 ENCOUNTER — DOCUMENTATION (OUTPATIENT)
Dept: VASCULAR LAB | Facility: MEDICAL CENTER | Age: 73
End: 2023-05-16
Payer: MEDICARE

## 2023-05-16 NOTE — PROGRESS NOTES
Sent fax request for most recent chart notes from Dr Gleason at [facility]. (Fx# [phone])    Fax confirmation in media.

## 2023-05-24 ENCOUNTER — DOCUMENTATION (OUTPATIENT)
Dept: VASCULAR LAB | Facility: MEDICAL CENTER | Age: 73
End: 2023-05-24
Payer: MEDICARE

## 2023-05-24 ENCOUNTER — TELEPHONE (OUTPATIENT)
Dept: MEDICAL GROUP | Facility: PHYSICIAN GROUP | Age: 73
End: 2023-05-24
Payer: MEDICARE

## 2023-05-24 DIAGNOSIS — E11.51 TYPE 2 DIABETES MELLITUS WITH DIABETIC PERIPHERAL ANGIOPATHY WITHOUT GANGRENE, WITHOUT LONG-TERM CURRENT USE OF INSULIN (HCC): ICD-10-CM

## 2023-05-24 NOTE — PROGRESS NOTES
Called and left message to patient to inquire about cardiology referral with Dr. Gleason's office.

## 2023-05-25 DIAGNOSIS — E11.51 TYPE 2 DIABETES MELLITUS WITH DIABETIC PERIPHERAL ANGIOPATHY WITHOUT GANGRENE, WITHOUT LONG-TERM CURRENT USE OF INSULIN (HCC): ICD-10-CM

## 2023-05-25 RX ORDER — DULAGLUTIDE 1.5 MG/.5ML
INJECTION, SOLUTION SUBCUTANEOUS
Qty: 6 ML | Refills: 3 | Status: CANCELLED | OUTPATIENT
Start: 2023-05-25

## 2023-05-26 RX ORDER — DULAGLUTIDE 1.5 MG/.5ML
INJECTION, SOLUTION SUBCUTANEOUS
Qty: 3 ML | Refills: 0 | Status: SHIPPED | OUTPATIENT
Start: 2023-05-26 | End: 2023-06-15 | Stop reason: SDUPTHER

## 2023-05-30 ENCOUNTER — TELEPHONE (OUTPATIENT)
Dept: HEALTH INFORMATION MANAGEMENT | Facility: OTHER | Age: 73
End: 2023-05-30
Payer: MEDICARE

## 2023-05-31 ENCOUNTER — TELEPHONE (OUTPATIENT)
Dept: MEDICAL GROUP | Facility: PHYSICIAN GROUP | Age: 73
End: 2023-05-31
Payer: MEDICARE

## 2023-05-31 NOTE — TELEPHONE ENCOUNTER
Phone Number Called: [phone]    Call outcome: Left detailed message for patient. Informed to call back with any additional questions. If patient calls back please reschedule him provider will be out of office on 06/02/2023

## 2023-06-05 ENCOUNTER — TELEPHONE (OUTPATIENT)
Dept: VASCULAR LAB | Facility: MEDICAL CENTER | Age: 73
End: 2023-06-05
Payer: MEDICARE

## 2023-06-05 NOTE — TELEPHONE ENCOUNTER
Spoke with pt on the phone.  Reminded him he has vascular imaging due to monitor his PAD, and abdominal [id number] occlusive disease.  Per previous conversations pt had wanted to follow up with cardiology.  However he does not currently have appts set up.  Pt is not see the benefit of doing any imaging as he is not wanting to pursue any intervention if needed.    Will update medical director.    Pt does have appt to see PCP in a couple weeks.    Scheduling number provided to cardiology for follow up.  Evelia ARGUETA  [facility]

## 2023-06-15 ENCOUNTER — OFFICE VISIT (OUTPATIENT)
Dept: MEDICAL GROUP | Facility: PHYSICIAN GROUP | Age: 73
End: 2023-06-15
Payer: MEDICARE

## 2023-06-15 VITALS
SYSTOLIC BLOOD PRESSURE: 100 MMHG | BODY MASS INDEX: 25.31 KG/M2 | HEIGHT: 69 IN | RESPIRATION RATE: 16 BRPM | TEMPERATURE: 97.7 F | HEART RATE: 80 BPM | WEIGHT: 170.9 LBS | OXYGEN SATURATION: 98 % | DIASTOLIC BLOOD PRESSURE: 50 MMHG

## 2023-06-15 DIAGNOSIS — I73.9 PAD (PERIPHERAL ARTERY DISEASE) (HCC): ICD-10-CM

## 2023-06-15 DIAGNOSIS — I74.09 CHRONIC DISTAL AORTIC OCCLUSION (HCC): ICD-10-CM

## 2023-06-15 DIAGNOSIS — E55.9 VITAMIN D DEFICIENCY: ICD-10-CM

## 2023-06-15 DIAGNOSIS — I51.81 STRESS-INDUCED CARDIOMYOPATHY: ICD-10-CM

## 2023-06-15 DIAGNOSIS — D69.6 THROMBOCYTOPENIA (HCC): ICD-10-CM

## 2023-06-15 DIAGNOSIS — I73.9 PERIPHERAL VASCULAR DISEASE (HCC): ICD-10-CM

## 2023-06-15 DIAGNOSIS — E11.51 TYPE 2 DIABETES MELLITUS WITH DIABETIC PERIPHERAL ANGIOPATHY WITHOUT GANGRENE, WITHOUT LONG-TERM CURRENT USE OF INSULIN (HCC): ICD-10-CM

## 2023-06-15 DIAGNOSIS — I35.0 NONRHEUMATIC AORTIC VALVE STENOSIS: ICD-10-CM

## 2023-06-15 DIAGNOSIS — F10.21 ALCOHOL DEPENDENCE IN REMISSION (HCC): ICD-10-CM

## 2023-06-15 DIAGNOSIS — I15.2 HYPERTENSION ASSOCIATED WITH DIABETES (HCC): ICD-10-CM

## 2023-06-15 DIAGNOSIS — E78.5 HYPERLIPIDEMIA DUE TO TYPE 2 DIABETES MELLITUS (HCC): ICD-10-CM

## 2023-06-15 DIAGNOSIS — I74.5 BILATERAL ILIAC ARTERY OCCLUSION (HCC): ICD-10-CM

## 2023-06-15 DIAGNOSIS — E11.69 HYPERLIPIDEMIA DUE TO TYPE 2 DIABETES MELLITUS (HCC): ICD-10-CM

## 2023-06-15 DIAGNOSIS — I25.2 HISTORY OF MI (MYOCARDIAL INFARCTION): ICD-10-CM

## 2023-06-15 DIAGNOSIS — E11.51 DIABETES MELLITUS WITH PERIPHERAL VASCULAR DISEASE (HCC): ICD-10-CM

## 2023-06-15 DIAGNOSIS — I73.9 CLAUDICATION (HCC): ICD-10-CM

## 2023-06-15 DIAGNOSIS — I70.0 ATHEROSCLEROSIS OF AORTA (HCC): ICD-10-CM

## 2023-06-15 DIAGNOSIS — I10 ESSENTIAL HYPERTENSION: ICD-10-CM

## 2023-06-15 DIAGNOSIS — Z87.891 HISTORY OF TOBACCO ABUSE: ICD-10-CM

## 2023-06-15 DIAGNOSIS — I65.22 CAROTID ARTERY STENOSIS, ASYMPTOMATIC, LEFT: ICD-10-CM

## 2023-06-15 DIAGNOSIS — Z13.29 SCREENING FOR THYROID DISORDER: ICD-10-CM

## 2023-06-15 DIAGNOSIS — E11.59 HYPERTENSION ASSOCIATED WITH DIABETES (HCC): ICD-10-CM

## 2023-06-15 PROBLEM — J34.89 INTERNAL NASAL LESION: Status: RESOLVED | Noted: 2021-07-22 | Resolved: 2023-06-15

## 2023-06-15 PROBLEM — M50.90 CERVICAL DISC DISEASE: Status: RESOLVED | Noted: 2017-05-18 | Resolved: 2023-06-15

## 2023-06-15 PROCEDURE — 99214 OFFICE O/P EST MOD 30 MIN: CPT | Performed by: NURSE PRACTITIONER

## 2023-06-15 PROCEDURE — 3074F SYST BP LT 130 MM HG: CPT | Performed by: NURSE PRACTITIONER

## 2023-06-15 PROCEDURE — 3078F DIAST BP <80 MM HG: CPT | Performed by: NURSE PRACTITIONER

## 2023-06-15 RX ORDER — ATORVASTATIN CALCIUM 80 MG/1
80 TABLET, FILM COATED ORAL
Qty: 100 TABLET | Refills: 3 | Status: SHIPPED | OUTPATIENT
Start: 2023-06-15

## 2023-06-15 RX ORDER — CLOPIDOGREL BISULFATE 75 MG/1
75 TABLET ORAL DAILY
Qty: 100 TABLET | Refills: 3 | Status: SHIPPED | OUTPATIENT
Start: 2023-06-15

## 2023-06-15 RX ORDER — LISINOPRIL 20 MG/1
20 TABLET ORAL DAILY
Qty: 100 TABLET | Refills: 3 | Status: SHIPPED | OUTPATIENT
Start: 2023-06-15

## 2023-06-15 RX ORDER — METOPROLOL SUCCINATE 25 MG/1
25 TABLET, EXTENDED RELEASE ORAL
Qty: 100 TABLET | Refills: 3 | Status: SHIPPED | OUTPATIENT
Start: 2023-06-15

## 2023-06-15 RX ORDER — DULAGLUTIDE 1.5 MG/.5ML
0.5 INJECTION, SOLUTION SUBCUTANEOUS
Qty: 6 ML | Refills: 3 | Status: SHIPPED | OUTPATIENT
Start: 2023-06-15

## 2023-06-15 RX ORDER — DAPAGLIFLOZIN 5 MG/1
5 TABLET, FILM COATED ORAL DAILY
Qty: 100 TABLET | Refills: 3 | Status: SHIPPED | OUTPATIENT
Start: 2023-06-15

## 2023-06-15 ASSESSMENT — FIBROSIS 4 INDEX: FIB4 SCORE: 4.45

## 2023-06-15 NOTE — ASSESSMENT & PLAN NOTE
Chronic stable.  Patient's last A1c was 5.8%.  He is stable on Trulicity 1.5 mg weekly.    Past labs reviewed.    Patient to continue Trulicity 1.5 mg weekly.

## 2023-06-15 NOTE — ASSESSMENT & PLAN NOTE
Chronic stable. Patient follows with cardiology and vascular. He is stable on atorvastatin 80mg.     Continue atorvastatin 80mg daily.

## 2023-06-15 NOTE — ASSESSMENT & PLAN NOTE
Chronic stable.  This most likely is medication induced.    We will continue to monitor CBC and platelet count every 6 to 12 months.

## 2023-06-15 NOTE — ASSESSMENT & PLAN NOTE
Chronic stable.  Patient was advised approximately 10 years ago that he should have a bypass.  Patient at that time declined as it was an extensive surgery and he lives alone and does not have anyone to help with ADLs after the procedure.    Patient since then has been medically managed.  Patient is currently on Plavix 75 mg daily, atorvastatin 80 mg daily, daily aspirin 81 mg, and Trental 400 mg twice daily.    Patient to continue medications.  Patient to continue following with cardiology.

## 2023-06-15 NOTE — ASSESSMENT & PLAN NOTE
Chronic stable.  Patient follows with Dr. Gleason, cardiology.  He has also seen vascular in the past.  Patient is currently taking atorvastatin 80 mg, Plavix 75 mg, daily 81 mg aspirin, as well as a newer medication called Trental 400 mg twice daily.    Patient to continue medications.  Patient to continue following with cardiology.  As Dr. Gleason recently moved to a different practice we will send referral so that patient can continue to follow with Dr. Gleason.

## 2023-06-15 NOTE — ASSESSMENT & PLAN NOTE
Chronic condition.  Patient is being closely monitored by his cardiologist.  Patient recently had an echo done in January.  This did show severe aortic valve stenosis.    Patient advised of symptoms that would indicate his valve stenosis was potentially worsening.    Patient to continue following with cardiology.

## 2023-06-15 NOTE — ASSESSMENT & PLAN NOTE
- stable   Chronic stable.  This is a chronic condition that is monitored by cardiology closely.  Patient is on medications including Plavix, aspirin, atorvastatin, and Trental.    Patient to continue medications.  Patient to continue following with cardiology.

## 2023-06-15 NOTE — PROGRESS NOTES
Chief Complaint   Patient presents with    Establish Care     New to you                                                                                                                                       HPI:   Chung presents today with the following.    Problem   Thrombocytopenia (Hcc)   Hyperlipidemia Due to Type 2 Diabetes Mellitus (Hcc)   Nonrheumatic Aortic Valve Stenosis   Bilateral Iliac Artery Occlusion (Hcc)   Alcohol Dependence in Remission (Hcc)   Hypertension Associated With Diabetes (Hcc)   Atherosclerosis of Aorta (Hcc)   Type 2 diabetes mellitus with diabetic peripheral angiopathy without gangrene, without long-term current use of insulin (HCC)   PAD (peripheral artery disease) (HCC)   Internal Nasal Lesion (Resolved)   Cervical Disc Disease (Resolved)   Bruit (Resolved)       Current Outpatient Medications   Medication Sig Dispense Refill    atorvastatin (LIPITOR) 80 MG tablet Take 1 Tablet by mouth every day. 100 Tablet 3    clopidogrel (PLAVIX) 75 MG Tab Take 1 Tablet by mouth every day. 100 Tablet 3    Dulaglutide (TRULICITY) 1.5 MG/0.5ML Solution Pen-injector Inject 0.5 mL under the skin every 7 days. INJECT 1 PEN UNDER SKIN EVERY 7 DAYS 6 mL 3    lisinopril (PRINIVIL) 20 MG Tab Take 1 Tablet by mouth every day. 100 Tablet 3    metoprolol SR (TOPROL XL) 25 MG TABLET SR 24 HR Take 1 Tablet by mouth every day. 100 Tablet 3    dapagliflozin propanediol (FARXIGA) 5 MG Tab Take 1 Tablet by mouth every day. 100 Tablet 3    ibuprofen (MOTRIN) 800 MG Tab TAKE ONE TABLET BY MOUTH EVERY 8 HOURS AS NEEDED 90 Tablet 1    azelastine (OPTIVAR) 0.05 % ophthalmic solution Administer 1 Drop into the left eye 2 times a day. 6 mL 0    pentoxifylline CR (TRENTAL) 400 MG CR tablet Take 1 Tablet by mouth 2 times a day. 60 Tablet 11    VITAMIN D PO Take  by mouth.      Multiple Vitamins-Minerals (CENTRUM SILVER PO) Take  by mouth.      Blood Glucose Meter Kit Test blood sugar as recommended by provider.  Per  "insurance preference, blood glucose monitoring kit. 1 Kit 0    Blood Glucose Test Strips Use one per insurance preference, strip to test blood sugar once daily early morning before first meal. 100 Strip 6    Lancets Use one per insurance preference, lancet to test blood sugar once daily early morning before first meal. 100 Each 3    Alcohol Swabs Wipe site with prep pad prior to injection. 100 Each 3    B Complex Vitamins (B COMPLEX PO) Take  by mouth.      FREESTYLE LITE strip       aspirin (ASA) 81 MG Chew Tab chewable tablet Take 81 mg by mouth every day.       No current facility-administered medications for this visit.       Allergies as of 06/15/2023    (No Known Allergies)        ROS:  All systems negative expect as addressed in assessment and plan.     /50 (BP Location: Left arm, Patient Position: Sitting, BP Cuff Size: Adult)   Pulse 80   Temp 36.5 °C (97.7 °F) (Temporal)   Resp 16   Ht 1.753 m (5' 9\")   Wt 77.5 kg (170 lb 14.4 oz)   SpO2 98%   BMI 25.24 kg/m²       Physical Exam  Vitals reviewed.   Constitutional:       Appearance: Normal appearance.   HENT:      Head: Normocephalic and atraumatic.      Mouth/Throat:      Mouth: Mucous membranes are moist.   Eyes:      Extraocular Movements: Extraocular movements intact.      Conjunctiva/sclera: Conjunctivae normal.   Cardiovascular:      Rate and Rhythm: Normal rate and regular rhythm.      Heart sounds: Murmur heard.   Pulmonary:      Effort: Pulmonary effort is normal.      Breath sounds: Normal breath sounds.   Musculoskeletal:         General: Normal range of motion.      Cervical back: Normal range of motion.   Skin:     General: Skin is warm and dry.   Neurological:      General: No focal deficit present.      Mental Status: He is alert and oriented to person, place, and time.   Psychiatric:         Mood and Affect: Mood normal.         Behavior: Behavior normal.         Thought Content: Thought content normal.           Assessment and " Plan:  73 y.o. male with the following issues.    1. Claudication (Former[facility])  Lipid Profile      2. Stress-induced cardiomyopathy  REFERRAL TO CARDIOLOGY      3. Carotid artery stenosis, asymptomatic, left  REFERRAL TO CARDIOLOGY    Comp Metabolic Panel    Lipid Profile      4. Atherosclerosis of aorta ([facility])  REFERRAL TO CARDIOLOGY    lisinopril (PRINIVIL) 20 MG Tab    Lipid Profile      5. 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[facility])  REFERRAL TO CARDIOLOGY    Lipid Profile      6. Nonrheumatic aortic valve stenosis  REFERRAL TO CARDIOLOGY      7. Chronic distal aortic occlusion (HCC)  REFERRAL TO CARDIOLOGY    Lipid Profile      8. Diabetes mellitus with peripheral vascular disease ([facility])  atorvastatin (LIPITOR) 80 MG tablet      9. Peripheral vascular disease (HCC)  atorvastatin (LIPITOR) 80 MG tablet    Lipid Profile      10. Type 2 diabetes mellitus with diabetic peripheral angiopathy without gangrene, without long-term current use of insulin ([facility])  Dulaglutide (TRULICITY) 1.5 MG/0.5ML Solution Pen-injector    Comp Metabolic Panel    MICROALBUMIN CREAT RATIO URINE    HEMOGLOBIN A1C      11. History of tobacco abuse  lisinopril (PRINIVIL) 20 MG Tab      12. Essential hypertension  lisinopril (PRINIVIL) 20 MG Tab    metoprolol SR (TOPROL XL) 25 MG TABLET SR 24 HR      13. History of MI (myocardial infarction)  metoprolol SR (TOPROL XL) 25 MG TABLET SR 24 HR      14. Hyperlipidemia due to type 2 diabetes mellitus ([facility])  Lipid Profile      15. Hypertension associated with diabetes ([facility])  Comp Metabolic Panel    MICROALBUMIN CREAT RATIO URINE    HEMOGLOBIN A1C      16. Screening for thyroid disorder  FREE THYROXINE    TSH      17. Vitamin D deficiency  VITAMIN D,25 HYDROXY (DEFICIENCY)      18. Thrombocytopenia ([facility])  CBC WITH DIFFERENTIAL      19. PAD (peripheral artery disease) ([facility])        20. Alcohol dependence in remission (HCC)             Hyperlipidemia due to type 2 diabetes mellitus ([facility])  Chronic stable. Patient  follows with cardiology and vascular. He is stable on atorvastatin 80mg.     Continue atorvastatin 80mg daily.       Type 2 diabetes mellitus with diabetic peripheral angiopathy without gangrene, without long-term current use of insulin (HCC)  Chronic stable.  Patient's last A1c was 5.8%.  He is stable on Trulicity 1.5 mg weekly.    Past labs reviewed.    Patient to continue Trulicity 1.5 mg weekly.    Thrombocytopenia (HCC)  Chronic stable.  This most likely is medication induced.    We will continue to monitor CBC and platelet count every 6 to 12 months.    PAD (peripheral artery disease) (HCC)  Chronic stable.  Patient follows with Dr. Gleason, cardiology.  He has also seen vascular in the past.  Patient is currently taking atorvastatin 80 mg, Plavix 75 mg, daily 81 mg aspirin, as well as a newer medication called Trental 400 mg twice daily.    Patient to continue medications.  Patient to continue following with cardiology.  As Dr. Gleason recently moved to a different practice we will send referral so that patient can continue to follow with Dr. Gleason.    Nonrheumatic aortic valve stenosis  Chronic condition.  Patient is being closely monitored by his cardiologist.  Patient recently had an echo done in January.  This did show severe aortic valve stenosis.    Patient advised of symptoms that would indicate his valve stenosis was potentially worsening.    Patient to continue following with cardiology.    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HCC)  Chronic stable.  Patient was advised approximately 10 years ago that he should have a bypass.  Patient at that time declined as it was an extensive surgery and he lives alone and does not have anyone to help with ADLs after the procedure.    Patient since then has been medically managed.  Patient is currently on Plavix 75 mg daily, atorvastatin 80 mg daily, daily aspirin 81 mg, and Trental 400 mg twice daily.    Patient to continue medications.  Patient to continue following  with cardiology.    Atherosclerosis of aorta (HCC)  Chronic stable.  This is a chronic condition that is monitored by cardiology closely.  Patient is on medications including Plavix, aspirin, atorvastatin, and Trental.    Patient to continue medications.  Patient to continue following with cardiology.    Alcohol dependence in remission (HCC)  This is chronic stable.  Patient has continued to avoid alcohol since 1985.      Return in about 3 months (around 9/15/2023) for follow up for diabetes.      Please note that this dictation was created using voice recognition software. I have worked with consultants from the vendor as well as technical experts from [employer] [employer] to optimize the interface. I have made every reasonable attempt to correct obvious errors, but I expect that there are errors of grammar and possibly content that I did not discover before finalizing the note.

## 2023-07-12 NOTE — PROGRESS NOTES
Called pt back to let them know their labs where ready, and scheduled a fv appt    Bexarotene Counseling:  I discussed with the patient the risks of bexarotene including but not limited to hair loss, dry lips/skin/eyes, liver abnormalities, hyperlipidemia, pancreatitis, depression/suicidal ideation, photosensitivity, drug rash/allergic reactions, hypothyroidism, anemia, leukopenia, infection, cataracts, and teratogenicity.  Patient understands that they will need regular blood tests to check lipid profile, liver function tests, white blood cell count, thyroid function tests and pregnancy test if applicable.

## 2023-07-14 NOTE — TELEPHONE ENCOUNTER
Requested Prescriptions     Pending Prescriptions Disp Refills   • glucose blood (CONTOUR NEXT TEST) strip 50 Strip 6     Sig: USE TO TEST BLOOD SUGAR ONCE DAILY EARLY MORNING BEFORE FIRST MEAL OF DAY     Caryl Moulton M.D.

## 2023-08-01 ENCOUNTER — OFFICE VISIT (OUTPATIENT)
Dept: MEDICAL GROUP | Facility: PHYSICIAN GROUP | Age: 73
End: 2023-08-01
Payer: MEDICARE

## 2023-08-01 VITALS
RESPIRATION RATE: 20 BRPM | BODY MASS INDEX: 24.75 KG/M2 | SYSTOLIC BLOOD PRESSURE: 120 MMHG | OXYGEN SATURATION: 96 % | DIASTOLIC BLOOD PRESSURE: 66 MMHG | HEART RATE: 92 BPM | WEIGHT: 167.1 LBS | TEMPERATURE: 97.7 F | HEIGHT: 69 IN

## 2023-08-01 DIAGNOSIS — K59.09 OTHER CONSTIPATION: ICD-10-CM

## 2023-08-01 DIAGNOSIS — N40.2 PROSTATE NODULE: ICD-10-CM

## 2023-08-01 PROCEDURE — 99214 OFFICE O/P EST MOD 30 MIN: CPT | Performed by: NURSE PRACTITIONER

## 2023-08-01 PROCEDURE — 3078F DIAST BP <80 MM HG: CPT | Performed by: NURSE PRACTITIONER

## 2023-08-01 PROCEDURE — 3074F SYST BP LT 130 MM HG: CPT | Performed by: NURSE PRACTITIONER

## 2023-08-01 ASSESSMENT — FIBROSIS 4 INDEX: FIB4 SCORE: 4.45

## 2023-08-01 NOTE — PROGRESS NOTES
Chief Complaint   Patient presents with    Back Pain     Mid back pain, spasms, constipation x4 days                                                                                                                                        HPI:   Chung presents today with the following.    Problem   Other Constipation       Current Outpatient Medications   Medication Sig Dispense Refill    glucose blood (CONTOUR NEXT TEST) strip USE TO TEST BLOOD SUGAR ONCE DAILY EARLY MORNING BEFORE FIRST MEAL OF DAY 50 Strip 6    atorvastatin (LIPITOR) 80 MG tablet Take 1 Tablet by mouth every day. 100 Tablet 3    clopidogrel (PLAVIX) 75 MG Tab Take 1 Tablet by mouth every day. 100 Tablet 3    Dulaglutide (TRULICITY) 1.5 MG/0.5ML Solution Pen-injector Inject 0.5 mL under the skin every 7 days. INJECT 1 PEN UNDER SKIN EVERY 7 DAYS 6 mL 3    lisinopril (PRINIVIL) 20 MG Tab Take 1 Tablet by mouth every day. 100 Tablet 3    metoprolol SR (TOPROL XL) 25 MG TABLET SR 24 HR Take 1 Tablet by mouth every day. 100 Tablet 3    dapagliflozin propanediol (FARXIGA) 5 MG Tab Take 1 Tablet by mouth every day. 100 Tablet 3    ibuprofen (MOTRIN) 800 MG Tab TAKE ONE TABLET BY MOUTH EVERY 8 HOURS AS NEEDED 90 Tablet 1    azelastine (OPTIVAR) 0.05 % ophthalmic solution Administer 1 Drop into the left eye 2 times a day. 6 mL 0    pentoxifylline CR (TRENTAL) 400 MG CR tablet Take 1 Tablet by mouth 2 times a day. 60 Tablet 11    VITAMIN D PO Take  by mouth.      Multiple Vitamins-Minerals (CENTRUM SILVER PO) Take  by mouth.      Blood Glucose Meter Kit Test blood sugar as recommended by provider.  Per insurance preference, blood glucose monitoring kit. 1 Kit 0    Blood Glucose Test Strips Use one per insurance preference, strip to test blood sugar once daily early morning before first meal. 100 Strip 6    Lancets Use one per insurance preference, lancet to test blood sugar once daily early morning before first meal. 100 Each 3    Alcohol Swabs Wipe site  "with prep pad prior to injection. 100 Each 3    B Complex Vitamins (B COMPLEX PO) Take  by mouth.      aspirin (ASA) 81 MG Chew Tab chewable tablet Take 81 mg by mouth every day.       No current facility-administered medications for this visit.       Allergies as of 08/01/2023    (No Known Allergies)        ROS:  All systems negative expect as addressed in assessment and plan.     /66 (BP Location: Right arm, Patient Position: Sitting, BP Cuff Size: Adult)   Pulse 92   Temp 36.5 °C (97.7 °F) (Temporal)   Resp 20   Ht 1.753 m (5' 9\")   Wt 75.8 kg (167 lb 1.6 oz)   SpO2 96%   BMI 24.68 kg/m²       Physical Exam  Vitals reviewed.   Constitutional:       Appearance: Normal appearance.   HENT:      Head: Normocephalic and atraumatic.      Mouth/Throat:      Mouth: Mucous membranes are moist.   Eyes:      Extraocular Movements: Extraocular movements intact.      Conjunctiva/sclera: Conjunctivae normal.   Pulmonary:      Effort: Pulmonary effort is normal.   Abdominal:      General: Abdomen is flat.      Palpations: Abdomen is soft.      Tenderness: There is abdominal tenderness in the suprapubic area. There is no guarding or rebound. Negative signs include Fitzgerald's sign and McBurney's sign.      Hernia: No hernia is present.   Genitourinary:     Prostate: Enlarged and nodules present. Not tender.      Rectum: No mass, tenderness, anal fissure, external hemorrhoid or internal hemorrhoid.   Musculoskeletal:         General: Normal range of motion.      Cervical back: Normal range of motion.   Skin:     General: Skin is warm and dry.   Neurological:      General: No focal deficit present.      Mental Status: He is alert and oriented to person, place, and time.   Psychiatric:         Mood and Affect: Mood normal.         Behavior: Behavior normal.         Thought Content: Thought content normal.         Assessment and Plan:  73 y.o. male with the following issues.    1. Other constipation  [id number]    "   2. Prostate nodule  Referral to Urology           Other constipation  Patient reprots that he has not been able to have a bowel movement for the last 4 days. He has tried miralax, fleet suppositories, increasing his metamucil. He however has not been able to have a bowel movement. Patient also reports difficulty passing gas.     Rectal exam performed.    Will order xray of abdomen to elvaluate stool burden.  Urgent urology referral placed due to potential finding of prostate nodule.  Patient advised to use Fleet enema and magnesium citrate to achieve a bowel movement.  Patient to follow-up if he is unable to achieve a bowel movement with in the next few days.          Return if symptoms worsen or fail to improve.      Please note that this dictation was created using voice recognition software. I have worked with consultants from the vendor as well as technical experts from [employer] to optimize the interface. I have made every reasonable attempt to correct obvious errors, but I expect that there are errors of grammar and possibly content that I did not discover before finalizing the note.

## 2023-08-01 NOTE — ASSESSMENT & PLAN NOTE
Patient reprots that he has not been able to have a bowel movement for the last 4 days. He has tried miralax, fleet suppositories, increasing his metamucil. He however has not been able to have a bowel movement. Patient also reports difficulty passing gas.     Rectal exam performed.    Will order xray of abdomen to elvaluate stool burden.  Urgent urology referral placed due to potential finding of prostate nodule.  Patient advised to use Fleet enema and magnesium citrate to achieve a bowel movement.  Patient to follow-up if he is unable to achieve a bowel movement with in the next few days.

## 2023-08-03 ENCOUNTER — HOSPITAL ENCOUNTER (OUTPATIENT)
Dept: RADIOLOGY | Facility: MEDICAL CENTER | Age: 73
End: 2023-08-03
Attending: NURSE PRACTITIONER
Payer: MEDICARE

## 2023-08-03 DIAGNOSIS — K59.09 OTHER CONSTIPATION: ICD-10-CM

## 2023-08-03 PROCEDURE — 74019 RADEX ABDOMEN 2 VIEWS: CPT

## 2023-08-23 ENCOUNTER — OFFICE VISIT (OUTPATIENT)
Dept: MEDICAL GROUP | Facility: PHYSICIAN GROUP | Age: 73
End: 2023-08-23
Payer: MEDICARE

## 2023-08-23 VITALS
SYSTOLIC BLOOD PRESSURE: 110 MMHG | HEART RATE: 84 BPM | OXYGEN SATURATION: 97 % | WEIGHT: 168 LBS | HEIGHT: 69 IN | DIASTOLIC BLOOD PRESSURE: 78 MMHG | BODY MASS INDEX: 24.88 KG/M2 | TEMPERATURE: 97.6 F

## 2023-08-23 DIAGNOSIS — K59.01 SLOW TRANSIT CONSTIPATION: ICD-10-CM

## 2023-08-23 DIAGNOSIS — M54.50 ACUTE LEFT-SIDED LOW BACK PAIN WITHOUT SCIATICA: ICD-10-CM

## 2023-08-23 DIAGNOSIS — K59.09 OTHER CONSTIPATION: ICD-10-CM

## 2023-08-23 PROCEDURE — 3074F SYST BP LT 130 MM HG: CPT

## 2023-08-23 PROCEDURE — 99214 OFFICE O/P EST MOD 30 MIN: CPT

## 2023-08-23 PROCEDURE — 3078F DIAST BP <80 MM HG: CPT

## 2023-08-23 RX ORDER — METHOCARBAMOL 500 MG/1
500 TABLET, FILM COATED ORAL 3 TIMES DAILY
Qty: 30 TABLET | Refills: 1 | Status: SHIPPED | OUTPATIENT
Start: 2023-08-23

## 2023-08-23 RX ORDER — SENNA AND DOCUSATE SODIUM 50; 8.6 MG/1; MG/1
1 TABLET, FILM COATED ORAL DAILY
Qty: 90 TABLET | Refills: 1 | Status: SHIPPED | OUTPATIENT
Start: 2023-08-23

## 2023-08-23 ASSESSMENT — ENCOUNTER SYMPTOMS
CONSTIPATION: 1
BACK PAIN: 1

## 2023-08-23 ASSESSMENT — FIBROSIS 4 INDEX: FIB4 SCORE: 4.45

## 2023-08-23 NOTE — PATIENT INSTRUCTIONS
For Prostate nodule: Please call and schedule.  [facility]  [address] [place] [id number]  Phone: [phone]      Constipation is a fairly common problem, and fortunately there are many good treatments available.  Listed below are things you can do to help with this issue.  I recommend you start with the first suggestion listed, and if that is not enough to help then keep working your way down the list until you get to the point where you are having normal bowel movements, then try backing off down the list to see if you can maintain normal bowel movements with something less aggressive.    1)  Increasing water intake to about 80 ounces a day (a bit more than a half gallon of water) can help.  In addition, regular exercise can make an enormous difference besides being generally good for you anyway.  Making sure your diet includes plenty of fruits and vegetables is also very helpful.    2)  Adding additional fiber to your diet with products like metamucil, benefiber, citrucel, or psyllium can help by adding bulk to your stool, keeping it from getting quite so packed down.    3) Polyethylene glycol (Miralax or its generic equivalent) is an osmotic laxative, meaning it brings extra water into your colon, helping keep your stool from getting hard and packed down.  One capful a day should give normal soft stool within about 2-3 days.  If not, consider trying 1 capful twice a day.    4) Milk of magnesia (30 ml daily) or magnesium citrate (1/2 to 1 bottle daily), or a bisacodyl (Dulcolax) suppository can be used next to get things moving.    5) If this has been ineffective, using Senna-S, 1-2 tablets one to two times daily can be helpful.    6) If all these measures have been ineffective, you can try a mineral oil enema or a warm tap water enema to see if this helps things.

## 2023-08-23 NOTE — ASSESSMENT & PLAN NOTE
Acute, ongoing. Likely caused from constipation, has been taking ibuprofen and using heating pad for this which helps some.   Will provide robaxin to see if this helps  Continue conservative treatment with heat/nsaid

## 2023-08-23 NOTE — ASSESSMENT & PLAN NOTE
"Chronic, unstable. Reports approximately fluid intake 36 oz daily, intermittent left back pain 8/10 at its worst \"from the inside\", feels like a firm knot inside, moves around. Demonstrates pain just lateral to spine, below ribs, on the left back.   PCP visit notes prostate nodule 8/1/23 with urgent referral placed for urology. Provided referral information  Reports having small hard bowel movements daily.   X-ray demonstrates moderate stool burden.  Currently taking metamucil daily  Previously tried magnesium citrate and fleets enema with little relief.   Will add to regimen with senokot-s  "

## 2023-08-23 NOTE — PROGRESS NOTES
"Subjective:     CC: Diagnoses of Slow transit constipation, Acute left-sided low back pain without sciatica, and Other constipation were pertinent to this visit.    HPI:   Chung presents today with    Problem   Acute Left-Sided Low Back Pain Without Sciatica   Other Constipation     ROS:  Review of Systems   Gastrointestinal:  Positive for constipation.   Musculoskeletal:  Positive for back pain.   All other systems reviewed and are negative.      Objective:     Exam:  /78 (BP Location: Right arm, Patient Position: Sitting, BP Cuff Size: Small adult)   Pulse 84   Temp 36.4 °C (97.6 °F) (Temporal)   Ht 1.753 m (5' 9\")   Wt 76.2 kg (168 lb)   SpO2 97%   BMI 24.81 kg/m²  Body mass index is 24.81 kg/m².    Physical Exam  Vitals reviewed.   Constitutional:       General: He is in acute distress.   HENT:      Head: Normocephalic and atraumatic.   Cardiovascular:      Rate and Rhythm: Normal rate and regular rhythm.      Pulses: Normal pulses.      Heart sounds: Normal heart sounds. No murmur heard.  Pulmonary:      Effort: Pulmonary effort is normal. No respiratory distress.      Breath sounds: Normal breath sounds.   Abdominal:      General: Bowel sounds are normal. There is no distension.      Palpations: There is no mass.      Tenderness: There is no abdominal tenderness.   Skin:     General: Skin is warm and dry.      Capillary Refill: Capillary refill takes less than 2 seconds.   Neurological:      General: No focal deficit present.      Mental Status: He is alert and oriented to person, place, and time.      Cranial Nerves: No cranial nerve deficit.      Sensory: No sensory deficit.      Motor: No weakness.   Psychiatric:         Mood and Affect: Mood is anxious.         Assessment & Plan:     73 y.o. male with the following -     Problem List Items Addressed This Visit       Other constipation     Chronic, unstable. Reports approximately fluid intake 36 oz daily, intermittent left back pain 8/10 at " "its worst \"from the inside\", feels like a firm knot inside, moves around. Demonstrates pain just lateral to spine, below ribs, on the left back.   PCP visit notes prostate nodule 8/1/23 with urgent referral placed for urology. Provided referral information  Reports having small hard bowel movements daily.   X-ray demonstrates moderate stool burden.  Currently taking metamucil daily  Previously tried magnesium citrate and fleets enema with little relief.   Will add to regimen with senokot-s         Relevant Medications    sennosides-docusate sodium (SENOKOT-S) 8.6-50 MG tablet    Acute left-sided low back pain without sciatica     Acute, ongoing. Likely caused from constipation, has been taking ibuprofen and using heating pad for this which helps some.   Will provide robaxin to see if this helps  Continue conservative treatment with heat/nsaid         Relevant Medications    methocarbamol (ROBAXIN) 500 MG Tab     Patient was educated in proper administration of medication(s) ordered today including safety, possible SE, risks, benefits, rationale and alternatives to therapy.   Supportive care, differential diagnoses, and indications for immediate follow-up discussed with patient.    Pathogenesis of diagnosis discussed including typical length and natural progression.    Instructed to return to clinic or nearest emergency department for any change in condition, further concerns, or worsening of symptoms.  Patient states understanding of the plan of care and discharge instructions.    Return if symptoms worsen or fail to improve.    Please note that this dictation was created using voice recognition software. I have made every reasonable attempt to correct obvious errors, but I expect that there are errors of grammar and possibly content that I did not discover before finalizing the note.        "

## 2023-08-28 ENCOUNTER — OFFICE VISIT (OUTPATIENT)
Dept: MEDICAL GROUP | Facility: PHYSICIAN GROUP | Age: 73
End: 2023-08-28
Payer: MEDICARE

## 2023-08-28 VITALS
RESPIRATION RATE: 16 BRPM | HEART RATE: 85 BPM | DIASTOLIC BLOOD PRESSURE: 66 MMHG | TEMPERATURE: 97.5 F | WEIGHT: 170 LBS | SYSTOLIC BLOOD PRESSURE: 104 MMHG | HEIGHT: 69 IN | OXYGEN SATURATION: 97 % | BODY MASS INDEX: 25.18 KG/M2

## 2023-08-28 DIAGNOSIS — Z87.891 HISTORY OF CIGARETTE SMOKING: ICD-10-CM

## 2023-08-28 DIAGNOSIS — G89.29 CHRONIC BILATERAL THORACIC BACK PAIN: ICD-10-CM

## 2023-08-28 DIAGNOSIS — K59.02 CONSTIPATION DUE TO OUTLET DYSFUNCTION: ICD-10-CM

## 2023-08-28 DIAGNOSIS — Z12.12 SCREENING FOR COLORECTAL CANCER: ICD-10-CM

## 2023-08-28 DIAGNOSIS — N40.2 PROSTATE NODULE: ICD-10-CM

## 2023-08-28 DIAGNOSIS — M54.6 CHRONIC BILATERAL THORACIC BACK PAIN: ICD-10-CM

## 2023-08-28 DIAGNOSIS — Z12.11 SCREENING FOR COLORECTAL CANCER: ICD-10-CM

## 2023-08-28 DIAGNOSIS — M54.50 ACUTE LEFT-SIDED LOW BACK PAIN WITHOUT SCIATICA: ICD-10-CM

## 2023-08-28 PROCEDURE — 3074F SYST BP LT 130 MM HG: CPT

## 2023-08-28 PROCEDURE — 3078F DIAST BP <80 MM HG: CPT

## 2023-08-28 PROCEDURE — 99214 OFFICE O/P EST MOD 30 MIN: CPT

## 2023-08-28 ASSESSMENT — ENCOUNTER SYMPTOMS
CONSTIPATION: 1
WEAKNESS: 1
BACK PAIN: 1
TINGLING: 1

## 2023-08-28 ASSESSMENT — FIBROSIS 4 INDEX: FIB4 SCORE: 4.45

## 2023-08-28 NOTE — ASSESSMENT & PLAN NOTE
Chronic, unstable, acute exacerbation.   Previously provided Robaxin without relief.  Has been using ibuprofen and heating pad with temporary minor relief.  Reports pain 8 out of 10, debilitating- interfering with his life and ability to function.  Feels like a constant dull, nagging ache.  Reports he will get a good night sleep and it does not bother him until 9-10 in the morning and it becomes debilitating.   Will provide referral to pain management for further evaluation and treatment.

## 2023-08-28 NOTE — ASSESSMENT & PLAN NOTE
Chronic, unstable. Currently exacerbated, with pain that shoots down the back of the leg.   Has been doing conservative management at home with NSAIDs, heating pad.  Declines physical therapy at this point has been doing home exercises and stretching.  We will provide referral to pain management

## 2023-08-29 NOTE — PROGRESS NOTES
"Subjective:     CC: Diagnoses of Acute left-sided low back pain without sciatica, Chronic bilateral thoracic back pain, Constipation due to outlet dysfunction, Prostate nodule, History of tobacco abuse (Quit >6 mos ago), and Screening for colorectal cancer were pertinent to this visit.    HPI:   Chung presents today with    Problem   Prostate Nodule   Acute Left-Sided Low Back Pain Without Sciatica   Constipation Due to Outlet Dysfunction   Chronic Bilateral Thoracic Back Pain     ROS:  Review of Systems   Gastrointestinal:  Positive for constipation.   Musculoskeletal:  Positive for back pain.   Neurological:  Positive for tingling and weakness.   All other systems reviewed and are negative.      Objective:     Exam:  /66 (BP Location: Right arm, Patient Position: Sitting, BP Cuff Size: Small adult)   Pulse 85   Temp 36.4 °C (97.5 °F) (Temporal)   Resp 16   Ht 1.753 m (5' 9\")   Wt 77.1 kg (170 lb)   SpO2 97%   BMI 25.10 kg/m²  Body mass index is 25.1 kg/m².    Physical Exam  Vitals reviewed.   Constitutional:       General: He is in acute distress.   HENT:      Head: Normocephalic and atraumatic.   Cardiovascular:      Rate and Rhythm: Normal rate and regular rhythm.      Pulses: Normal pulses.      Heart sounds: Murmur heard.   Pulmonary:      Effort: Pulmonary effort is normal. No respiratory distress.      Breath sounds: Normal breath sounds. No wheezing.   Abdominal:      General: Bowel sounds are normal.   Skin:     General: Skin is warm and dry.      Capillary Refill: Capillary refill takes less than 2 seconds.   Neurological:      General: No focal deficit present.      Mental Status: He is alert and oriented to person, place, and time.   Psychiatric:         Mood and Affect: Mood normal.         Behavior: Behavior normal.       Assessment & Plan:     73 y.o. male with the following -     Problem List Items Addressed This Visit       Chronic bilateral thoracic back pain     Chronic, unstable, " acute exacerbation.   Previously provided Robaxin without relief.  Has been using ibuprofen and heating pad with temporary minor relief.  Reports pain 8 out of 10, debilitating- interfering with his life and ability to function.  Feels like a constant dull, nagging ache.  Reports he will get a good night sleep and it does not bother him until 9-10 in the morning and it becomes debilitating.   Will provide referral to pain management for further evaluation and treatment.         Relevant Orders    Referral to Pain Management    Constipation due to outlet dysfunction     Chronic, unchanged.  Has upcoming appointment with urology upcoming Thursday for prostate nodule         Acute left-sided low back pain without sciatica     Chronic, unstable. Currently exacerbated, with pain that shoots down the back of the leg.   Has been doing conservative management at home with NSAIDs, heating pad.  Declines physical therapy at this point has been doing home exercises and stretching.  We will provide referral to pain management         Relevant Orders    Referral to Pain Management    Prostate nodule     Documented in PE from PCP 8/1/23, has appointment with urology Thursday this week.           Other Visit Diagnoses       History of tobacco abuse (Quit >6 mos ago)        Relevant Orders    CT-LUNG CANCER-SCREENING    Screening for colorectal cancer        Relevant Orders    Referral to GI for Colonoscopy          Patient was educated in proper administration of medication(s) ordered today including safety, possible SE, risks, benefits, rationale and alternatives to therapy.   Supportive care, differential diagnoses, and indications for immediate follow-up discussed with patient.    Pathogenesis of diagnosis discussed including typical length and natural progression.    Instructed to return to clinic or nearest emergency department for any change in condition, further concerns, or worsening of symptoms.  Patient states  understanding of the plan of care and discharge instructions.    Return if symptoms worsen or fail to improve.    Please note that this dictation was created using voice recognition software. I have made every reasonable attempt to correct obvious errors, but I expect that there are errors of grammar and possibly content that I did not discover before finalizing the note.

## 2023-09-06 ENCOUNTER — HOSPITAL ENCOUNTER (OUTPATIENT)
Dept: RADIOLOGY | Facility: MEDICAL CENTER | Age: 73
End: 2023-09-06
Payer: MEDICARE

## 2023-09-06 DIAGNOSIS — Z87.891 HISTORY OF CIGARETTE SMOKING: ICD-10-CM

## 2023-09-06 PROCEDURE — 71271 CT THORAX LUNG CANCER SCR C-: CPT

## 2023-10-06 ENCOUNTER — HOSPITAL ENCOUNTER (OUTPATIENT)
Dept: RADIOLOGY | Facility: MEDICAL CENTER | Age: 73
End: 2023-10-06
Attending: PHYSICAL MEDICINE & REHABILITATION
Payer: MEDICARE

## 2023-10-06 DIAGNOSIS — M47.814 THORACIC SPONDYLOSIS WITHOUT MYELOPATHY: ICD-10-CM

## 2023-10-06 PROCEDURE — 72146 MRI CHEST SPINE W/O DYE: CPT

## 2023-10-30 DIAGNOSIS — M54.2 CERVICAL PAIN: ICD-10-CM

## 2023-11-01 RX ORDER — IBUPROFEN 800 MG/1
TABLET ORAL
Qty: 90 TABLET | Refills: 0 | Status: SHIPPED | OUTPATIENT
Start: 2023-11-01 | End: 2024-02-09

## 2023-12-07 ENCOUNTER — OFFICE VISIT (OUTPATIENT)
Dept: PHYSICAL MEDICINE AND REHAB | Facility: MEDICAL CENTER | Age: 73
End: 2023-12-07
Payer: MEDICARE

## 2023-12-07 VITALS
OXYGEN SATURATION: 97 % | DIASTOLIC BLOOD PRESSURE: 72 MMHG | SYSTOLIC BLOOD PRESSURE: 120 MMHG | BODY MASS INDEX: 25.03 KG/M2 | HEART RATE: 79 BPM | WEIGHT: 169 LBS | TEMPERATURE: 97.6 F | HEIGHT: 69 IN

## 2023-12-07 DIAGNOSIS — M48.02 CERVICAL SPINAL STENOSIS: ICD-10-CM

## 2023-12-07 DIAGNOSIS — M50.30 DEGENERATIVE DISC DISEASE, CERVICAL: ICD-10-CM

## 2023-12-07 DIAGNOSIS — G89.29 CHRONIC BILATERAL LOW BACK PAIN WITHOUT SCIATICA: ICD-10-CM

## 2023-12-07 DIAGNOSIS — M54.50 CHRONIC BILATERAL LOW BACK PAIN WITHOUT SCIATICA: ICD-10-CM

## 2023-12-07 DIAGNOSIS — M47.812 CERVICAL SPONDYLOSIS: ICD-10-CM

## 2023-12-07 DIAGNOSIS — M54.12 CERVICAL RADICULOPATHY: ICD-10-CM

## 2023-12-07 DIAGNOSIS — M54.6 CHRONIC BILATERAL THORACIC BACK PAIN: ICD-10-CM

## 2023-12-07 DIAGNOSIS — G89.29 CHRONIC BILATERAL THORACIC BACK PAIN: ICD-10-CM

## 2023-12-07 DIAGNOSIS — M54.2 CHRONIC NECK PAIN: ICD-10-CM

## 2023-12-07 DIAGNOSIS — G89.29 CHRONIC NECK PAIN: ICD-10-CM

## 2023-12-07 PROCEDURE — 3074F SYST BP LT 130 MM HG: CPT | Performed by: PHYSICAL MEDICINE & REHABILITATION

## 2023-12-07 PROCEDURE — 1126F AMNT PAIN NOTED NONE PRSNT: CPT | Performed by: PHYSICAL MEDICINE & REHABILITATION

## 2023-12-07 PROCEDURE — 99214 OFFICE O/P EST MOD 30 MIN: CPT | Performed by: PHYSICAL MEDICINE & REHABILITATION

## 2023-12-07 PROCEDURE — 3078F DIAST BP <80 MM HG: CPT | Performed by: PHYSICAL MEDICINE & REHABILITATION

## 2023-12-07 ASSESSMENT — PAIN SCALES - GENERAL: PAINLEVEL: NO PAIN

## 2023-12-07 ASSESSMENT — FIBROSIS 4 INDEX: FIB4 SCORE: 4.45

## 2023-12-07 ASSESSMENT — PATIENT HEALTH QUESTIONNAIRE - PHQ9: CLINICAL INTERPRETATION OF PHQ2 SCORE: 0

## 2023-12-07 NOTE — PROGRESS NOTES
"Follow up patient note  Interventional spine and Pain  Physiatry (physical medicine and  Rehabilitation)       Chief complaint:   Chief Complaint   Patient presents with    Follow-Up     Cervical radiculopathy          HISTORY    Please see new patient note by Dr Monique,  for more details.     HPI  Patient identification: Chung Limon ,  1950,   With Diagnoses of Cervical radiculopathy, Cervical spinal stenosis, Degenerative disc disease, cervical, Cervical spondylosis, Chronic bilateral thoracic back pain, Chronic bilateral low back pain without sciatica, and Chronic neck pain were pertinent to this visit.     The patient overall has stable pain at baseline.  Current pain is 2 out of 10 intensity in the neck, periscapular region and in the mid thoracic spine.  He denies acute onset injury.  Denies falls or trauma.  He does get pain which is up to 6 out of 10 intensity with high levels of activity but the patient is remaining active and doing daily exercises.  He also fishes and has multiple hobbies.  He denies changes in ADLs.  He is able to do all of his ADLs on his own.  He denies radiating pain.  Denies numbness tingling or weakness.            ROS Red Flags :   Fever, Chills, Sweats: Denies  Involuntary Weight Loss: Denies  Bowel/Bladder Incontinence: Denies  Saddle Anesthesia: Denies        PMHx:   Past Medical History:   Diagnosis Date    Benign neoplasm of soft palate 2018    Bruit 2016    Carotid US 2016 Mild stenosis of the right internal carotid (< 50%).   Moderate stenosis of the left internal carotid (50-69%).   Flow within both subclavian arteries appears to be within normal limits.     Antegrade flow, bilateral vertebral arteries.      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 states checks his blood sugar daily. Not currently taking medication for.    H/O colonoscopy with polypectomy 5/7/2013    [facility] maintenance 4/5/2018    Hepatitis C     no treatment    History of alcoholism (HCC) 8/22/2012    Kashia (hard of hearing) 07/26/2019    Hyperlipidemia     Hypertension     Insulin dependent diabetes mellitus 07/26/2019    Pt. states used to take Insulin 2 years ago.    Internal nasal lesion 7/22/2021    S/P excision of lipoma 9/18/2019       PSHx:   Past Surgical History:   Procedure Laterality Date    MASS EXCISION GENERAL Right 7/31/2019    Procedure: EXCISION, MASS, GENERAL - 15 CM LIPOMA OF SHOULDER;  Surgeon: Augustin Rosales M.D.;  Location: SURGERY [facility];  Service: General    COLONOSCOPY  2017    WIDE EXCISION  5/6/2014    [facility]    FLAP GRAFT  5/6/2014    Performed by Nicholas Govea M.D. at [facility]GERY [facility]    RECOVERY  10/22/2012    Performed by Pily Lee M.D. at SURGERY [facility]       Family history   Family History   Problem Relation Age of Onset    Heart Disease Father     Arthritis Mother     No Known Problems Sister     Cancer Brother         type unknown    Cancer Brother         skin     No Known Problems Maternal Grandmother     No Known Problems Maternal Grandfather     No Known Problems Paternal Grandmother     No Known Problems Paternal Grandfather     Diabetes Neg Hx     Hypertension Neg Hx     Stroke Neg Hx     Hyperlipidemia Neg Hx          Medications:   Outpatient Medications Marked as Taking for the 12/7/23 encounter (Office Visit) with Ehsan Monique M.D.   Medication Sig Dispense Refill    ibuprofen (MOTRIN) 800 MG Tab TAKE ONE TABLET BY MOUTH EVERY 8 HOURS AS NEEDED 90 Tablet 0    sennosides-docusate sodium (SENOKOT-S) 8.6-50 MG tablet Take 1 Tablet by mouth every day. 90 Tablet 1    glucose blood (CONTOUR NEXT TEST) strip  USE TO TEST BLOOD SUGAR ONCE DAILY EARLY MORNING BEFORE FIRST MEAL OF DAY 50 Strip 6    atorvastatin (LIPITOR) 80 MG tablet Take 1 Tablet by mouth every day. 100 Tablet 3    clopidogrel (PLAVIX) 75 MG Tab Take 1 Tablet by mouth every day. 100 Tablet 3    Dulaglutide (TRULICITY) 1.5 MG/0.5ML Solution Pen-injector Inject 0.5 mL under the skin every 7 days. INJECT 1 PEN UNDER SKIN EVERY 7 DAYS 6 mL 3    lisinopril (PRINIVIL) 20 MG Tab Take 1 Tablet by mouth every day. 100 Tablet 3    metoprolol SR (TOPROL XL) 25 MG TABLET SR 24 HR Take 1 Tablet by mouth every day. 100 Tablet 3    dapagliflozin propanediol (FARXIGA) 5 MG Tab Take 1 Tablet by mouth every day. 100 Tablet 3    VITAMIN D PO Take  by mouth.      Multiple Vitamins-Minerals (CENTRUM SILVER PO) Take  by mouth.      Blood Glucose Meter Kit Test blood sugar as recommended by provider.  Per insurance preference, blood glucose monitoring kit. 1 Kit 0    Blood Glucose Test Strips Use one per insurance preference, strip to test blood sugar once daily early morning before first meal. 100 Strip 6    Lancets Use one per insurance preference, lancet to test blood sugar once daily early morning before first meal. 100 Each 3    Alcohol Swabs Wipe site with prep pad prior to injection. 100 Each 3    B Complex Vitamins (B COMPLEX PO) Take  by mouth.      aspirin (ASA) 81 MG Chew Tab chewable tablet Take 81 mg by mouth every day.          Current Outpatient Medications on File Prior to Visit   Medication Sig Dispense Refill    ibuprofen (MOTRIN) 800 MG Tab TAKE ONE TABLET BY MOUTH EVERY 8 HOURS AS NEEDED 90 Tablet 0    sennosides-docusate sodium (SENOKOT-S) 8.6-50 MG tablet Take 1 Tablet by mouth every day. 90 Tablet 1    glucose blood (CONTOUR NEXT TEST) strip USE TO TEST BLOOD SUGAR ONCE DAILY EARLY MORNING BEFORE FIRST MEAL OF DAY 50 Strip 6    atorvastatin (LIPITOR) 80 MG tablet Take 1 Tablet by mouth every day. 100 Tablet 3    clopidogrel (PLAVIX) 75 MG Tab Take 1 Tablet  by mouth every day. 100 Tablet 3    Dulaglutide (TRULICITY) 1.5 MG/0.5ML Solution Pen-injector Inject 0.5 mL under the skin every 7 days. INJECT 1 PEN UNDER SKIN EVERY 7 DAYS 6 mL 3    lisinopril (PRINIVIL) 20 MG Tab Take 1 Tablet by mouth every day. 100 Tablet 3    metoprolol SR (TOPROL XL) 25 MG TABLET SR 24 HR Take 1 Tablet by mouth every day. 100 Tablet 3    dapagliflozin propanediol (FARXIGA) 5 MG Tab Take 1 Tablet by mouth every day. 100 Tablet 3    VITAMIN D PO Take  by mouth.      Multiple Vitamins-Minerals (CENTRUM SILVER PO) Take  by mouth.      Blood Glucose Meter Kit Test blood sugar as recommended by provider.  Per insurance preference, blood glucose monitoring kit. 1 Kit 0    Blood Glucose Test Strips Use one per insurance preference, strip to test blood sugar once daily early morning before first meal. 100 Strip 6    Lancets Use one per insurance preference, lancet to test blood sugar once daily early morning before first meal. 100 Each 3    Alcohol Swabs Wipe site with prep pad prior to injection. 100 Each 3    B Complex Vitamins (B COMPLEX PO) Take  by mouth.      aspirin (ASA) 81 MG Chew Tab chewable tablet Take 81 mg by mouth every day.      methocarbamol (ROBAXIN) 500 MG Tab Take 1 Tablet by mouth 3 times a day. (Patient not taking: Reported on 12/7/2023) 30 Tablet 1    azelastine (OPTIVAR) 0.05 % ophthalmic solution Administer 1 Drop into the left eye 2 times a day. (Patient not taking: Reported on 12/7/2023) 6 mL 0    pentoxifylline CR (TRENTAL) 400 MG CR tablet Take 1 Tablet by mouth 2 times a day. (Patient not taking: Reported on 12/7/2023) 60 Tablet 11     No current facility-administered medications on file prior to visit.         Allergies:   No Known Allergies    Social Hx:   Social History     Socioeconomic History    Marital status: Single     Spouse name: Not on file    Number of children: Not on file    Years of education: Not on file    Highest education level: Not on file  "  Occupational History    Not on file   Tobacco Use    Smoking status: Former     Current packs/day: 0.00     Average packs/day: 1 pack/day for 58.0 years (58.0 ttl pk-yrs)     Types: Cigarettes     Start date: 1960     Quit date: 2011     Years since quittin.2    Smokeless tobacco: Never    Tobacco comments:     avoid all tobacco products   Vaping Use    Vaping Use: Never used   Substance and Sexual Activity    Alcohol use: No     Alcohol/week: 0.0 oz     Comment: quit , drank 2 times since    Drug use: Yes     Types: Inhaled, Marijuana    Sexual activity: Never   Other Topics Concern    Not on file   Social History Narrative    Not on file     Social Determinants of Health     Financial Resource Strain: Not on file   Food Insecurity: No Food Insecurity (10/26/2020)    Hunger Vital Sign     Worried About Running Out of Food in the Last Year: Never true     Ran Out of Food in the Last Year: Never true   Transportation Needs: No Transportation Needs (10/26/2020)    PRAPARE - Transportation     Lack of Transportation (Medical): No     Lack of Transportation (Non-Medical): No   Physical Activity: Not on file   Stress: Not on file   Social Connections: Not on file   Intimate Partner Violence: Not on file   Housing Stability: Not on file         EXAMINATION     Physical Exam:   Vitals: /72 (BP Location: Right arm, Patient Position: Sitting, BP Cuff Size: Adult)   Pulse 79   Temp 36.4 °C (97.6 °F) (Temporal)   Ht 1.753 m (5' 9\")   Wt 76.7 kg (169 lb)   SpO2 97%     Constitutional:   Body Habitus: Body mass index is 24.96 kg/m².  Cooperation: Fully cooperates with exam  Appearance: Well-groomed no disheveled    Respiratory-  breathing comfortable on room air, no audible wheezing  Cardiovascular- No lower extremity edema is noted.   Psychiatric- alert and oriented ×3. Normal affect.    MSK and Neuro: -  Cervical spine      decreased active range of motion with flexion, lateral flexion, and " rotation bilaterally.   There is decreased active range of motion with cervical extension.      Palpation:   Tenderness to palpation throughout the cervical spine: negative bilaterally        Cervical spine Special tests  Neuro tension  Spurling's maneuver negative bilaterally    Cervical facet loading maneuver  negative bilaterally        Key points for the international standards for neurological classification of spinal cord injury (ISNCSCI) to light touch.     Dermatome R L   C4 2 2   C5 2 2   C6 2 2   C7 2 2   C8 2 2   T1 2 2   T2 2 2                                         Motor Exam Upper Extremities   ? Myotome R L   Shoulder flexion C5 5 5   Elbow flexion C5 5 5   Wrist extension C6 5 5   Elbow extension C7 5 5   Finger flexion C8 5 5   Finger abduction T1 5 5       Thoracic/Lumbar Spine/Sacral Spine/Hips   There are no signs of infection around the injection sites.   decreased active range of motion with flexion, lateral flexion, and rotation bilaterally.   There is decreased active range of motion with lumbar extension.    There is no  pain with lumbar extension.   There is no pain with facet loading maneuver (extension rotation) with axial low back pain on the BILATERAL side(s)       Palpation:   No tenderness to palpation in midline at T1-T12 levels. No tenderness to palpation in the left and right of the midline T1-L5, NEGATIVE for tenderness to palpation to the para-midline region in the lower lumbar levels.  palpation over SI joint: negative bilaterally    palpation in hip or over the gluteus medius tendon insertion: negative bilaterally      Lumbar spine Special tests  Neuro tension  Straight leg test negative bilaterally    Slump test negative bilaterally      Key points for the international standards for neurological classification of spinal cord injury (ISNCSCI) to light touch.     Dermatome R L                                      L2 2 2   L3 2 2   L4 2 2   L5 2 2   S1 2 2   S2 2 2          Motor Exam Lower Extremities    ? Myotome R L   Hip flexion L2 5 5   Knee extension L3 5 5   Ankle dorsiflexion L4 5 5   Toe extension L5 5 5   Ankle plantarflexion S1 5 5           MEDICAL DECISION MAKING    DATA    Labs:   Lab Results   Component Value Date/Time    SODIUM 142 12/29/2022 06:21 AM    POTASSIUM 4.1 12/29/2022 06:21 AM    CHLORIDE 107 12/29/2022 06:21 AM    CO2 23 12/29/2022 06:21 AM    GLUCOSE 135 (H) 12/29/2022 06:21 AM    BUN 22 12/29/2022 06:21 AM    CREATININE 0.96 12/29/2022 06:21 AM        Lab Results   Component Value Date/Time    PROTHROMBTM 13.4 06/09/2017 02:07 PM    INR 0.99 06/09/2017 02:07 PM        Lab Results   Component Value Date/Time    WBC 4.1 (L) 12/29/2022 06:21 AM    RBC 4.79 12/29/2022 06:21 AM    HEMOGLOBIN 14.5 12/29/2022 06:21 AM    HEMATOCRIT 42.4 12/29/2022 06:21 AM    MCV 88.5 12/29/2022 06:21 AM    MCH 30.3 12/29/2022 06:21 AM    MCHC 34.2 12/29/2022 06:21 AM    MPV 12.1 12/29/2022 06:21 AM    NEUTSPOLYS 34.90 (L) 12/29/2022 06:21 AM    LYMPHOCYTES 52.10 (H) 12/29/2022 06:21 AM    MONOCYTES 6.40 12/29/2022 06:21 AM    EOSINOPHILS 5.70 12/29/2022 06:21 AM    BASOPHILS 0.70 12/29/2022 06:21 AM    HYPOCHROMIA 1+ 01/09/2013 08:50 AM        Lab Results   Component Value Date/Time    HBA1C 5.8 (A) 01/23/2023 02:15 PM          Imaging:   I personally reviewed following images      MRI cervical spine 12/9/2022  Severe degenerative disc disease at C5-6.  Moderate central canal stenosis at C4-5 and C5-6 in the central canal.  Severe neuroforaminal stenosis at C5-6 bilaterally.      I reviewed the following radiology reports                     Results for orders placed during the hospital encounter of 12/09/22    MR-CERVICAL SPINE-W/O    Impression  Degenerative changes in cervical spine as described above. There is moderate canal narrowing at C4-5 and C5-6 with slight cord deformity.    There is severe left and moderate right foraminal narrowing at C4-5.    At C5-6, there  is bilateral severe neural foraminal narrowing.    Incidental note made of a lipoma in the right submandibular region, also stable in appearance from previous study.    Overall, the findings are stable compared to the previous examination.                                                                                 Results for orders placed during the hospital encounter of 01/15/15    CT-ABDOMEN-PELVIS W/ IV Contrast (R/O acute, uncomplicated appendicitis or diverticulitis, bowel ischemia) FOR HIGH BMI PATIENTS    Impression  1.  No acute intra-abdominal findings.    2.  Hepatic steatosis.    3.  Diverticulosis.                    Results for orders placed in visit on 03/06/17    DX-CERVICAL SPINE-2 OR 3 VIEWS    Impression  Disc space narrowing and uncovertebral joint joint arthropathy.    Carotid artery atherosclerotic disease.     Results for orders placed during the hospital encounter of 12/02/20    DX-CHEST-2 VIEWS    Impression  No evidence of acute cardiopulmonary disease                      Results for orders placed in visit on 01/04/21    DX-LUMBAR SPINE-2 OR 3 VIEWS    Impression  Multilevel multifactorial degenerative changes             Results for orders placed in visit on 09/18/19    DX-SHOULDER 2+ RIGHT    Impression  1.  There is marked degenerative change in the right glenohumeral joint.  2.  There are probably intra-articular bodies as well as changes of subcoracoid calcific bursitis.    Results for orders placed in visit on 01/04/21    DX-THORACIC SPINE-2 VIEWS    Impression  Mild to moderate degenerative changes of the thoracic spine            DIAGNOSIS   Visit Diagnoses     ICD-10-CM   1. Cervical radiculopathy  M54.12   2. Cervical spinal stenosis  M48.02   3. Degenerative disc disease, cervical  M50.30   4. Cervical spondylosis  M47.812   5. Chronic bilateral thoracic back pain  M54.6    G89.29   6. Chronic bilateral low back pain without sciatica  M54.50    G89.29   7. Chronic neck pain   M54.2    G89.29         ASSESSMENT and PLAN:     Chung Limon  1950 male      Chung was seen today for follow-up.    Diagnoses and all orders for this visit:    Cervical radiculopathy  -     Referral to Chiropractic  -     Referral to Physical Therapy    Cervical spinal stenosis  -     Referral to Chiropractic  -     Referral to Physical Therapy    Degenerative disc disease, cervical  -     Referral to Chiropractic  -     Referral to Physical Therapy    Cervical spondylosis  -     Referral to Chiropractic  -     Referral to Physical Therapy    Chronic bilateral thoracic back pain  -     Referral to Chiropractic  -     Referral to Physical Therapy    Chronic bilateral low back pain without sciatica  -     Referral to Chiropractic  -     Referral to Physical Therapy    Chronic neck pain  -     Referral to Chiropractic  -     Referral to Physical Therapy      The patient has no red flag signs on today's exam.  Specifically the patient has no saddle anesthesia, bowel incontinence, bladder incontinence.  We discussed emergency precautions. The patient understands emergency precautions.     The patient is neurologically intact.  I believe most of his pain is muscular in nature.  He has no signs of clinical facet arthropathy.    Medications: Continue gabapentin the patient has had improvement with this    We discussed other conservative options for the patient with additions to his home exercise program, physical therapy and chiropractic care with Dr. Nasim Ramos and the patient is amenable to these.  I placed these referrals.    We discussed trigger point injections but decided against this given that pain is typically mild.    Follow up: As needed with me    Thank you for allowing me to participate in the care of this patient. If you have any questions please not hesitate to contact me.             Please note that this dictation was created using voice recognition software. I have made every reasonable  attempt to correct obvious errors but there may be errors of grammar and content that I may have overlooked prior to finalization of this note.      Ehsan Monique MD  Interventional Spine and Sports Physiatry  Physical Medicine and Rehabilitation  [facility]

## 2023-12-28 ENCOUNTER — TELEPHONE (OUTPATIENT)
Dept: FAMILY PLANNING/WOMEN'S HEALTH CLINIC | Facility: PHYSICIAN GROUP | Age: 73
End: 2023-12-28
Payer: MEDICARE

## 2023-12-28 NOTE — TELEPHONE ENCOUNTER
Message: Called and left message for patient to schedule annual [facility] Visit with [facility]. Left phone number for patient to call [facility] at [phone] to schedule.

## 2024-01-29 NOTE — OP THERAPY EVALUATION
Outpatient Physical Therapy  INITIAL EVALUATION    [facility] [address]  [address]  [place] [address]  Phone:  [phone]  Fax:  [phone]    Date of Evaluation: 01/30/2024    Patient: Chung Limon  YOB: 1950  MRN: [id number]     Referring Provider: Ehsan Monique M.D.  [address]  [address]  [place] [address]   Referring Diagnosis Cervical radiculopathy [M54.12];Cervical spinal stenosis [M48.02];Degenerative disc disease, cervical [M50.30];Cervical spondylosis [M47.812];Chronic bilateral thoracic back pain [M54.6, G89.29];Chronic bilateral low back pain without sciatica [M54.50, G89.29];Chronic neck pain [M54.2, G89.29]     Time Calculation  Start time: 0930  Stop time: 1000 Time Calculation (min): 30 minutes         Chief Complaint: Back Problem and Neck Problem    Visit Diagnoses     ICD-10-CM   1. Cervical radiculopathy  M54.12   2. Cervical spinal stenosis  M48.02   3. Degenerative disc disease, cervical  M50.30   4. Cervical spondylosis  M47.812   5. Chronic bilateral thoracic back pain  M54.6    G89.29   6. Chronic bilateral low back pain without sciatica  M54.50    G89.29   7. Chronic neck pain  M54.2    G89.29       Date of onset of impairment: 1/30/2024    Subjective:   History of Present Illness:     Mechanism of injury:  He states he has had back pain for a few years. He is fine in the morning, he takes a hot shower, but around 9-10am the middle of his back seizes up and tightens. If he lays on a hot pad for 20 min that improves. He has a clogged aorta which means he can't walk longer than 100 ft. He stays busy throughout the day. Denies n/t. The pn in his back is in the middle of his thoracic spine from his shoulder backs down to lower t/s. It's achy. Sleeping is fine, no pn at night.       Aggs:   9-10am back seizes up  Reaching out for a period of time- wash dishes, work on car.   Wash dishes for 10 min pn inc's, sits and  "relaxes for a bit after  Work on car bent over riley    Eases:   Lay on back, heat    HPI 23  \"Chung Limon ,  1950,   With Diagnoses of Cervical radiculopathy, Cervical spinal stenosis, Degenerative disc disease, cervical, Cervical spondylosis, Chronic bilateral thoracic back pain, Chronic bilateral low back pain without sciatica, and Chronic neck pain were pertinent to this visit.      The patient overall has stable pain at baseline.  Current pain is 2 out of 10 intensity in the neck, periscapular region and in the mid thoracic spine.  He denies acute onset injury.  Denies falls or trauma.  He does get pain which is up to 6 out of 10 intensity with high levels of activity but the patient is remaining active and doing daily exercises.  He also fishes and has multiple hobbies.  He denies changes in ADLs.  He is able to do all of his ADLs on his own.  He denies radiating pain.  Denies numbness tingling or weakness.\"  Pain:     Current pain ratin    At best pain ratin    At worst pain ratin  Patient Goals:     Other patient goals:  Less pn      Past Medical History:   Diagnosis Date    Benign neoplasm of soft palate 2018    Bruit 2016    Carotid US 2016 Mild stenosis of the right internal carotid (< 50%).   Moderate stenosis of the left internal carotid (50-69%).   Flow within both subclavian arteries appears to be within normal limits.     Antegrade flow, bilateral vertebral arteries.      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 states checks his blood sugar daily. Not currently taking medication for.    H/O colonoscopy with polypectomy 2013    [facility] 2018    Hepatitis C     no treatment    History of " alcoholism (HCC) 2012    [place] (hard of hearing) 2019    Hyperlipidemia     Hypertension     Insulin dependent diabetes mellitus 2019    Pt. states used to take Insulin 2 years ago.    Internal nasal lesion 2021    S/P excision of lipoma 2019     Past Surgical History:   Procedure Laterality Date    MASS EXCISION GENERAL Right 2019    Procedure: EXCISION, MASS, GENERAL - 15 CM LIPOMA OF SHOULDER;  Surgeon: Augustin Rosales M.D.;  Location: SURGERY [facility];  Service: General    COLONOSCOPY  2017    WIDE EXCISION  2014    Performed by Nicholas Govea M.D. at SURGERY SAME DAY [facility]    FLAP GRAFT  2014    Performed by Nicholas Govea M.D. at SURGERY SAME DAY [facility]    RECOVERY  10/22/2012    Performed by Pily Lee M.D. at SURGERY [facility]     Social History     Tobacco Use    Smoking status: Former     Current packs/day: 0.00     Average packs/day: 1 pack/day for 58.0 years (58.0 ttl pk-yrs)     Types: Cigarettes     Start date: 1960     Quit date: 2011     Years since quittin.4    Smokeless tobacco: Never    Tobacco comments:     avoid all tobacco products   Substance Use Topics    Alcohol use: No     Alcohol/week: 0.0 oz     Comment: quit , drank 2 times since     Family and Occupational History     Socioeconomic History    Marital status: Single     Spouse name: Not on file    Number of children: Not on file    Years of education: Not on file    Highest education level: Not on file   Occupational History    Not on file       Objective     General Comments     Spine Comments   L/s AROM:  All WFL, reduced back pn w/ mvnt     T/s AROM:  Flex: WNL, cramp in rib  Ext: WNL  LR: 50% motions, no change in pn   RR:WNL, cramp in R rib    PA's: global t/s hypomobility but reported relief in pn w/ PA's    Lat dorsi: 3+ bilat  Mid trap: 3- bialt           Therapeutic Exercises (CPT 83138):     2. t/s rot AROM, x10 every hour    3. t/s  flex/ext AROM, x10 every hour    19. Certification Period: 01/30/2024 to  04/23/24      Therapeutic Exercise Summary: Pt denied HEP HO      Time-based treatments/modalities:    Physical Therapy Timed Treatment Charges  Therapeutic exercise minutes (CPT 49072): 8 minutes      Assessment, Response and Plan:   Impairments: abnormal ADL function, abnormal muscle firing, abnormal or restricted ROM, activity intolerance, impaired functional mobility, impaired physical strength, lacks appropriate home exercise program, limited ADL's, limited mobility and pain with function    Assessment details:  Mr. Limon is a 73 y/o male who presents in PT w/ s/s consistent w/ possible t/s mobility and postural coordination deficits. He presents w/ deficits in dec'd t/s AROM w/ pn into R rib, global thoracic hypomobility w/ central Pas, inc'd mm tension in thoracic paraspinals, sig postural weakness, and pain that are preventing him from doing dishes, working on his car, and standing w/o inc'd pn. Pt will cont to benefit from PT at this time in order to address his functional limitations and help him reach his functional goals.       Barriers to therapy:  Age and comorbidities  Prognosis: good    Goals:   Short Term Goals:   Pt will demo good compliance to HEP  Pt will demo pain free and WNL t/s AROM globally  Pt will be able to wash dishes as needed w/o inc'd pn >2/10  Short term goal time span:  4-6 weeks      Long Term Goals:    Pt will demo postural MMT of 4/5 or better globally  Pt will have no limitations working on car for 30 min bouts and managing pn w/ HEP  Pt will have no limitations with reach d/t pn  Pt will improve ARTEMIO to <15  Long term goal time span:  1-2 months    Plan:   Therapy options:  Physical therapy treatment to continue  Planned therapy interventions:  Neuromuscular Re-education (CPT 19471), Mechanical Traction (CPT 91101), Manual Therapy (CPT 71612), Gait Training (CPT 07461), E Stim Unattended (CPT 97566),  Therapeutic Activities (CPT 24038), Therapeutic Exercise (CPT 78200) and Hot or Cold Pack Therapy (CPT 18888)  Frequency:  1x week  Duration in weeks:  12  Duration in visits:  12  Discussed with:  Patient  Plan details:  Goes by Tuan  T/s mobs, postural strength progression and t/s extensor strengthening        Functional Assessment Used  Oswestry Low Back Pain Disability Total Score: 28     Referring provider co-signature:  I have reviewed this plan of care and my co-signature certifies the need for services.    Certification Period: 01/30/2024 to  04/23/24    Physician Signature: ________________________________ Date: ______________

## 2024-01-30 ENCOUNTER — PHYSICAL THERAPY (OUTPATIENT)
Dept: PHYSICAL THERAPY | Facility: REHABILITATION | Age: 74
End: 2024-01-30
Attending: PHYSICAL MEDICINE & REHABILITATION
Payer: MEDICARE

## 2024-01-30 DIAGNOSIS — M54.6 CHRONIC BILATERAL THORACIC BACK PAIN: ICD-10-CM

## 2024-01-30 DIAGNOSIS — M50.30 DEGENERATIVE DISC DISEASE, CERVICAL: ICD-10-CM

## 2024-01-30 DIAGNOSIS — G89.29 CHRONIC BILATERAL LOW BACK PAIN WITHOUT SCIATICA: ICD-10-CM

## 2024-01-30 DIAGNOSIS — M54.50 CHRONIC BILATERAL LOW BACK PAIN WITHOUT SCIATICA: ICD-10-CM

## 2024-01-30 DIAGNOSIS — M54.2 CHRONIC NECK PAIN: ICD-10-CM

## 2024-01-30 DIAGNOSIS — M54.12 CERVICAL RADICULOPATHY: ICD-10-CM

## 2024-01-30 DIAGNOSIS — G89.29 CHRONIC NECK PAIN: ICD-10-CM

## 2024-01-30 DIAGNOSIS — M48.02 CERVICAL SPINAL STENOSIS: ICD-10-CM

## 2024-01-30 DIAGNOSIS — M47.812 CERVICAL SPONDYLOSIS: ICD-10-CM

## 2024-01-30 DIAGNOSIS — G89.29 CHRONIC BILATERAL THORACIC BACK PAIN: ICD-10-CM

## 2024-01-30 PROCEDURE — 97110 THERAPEUTIC EXERCISES: CPT

## 2024-01-30 PROCEDURE — 97162 PT EVAL MOD COMPLEX 30 MIN: CPT

## 2024-01-30 ASSESSMENT — ENCOUNTER SYMPTOMS
PAIN SCALE AT LOWEST: 9
PAIN SCALE AT HIGHEST: 1
PAIN SCALE: 2

## 2024-02-01 NOTE — OP THERAPY DAILY TREATMENT
Outpatient Physical Therapy  DAILY TREATMENT     [facility] [address].  [address]  [place] [address]  Phone:  [phone]  Fax:  [phone]    Date: 02/05/2024    Patient: Chung Limon  YOB: 1950  MRN: [id number]     Time Calculation                   Chief Complaint: No chief complaint on file.    Visit #: 2    SUBJECTIVE:  ***    Aggs:   9-10am back seizes up  Reaching out for a period of time- wash dishes, work on car.   Wash dishes for 10 min pn inc's, sits and relaxes for a bit after  Work on car bent over riley     Eases:   Lay on back, heat    OBJECTIVE:  L/s AROM:  All WFL, reduced back pn w/ mvnt      T/s AROM:  Flex: WNL, cramp in rib  Ext: WNL  LR: 50% motions, no change in pn   RR:WNL, cramp in R rib     PA's: global t/s hypomobility but reported relief in pn w/ PA's     Lat dorsi: 3+ bilat  Mid trap: 3- bialt           Therapeutic Exercises (CPT 20864):     2. t/s rot AROM, x10 every hour    3. t/s flex/ext AROM, x10 every hour    19. Certification Period: 01/30/2024 to  04/23/24      Therapeutic Exercise Summary: Pt denied HEP HO      Time-based treatments/modalities:         ASSESSMENT:   Response to treatment: ***    Goals:   Short Term Goals:   Pt will demo good compliance to HEP  Pt will demo pain free and WNL t/s AROM globally  Pt will be able to wash dishes as needed w/o inc'd pn >2/10  Short term goal time span:  4-6 weeks      Long Term Goals:    Pt will demo postural MMT of 4/5 or better globally  Pt will have no limitations working on car for 30 min bouts and managing pn w/ HEP  Pt will have no limitations with reach d/t pn  Pt will improve ARTEMIO to <15  Long term goal time span:  1-2 months    PLAN/RECOMMENDATIONS:   Goes by Tuan  T/s mobs, postural strength progression and t/s extensor strengthening

## 2024-02-05 ENCOUNTER — APPOINTMENT (OUTPATIENT)
Dept: PHYSICAL THERAPY | Facility: REHABILITATION | Age: 74
End: 2024-02-05
Attending: PHYSICAL MEDICINE & REHABILITATION
Payer: MEDICARE

## 2024-02-06 DIAGNOSIS — M54.2 CERVICAL PAIN: ICD-10-CM

## 2024-02-09 RX ORDER — IBUPROFEN 800 MG/1
TABLET ORAL
Qty: 90 TABLET | Refills: 0 | Status: SHIPPED | OUTPATIENT
Start: 2024-02-09

## 2024-02-09 NOTE — TELEPHONE ENCOUNTER
Received request via: Patient    Was the patient seen in the last year in this department? Yes    Does the patient have an active prescription (recently filled or refills available) for medication(s) requested? No    Pharmacy Name: [facility]    Does the patient have residential Plus and need 100 day supply (blood pressure, diabetes and cholesterol meds only)? Patient does not have SCP

## 2024-02-15 NOTE — OP THERAPY DAILY TREATMENT
"  Outpatient Physical Therapy  DAILY TREATMENT     [facility] Physical Therapy [address]  [address]  [place] [address]  Phone:  [phone]  Fax:  [phone]    Date: 02/16/2024    Patient: Chung Limon  YOB: 1950  MRN: [id number]     Time Calculation    Start time: 0800  Stop time: 0840 Time Calculation (min): 40 minutes         Chief Complaint: No chief complaint on file.    Visit #: 2    SUBJECTIVE:  Pt states that he was pretty consistent w/ his exercises. He feels less tension. The pn has gone from a 7/10 to a 2-3/10.     Aggs:   9-10am back seizes up  Reaching out for a period of time- wash dishes, work on car.   Wash dishes for 10 min pn inc's, sits and relaxes for a bit after  Work on car bent over riley     Eases:   Lay on back, heat    OBJECTIVE:  L/s AROM:  All WFL, reduced back pn w/ mvnt      T/s AROM:  Flex: WNL, cramp in rib  Ext: WNL  LR: 50% motions, no change in pn   RR:WNL, cramp in R rib     PA's: global t/s hypomobility but reported relief in pn w/ PA's     Lat dorsi: 3+ bilat  Mid trap: 3- bialt           Therapeutic Exercises (CPT 30331):     2. t/s rot AROM, 10x10\"    3. t/s flex/ext AROM, 10x10\"    4. open book stretch, 10x10\"    5. LTR, 5'    6. row, GTB 10x10\"    7. pull back, x10 GTB    19. Certification Period: 01/30/2024 to  04/23/24      Therapeutic Exercise Summary: Access Code: [email or web address]  URL: [email or web address]  Date: 02/16/2024  Prepared by: Eun Bruce    Exercises  - Seated Thoracic Flexion and Rotation with Arms Crossed  - 5 x daily - 7 x weekly - 10 reps  - Seated Thoracic Lumbar Extension  - 5 x daily - 7 x weekly - 10 reps  - Sidelying Thoracic Rotation with Open Book  - 1 x daily - 7 x weekly - 5 reps - 10 seconds hold  - Standing Row with Anchored Resistance  - 1 x daily - 4 x weekly - 10 reps - 10 seconds hold  - Shoulder extension with resistance - Neutral  - 1 x daily - 4 x weekly - 2 sets - 10 reps      Time-based " treatments/modalities:    Physical Therapy Timed Treatment Charges  Therapeutic exercise minutes (CPT 57857): 40 minutes    ASSESSMENT:   Pt had sig reduction in pn btwn sessions. He had improved t/s AROM noted in all directions w/ no pn. Pt tolerated strength progression well and was educated to mod in sitting if needed d/t leg pn. Pt will cont to benefit from PT at this time.     Goals:   Short Term Goals:   Pt will demo good compliance to HEP  Pt will demo pain free and WNL t/s AROM globally  Pt will be able to wash dishes as needed w/o inc'd pn >2/10  Short term goal time span:  4-6 weeks      Long Term Goals:    Pt will demo postural MMT of 4/5 or better globally  Pt will have no limitations working on car for 30 min bouts and managing pn w/ HEP  Pt will have no limitations with reach d/t pn  Pt will improve ARTEMIO to <15  Long term goal time span:  1-2 months    PLAN/RECOMMENDATIONS:   Goes by Tuan  T/s mobs, postural strength progression and t/s extensor strengthening

## 2024-02-16 ENCOUNTER — PHYSICAL THERAPY (OUTPATIENT)
Dept: PHYSICAL THERAPY | Facility: REHABILITATION | Age: 74
End: 2024-02-16
Attending: PHYSICAL MEDICINE & REHABILITATION
Payer: MEDICARE

## 2024-02-16 DIAGNOSIS — M54.2 CHRONIC NECK PAIN: ICD-10-CM

## 2024-02-16 DIAGNOSIS — M50.30 DEGENERATIVE DISC DISEASE, CERVICAL: ICD-10-CM

## 2024-02-16 DIAGNOSIS — G89.29 CHRONIC BILATERAL THORACIC BACK PAIN: ICD-10-CM

## 2024-02-16 DIAGNOSIS — G89.29 CHRONIC BILATERAL LOW BACK PAIN WITHOUT SCIATICA: ICD-10-CM

## 2024-02-16 DIAGNOSIS — G89.29 CHRONIC NECK PAIN: ICD-10-CM

## 2024-02-16 DIAGNOSIS — M48.02 CERVICAL SPINAL STENOSIS: ICD-10-CM

## 2024-02-16 DIAGNOSIS — M54.50 CHRONIC BILATERAL LOW BACK PAIN WITHOUT SCIATICA: ICD-10-CM

## 2024-02-16 DIAGNOSIS — M47.812 CERVICAL SPONDYLOSIS: ICD-10-CM

## 2024-02-16 DIAGNOSIS — M54.12 CERVICAL RADICULOPATHY: ICD-10-CM

## 2024-02-16 DIAGNOSIS — M54.6 CHRONIC BILATERAL THORACIC BACK PAIN: ICD-10-CM

## 2024-02-16 PROCEDURE — 97110 THERAPEUTIC EXERCISES: CPT

## 2024-02-28 ENCOUNTER — APPOINTMENT (OUTPATIENT)
Dept: PHYSICAL THERAPY | Facility: REHABILITATION | Age: 74
End: 2024-02-28
Attending: PHYSICAL MEDICINE & REHABILITATION
Payer: MEDICARE

## 2024-03-06 ENCOUNTER — TELEPHONE (OUTPATIENT)
Dept: PHYSICAL THERAPY | Facility: REHABILITATION | Age: 74
End: 2024-03-06
Payer: MEDICARE

## 2024-03-06 NOTE — OP THERAPY DISCHARGE SUMMARY
Outpatient Physical Therapy  DISCHARGE SUMMARY NOTE      [address]  [address]  [place] [address]  Phone:  [phone]  Fax:  [phone]    Date of Visit: 03/06/2024    Patient: Chung Limon  YOB: 1950  MRN: [id number]     Referring Provider: Ehsan Monique M.D.    Referring Diagnosis Cervical radiculopathy [M54.12];Cervical spinal stenosis [M48.02];Degenerative disc disease, cervical [M50.30];Cervical spondylosis [M47.812];Chronic bilateral thoracic back pain [M54.6, G89.29];Chronic bilateral low back pain without sciatica [M54.50, G89.29];Chronic neck pain [M54.2, G89.29]        Your patient is being discharged from Physical Therapy with the following comments:   Patient cancelled or missed more than 2 scheduled appointments/non-compliant    Comments:  Pt is self discharged at this time d/t our late cancel or no show policy. They were advised in previous sessions to cont compliance to HEP and contact PT as needed w/ any questions or concerns. Pt may return to PT in future as needed w/ new referral and check in w/referring provider.     Eun Bruce, PT    Date: 3/6/2024

## 2024-03-11 ENCOUNTER — APPOINTMENT (OUTPATIENT)
Dept: PHYSICAL THERAPY | Facility: REHABILITATION | Age: 74
End: 2024-03-11
Attending: PHYSICAL MEDICINE & REHABILITATION
Payer: MEDICARE

## 2024-03-13 ENCOUNTER — APPOINTMENT (OUTPATIENT)
Dept: PHYSICAL THERAPY | Facility: REHABILITATION | Age: 74
End: 2024-03-13
Attending: PHYSICAL MEDICINE & REHABILITATION
Payer: MEDICARE

## 2024-03-18 ENCOUNTER — APPOINTMENT (OUTPATIENT)
Dept: PHYSICAL THERAPY | Facility: REHABILITATION | Age: 74
End: 2024-03-18
Attending: PHYSICAL MEDICINE & REHABILITATION
Payer: MEDICARE

## 2024-03-20 ENCOUNTER — APPOINTMENT (OUTPATIENT)
Dept: PHYSICAL THERAPY | Facility: REHABILITATION | Age: 74
End: 2024-03-20
Attending: PHYSICAL MEDICINE & REHABILITATION
Payer: MEDICARE

## 2024-03-22 ENCOUNTER — PATIENT MESSAGE (OUTPATIENT)
Dept: MEDICAL GROUP | Facility: PHYSICIAN GROUP | Age: 74
End: 2024-03-22
Payer: MEDICARE

## 2024-03-22 ASSESSMENT — PATIENT HEALTH QUESTIONNAIRE - PHQ9
2. FEELING DOWN, DEPRESSED, IRRITABLE, OR HOPELESS: NOT AT ALL
1. LITTLE INTEREST OR PLEASURE IN DOING THINGS: SEVERAL DAYS

## 2024-03-22 ASSESSMENT — ENCOUNTER SYMPTOMS: GENERAL WELL-BEING: FAIR

## 2024-03-22 ASSESSMENT — ACTIVITIES OF DAILY LIVING (ADL): BATHING_REQUIRES_ASSISTANCE: 0

## 2024-03-22 NOTE — PATIENT COMMUNICATION
Pt came in, asked for a refill of Trulicity sent to [facility] pharmacy on [address] since Raljareks is currently back ordered.  Thank you.

## 2024-03-25 ENCOUNTER — TELEPHONE (OUTPATIENT)
Dept: MEDICAL GROUP | Facility: PHYSICIAN GROUP | Age: 74
End: 2024-03-25
Payer: MEDICARE

## 2024-03-25 ENCOUNTER — APPOINTMENT (OUTPATIENT)
Dept: PHYSICAL THERAPY | Facility: REHABILITATION | Age: 74
End: 2024-03-25
Attending: PHYSICAL MEDICINE & REHABILITATION
Payer: MEDICARE

## 2024-03-25 PROBLEM — Z87.891 HISTORY OF TOBACCO ABUSE: Status: RESOLVED | Noted: 2017-01-31 | Resolved: 2024-03-25

## 2024-03-25 PROBLEM — M54.50 ACUTE LEFT-SIDED LOW BACK PAIN WITHOUT SCIATICA: Status: RESOLVED | Noted: 2023-08-23 | Resolved: 2024-03-25

## 2024-03-25 NOTE — TELEPHONE ENCOUNTER
I Spoke with patient and informed him that all of the pharmacy are in back order for Trulicity. Jacqui does not want to switch medications due to others being short supplied or back order as well. Unfortunately, there is nothing we can do at this time.

## 2024-03-25 NOTE — ASSESSMENT & PLAN NOTE
Chronic, stable. Currently taking atorvastatin 80 mg daily. Reports that he is not exercising regularly, but he is very active. Followed by cardiology, Dr. Gleason.

## 2024-03-25 NOTE — ASSESSMENT & PLAN NOTE
Chronic, stable. Currently supplementing with vitamin D daily. Followed by primary care provider.

## 2024-03-25 NOTE — ASSESSMENT & PLAN NOTE
BMI is 23.68 kg/m2. Provided education on heart healthy diet with adequate intake of fruits, vegetables, and whole grains. Encourage 30 minutes of moderate exercise 3-4 times a week.

## 2024-03-25 NOTE — ASSESSMENT & PLAN NOTE
Chronic, stable. Currently taking 81 mg aspirin daily. Denies chest pain, pain with ambulation, and weakness of extremities.

## 2024-03-25 NOTE — ASSESSMENT & PLAN NOTE
Chronic, stable. Most recent hemoglobin A1C was 5.8 in January 2023. Reports that home blood sugars are usually 110-120, but have been in the 180s since he hasn't been able to receive dulaglutide for 5 weeks. Referral placed to pharmacotherapy services. Followed by primary care provider.

## 2024-03-25 NOTE — ASSESSMENT & PLAN NOTE
Chronic, stable. Reviewed ultrasound of aorta and iliac duplex from 2023. History of aortography and lower extremity arteriography by Dr. Lee in 2012. Continues on aspirin and statin therapy daily.

## 2024-03-25 NOTE — ASSESSMENT & PLAN NOTE
Chronic, stable. Reviewed CBC from January 2022 with baseline trend of thrombocytopenia. Denies abnormal bleeding. Monitored by primary care provider.

## 2024-03-25 NOTE — ASSESSMENT & PLAN NOTE
Chronic, stable. Currently taking lisinopril 20 mg and metoprolol SR 25 mg daily. Denies chest pain, lightheadedness, palpitations, and lower extremity edema.

## 2024-03-25 NOTE — TELEPHONE ENCOUNTER
Good morning,     Patient came in office regarding   Dulaglutide (TRULICITY) 1.5 MG/0.5ML Solution Pen-injector  stating that this medication has been backordered at [facility] for 5 weeks.   Requesting refill. Please advise.     Thank you.

## 2024-03-26 ENCOUNTER — HOSPITAL ENCOUNTER (OUTPATIENT)
Facility: MEDICAL CENTER | Age: 74
End: 2024-03-26
Payer: MEDICARE

## 2024-03-26 ENCOUNTER — OFFICE VISIT (OUTPATIENT)
Dept: FAMILY PLANNING/WOMEN'S HEALTH CLINIC | Facility: PHYSICIAN GROUP | Age: 74
End: 2024-03-26
Attending: FAMILY MEDICINE
Payer: MEDICARE

## 2024-03-26 VITALS
BODY MASS INDEX: 23.62 KG/M2 | WEIGHT: 165 LBS | HEIGHT: 70 IN | SYSTOLIC BLOOD PRESSURE: 142 MMHG | DIASTOLIC BLOOD PRESSURE: 78 MMHG

## 2024-03-26 DIAGNOSIS — I70.0 ATHEROSCLEROSIS OF AORTA (HCC): ICD-10-CM

## 2024-03-26 DIAGNOSIS — E11.65 TYPE 2 DIABETES MELLITUS WITH HYPERGLYCEMIA, WITHOUT LONG-TERM CURRENT USE OF INSULIN (HCC): ICD-10-CM

## 2024-03-26 DIAGNOSIS — M54.12 CERVICAL RADICULOPATHY, CHRONIC: ICD-10-CM

## 2024-03-26 DIAGNOSIS — E78.5 HYPERLIPIDEMIA DUE TO TYPE 2 DIABETES MELLITUS (HCC): ICD-10-CM

## 2024-03-26 DIAGNOSIS — E11.69 HYPERLIPIDEMIA DUE TO TYPE 2 DIABETES MELLITUS (HCC): ICD-10-CM

## 2024-03-26 DIAGNOSIS — I74.5 BILATERAL ILIAC ARTERY OCCLUSION (HCC): ICD-10-CM

## 2024-03-26 DIAGNOSIS — Z12.12 ENCOUNTER FOR COLORECTAL CANCER SCREENING: ICD-10-CM

## 2024-03-26 DIAGNOSIS — I15.2 HYPERTENSION ASSOCIATED WITH DIABETES (HCC): ICD-10-CM

## 2024-03-26 DIAGNOSIS — E55.9 VITAMIN D DEFICIENCY: ICD-10-CM

## 2024-03-26 DIAGNOSIS — I73.9 CLAUDICATION (HCC): ICD-10-CM

## 2024-03-26 DIAGNOSIS — I74.09 CHRONIC DISTAL AORTIC OCCLUSION (HCC): ICD-10-CM

## 2024-03-26 DIAGNOSIS — B18.2 CHRONIC HEPATITIS C WITHOUT HEPATIC COMA (HCC): ICD-10-CM

## 2024-03-26 DIAGNOSIS — K59.02 CONSTIPATION DUE TO OUTLET DYSFUNCTION: ICD-10-CM

## 2024-03-26 DIAGNOSIS — D69.6 THROMBOCYTOPENIA (HCC): ICD-10-CM

## 2024-03-26 DIAGNOSIS — G89.29 CHRONIC BILATERAL THORACIC BACK PAIN: ICD-10-CM

## 2024-03-26 DIAGNOSIS — E11.59 HYPERTENSION ASSOCIATED WITH DIABETES (HCC): ICD-10-CM

## 2024-03-26 DIAGNOSIS — D69.2 SENILE PURPURA (HCC): ICD-10-CM

## 2024-03-26 DIAGNOSIS — M54.6 CHRONIC BILATERAL THORACIC BACK PAIN: ICD-10-CM

## 2024-03-26 DIAGNOSIS — Z12.11 ENCOUNTER FOR COLORECTAL CANCER SCREENING: ICD-10-CM

## 2024-03-26 DIAGNOSIS — F10.21 ALCOHOL DEPENDENCE IN REMISSION (HCC): ICD-10-CM

## 2024-03-26 PROBLEM — R07.89 OTHER CHEST PAIN: Status: RESOLVED | Noted: 2021-01-04 | Resolved: 2024-03-26

## 2024-03-26 PROBLEM — M25.511 CHRONIC RIGHT SHOULDER PAIN: Status: RESOLVED | Noted: 2019-09-18 | Resolved: 2024-03-26

## 2024-03-26 PROCEDURE — G0439 PPPS, SUBSEQ VISIT: HCPCS

## 2024-03-26 PROCEDURE — 3078F DIAST BP <80 MM HG: CPT

## 2024-03-26 PROCEDURE — 82043 UR ALBUMIN QUANTITATIVE: CPT

## 2024-03-26 PROCEDURE — 82570 ASSAY OF URINE CREATININE: CPT

## 2024-03-26 PROCEDURE — 3077F SYST BP >= 140 MM HG: CPT

## 2024-03-26 SDOH — HEALTH STABILITY: MENTAL HEALTH
STRESS IS WHEN SOMEONE FEELS TENSE, NERVOUS, ANXIOUS, OR CAN'T SLEEP AT NIGHT BECAUSE THEIR MIND IS TROUBLED. HOW STRESSED ARE YOU?: NOT AT ALL

## 2024-03-26 SDOH — HEALTH STABILITY: MENTAL HEALTH: HOW OFTEN DO YOU HAVE 6 OR MORE DRINKS ON ONE OCCASION?: NEVER

## 2024-03-26 SDOH — SOCIAL STABILITY: SOCIAL NETWORK
DO YOU BELONG TO ANY CLUBS OR ORGANIZATIONS SUCH AS CHURCH GROUPS UNIONS, FRATERNAL OR ATHLETIC GROUPS, OR SCHOOL GROUPS?: YES

## 2024-03-26 SDOH — SOCIAL STABILITY: SOCIAL NETWORK
IN A TYPICAL WEEK, HOW MANY TIMES DO YOU TALK ON THE PHONE WITH FAMILY, FRIENDS, OR NEIGHBORS?: MORE THAN THREE TIMES A WEEK

## 2024-03-26 SDOH — ECONOMIC STABILITY: FOOD INSECURITY: WITHIN THE PAST 12 MONTHS, YOU WORRIED THAT YOUR FOOD WOULD RUN OUT BEFORE YOU GOT MONEY TO BUY MORE.: NEVER TRUE

## 2024-03-26 SDOH — ECONOMIC STABILITY: FOOD INSECURITY: WITHIN THE PAST 12 MONTHS, THE FOOD YOU BOUGHT JUST DIDN'T LAST AND YOU DIDN'T HAVE MONEY TO GET MORE.: NEVER TRUE

## 2024-03-26 SDOH — SOCIAL STABILITY: SOCIAL NETWORK

## 2024-03-26 SDOH — ECONOMIC STABILITY: INCOME INSECURITY: IN THE PAST 12 MONTHS, HAS THE ELECTRIC, GAS, OIL, OR WATER COMPANY THREATENED TO SHUT OFF SERVICE IN YOUR HOME?: NO

## 2024-03-26 SDOH — ECONOMIC STABILITY: INCOME INSECURITY: IN THE LAST 12 MONTHS, WAS THERE A TIME WHEN YOU WERE NOT ABLE TO PAY THE MORTGAGE OR RENT ON TIME?: NO

## 2024-03-26 SDOH — SOCIAL STABILITY: SOCIAL NETWORK: ARE YOU MARRIED, WIDOWED, DIVORCED, SEPARATED, NEVER MARRIED, OR LIVING WITH A PARTNER?: WIDOWED

## 2024-03-26 SDOH — HEALTH STABILITY: MENTAL HEALTH: HOW MANY STANDARD DRINKS CONTAINING ALCOHOL DO YOU HAVE ON A TYPICAL DAY?: PATIENT DOES NOT DRINK

## 2024-03-26 SDOH — ECONOMIC STABILITY: HOUSING INSECURITY: IN THE LAST 12 MONTHS, HOW MANY PLACES HAVE YOU LIVED?: 1

## 2024-03-26 SDOH — HEALTH STABILITY: PHYSICAL HEALTH: ON AVERAGE, HOW MANY DAYS PER WEEK DO YOU ENGAGE IN MODERATE TO STRENUOUS EXERCISE (LIKE A BRISK WALK)?: 7 DAYS

## 2024-03-26 SDOH — HEALTH STABILITY: PHYSICAL HEALTH: ON AVERAGE, HOW MANY MINUTES DO YOU ENGAGE IN EXERCISE AT THIS LEVEL?: 100 MIN

## 2024-03-26 SDOH — SOCIAL STABILITY: SOCIAL NETWORK: HOW OFTEN DO YOU ATTEND CHURCH OR RELIGIOUS SERVICES?: 1 TO 4 TIMES PER YEAR

## 2024-03-26 SDOH — HEALTH STABILITY: MENTAL HEALTH: HOW OFTEN DO YOU HAVE A DRINK CONTAINING ALCOHOL?: NEVER

## 2024-03-26 SDOH — ECONOMIC STABILITY: HOUSING INSECURITY
IN THE LAST 12 MONTHS, WAS THERE A TIME WHEN YOU DID NOT HAVE A STEADY PLACE TO SLEEP OR SLEPT IN A SHELTER (INCLUDING NOW)?: NO

## 2024-03-26 SDOH — ECONOMIC STABILITY: INCOME INSECURITY: HOW HARD IS IT FOR YOU TO PAY FOR THE VERY BASICS LIKE FOOD, HOUSING, MEDICAL CARE, AND HEATING?: NOT HARD AT ALL

## 2024-03-26 ASSESSMENT — LIFESTYLE VARIABLES
AUDIT-C TOTAL SCORE: 0
SKIP TO QUESTIONS 9-10: 1

## 2024-03-26 ASSESSMENT — FIBROSIS 4 INDEX: FIB4 SCORE: 4.51

## 2024-03-26 ASSESSMENT — PATIENT HEALTH QUESTIONNAIRE - PHQ9: CLINICAL INTERPRETATION OF PHQ2 SCORE: 1

## 2024-03-26 NOTE — ASSESSMENT & PLAN NOTE
Chronic, stable. Scattered ecchymoses to bilateral upper and lower extremities. Currently on clopidogrel and aspirin daily. Denies abnormal bleeding.

## 2024-03-26 NOTE — ASSESSMENT & PLAN NOTE
Chronic, stable. Reports pain to bilateral calves with ambulation that resolves with rest. Continues on aspirin and statin therapy. Followed by cardiology, Dr. Gleason.

## 2024-03-26 NOTE — PROGRESS NOTES
Comprehensive Health Assessment Program     Chung Limon is a 74 y.o. here for his comprehensive health assessment.    Patient Active Problem List    Diagnosis Date Noted    Senile purpura (HCC) 03/26/2024    Prostate nodule 08/28/2023    Constipation due to outlet dysfunction 08/01/2023    Vitamin D deficiency 04/21/2022    Pancytopenia (HCC) 03/15/2022    Thrombocytopenia (HCC) 03/15/2022    Hyperlipidemia due to type 2 diabetes mellitus (HCC) 03/15/2022    Degenerative disc disease, lumbar 03/15/2022    Nonrheumatic aortic valve stenosis 03/02/2022    Chronic bilateral thoracic back pain 01/04/2021    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HCC) 09/21/2020    Chronic distal aortic occlusion (HCC) 09/21/2020    Carotid artery stenosis, asymptomatic, left 09/21/2020    BMI 26.0-26.9,adult 05/12/2020    Transaminitis 05/12/2020    Alcohol dependence in remission (HCC) 04/19/2019    Cervical radiculopathy, chronic 04/05/2018    Encounter for colorectal cancer screening 04/05/2018    Hypertension associated with diabetes (HCC) 04/05/2018    Chronic hepatitis C without hepatic coma (HCC) 05/18/2017    Atherosclerosis of aorta (HCC) 01/31/2017    Type 2 diabetes mellitus with hyperglycemia, without long-term current use of insulin (HCC) 11/15/2016    Microalbuminuria due to type 2 diabetes mellitus (HCC) 11/15/2016    Stress-induced cardiomyopathy 03/10/2015    PAD (peripheral artery disease) (HCC) 01/21/2015    Claudication ([facility]) 10/22/2012       Current Outpatient Medications   Medication Sig Dispense Refill    ibuprofen (MOTRIN) 800 MG Tab TAKE ONE TABLET BY MOUTH EVERY 8 HOURS AS NEEDED 90 Tablet 0    sennosides-docusate sodium (SENOKOT-S) 8.6-50 MG tablet Take 1 Tablet by mouth every day. 90 Tablet 1    glucose blood (CONTOUR NEXT TEST) strip USE TO TEST BLOOD SUGAR ONCE DAILY EARLY MORNING BEFORE FIRST MEAL OF DAY 50 Strip 6    atorvastatin (LIPITOR) 80 MG tablet Take 1 Tablet by mouth every day. 100 Tablet  3    clopidogrel (PLAVIX) 75 MG Tab Take 1 Tablet by mouth every day. 100 Tablet 3    Dulaglutide (TRULICITY) 1.5 MG/0.5ML Solution Pen-injector Inject 0.5 mL under the skin every 7 days. INJECT 1 PEN UNDER SKIN EVERY 7 DAYS 6 mL 3    lisinopril (PRINIVIL) 20 MG Tab Take 1 Tablet by mouth every day. 100 Tablet 3    metoprolol SR (TOPROL XL) 25 MG TABLET SR 24 HR Take 1 Tablet by mouth every day. 100 Tablet 3    VITAMIN D PO Take  by mouth.      Multiple Vitamins-Minerals (CENTRUM SILVER PO) Take  by mouth.      Blood Glucose Meter Kit Test blood sugar as recommended by provider.  Per insurance preference, blood glucose monitoring kit. 1 Kit 0    Blood Glucose Test Strips Use one per insurance preference, strip to test blood sugar once daily early morning before first meal. 100 Strip 6    Lancets Use one per insurance preference, lancet to test blood sugar once daily early morning before first meal. 100 Each 3    Alcohol Swabs Wipe site with prep pad prior to injection. 100 Each 3    B Complex Vitamins (B COMPLEX PO) Take  by mouth.      aspirin (ASA) 81 MG Chew Tab chewable tablet Take 81 mg by mouth every day.      methocarbamol (ROBAXIN) 500 MG Tab Take 1 Tablet by mouth 3 times a day. (Patient not taking: Reported on 3/26/2024) 30 Tablet 1    dapagliflozin propanediol (FARXIGA) 5 MG Tab Take 1 Tablet by mouth every day. (Patient not taking: Reported on 3/26/2024) 100 Tablet 3    azelastine (OPTIVAR) 0.05 % ophthalmic solution Administer 1 Drop into the left eye 2 times a day. (Patient not taking: Reported on 12/7/2023) 6 mL 0    pentoxifylline CR (TRENTAL) 400 MG CR tablet Take 1 Tablet by mouth 2 times a day. (Patient not taking: Reported on 3/26/2024) 60 Tablet 11     No current facility-administered medications for this visit.          Current supplements as per medication list.     Allergies:   Patient has no known allergies.  Social History     Tobacco Use    Smoking status: Former     Current packs/day:  0.00     Average packs/day: 1 pack/day for 58.0 years (58.0 ttl pk-yrs)     Types: Cigarettes     Start date: 1960     Quit date: 2011     Years since quittin.5    Smokeless tobacco: Never    Tobacco comments:     avoid all tobacco products   Vaping Use    Vaping Use: Never used   Substance Use Topics    Alcohol use: No     Alcohol/week: 0.0 oz     Comment: quit , drank 2 times since    Drug use: Yes     Types: Inhaled, Marijuana     Family History   Problem Relation Age of Onset    Heart Disease Father     Arthritis Mother     No Known Problems Sister     Cancer Brother         type unknown    Cancer Brother         skin     No Known Problems Maternal Grandmother     No Known Problems Maternal Grandfather     No Known Problems Paternal Grandmother     No Known Problems Paternal Grandfather     Diabetes Neg Hx     Hypertension Neg Hx     Stroke Neg Hx     Hyperlipidemia Neg Hx      Chung  has a past medical history of Acute left-sided low back pain without sciatica, Benign neoplasm of soft palate (2018), Bruit (2016), Cancer ([facility]), Cervical disc disease (2017), Chronic right shoulder pain, Claudication ([facility]) (2012), Dental disorder (2019), Dermoid cyst of ear, left (2019), Diabetes ([facility]) (2019), H/O colonoscopy with polypectomy (2013), Hayfever, Healthcare maintenance (2018), Hepatitis C, History of alcoholism ([facility]) (2012), History of tobacco abuse, [place] (hard of hearing) (2019), Hyperlipidemia, Hypertension, Insulin dependent diabetes mellitus (2019), Internal nasal lesion (2021), Other chest pain, and S/P excision of lipoma (2019).   Past Surgical History:   Procedure Laterality Date    MASS EXCISION GENERAL Right 2019    Procedure: EXCISION, MASS, GENERAL - 15 CM LIPOMA OF SHOULDER;  Surgeon: Augustin Rosales M.D.;  Location: SURGERY [facility];  Service: General    COLONOSCOPY      WIDE EXCISION   5/6/2014    Performed by Nicholas Govea M.D. at SURGERY SAME DAY [facility] ORS    FLAP GRAFT  5/6/2014    Performed by Nicholas Govea M.D. at SURGERY SAME DAY [facility] ORS    RECOVERY  10/22/2012    Performed by Pily Lee M.D. at SURGERY [facility] ORS       Screening:  In the last six months have you experienced any leakage of urine? No    Depression Screening  Little interest or pleasure in doing things?  1 - several days  Feeling down, depressed , or hopeless? 0 - not at all  Patient Health Questionnaire Score: 0     If depressive symptoms identified deferred to follow up visit unless specifically addressed in assessment and plan.    Interpretation of PHQ-9 Total Score   Score Severity   1-4 No Depression   5-9 Mild Depression   10-14 Moderate Depression   15-19 Moderately Severe Depression   20-27 Severe Depression    Screening for Cognitive Impairment  Do you or any of your friends or family members have any concern about your memory? Yes  Three Minute Recall (Leader, Season, Table) 2/3    Rashawn clock face with all 12 numbers and set the hands to show 10 minutes after 11.  Yes    Cognitive concerns identified deferred for follow up unless specifically addressed in assessment and plan.    Fall Risk Assessment  Has the patient had two or more falls in the last year or any fall with injury in the last year?  No    Safety Assessment  Do you always wear your seatbelt?  Yes  Any changes to home needed to function safely? No  Difficulty hearing.  Yes  Patient counseled about all safety risks that were identified.    Functional Assessment ADLs  Are there any barriers preventing you from cooking for yourself or meeting nutritional needs?  No.    Are there any barriers preventing you from driving safely or obtaining transportation?  No.    Are there any barriers preventing you from using a telephone or calling for help?  No    Are there any barriers preventing you from shopping?  No.    Are there any barriers  preventing you from taking care of your own finances?  No    Are there any barriers preventing you from managing your medications?  No    Are there any barriers preventing you from showering, bathing or dressing yourself? No    Are there any barriers preventing you from doing housework or laundry? No  Are there any barriers preventing you from using the toilet?No  Are you currently engaging in any exercise or physical activity?  Yes.      Self-Assessment of Health  What is your perception of your health? Fair  Do you sleep more than six hours a night? Yes  In the past 7 days, how much did pain keep you from doing your normal work? Some  Do you spend quality time with family or friends (virtually or in person)? Yes  Do you usually eat a heart healthy diet that constists of a variety of fruits, vegetables, whole grains and fiber? Yes  Do you eat foods high in fat and/or Fast Food more than three times per week? Yes    Advance Care Planning  Do you have an Advance Directive, Living Will, Durable Power of , or POLST? Yes  Advance Directive Living Will Durable Power of           Health Maintenance Summary            Overdue - Hepatitis A Vaccine (Hep A) (1 of 2 - Risk 2-dose series) Never done      No completion history exists for this topic.              Overdue - Hepatitis B Vaccine (Hep B) (1 of 3 - Risk 3-dose series) Never done      No completion history exists for this topic.              Overdue - Zoster (Shingles) Vaccines (2 of 3) Overdue since 1/10/2017      11/15/2016  Imm Admin: Zoster Vaccine Live (ZVL) (Zostavax) - HISTORICAL DATA              Ordered - Colorectal Cancer Screening (Colonoscopy - Every 5 Years) Ordered on 3/26/2024      01/23/2023  Colon Cancer Screening Annual FIT (Done)    01/23/2023  Colon Cancer Screening Annual FIT (Done)    10/03/2016  REFERRAL TO GI FOR COLONOSCOPY    12/05/2014  OCCULT BLOOD FECES IMMUNOASSAY    04/30/2013  Colonoscopy (Done - two polyps, tubular  adenomas)    Only the first 5 history entries have been loaded, but more history exists.              Ordered - A1c Screening (Every 6 Months) Ordered on 6/15/2023      01/23/2023  POCT Hemoglobin A1C    08/12/2022  HEMOGLOBIN A1C    05/13/2022  HEMOGLOBIN A1C    07/22/2021  POCT Hemoglobin A1C    09/28/2020  HEMOGLOBIN A1C    Only the first 5 history entries have been loaded, but more history exists.              Ordered - Fasting Lipid Profile (Yearly) Ordered on 6/15/2023      08/12/2022  Lipid Profile    09/29/2020  Lipid Profile    06/27/2019  Lipid Profile    06/27/2019  Lipid Profile    01/17/2018  LIPID PROFILE    Only the first 5 history entries have been loaded, but more history exists.              Ordered - Diabetes: Urine Protein Screening (Yearly) Ordered on 3/26/2024      08/12/2022  MICROALBUMIN CREAT RATIO URINE    05/13/2022  MICROALBUMIN CREAT RATIO URINE    09/28/2020  MICROALBUMIN CREAT RATIO URINE    08/15/2018  MICROALBUMIN CREAT RATIO URINE    02/01/2017  MICROALBUMIN CREAT RATIO URINE    Only the first 5 history entries have been loaded, but more history exists.              Overdue - Diabetes: Monofilament / LE Exam (Yearly) Overdue since 8/18/2023 08/18/2022  Diabetic Monofilament Lower Extremity Exam    08/18/2022  SmartData: WORKFLOW - DIABETES - DIABETIC FOOT EXAM PERFORMED    11/12/2020  Diabetic Monofilament LE Exam    11/12/2020  SmartData: WORKFLOW - DIABETES - DIABETIC FOOT EXAM PERFORMED    09/18/2019  Diabetic Monofilament LE Exam    Only the first 5 history entries have been loaded, but more history exists.              Overdue - Influenza Vaccine (1) Overdue since 9/1/2023 11/28/2022  Imm Admin: Influenza Vaccine Adult HD    12/02/2021  Outside Immunization: Fluzone High-Dose Quad    01/04/2021  Imm Admin: Influenza Vaccine Adult HD    11/15/2016  Imm Admin: Influenza Vaccine Adult HD    10/28/2015  Imm Admin: Influenza Vaccine Adult HD    Only the first 5 history  entries have been loaded, but more history exists.              Overdue - COVID-19 Vaccine (5 - 2023-24 season) Overdue since 9/1/2023 12/02/2022  Imm Admin: PFIZER BIVALENT SARS-COV-2 VACCINE (12+)    12/02/2021  Imm Admin: PFIZER PURPLE CAP SARS-COV-2 VACCINATION (12+)    04/08/2021  Imm Admin: PFIZER PURPLE CAP SARS-COV-2 VACCINATION (12+)    03/17/2021  Imm Admin: PFIZER PURPLE CAP SARS-COV-2 VACCINATION (12+)              Ordered - SERUM CREATININE (Yearly) Ordered on 6/15/2023      12/29/2022  Comp Metabolic Panel    08/12/2022  Comp Metabolic Panel    09/29/2020  Comp Metabolic Panel    07/26/2019  Comp Metabolic Panel    06/27/2019  Comp Metabolic Panel    Only the first 5 history entries have been loaded, but more history exists.              Overdue - Diabetes: Retinopathy Screening (Yearly) Overdue since 1/23/2024 01/23/2023  Done    11/12/2020  Done    08/23/2019  REFERRAL FOR RETINAL SCREENING EXAM    08/16/2019  REFERRAL FOR RETINAL SCREENING EXAM    08/30/2017  REFERRAL FOR RETINAL SCREENING EXAM    Only the first 5 history entries have been loaded, but more history exists.              Lung Cancer Screening (Yearly) Next due on 9/6/2024 09/06/2023  CT-LUNG CANCER-SCREENING    12/02/2020  CT-LUNG CANCER-SCREENING    05/15/2019  CT-LUNG CANCER-SCREENING    02/27/2018  CT-LUNG CANCER-SCREENING              IMM DTaP/Tdap/Td Vaccine (2 - Td or Tdap) Next due on 12/2/2024 12/02/2014  Imm Admin: Tdap Vaccine              Annual Wellness Visit (Yearly) Next due on 3/26/2025      03/26/2024  Done - Comprehensive Health Assessment    03/26/2024  Level of Service: CO ANNUAL WELLNESS VISIT-INCLUDES PPPS SUBSEQUE*    01/11/2023  Level of Service: CO ANNUAL WELLNESS VISIT-INCLUDES PPPS SUBSEQUE*    03/15/2022  Level of Service: CO ANNUAL WELLNESS VISIT-INCLUDES PPPS SUBSEQUE*    07/23/2021  Level of Service: CO ANNUAL WELLNESS VISIT-INCLUDES PPPS SUBSEQUE*    Only the first 5 history entries  have been loaded, but more history exists.              Pneumococcal Vaccine: 65+ Years (Series Information) Completed      01/14/2016  Imm Admin: Pneumococcal Conjugate Vaccine (Prevnar/PCV-13)    12/02/2014  Imm Admin: Pneumococcal polysaccharide vaccine (PPSV-23)              Abdominal Aortic Aneurysm (AAA) Screening  Completed      10/19/2022  US-AORTA/ILIACS DUPLEX COMPLETE    10/01/2021  US-AORTA/ILIACS DUPLEX COMPLETE    10/09/2020  US-AORTA/ILIACS DUPLEX COMPLETE    06/07/2017  US-ABDOMEN COMPLETE SURVEY    01/15/2015  CT-ABDOMEN-PELVIS W/ IV Contrast (R/O acute, uncomplicated appendicitis or diverticulitis, bowel ischemia) FOR HIGH BMI PATIENTS    Only the first 5 history entries have been loaded, but more history exists.              HPV Vaccines (Series Information) Aged Out      No completion history exists for this topic.              Polio Vaccine (Inactivated Polio) (Series Information) Aged Out      No completion history exists for this topic.              Meningococcal Immunization (Series Information) Aged Out      No completion history exists for this topic.                    Patient Care Team:  LEVI Griggs as PCP - General (Nurse Practitioner Family)  Enrike Killian M.D. as PCP - [facility] Paneled  Manjit Gleason M.D. as Consulting Physician (Cardiovascular Disease (Cardiology))  [facility] as Consulting Physician (Optometry)  Fabienne Scruggs C.N.A. (Inactive) as    Bharat Caicedo M.D. as Consulting Physician (Family Medicine)  Candelario Narayan M.D. as Consulting Physician (Otolaryngology)      Financial Resource Strain: Low Risk  (3/26/2024)    Overall Financial Resource Strain (CARDIA)     Difficulty of Paying Living Expenses: Not hard at all      Transportation Needs: No Transportation Needs (3/26/2024)    PRAPARE - Transportation     Lack of Transportation (Medical): No     Lack of Transportation (Non-Medical): No      Food Insecurity:  "No Food Insecurity (3/26/2024)    Hunger Vital Sign     Worried About Running Out of Food in the Last Year: Never true     Ran Out of Food in the Last Year: Never true        Encounter Vitals  Blood Pressure : (!) 142/78  Weight: 74.8 kg (165 lb)  Height: 177.8 cm (5' 10\")  BMI (Calculated): 23.68     Alert, oriented in no acute distress.  Eye contact is good, speech goal directed, affect calm.    Assessment and Plan. The following treatment and monitoring plan is recommended:    Alcohol dependence in remission (HCC)  Stable. Reports that he quit drinking alcohol in 1985.     Atherosclerosis of aorta (HCC)  Chronic, stable. Currently taking aspirin 81 mg daily. Denies chest pain, pain with ambulation, and weakness of extremities.    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HCC)  Chronic distal aortic occlusion (HCC)  Chronic, stable. Reviewed ultrasound of aorta and iliac duplex from 2023. History of aortography and lower extremity arteriography by Dr. Lee in 2012. Continues on aspirin and statin therapy daily. Reports that he declined aortobifemoral bypass.    BMI 23.0-23.9,adult  BMI is 23.68 kg/m2. Provided education on heart healthy diet with adequate intake of fruits, vegetables, and whole grains. Encourage 30 minutes of moderate exercise 3-4 times a week.    Cervical radiculopathy, chronic  Chronic bilateral thoracic back pain  Chronic, stable. Currently taking acetaminophen and ibuprofen as needed. Reports improvement in pain with heat. Denies numbness, tingling, and interference with activities of daily living.    Chronic hepatitis C without hepatic coma (CMS-HCC)  Stable. Reports diagnosis over 20 years ago. States that he attempted treatment a few years ago but did not tolerate the anti-virals well.    Claudication (HCC)  Chronic, stable. Reports bilateral calf pain with ambulation-- states that cramping resolves with rest. Continues on aspirin and statin therapy daily. Followed by cardiology, Dr. Gleason. "     Constipation due to outlet dysfunction  Chronic, stable. Currently taking senokot and metamucil as needed. Reports having formed bowel movements daily.     Hyperlipidemia due to type 2 diabetes mellitus (HCC)  Chronic, stable. Currently taking atorvastatin 80 mg daily. Reports that he is not exercising regularly, but he is very active. Offered [employer] gym resources. Followed by cardiology, Dr. Gleason.    Hypertension associated with diabetes (HCC)  Chronic, stable. Currently taking lisinopril 20 mg and metoprolol SR 25 mg daily. Denies chest pain, lightheadedness, palpitations, and lower extremity edema. Followed by cardiology, Dr. Gleason.    Senile purpura ([facility])  Chronic, stable. Scattered ecchymoses to bilateral upper and lower extremities. Continues on clopidogrel and aspirin daily.     Thrombocytopenia ([facility])  Chronic, stable. Reviewed CBC from January 2022 with baseline trend of thrombocytopenia. Continues on clopidogrel and aspirin daily. Denies abnormal bleeding. Monitored by primary care provider.    Type 2 diabetes mellitus with hyperglycemia, without long-term current use of insulin ([facility])  Chronic, stable. Most recent hemoglobin A1C was 5.8 in January 2023. Reports that home blood sugars are usually 110-120, but have been in the 180s since he hasn't been able to receive his prescription for dulaglutide for 5 weeks. Referral placed to pharmacotherapy services for assistance. Followed by primary care provider.  --In-office urine protein screening ordered.    Vitamin D deficiency  Chronic, stable. Currently supplementing with vitamin D daily. Followed by primary care provider.     Encounter for colorectal cancer screening  Referral to GI for colonoscopy.      Services suggested: Referral to Pharmacotherapy Polypharmacy Clinic  Health Care Screening: Age-appropriate preventive services recommended by USPTF and ACIP covered by Medicare were discussed today. Services ordered if indicated and agreed  upon by the patient.  Referrals offered: Community-based lifestyle interventions to reduce health risks and promote self-management and wellness, fall prevention, nutrition, physical activity, tobacco-use cessation, weight loss, and mental health services as per orders if indicated.    Discussion today about general wellness and lifestyle habits:    Prevent falls and reduce trip hazards; Cautioned about securing or removing rugs.  Have a working fire alarm and carbon monoxide detector.  Engage in regular physical activity and social activities.    Follow-up: Return for appointment with Primary Care Provider as needed.

## 2024-03-26 NOTE — ASSESSMENT & PLAN NOTE
[place]. Reports diagnosis about 20 years ago when he tried to donate blood. States that he attempted treatment two years ago but did not tolerate the medications.

## 2024-03-26 NOTE — ASSESSMENT & PLAN NOTE
Chronic, stable. Currently taking acetaminophen and ibuprofen as needed pain control. Denies numbness, tingling, and interference with activities of daily living.

## 2024-03-26 NOTE — ASSESSMENT & PLAN NOTE
Chronic, stable. Reviewed ultrasound of aorta and iliac duplex from 2023. History of aortography and lower extremity arteriography by Dr. Lee in 2012. Continues on aspirin and statin therapy daily. Reports that he declined Ao-bifrem BPG.

## 2024-03-27 LAB
CREAT UR-MCNC: 66.39 MG/DL
MICROALBUMIN UR-MCNC: 62.2 MG/DL
MICROALBUMIN/CREAT UR: 937 MG/G (ref 0–30)

## 2024-03-28 ENCOUNTER — OFFICE VISIT (OUTPATIENT)
Dept: MEDICAL GROUP | Facility: PHYSICIAN GROUP | Age: 74
End: 2024-03-28
Payer: MEDICARE

## 2024-03-28 VITALS
OXYGEN SATURATION: 96 % | RESPIRATION RATE: 16 BRPM | WEIGHT: 175 LBS | SYSTOLIC BLOOD PRESSURE: 132 MMHG | BODY MASS INDEX: 25.05 KG/M2 | HEIGHT: 70 IN | DIASTOLIC BLOOD PRESSURE: 70 MMHG | HEART RATE: 83 BPM | TEMPERATURE: 97.6 F

## 2024-03-28 DIAGNOSIS — D70.8 OTHER NEUTROPENIA (HCC): ICD-10-CM

## 2024-03-28 DIAGNOSIS — I25.2 HISTORY OF MI (MYOCARDIAL INFARCTION): ICD-10-CM

## 2024-03-28 DIAGNOSIS — E11.51 DIABETES MELLITUS WITH PERIPHERAL VASCULAR DISEASE (HCC): ICD-10-CM

## 2024-03-28 DIAGNOSIS — E11.29 MICROALBUMINURIA DUE TO TYPE 2 DIABETES MELLITUS (HCC): ICD-10-CM

## 2024-03-28 DIAGNOSIS — M54.50 ACUTE LEFT-SIDED LOW BACK PAIN WITHOUT SCIATICA: ICD-10-CM

## 2024-03-28 DIAGNOSIS — I70.0 ATHEROSCLEROSIS OF AORTA (HCC): ICD-10-CM

## 2024-03-28 DIAGNOSIS — E11.69 HYPERLIPIDEMIA DUE TO TYPE 2 DIABETES MELLITUS (HCC): ICD-10-CM

## 2024-03-28 DIAGNOSIS — R80.9 MICROALBUMINURIA DUE TO TYPE 2 DIABETES MELLITUS (HCC): ICD-10-CM

## 2024-03-28 DIAGNOSIS — Z13.29 SCREENING FOR THYROID DISORDER: ICD-10-CM

## 2024-03-28 DIAGNOSIS — E11.65 TYPE 2 DIABETES MELLITUS WITH HYPERGLYCEMIA, WITHOUT LONG-TERM CURRENT USE OF INSULIN (HCC): ICD-10-CM

## 2024-03-28 DIAGNOSIS — I10 ESSENTIAL HYPERTENSION: ICD-10-CM

## 2024-03-28 DIAGNOSIS — Z87.891 HISTORY OF TOBACCO ABUSE: ICD-10-CM

## 2024-03-28 DIAGNOSIS — E55.9 VITAMIN D DEFICIENCY: ICD-10-CM

## 2024-03-28 DIAGNOSIS — M54.2 CERVICAL PAIN: ICD-10-CM

## 2024-03-28 DIAGNOSIS — I15.2 HYPERTENSION ASSOCIATED WITH DIABETES (HCC): ICD-10-CM

## 2024-03-28 DIAGNOSIS — I73.9 PERIPHERAL VASCULAR DISEASE (HCC): ICD-10-CM

## 2024-03-28 DIAGNOSIS — E11.59 HYPERTENSION ASSOCIATED WITH DIABETES (HCC): ICD-10-CM

## 2024-03-28 DIAGNOSIS — E78.5 HYPERLIPIDEMIA DUE TO TYPE 2 DIABETES MELLITUS (HCC): ICD-10-CM

## 2024-03-28 PROCEDURE — 3078F DIAST BP <80 MM HG: CPT | Performed by: NURSE PRACTITIONER

## 2024-03-28 PROCEDURE — 99214 OFFICE O/P EST MOD 30 MIN: CPT | Performed by: NURSE PRACTITIONER

## 2024-03-28 PROCEDURE — 3075F SYST BP GE 130 - 139MM HG: CPT | Performed by: NURSE PRACTITIONER

## 2024-03-28 RX ORDER — METHOCARBAMOL 500 MG/1
500 TABLET, FILM COATED ORAL 3 TIMES DAILY
Qty: 30 TABLET | Refills: 1 | Status: SHIPPED | OUTPATIENT
Start: 2024-03-28

## 2024-03-28 RX ORDER — METOPROLOL SUCCINATE 25 MG/1
25 TABLET, EXTENDED RELEASE ORAL
Qty: 100 TABLET | Refills: 3 | Status: SHIPPED | OUTPATIENT
Start: 2024-03-28

## 2024-03-28 RX ORDER — DAPAGLIFLOZIN 5 MG/1
5 TABLET, FILM COATED ORAL DAILY
Qty: 100 TABLET | Refills: 3 | Status: SHIPPED | OUTPATIENT
Start: 2024-03-28

## 2024-03-28 RX ORDER — CLOPIDOGREL BISULFATE 75 MG/1
75 TABLET ORAL DAILY
Qty: 100 TABLET | Refills: 3 | Status: SHIPPED | OUTPATIENT
Start: 2024-03-28

## 2024-03-28 RX ORDER — ATORVASTATIN CALCIUM 80 MG/1
80 TABLET, FILM COATED ORAL
Qty: 100 TABLET | Refills: 3 | Status: SHIPPED | OUTPATIENT
Start: 2024-03-28

## 2024-03-28 RX ORDER — SEMAGLUTIDE 1.34 MG/ML
1 INJECTION, SOLUTION SUBCUTANEOUS
Qty: 6 ML | Refills: 3 | Status: SHIPPED | OUTPATIENT
Start: 2024-03-28

## 2024-03-28 RX ORDER — LISINOPRIL 20 MG/1
20 TABLET ORAL DAILY
Qty: 100 TABLET | Refills: 3 | Status: SHIPPED | OUTPATIENT
Start: 2024-03-28

## 2024-03-28 ASSESSMENT — FIBROSIS 4 INDEX: FIB4 SCORE: 4.51

## 2024-03-28 NOTE — PROGRESS NOTES
Chief Complaint   Patient presents with    Medication Refill                                                                                                                                       HPI:   Chung presents today with the following.    The patient attended the consultation today to discuss concerns regarding their blood sugar levels and medication management.    The chief complaint presented by the patient is that their blood sugar has doubled and is now at 200 every morning. The patient is currently taking Farxiga daily and inquires about the possibility of adding Metformin to their regimen. They have not taken Metformin before and are informed about its potential side effects, including diarrhea, bloating, and gas. The patient expresses interest in trying a weekly injectable instead, such as Ozempic or Monjaro. They are currently on Trulicity 1.5 mg but are open to switching to Ozempic 1 mg due to availability issues with Trulicity.    Regarding their current medication regimen, the patient mentions that they have not had lab work done recently and are due for a check-up. They confirm that they are no longer taking Trental but are still taking metoprolol. The patient also mentions that they could use a refill of the muscle relaxer for back and neck pain. They are currently on Plavix, Farxiga, metoprolol, and Atorvastatin.    The patient has recently seen another healthcare provider who referred them to a pharmacy service that helps patients with diabetes and heart conditions obtain medications at a more affordable rate. However, they have not yet been contacted by this service. The patient also reports having difficulty receiving responses to their messages sent through the patient portal and is advised to ensure they are sending non-urgent medical questions rather than customer service inquiries.    The patient's concerns regarding their blood sugar levels and medication management have been  "discussed, with particular attention to the potential addition of Metformin or a weekly injectable, their current medication regimen, and the need for upcoming lab work and a check-up.    ROS:  All systems negative expect as addressed in assessment and plan.     /70 (BP Location: Left arm, Patient Position: Sitting, BP Cuff Size: Adult)   Pulse 83   Temp 36.4 °C (97.6 °F) (Temporal)   Resp 16   Ht 1.778 m (5' 10\")   Wt 79.4 kg (175 lb)   SpO2 96%   BMI 25.11 kg/m²     Objective:    Physical Exam  Vitals reviewed.   Constitutional:       Appearance: Normal appearance.   HENT:      Head: Normocephalic and atraumatic.      Mouth/Throat:      Mouth: Mucous membranes are moist.   Eyes:      Extraocular Movements: Extraocular movements intact.      Conjunctiva/sclera: Conjunctivae normal.   Pulmonary:      Effort: Pulmonary effort is normal.   Musculoskeletal:         General: Normal range of motion.      Cervical back: Normal range of motion.   Skin:     General: Skin is warm and dry.   Neurological:      General: No focal deficit present.      Mental Status: He is alert and oriented to person, place, and time.   Psychiatric:         Mood and Affect: Mood normal.         Behavior: Behavior normal.         Thought Content: Thought content normal.         Assessment and Plan:  74 y.o. male with the following issues.    Problem List Items Addressed This Visit       Type 2 diabetes mellitus with hyperglycemia, without long-term current use of insulin (HCC)    Relevant Medications    Semaglutide, 1 MG/DOSE, (OZEMPIC, 1 MG/DOSE,) 4 MG/3ML Solution Pen-injector    dapagliflozin propanediol (FARXIGA) 5 MG Tab    atorvastatin (LIPITOR) 80 MG tablet    Other Relevant Orders    Comp Metabolic Panel    HEMOGLOBIN A1C    Microalbuminuria due to type 2 diabetes mellitus (HCC)    Relevant Medications    Semaglutide, 1 MG/DOSE, (OZEMPIC, 1 MG/DOSE,) 4 MG/3ML Solution Pen-injector    dapagliflozin propanediol (FARXIGA) 5 MG " Tab    Other Relevant Orders    Comp Metabolic Panel    Atherosclerosis of aorta (HCC)    Relevant Medications    metoprolol SR (TOPROL XL) 25 MG TABLET SR 24 HR    lisinopril (PRINIVIL) 20 MG Tab    atorvastatin (LIPITOR) 80 MG tablet    Hypertension associated with diabetes (HCC)    Relevant Medications    Semaglutide, 1 MG/DOSE, (OZEMPIC, 1 MG/DOSE,) 4 MG/3ML Solution Pen-injector    metoprolol SR (TOPROL XL) 25 MG TABLET SR 24 HR    lisinopril (PRINIVIL) 20 MG Tab    dapagliflozin propanediol (FARXIGA) 5 MG Tab    atorvastatin (LIPITOR) 80 MG tablet    Other Relevant Orders    Comp Metabolic Panel    Other neutropenia (HCC)     Chronic.     Patient has not had CBC drawn since 12/2022.    Latest Reference Range & Units Most Recent   WBC 4.8 - 10.8 K/uL 4.1 (L)  12/29/22 06:21   RBC 4.70 - 6.10 M/uL 4.79  12/29/22 06:21   Hemoglobin 14.0 - 18.0 g/dL 14.5  12/29/22 06:21   Hematocrit 42.0 - 52.0 % 42.4  12/29/22 06:21   MCV 81.4 - 97.8 fL 88.5  12/29/22 06:21   MCH 27.0 - 33.0 pg 30.3  12/29/22 06:21   MCHC 33.7 - 35.3 g/dL 34.2  12/29/22 06:21   RDW 35.9 - 50.0 fL 42.9  12/29/22 06:21   Platelet Count 164 - 446 K/uL 123 (L)  12/29/22 06:22   MPV 9.0 - 12.9 fL 12.1  12/29/22 06:21   Neutrophils-Polys 44.00 - 72.00 % 34.90 (L)  12/29/22 06:21   Neutrophils (Absolute) 1.82 - 7.42 K/uL 1.42 (L)  12/29/22 06:21   Lymphocytes 22.00 - 41.00 % 52.10 (H)  12/29/22 06:21   Lymphs (Absolute) 1.00 - 4.80 K/uL 2.12  12/29/22 06:21   Monocytes 0.00 - 13.40 % 6.40  12/29/22 06:21   Monos (Absolute) 0.00 - 0.85 K/uL 0.26  12/29/22 06:21   Eosinophils 0.00 - 6.90 % 5.70  12/29/22 06:21   Eos (Absolute) 0.00 - 0.51 K/uL 0.23  12/29/22 06:21   Basophils 0.00 - 1.80 % 0.70  12/29/22 06:21   Baso (Absolute) 0.00 - 0.12 K/uL 0.03  12/29/22 06:21   Immature Granulocytes 0.00 - 0.90 % 0.20  12/29/22 06:21   Immature Granulocytes (abs) 0.00 - 0.11 K/uL 0.01  12/29/22 06:21   Nucleated RBC /100 WBC 0.00  12/29/22 06:21   NRBC (Absolute)  K/uL 0.00  12/29/22 06:21   RBC Morphology  Normal  10/19/12 12:08   Hypochromia  1+  1/9/13 08:50   (L): Data is abnormally low  (H): Data is abnormally high    This is most likely due to medication.     Will order CBC to monitor at least yearly.         Relevant Orders    CBC WITH DIFFERENTIAL    Hyperlipidemia due to type 2 diabetes mellitus (HCC)    Relevant Medications    Semaglutide, 1 MG/DOSE, (OZEMPIC, 1 MG/DOSE,) 4 MG/3ML Solution Pen-injector    metoprolol SR (TOPROL XL) 25 MG TABLET SR 24 HR    lisinopril (PRINIVIL) 20 MG Tab    dapagliflozin propanediol (FARXIGA) 5 MG Tab    atorvastatin (LIPITOR) 80 MG tablet    Other Relevant Orders    Comp Metabolic Panel    Lipid Profile    Vitamin D deficiency    Relevant Orders    VITAMIN D,25 HYDROXY (DEFICIENCY)     Other Visit Diagnoses       Screening for thyroid disorder        Relevant Orders    FREE THYROXINE    TSH    Essential hypertension        Relevant Medications    metoprolol SR (TOPROL XL) 25 MG TABLET SR 24 HR    lisinopril (PRINIVIL) 20 MG Tab    atorvastatin (LIPITOR) 80 MG tablet    History of MI (myocardial infarction)        Relevant Medications    metoprolol SR (TOPROL XL) 25 MG TABLET SR 24 HR    Acute left-sided low back pain without sciatica        Relevant Medications    methocarbamol (ROBAXIN) 500 MG Tab    History of tobacco abuse        Relevant Medications    lisinopril (PRINIVIL) 20 MG Tab    Diabetes mellitus with peripheral vascular disease (HCC)        Relevant Medications    Semaglutide, 1 MG/DOSE, (OZEMPIC, 1 MG/DOSE,) 4 MG/3ML Solution Pen-injector    metoprolol SR (TOPROL XL) 25 MG TABLET SR 24 HR    lisinopril (PRINIVIL) 20 MG Tab    dapagliflozin propanediol (FARXIGA) 5 MG Tab    atorvastatin (LIPITOR) 80 MG tablet    Peripheral vascular disease (HCC)        Relevant Medications    metoprolol SR (TOPROL XL) 25 MG TABLET SR 24 HR    lisinopril (PRINIVIL) 20 MG Tab    atorvastatin (LIPITOR) 80 MG tablet    Cervical pain                  1. Type 2 Diabetes Mellitus:     - Plan: Discontinue Trulicity and initiate Ozempic 1 mg weekly due to availability issues. Patient to monitor for side effects and can switch back to Trulicity when available.     - Plan: Monitor blood sugar levels and adjust medications as needed.    2. Gastrointestinal side effects concern:     - Plan: Opt for Ozempic instead of Metformin to avoid gastrointestinal side effects.    3. Medication management:     - Plan: Continue current medications (Plavix, Farxiga, Metoprolol, Atorvastatin) and monitor for any changes or recommendations from the specialist.    4. Musculoskeletal pain:     - Plan: Prescribe muscle relaxer as needed for pain management.    5. Lab work:     - Plan: Order labs to check kidney function, liver function, vitamin D, and cholesterol levels.    6. Follow-up:     - Plan: Schedule a follow-up appointment in 6 months for a check-in and to monitor the patient's progress.    7. Communication:     - Plan: Encourage the patient to use the appropriate messaging option for non-urgent medical questions to ensure proper communication with the provider.    Return in about 6 months (around 9/28/2024) for Diabetes, HTN, High cholesterol.      Please note that this dictation was created using voice recognition software. I have worked with consultants from the vendor as well as technical experts from [employer] to optimize the interface. I have made every reasonable attempt to correct obvious errors, but I expect that there are errors of grammar and possibly content that I did not discover before finalizing the note.        Tarsorrhaphy Text: A tarsorrhaphy was performed using Frost sutures.

## 2024-03-29 NOTE — ASSESSMENT & PLAN NOTE
Chronic.     Patient has not had CBC drawn since 12/2022.    Latest Reference Range & Units Most Recent   WBC 4.8 - 10.8 K/uL 4.1 (L)  12/29/22 06:21   RBC 4.70 - 6.10 M/uL 4.79  12/29/22 06:21   Hemoglobin 14.0 - 18.0 g/dL 14.5  12/29/22 06:21   Hematocrit 42.0 - 52.0 % 42.4  12/29/22 06:21   MCV 81.4 - 97.8 fL 88.5  12/29/22 06:21   MCH 27.0 - 33.0 pg 30.3  12/29/22 06:21   MCHC 33.7 - 35.3 g/dL 34.2  12/29/22 06:21   RDW 35.9 - 50.0 fL 42.9  12/29/22 06:21   Platelet Count 164 - 446 K/uL 123 (L)  12/29/22 06:22   MPV 9.0 - 12.9 fL 12.1  12/29/22 06:21   Neutrophils-Polys 44.00 - 72.00 % 34.90 (L)  12/29/22 06:21   Neutrophils (Absolute) 1.82 - 7.42 K/uL 1.42 (L)  12/29/22 06:21   Lymphocytes 22.00 - 41.00 % 52.10 (H)  12/29/22 06:21   Lymphs (Absolute) 1.00 - 4.80 K/uL 2.12  12/29/22 06:21   Monocytes 0.00 - 13.40 % 6.40  12/29/22 06:21   Monos (Absolute) 0.00 - 0.85 K/uL 0.26  12/29/22 06:21   Eosinophils 0.00 - 6.90 % 5.70  12/29/22 06:21   Eos (Absolute) 0.00 - 0.51 K/uL 0.23  12/29/22 06:21   Basophils 0.00 - 1.80 % 0.70  12/29/22 06:21   Baso (Absolute) 0.00 - 0.12 K/uL 0.03  12/29/22 06:21   Immature Granulocytes 0.00 - 0.90 % 0.20  12/29/22 06:21   Immature Granulocytes (abs) 0.00 - 0.11 K/uL 0.01  12/29/22 06:21   Nucleated RBC /100 WBC 0.00  12/29/22 06:21   NRBC (Absolute) K/uL 0.00  12/29/22 06:21   RBC Morphology  Normal  10/19/12 12:08   Hypochromia  1+  1/9/13 08:50   (L): Data is abnormally low  (H): Data is abnormally high    This is most likely due to medication.     Will order CBC to monitor at least yearly.

## 2024-04-02 ENCOUNTER — TELEPHONE (OUTPATIENT)
Dept: HEALTH INFORMATION MANAGEMENT | Facility: OTHER | Age: 74
End: 2024-04-02
Payer: MEDICARE

## 2024-04-25 ENCOUNTER — OFFICE VISIT (OUTPATIENT)
Dept: MEDICAL GROUP | Facility: PHYSICIAN GROUP | Age: 74
End: 2024-04-25
Payer: MEDICARE

## 2024-04-25 VITALS — OXYGEN SATURATION: 100 % | BODY MASS INDEX: 25.05 KG/M2 | HEIGHT: 70 IN | WEIGHT: 175 LBS | HEART RATE: 94 BPM

## 2024-04-25 DIAGNOSIS — E11.65 TYPE 2 DIABETES MELLITUS WITH HYPERGLYCEMIA, WITHOUT LONG-TERM CURRENT USE OF INSULIN (HCC): ICD-10-CM

## 2024-04-25 ASSESSMENT — FIBROSIS 4 INDEX: FIB4 SCORE: 4.51

## 2024-04-25 NOTE — PROGRESS NOTES
"Patient Consult Note     TIME IN: 910am   TIME OUT: 934am       Primary care physician: LEVI Griggs    Reason for consult: Management of Controlled Type 2 Diabetes    HPI:  Chung Limon is a 74 y.o. old patient who comes in today for evaluation of above stated problem.    Allergies:  Patient has no known allergies.    Physical Examination:  Vital signs: Pulse 94   Ht 1.778 m (5' 10\")   Wt 79.4 kg (175 lb)   SpO2 100%   BMI 25.11 kg/m²  Body mass index is 25.11 kg/m².    Most Recent labs, A1c, and immunizations:    Lab Results   Component Value Date/Time    HBA1C 5.8 (A) 01/23/2023 1415    HBA1C 7.2 (H) 08/12/2022 0707    HBA1C 8.2 (H) 05/13/2022 0742    AVGLUC 160 08/12/2022 0707    AVGLUC 189 05/13/2022 0742    AVGLUC 128 09/28/2020 1338         Lab Results   Component Value Date/Time    CHOLSTRLTOT 103 08/12/2022 07:07 AM    LDL 46 08/12/2022 07:07 AM    LDL 47 09/29/2020 06:46 AM    LDL 53 06/27/2019 06:22 AM    LDL 53 06/27/2019 06:22 AM    HDL 27 (A) 08/12/2022 07:07 AM    TRIGLYCERIDE 152 (H) 08/12/2022 07:07 AM    TRIGLYCERIDE 123 09/29/2020 06:46 AM    TRIGLYCERIDE 116 06/27/2019 06:22 AM    TRIGLYCERIDE 111 06/27/2019 06:22 AM       Lab Results   Component Value Date/Time    SODIUM 142 12/29/2022 06:21 AM    POTASSIUM 4.1 12/29/2022 06:21 AM    CHLORIDE 107 12/29/2022 06:21 AM    CO2 23 12/29/2022 06:21 AM    GLUCOSE 135 (H) 12/29/2022 06:21 AM    GLUCOSE 154 (H) 08/12/2022 07:07 AM    GLUCOSE 117 (H) 09/29/2020 06:46 AM    BUN 22 12/29/2022 06:21 AM    CREATININE 0.96 12/29/2022 06:21 AM    CREATININE 0.95 08/12/2022 07:07 AM    CREATININE 0.87 09/29/2020 06:46 AM     Lab Results   Component Value Date/Time    ALKPHOSPHAT 69 12/29/2022 06:21 AM    ASTSGOT 75 (H) 12/29/2022 06:22 AM    ALTSGPT 103 (H) 12/29/2022 06:22 AM    TBILIRUBIN 0.9 12/29/2022 06:21 AM    INR 0.99 06/09/2017 02:07 PM    ALBUMIN 4.2 12/29/2022 06:21 AM      Lab Results   Component Value Date/Time    MALBCRT " 937 (H) 03/26/2024 08:42 AM    MALBCRT 483 (H) 08/12/2022 07:07 AM    MALBCRT 902 (H) 05/13/2022 07:42 AM    MICROALBUR 62.2 03/26/2024 08:42 AM    MICROALBUR 66.5 08/12/2022 07:07 AM    MICROALBUR 98.0 05/13/2022 07:42 AM     Immunization History   Administered Date(s) Administered    Influenza Vaccine Adult HD 10/28/2015, 11/15/2016, 01/04/2021, 12/02/2021, 11/28/2022    Influenza Vaccine Quad Inj (Pf) 12/02/2014    PFIZER BIVALENT SARS-COV-2 VACCINE (12+) 12/02/2022    PFIZER PURPLE CAP SARS-COV-2 VACCINATION (12+) 03/17/2021, 04/08/2021, 12/02/2021    Pneumococcal Conjugate Vaccine (Prevnar/PCV-13) 01/14/2016    Pneumococcal polysaccharide vaccine (PPSV-23) 12/02/2014    Tdap Vaccine 12/02/2014    Zoster Vaccine Live (ZVL) (Zostavax) - HISTORICAL DATA 11/15/2016         Medications:    Current Outpatient Medications:     Dulaglutide, 0.5 mL, Subcutaneous, Q7 DAYS    dapagliflozin propanediol, 5 mg, Oral, DAILY, Taking    metoprolol SR, 25 mg, Oral, QDAY    methocarbamol, 500 mg, Oral, TID    lisinopril, 20 mg, Oral, DAILY    clopidogrel, 75 mg, Oral, DAILY    atorvastatin, 80 mg, Oral, QDAY    ibuprofen, TAKE ONE TABLET BY MOUTH EVERY 8 HOURS AS NEEDED    sennosides-docusate sodium, 1 Tablet, Oral, DAILY    Contour Next Test, USE TO TEST BLOOD SUGAR ONCE DAILY EARLY MORNING BEFORE FIRST MEAL OF DAY    azelastine, 1 Drop, Left Eye, BID (Patient not taking: Reported on 3/28/2024)    VITAMIN D PO, Take  by mouth.    Multiple Vitamins-Minerals (CENTRUM SILVER PO), Take  by mouth.    Blood Glucose Meter Kit, Test blood sugar as recommended by provider.  Per insurance preference, blood glucose monitoring kit.    Blood Glucose Test Strips, Use one per insurance preference, strip to test blood sugar once daily early morning before first meal.    Lancets, Use one per insurance preference, lancet to test blood sugar once daily early morning before first meal.    Alcohol Swabs, Wipe site with prep pad prior to injection.     B Complex Vitamins (B COMPLEX PO), Take  by mouth.    aspirin, 81 mg, Oral, DAILY    Assessment and Plan:  1. DM2   REC score     Most recent A1c is: at goal, but due for repeat A1c  Previous DM medications and outcomes and/or SEs:  Ozempic severe GI pains, N/V/C  Is Medication cost affordable: y  Kidney function:   Latest Reference Range & Units 08/12/22 07:07 12/29/22 06:21   Creatinine 0.50 - 1.40 mg/dL 0.95 0.96   GFR (CKD-EPI) >60 mL/min/1.73 m 2 84 83     SMBG:  AM:130  Before meals:-  PM: -  Any hypoglycemia: only once   15:15 Rule discussed with patient            Medication(s) recommended after today's visit:   Continue Farxiga 5mg daily   Declined increase to 10 mg  Restart Trulicity 0.75 mg every 7 days (when available in the pharmacy)  He discontinued his Ozempic after 1 dose  Probably not a good candidate for Mounjaro  Declined metformin    2.  Cardiovascular  Is LDL <70:y, but due for labs      Medication(s) recommended.   Statin: Continue atorvastatin 80 mg once daily  ACE/ARB: Continue lisinopril 20 mg once daily    3.  Lifestyle changes   Focus on eating a DASH/Mediterranean-style diet.   Exercise 30 minutes daily at least 5 days/week, as tolerated.  https://www.niddk.nih.gov/bwp    https://www.nhlbi.nih.gov/education/dash-eating-plan        Follow-up appointment with PCP  He will get labs prior to her appointment tomorrow, as he is not fasting today      Zach Drew, PharmD, MS, BCACP, [facility]  Phone [phone] fax [phone]    This note was created using voice recognition software (Dragon). The accuracy of the dictation is limited by the abilities of the software. I have reviewed the note prior to signing, however some errors in grammar and context are still possible. If you have any questions related to this note please do not hesitate to contact our office.     CC:   Dana Mendenhall, A.P.RDalila Banks, A.P.R*  Dr. Calvert  Robert    Pt info if needed:               Level 1 hypoglycemia is defined as a glucose  less than 70 mg/dL but greater than 54 mg/dL   This serves to alert patients to have carbohydrate intake in order to prevent progression to level 2.  Level 2 hypoglycemia is defined as a glucose less than 54 mg/dL and is associated with an increased risk of cognitive dysfunction and mortality.   When the glucose concentration decreases to less than 54 mg/dL (level 2 hypoglycemia), neuroglycopenic symptoms begin to manifest, requiring immediate action to resolve the hypoglycemic event. These symptoms include, but are not limited to, impairments in reaction times, information processing, psychomotor function, and executive function   If a patient experiences level 2 hypoglycemia without neurogenic or neuroglycopenic symptoms, it is likely indicative of hypoglycemia unawareness  Level 3 hypoglycemia is not defined by a value; rather, it is considered a “severe event” that is associated with altered mental or physical status and requires assistance from another person for treatment.    Recommends 15-20 g of simple carbohydrates for conscious individuals with a blood glucose less than 70 mg/dL (Box 1). If blood glucose continues to be below 70 mg/dL after 15 minutes, the treatment should   be repeated. This is called the rule of 15 (CDC 2022). Once the blood glucose is trending upward, the individual should consume a meal or snack to prevent recurrence of hypoglycemia. Patients should avoid fat content because it slows the absorption of glucose (e.g., using nonfat milk instead of whole milk and avoiding candy bars such as those containing chocolate and other additives).       CGM coverage is  Medicare. Currently, CGM technology is covered under Part B benefits (i.e., durable medical equipment). Historically, this coverage was limited to patients with T1D or T2D on 3 or more insulin injections per day. During the coronavirus disease  2019 pandemic, Medicare lifted the requirement for BGM testing requirements (3-4 times per day for 3 months). Most recently, in 2023, the insulin requirement for individuals with T2D was decreased to only once daily, and further expansion of coverage makes an exception for patients not on insulin but who have a history of significant hypoglycemia (Greyson, 2023). Problematic hypoglycemia has been defined as level 2 hypoglycemia (recurrent events with glucose levels less than 54 mg/dL that persist after 2 or more attempts to adjust medication) or level 3 hypoglycemia (1 event with glucose levels less than 54 mg/dL with altered mental and/or physical state that requires third-party assistance). Patients must continue to use durable medical supply companies--not pharmacies--and may be responsible for 20% of the cost of the copay ([employer] for Medicare & Medicaid Services 2024).                    -Blood glucose and A1c target          The A1c goal for individuals with diabetes varies based on age, overall health, and other individual factors. Here are the A1c goals recommended by some of the leading medical organizations:  American Diabetes Association (ADA):  General recommendation: <7% for most adults with diabetes  Individualized recommendations:  Children and adolescents: <7.5%  Older adults: <7.5-8%, if they can tolerate it without significant side effects  People with a history of severe hypoglycemia or limited life expectancy: <8%  American Association of Clinical Endocrinologists (AACE):  General recommendation: <6.5% for most adults with diabetes  Individualized recommendations:  Older adults: <7.5%  People with a history of severe hypoglycemia, limited life expectancy, or multiple comorbidities: <8%  American Geriatrics Society (AGS):  General recommendation: <8% for most older adults with diabetes  Individualized recommendations:  Frail older adults: <8.5-9%  Older adults with limited life expectancy or  multiple comorbidities: <8.5-9%  It's worth noting that A1c goals are just one part of diabetes management and should be individualized based on each patient's unique needs and circumstances. Healthcare providers may consider factors such as age, health status, comorbidities, medication regimen, and patient preferences when setting A1c goals.    Testing and CGM if applicable   CGM typically aim for a time in range or  mg/dL at least 70% of the time.   The goal is to limit the time spent in low range (less than 70 mg/dL) to less than 4%   and the time spent in very low range (less than 54 mg/dL) to less than 1%.

## 2024-04-26 ENCOUNTER — APPOINTMENT (OUTPATIENT)
Dept: MEDICAL GROUP | Facility: PHYSICIAN GROUP | Age: 74
End: 2024-04-26
Payer: MEDICARE

## 2024-04-26 ENCOUNTER — HOSPITAL ENCOUNTER (OUTPATIENT)
Dept: LAB | Facility: MEDICAL CENTER | Age: 74
End: 2024-04-26
Attending: NURSE PRACTITIONER
Payer: MEDICARE

## 2024-04-26 DIAGNOSIS — Z13.29 SCREENING FOR THYROID DISORDER: ICD-10-CM

## 2024-04-26 DIAGNOSIS — D69.6 THROMBOCYTOPENIA (HCC): ICD-10-CM

## 2024-04-26 DIAGNOSIS — I65.22 CAROTID ARTERY STENOSIS, ASYMPTOMATIC, LEFT: ICD-10-CM

## 2024-04-26 DIAGNOSIS — I74.09 CHRONIC DISTAL AORTIC OCCLUSION (HCC): ICD-10-CM

## 2024-04-26 DIAGNOSIS — E11.69 HYPERLIPIDEMIA DUE TO TYPE 2 DIABETES MELLITUS (HCC): ICD-10-CM

## 2024-04-26 DIAGNOSIS — E78.5 HYPERLIPIDEMIA DUE TO TYPE 2 DIABETES MELLITUS (HCC): ICD-10-CM

## 2024-04-26 DIAGNOSIS — I15.2 HYPERTENSION ASSOCIATED WITH DIABETES (HCC): ICD-10-CM

## 2024-04-26 DIAGNOSIS — I70.0 ATHEROSCLEROSIS OF AORTA (HCC): ICD-10-CM

## 2024-04-26 DIAGNOSIS — E11.59 HYPERTENSION ASSOCIATED WITH DIABETES (HCC): ICD-10-CM

## 2024-04-26 DIAGNOSIS — E11.51 TYPE 2 DIABETES MELLITUS WITH DIABETIC PERIPHERAL ANGIOPATHY WITHOUT GANGRENE, WITHOUT LONG-TERM CURRENT USE OF INSULIN (HCC): ICD-10-CM

## 2024-04-26 DIAGNOSIS — E55.9 VITAMIN D DEFICIENCY: ICD-10-CM

## 2024-04-26 DIAGNOSIS — I73.9 CLAUDICATION (HCC): ICD-10-CM

## 2024-04-26 DIAGNOSIS — I74.5 BILATERAL ILIAC ARTERY OCCLUSION (HCC): ICD-10-CM

## 2024-04-26 DIAGNOSIS — I73.9 PERIPHERAL VASCULAR DISEASE (HCC): ICD-10-CM

## 2024-04-26 LAB
25(OH)D3 SERPL-MCNC: 61 NG/ML (ref 30–100)
ALBUMIN SERPL BCP-MCNC: 3.9 G/DL (ref 3.2–4.9)
ALBUMIN/GLOB SERPL: 1.2 G/DL
ALP SERPL-CCNC: 82 U/L (ref 30–99)
ALT SERPL-CCNC: 128 U/L (ref 2–50)
ANION GAP SERPL CALC-SCNC: 12 MMOL/L (ref 7–16)
AST SERPL-CCNC: 137 U/L (ref 12–45)
BASOPHILS # BLD AUTO: 1 % (ref 0–1.8)
BASOPHILS # BLD: 0.04 K/UL (ref 0–0.12)
BILIRUB SERPL-MCNC: 0.5 MG/DL (ref 0.1–1.5)
BUN SERPL-MCNC: 19 MG/DL (ref 8–22)
CALCIUM ALBUM COR SERPL-MCNC: 8.9 MG/DL (ref 8.5–10.5)
CALCIUM SERPL-MCNC: 8.8 MG/DL (ref 8.5–10.5)
CHLORIDE SERPL-SCNC: 108 MMOL/L (ref 96–112)
CHOLEST SERPL-MCNC: 102 MG/DL (ref 100–199)
CO2 SERPL-SCNC: 22 MMOL/L (ref 20–33)
CREAT SERPL-MCNC: 0.89 MG/DL (ref 0.5–1.4)
CREAT UR-MCNC: 104.46 MG/DL
EOSINOPHIL # BLD AUTO: 0.27 K/UL (ref 0–0.51)
EOSINOPHIL NFR BLD: 6.5 % (ref 0–6.9)
ERYTHROCYTE [DISTWIDTH] IN BLOOD BY AUTOMATED COUNT: 42.3 FL (ref 35.9–50)
EST. AVERAGE GLUCOSE BLD GHB EST-MCNC: 148 MG/DL
GFR SERPLBLD CREATININE-BSD FMLA CKD-EPI: 89 ML/MIN/1.73 M 2
GLOBULIN SER CALC-MCNC: 3.2 G/DL (ref 1.9–3.5)
GLUCOSE SERPL-MCNC: 144 MG/DL (ref 65–99)
HBA1C MFR BLD: 6.8 % (ref 4–5.6)
HCT VFR BLD AUTO: 43.5 % (ref 42–52)
HDLC SERPL-MCNC: 34 MG/DL
HGB BLD-MCNC: 14.8 G/DL (ref 14–18)
IMM GRANULOCYTES # BLD AUTO: 0.01 K/UL (ref 0–0.11)
IMM GRANULOCYTES NFR BLD AUTO: 0.2 % (ref 0–0.9)
LDLC SERPL CALC-MCNC: 50 MG/DL
LYMPHOCYTES # BLD AUTO: 1.51 K/UL (ref 1–4.8)
LYMPHOCYTES NFR BLD: 36.6 % (ref 22–41)
MCH RBC QN AUTO: 29.5 PG (ref 27–33)
MCHC RBC AUTO-ENTMCNC: 34 G/DL (ref 32.3–36.5)
MCV RBC AUTO: 86.7 FL (ref 81.4–97.8)
MICROALBUMIN UR-MCNC: 50 MG/DL
MICROALBUMIN/CREAT UR: 479 MG/G (ref 0–30)
MONOCYTES # BLD AUTO: 0.31 K/UL (ref 0–0.85)
MONOCYTES NFR BLD AUTO: 7.5 % (ref 0–13.4)
NEUTROPHILS # BLD AUTO: 1.99 K/UL (ref 1.82–7.42)
NEUTROPHILS NFR BLD: 48.2 % (ref 44–72)
NRBC # BLD AUTO: 0 K/UL
NRBC BLD-RTO: 0 /100 WBC (ref 0–0.2)
PLATELET # BLD AUTO: 116 K/UL (ref 164–446)
PMV BLD AUTO: 11.7 FL (ref 9–12.9)
POTASSIUM SERPL-SCNC: 4.5 MMOL/L (ref 3.6–5.5)
PROT SERPL-MCNC: 7.1 G/DL (ref 6–8.2)
RBC # BLD AUTO: 5.02 M/UL (ref 4.7–6.1)
SODIUM SERPL-SCNC: 142 MMOL/L (ref 135–145)
T4 FREE SERPL-MCNC: 1.15 NG/DL (ref 0.93–1.7)
TRIGL SERPL-MCNC: 91 MG/DL (ref 0–149)
TSH SERPL DL<=0.005 MIU/L-ACNC: 2.88 UIU/ML (ref 0.38–5.33)
WBC # BLD AUTO: 4.1 K/UL (ref 4.8–10.8)

## 2024-04-26 PROCEDURE — 80053 COMPREHEN METABOLIC PANEL: CPT

## 2024-04-26 PROCEDURE — 84439 ASSAY OF FREE THYROXINE: CPT

## 2024-04-26 PROCEDURE — 82570 ASSAY OF URINE CREATININE: CPT

## 2024-04-26 PROCEDURE — 82306 VITAMIN D 25 HYDROXY: CPT

## 2024-04-26 PROCEDURE — 80061 LIPID PANEL: CPT

## 2024-04-26 PROCEDURE — 82043 UR ALBUMIN QUANTITATIVE: CPT

## 2024-04-26 PROCEDURE — 36415 COLL VENOUS BLD VENIPUNCTURE: CPT

## 2024-04-26 PROCEDURE — 83036 HEMOGLOBIN GLYCOSYLATED A1C: CPT

## 2024-04-26 PROCEDURE — 85025 COMPLETE CBC W/AUTO DIFF WBC: CPT

## 2024-04-26 PROCEDURE — 84443 ASSAY THYROID STIM HORMONE: CPT

## 2024-05-01 ENCOUNTER — APPOINTMENT (OUTPATIENT)
Dept: MEDICAL GROUP | Facility: PHYSICIAN GROUP | Age: 74
End: 2024-05-01
Payer: MEDICARE

## 2024-05-01 VITALS
HEIGHT: 70 IN | TEMPERATURE: 98.1 F | DIASTOLIC BLOOD PRESSURE: 80 MMHG | SYSTOLIC BLOOD PRESSURE: 138 MMHG | RESPIRATION RATE: 18 BRPM | OXYGEN SATURATION: 98 % | WEIGHT: 164 LBS | BODY MASS INDEX: 23.48 KG/M2 | HEART RATE: 83 BPM

## 2024-05-01 DIAGNOSIS — R80.9 MICROALBUMINURIA DUE TO TYPE 2 DIABETES MELLITUS (HCC): ICD-10-CM

## 2024-05-01 DIAGNOSIS — I74.09 CHRONIC DISTAL AORTIC OCCLUSION (HCC): ICD-10-CM

## 2024-05-01 DIAGNOSIS — E11.69 HYPERLIPIDEMIA DUE TO TYPE 2 DIABETES MELLITUS (HCC): ICD-10-CM

## 2024-05-01 DIAGNOSIS — I74.5 BILATERAL ILIAC ARTERY OCCLUSION (HCC): ICD-10-CM

## 2024-05-01 DIAGNOSIS — I73.9 PAD (PERIPHERAL ARTERY DISEASE) (HCC): ICD-10-CM

## 2024-05-01 DIAGNOSIS — I15.2 HYPERTENSION ASSOCIATED WITH DIABETES (HCC): ICD-10-CM

## 2024-05-01 DIAGNOSIS — E11.59 HYPERTENSION ASSOCIATED WITH DIABETES (HCC): ICD-10-CM

## 2024-05-01 DIAGNOSIS — E11.29 MICROALBUMINURIA DUE TO TYPE 2 DIABETES MELLITUS (HCC): ICD-10-CM

## 2024-05-01 DIAGNOSIS — I70.0 ATHEROSCLEROSIS OF AORTA (HCC): ICD-10-CM

## 2024-05-01 DIAGNOSIS — E78.5 HYPERLIPIDEMIA DUE TO TYPE 2 DIABETES MELLITUS (HCC): ICD-10-CM

## 2024-05-01 DIAGNOSIS — E55.9 VITAMIN D DEFICIENCY: ICD-10-CM

## 2024-05-01 DIAGNOSIS — E11.65 TYPE 2 DIABETES MELLITUS WITH HYPERGLYCEMIA, WITHOUT LONG-TERM CURRENT USE OF INSULIN (HCC): Primary | ICD-10-CM

## 2024-05-01 DIAGNOSIS — I73.9 CLAUDICATION (HCC): ICD-10-CM

## 2024-05-01 PROCEDURE — 99214 OFFICE O/P EST MOD 30 MIN: CPT | Performed by: NURSE PRACTITIONER

## 2024-05-01 PROCEDURE — 3075F SYST BP GE 130 - 139MM HG: CPT | Performed by: NURSE PRACTITIONER

## 2024-05-01 PROCEDURE — 3079F DIAST BP 80-89 MM HG: CPT | Performed by: NURSE PRACTITIONER

## 2024-05-01 ASSESSMENT — FIBROSIS 4 INDEX: FIB4 SCORE: 7.83

## 2024-05-01 NOTE — PROGRESS NOTES
Chief Complaint   Patient presents with    Diabetes Follow-up                                                                                                                                       HPI:   Chung presents today with the following.    The patient attended the consultation today to discuss concerns regarding medication management and side effects, as well as to provide an update on his vascular health issues.    The patient reports experiencing side effects from Ozempic, leading to a switch to Trulicity. He expresses concerns about obtaining a consistent supply of Trulicity due to shortages, mentioning difficulties in securing a 90-day supply. He is currently prescribed Trulicity at a dose of 0.75 mg, adjusted from 1.5 mg to mitigate side effects and allow his body to adjust after discontinuing Ozempic. The patient also mentions receiving a confusing call from [facility] regarding a prescription, which he did not understand and plans to discuss with his pharmacy.    The patient is concerned about his medication costs, noting that one month's supply of Trulicity costs about $1,500 without insurance, which he finds prohibitive. He is aware that insurance coverage is limited to specific diagnoses, primarily type 2 diabetes, and mentions that some people pay cash for Trulicity for weight loss purposes.    Additionally, the patient discusses his vascular health issues, including a significant plaque buildup in the artery feeding his lower extremities, leading to reduced blood flow and the necessity of a blood thinner. He describes the potential for a surgical intervention but notes the high risks and costs associated with such procedures. The patient is advised to maintain physical activity to help manage his condition.    The patient's current health management strategies and concerns have been discussed, with particular attention to his medication management, side effects, insurance coverage, and  "vascular health issues.    ROS:  All systems negative expect as addressed in assessment and plan.     /80 (BP Location: Left arm, Patient Position: Sitting)   Pulse 83   Temp 36.7 °C (98.1 °F) (Temporal)   Resp 18   Ht 1.778 m (5' 10\")   Wt 74.4 kg (164 lb)   SpO2 98%   BMI 23.53 kg/m²     Objective:    Physical Exam  Vitals reviewed.   Constitutional:       Appearance: Normal appearance.   HENT:      Head: Normocephalic and atraumatic.      Mouth/Throat:      Mouth: Mucous membranes are moist.   Eyes:      Extraocular Movements: Extraocular movements intact.      Conjunctiva/sclera: Conjunctivae normal.   Pulmonary:      Effort: Pulmonary effort is normal.   Musculoskeletal:         General: Normal range of motion.      Cervical back: Normal range of motion.   Skin:     General: Skin is warm and dry.   Neurological:      General: No focal deficit present.      Mental Status: He is alert and oriented to person, place, and time.   Psychiatric:         Mood and Affect: Mood normal.         Behavior: Behavior normal.         Thought Content: Thought content normal.        Latest Reference Range & Units 04/26/24 06:35   Sodium 135 - 145 mmol/L 142   Potassium 3.6 - 5.5 mmol/L 4.5   Chloride 96 - 112 mmol/L 108   Co2 20 - 33 mmol/L 22   Anion Gap 7.0 - 16.0  12.0   Glucose 65 - 99 mg/dL 144 (H)   Bun 8 - 22 mg/dL 19   Creatinine 0.50 - 1.40 mg/dL 0.89   GFR (CKD-EPI) >60 mL/min/1.73 m 2 89   Calcium 8.5 - 10.5 mg/dL 8.8   Correct Calcium 8.5 - 10.5 mg/dL 8.9   AST(SGOT) 12 - 45 U/L 137 (H)   ALT(SGPT) 2 - 50 U/L 128 (H)   Alkaline Phosphatase 30 - 99 U/L 82   Total Bilirubin 0.1 - 1.5 mg/dL 0.5   Albumin 3.2 - 4.9 g/dL 3.9   Total Protein 6.0 - 8.2 g/dL 7.1   Globulin 1.9 - 3.5 g/dL 3.2   A-G Ratio g/dL 1.2   Glycohemoglobin 4.0 - 5.6 % 6.8 (H)   Estim. Avg Glu mg/dL 148   Cholesterol,Tot 100 - 199 mg/dL 102   Triglycerides 0 - 149 mg/dL 91   HDL >=40 mg/dL 34 !   LDL <100 mg/dL 50   Micro Alb Creat Ratio 0 " - 30 mg/g 479 (H)   Creatinine, Urine mg/dL 104.46   Microalbumin, Urine Random mg/dL 50.0   25-Hydroxy   Vitamin D 25 30 - 100 ng/mL 61   TSH 0.380 - 5.330 uIU/mL 2.880   Free T-4 0.93 - 1.70 ng/dL 1.15   (H): Data is abnormally high  !: Data is abnormal    Diagnostic Test Results:  -Results discussed with patient in office.      Assessment and Plan:  74 y.o. male with the following issues.    1. Type 2 diabetes mellitus with hyperglycemia, without long-term current use of insulin ([facility])  - Dulaglutide 0.75 MG/0.5ML Solution Pen-injector; Inject 0.5 mL under the skin every 7 days.  Dispense: 6 mL; Refill: 3    2. Atherosclerosis of aorta ([facility])    3. 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[facility])    4. Chronic distal aortic occlusion ([facility])    5. Claudication ([facility])    6. Hyperlipidemia due to type 2 diabetes mellitus ([facility])    7. Hypertension associated with diabetes ([facility])    8. Microalbuminuria due to type 2 diabetes mellitus ([facility])    9. PAD (peripheral artery disease) ([facility])    10. Vitamin D deficiency      1. Type 2 Diabetes Mellitus:     - Assessment: Patient switched from Ozempic to Trulicity due to side effects and shortages. Currently on Trulicity 0.75 mg for one month to allow Ozempic to clear from the system. A1C at 6.8, blood sugar well-controlled.     - Plan:          a. Continue Trulicity 0.75 mg for one month.          b. Reevaluate after one month and consider increasing the dose to 1.5 mg if needed and tolerated.    2. Chronic Kidney Disease:     - Assessment: High urine protein level of 937, improved to almost half in a month. Patient on Farxiga and lisinopril for kidney function improvement and protection.     - Plan:          a. Continue Farxiga and lisinopril as prescribed.          b. Monitor kidney function and urine protein levels regularly.    3. Peripheral Artery Disease:     - Assessment: Plaque buildup in the artery of the hip, causing reduced blood flow to lower extremities. Patient on blood thinner  for vascular reasons.     - Plan:          a. Continue blood thinner as prescribed.          b. Encourage patient to maintain physical activity to promote blood flow.          c. Schedule follow-up appointment in six months (September 30th) to monitor the condition.    4. Colonoscopy:     - Assessment: Patient has a history of polyps, unclear if precancerous. Last colonoscopy was 4-5 years ago.     - Plan:          a. Review patient's records to determine if polyps were precancerous.          b. If precancerous, schedule a colonoscopy for further evaluation.    5. Disability Placard:     - Assessment: Patient has difficulty walking due to reduced blood flow in lower extremities.     - Plan:          a. Complete and provide the patient with the [place] disability placard form.          b. Instruct the patient to submit the form to the DMV for processing.    Return in about 5 months (around 9/30/2024) for Chronic Health Conditions.      Please note that this dictation was created using voice recognition software. I have worked with consultants from the vendor as well as technical experts from [facility] to optimize the interface. I have made every reasonable attempt to correct obvious errors, but I expect that there are errors of grammar and possibly content that I did not discover before finalizing the note.

## 2024-06-06 DIAGNOSIS — M54.2 CERVICAL PAIN: ICD-10-CM

## 2024-06-12 RX ORDER — IBUPROFEN 800 MG/1
TABLET ORAL
Qty: 100 TABLET | Refills: 0 | Status: SHIPPED | OUTPATIENT
Start: 2024-06-12

## 2024-07-09 ENCOUNTER — DOCUMENTATION (OUTPATIENT)
Dept: HEALTH INFORMATION MANAGEMENT | Facility: OTHER | Age: 74
End: 2024-07-09
Payer: MEDICARE

## 2024-08-02 ENCOUNTER — OFFICE VISIT (OUTPATIENT)
Dept: URGENT CARE | Facility: PHYSICIAN GROUP | Age: 74
End: 2024-08-02
Payer: MEDICARE

## 2024-08-02 VITALS
HEART RATE: 68 BPM | DIASTOLIC BLOOD PRESSURE: 60 MMHG | BODY MASS INDEX: 24.79 KG/M2 | SYSTOLIC BLOOD PRESSURE: 110 MMHG | OXYGEN SATURATION: 96 % | HEIGHT: 70 IN | WEIGHT: 173.2 LBS | RESPIRATION RATE: 16 BRPM | TEMPERATURE: 98.3 F

## 2024-08-02 DIAGNOSIS — J01.00 ACUTE NON-RECURRENT MAXILLARY SINUSITIS: ICD-10-CM

## 2024-08-02 DIAGNOSIS — H66.002 NON-RECURRENT ACUTE SUPPURATIVE OTITIS MEDIA OF LEFT EAR WITHOUT SPONTANEOUS RUPTURE OF TYMPANIC MEMBRANE: ICD-10-CM

## 2024-08-02 PROCEDURE — 3074F SYST BP LT 130 MM HG: CPT

## 2024-08-02 PROCEDURE — 3078F DIAST BP <80 MM HG: CPT

## 2024-08-02 PROCEDURE — 99214 OFFICE O/P EST MOD 30 MIN: CPT

## 2024-08-02 PROCEDURE — 1125F AMNT PAIN NOTED PAIN PRSNT: CPT

## 2024-08-02 RX ORDER — FLUTICASONE PROPIONATE 50 MCG
2 SPRAY, SUSPENSION (ML) NASAL DAILY
Qty: 16 G | Refills: 0 | Status: SHIPPED | OUTPATIENT
Start: 2024-08-02

## 2024-08-02 ASSESSMENT — FIBROSIS 4 INDEX: FIB4 SCORE: 7.83

## 2024-08-02 ASSESSMENT — PAIN SCALES - GENERAL: PAINLEVEL: 5=MODERATE PAIN

## 2024-08-02 ASSESSMENT — ENCOUNTER SYMPTOMS
FEVER: 0
SORE THROAT: 0
COUGH: 0

## 2024-08-02 NOTE — PROGRESS NOTES
Subjective:     CHIEF COMPLAINT  Chief Complaint   Patient presents with    Sinusitis     X 3 days       HPI  BI CHOI is a very pleasant 74 y.o. male who presents with left-sided maxillary facial pain, pressure behind the left eye, and throbbing in the left ear that has been present for the past 3 days.  He reports nasal congestion with minimal mucus production.  He has tried using Afrin with temporary improvement in symptoms.  He has not had any fevers or fatigue.  He denies any recent sick contacts.       REVIEW OF SYSTEMS  Review of Systems   Constitutional:  Negative for fever and malaise/fatigue.   HENT:  Positive for congestion and ear pain. Negative for sore throat.    Respiratory:  Negative for cough.        PAST MEDICAL HISTORY  Patient Active Problem List    Diagnosis Date Noted    Senile purpura (HCC) 03/26/2024    Prostate nodule 08/28/2023    Constipation due to outlet dysfunction 08/01/2023    Vitamin D deficiency 04/21/2022    Thrombocytopenia (HCC) 03/15/2022    Hyperlipidemia due to type 2 diabetes mellitus (HCC) 03/15/2022    Degenerative disc disease, lumbar 03/15/2022    Nonrheumatic aortic valve stenosis 03/02/2022    Chronic bilateral thoracic back pain 01/04/2021    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HCC) 09/21/2020    Chronic distal aortic occlusion (HCC) 09/21/2020    Carotid artery stenosis, asymptomatic, left 09/21/2020    BMI 23.0-23.9, adult 05/12/2020    Transaminitis 05/12/2020    Alcohol dependence in remission (HCC) 04/19/2019    Cervical radiculopathy, chronic 04/05/2018    Encounter for colorectal cancer screening 04/05/2018    Hypertension associated with diabetes (HCC) 04/05/2018    Chronic hepatitis C without hepatic coma (HCC) 05/18/2017    Atherosclerosis of aorta (HCC) 01/31/2017    Type 2 diabetes mellitus with hyperglycemia, without long-term current use of insulin (HCC) 11/15/2016    Microalbuminuria due to type 2 diabetes mellitus (HCC) 11/15/2016     Stress-induced cardiomyopathy 03/10/2015    PAD (peripheral artery disease) (HCC) 2015    Claudication (HCC) 10/22/2012       SURGICAL HISTORY   has a past surgical history that includes recovery (10/22/2012); wide excision (2014); flap graft (2014); colonoscopy (2017); and mass excision general (Right, 2019).    ALLERGIES  Allergies   Allergen Reactions    Ozempic (0.25 Or 0.5 Mg-Dose) [Semaglutide]      Severe constipation        CURRENT MEDICATIONS  Home Medications       Reviewed by Colleen Pascal P.A.-C. (Physician Assistant) on 24 at 0855  Med List Status: <None>     Medication Last Dose Status   Alcohol Swabs Taking Active   aspirin (ASA) 81 MG Chew Tab chewable tablet Taking Active   atorvastatin (LIPITOR) 80 MG tablet Taking Active   B Complex Vitamins (B COMPLEX PO) Taking Active   Blood Glucose Meter Kit Taking Active   Blood Glucose Test Strips Taking Active   clopidogrel (PLAVIX) 75 MG Tab Taking Active   dapagliflozin propanediol (FARXIGA) 5 MG Tab Taking Active   Dulaglutide 0.75 MG/0.5ML Solution Pen-injector Taking Active   glucose blood (CONTOUR NEXT TEST) strip Taking Active   ibuprofen (MOTRIN) 800 MG Tab Taking Active   Lancets Taking Active   lisinopril (PRINIVIL) 20 MG Tab Taking Active   methocarbamol (ROBAXIN) 500 MG Tab Taking Active   metoprolol SR (TOPROL XL) 25 MG TABLET SR 24 HR Taking Active   Multiple Vitamins-Minerals (CENTRUM SILVER PO) Taking Active   sennosides-docusate sodium (SENOKOT-S) 8.6-50 MG tablet Taking Active   VITAMIN D PO Taking Active                    SOCIAL HISTORY  Social History     Tobacco Use    Smoking status: Former     Current packs/day: 0.00     Average packs/day: 1 pack/day for 58.0 years (58.0 ttl pk-yrs)     Types: Cigarettes     Start date: 1960     Quit date: 2011     Years since quittin.9    Smokeless tobacco: Never    Tobacco comments:     avoid all tobacco products   Vaping Use    Vaping status: Never Used  "  Substance and Sexual Activity    Alcohol use: No     Alcohol/week: 0.0 oz     Comment: quit 1985, drank 2 times since    Drug use: Yes     Types: Inhaled, Marijuana    Sexual activity: Never       FAMILY HISTORY  Family History   Problem Relation Age of Onset    Heart Disease Father     Arthritis Mother     No Known Problems Sister     Cancer Brother         type unknown    Cancer Brother         skin     No Known Problems Maternal Grandmother     No Known Problems Maternal Grandfather     No Known Problems Paternal Grandmother     No Known Problems Paternal Grandfather     Diabetes Neg Hx     Hypertension Neg Hx     Stroke Neg Hx     Hyperlipidemia Neg Hx           Objective:     VITAL SIGNS: /60 (BP Location: Left arm, Patient Position: Sitting, BP Cuff Size: Adult)   Pulse 68   Temp 36.8 °C (98.3 °F) (Temporal)   Resp 16   Ht 1.778 m (5' 10\")   Wt 78.6 kg (173 lb 3.2 oz)   SpO2 96%   BMI 24.85 kg/m²     PHYSICAL EXAM  Physical Exam  Vitals reviewed.   Constitutional:       General: He is not in acute distress.     Appearance: Normal appearance. He is not ill-appearing or toxic-appearing.   HENT:      Head: Normocephalic and atraumatic.      Right Ear: Tympanic membrane, ear canal and external ear normal.      Left Ear: Ear canal and external ear normal.      Ears:      Comments: Left tympanic membrane is erythematous with a middle ear effusion     Nose: Septal deviation and congestion present.      Mouth/Throat:      Mouth: Mucous membranes are moist.      Pharynx: Posterior oropharyngeal erythema present. No oropharyngeal exudate.      Comments: Uvula midline  Eyes:      Conjunctiva/sclera: Conjunctivae normal.      Pupils: Pupils are equal, round, and reactive to light.   Cardiovascular:      Rate and Rhythm: Normal rate and regular rhythm.      Heart sounds: Murmur (Patient is aware of murmur) heard.   Pulmonary:      Effort: Pulmonary effort is normal. No respiratory distress.      Breath " sounds: Normal breath sounds. No stridor. No wheezing, rhonchi or rales.   Skin:     General: Skin is warm and dry.      Coloration: Skin is not pale.   Neurological:      General: No focal deficit present.      Mental Status: He is alert and oriented to person, place, and time.   Psychiatric:         Mood and Affect: Mood normal.         Assessment/Plan:     1. Non-recurrent acute suppurative otitis media of left ear without spontaneous rupture of tympanic membrane  - amoxicillin-clavulanate (AUGMENTIN) 875-125 MG Tab; Take 1 Tablet by mouth 2 times a day for 7 days.  Dispense: 14 Tablet; Refill: 0    2. Acute non-recurrent maxillary sinusitis  - fluticasone (FLONASE) 50 MCG/ACT nasal spray; Administer 2 Sprays into affected nostril(S) every day.  Dispense: 16 g; Refill: 0  -Tylenol over-the-counter as needed for discomfort  -Rest and hydrate  -Return to clinic if symptoms worsen or fail to resolve    MDM/Comments:  I have prepared for this visit by personally reviewing the patient's prevous medical records, vitals, and labs including: most recent GFR and CMP. Patient has a history of type 2 DM and HTN with a normal BP on physical exam. Most recent Creatinine of 0.89 and GFR of 89. Renal dosing of medication not indicated. Patient has stable vital signs and is non-toxic appearing. Discussed supportive care with hydration, rest, Tylenol as needed.  Recommend use of Flonase for congestion and possible allergy component of symptoms.    Discussed that sinus infections are typically viral, and given short duration of symptoms we cannot rule out a viral origin. Patient does have evidence of acute otitis media of the left ear on physical exam and has been initiated on Augmentin.  Patient demonstrated understanding of treatment plan at this time and will RTC if symptoms worsen or fail to resolve.       Differential diagnosis, natural history, supportive care, and indications for immediate follow-up discussed. All questions  answered. Patient agrees with the plan of care.    Follow-up as needed if symptoms worsen or fail to improve to PCP, Urgent care or Emergency Room.    I have personally reviewed prior external notes and test results pertinent to today's visit.  I have independently reviewed and interpreted all diagnostics ordered during this urgent care acute visit.   Discussed management options (risks,benefits, and alternatives to treatment). Pt expresses understanding and the treatment plan was agreed upon. Questions were encouraged and answered to pt's satisfaction.    Please note that this dictation was created using voice recognition software. I have made a reasonable attempt to correct obvious errors, but I expect that there are errors of grammar and possibly content that I did not discover before finalizing the note.

## 2024-09-30 ENCOUNTER — APPOINTMENT (OUTPATIENT)
Dept: MEDICAL GROUP | Facility: PHYSICIAN GROUP | Age: 74
End: 2024-09-30
Payer: MEDICARE

## 2024-10-01 ENCOUNTER — APPOINTMENT (OUTPATIENT)
Dept: MEDICAL GROUP | Facility: PHYSICIAN GROUP | Age: 74
End: 2024-10-01
Payer: MEDICARE

## 2024-10-01 ENCOUNTER — OFFICE VISIT (OUTPATIENT)
Dept: MEDICAL GROUP | Facility: PHYSICIAN GROUP | Age: 74
End: 2024-10-01
Payer: MEDICARE

## 2024-10-01 ENCOUNTER — HOSPITAL ENCOUNTER (OUTPATIENT)
Dept: LAB | Facility: MEDICAL CENTER | Age: 74
End: 2024-10-01
Attending: NURSE PRACTITIONER
Payer: MEDICARE

## 2024-10-01 VITALS
DIASTOLIC BLOOD PRESSURE: 60 MMHG | HEIGHT: 70 IN | BODY MASS INDEX: 24.77 KG/M2 | OXYGEN SATURATION: 97 % | SYSTOLIC BLOOD PRESSURE: 130 MMHG | HEART RATE: 64 BPM | WEIGHT: 173 LBS | TEMPERATURE: 97.9 F

## 2024-10-01 DIAGNOSIS — Z13.29 SCREENING FOR THYROID DISORDER: ICD-10-CM

## 2024-10-01 DIAGNOSIS — E11.69 HYPERLIPIDEMIA DUE TO TYPE 2 DIABETES MELLITUS (HCC): ICD-10-CM

## 2024-10-01 DIAGNOSIS — E78.5 HYPERLIPIDEMIA DUE TO TYPE 2 DIABETES MELLITUS (HCC): ICD-10-CM

## 2024-10-01 DIAGNOSIS — I73.9 CLAUDICATION (HCC): ICD-10-CM

## 2024-10-01 DIAGNOSIS — F17.211 NICOTINE DEPENDENCE, CIGARETTES, IN REMISSION: ICD-10-CM

## 2024-10-01 DIAGNOSIS — M54.6 CHRONIC BILATERAL THORACIC BACK PAIN: ICD-10-CM

## 2024-10-01 DIAGNOSIS — E11.51 DIABETES MELLITUS WITH PERIPHERAL VASCULAR DISEASE (HCC): ICD-10-CM

## 2024-10-01 DIAGNOSIS — R80.9 MICROALBUMINURIA DUE TO TYPE 2 DIABETES MELLITUS (HCC): ICD-10-CM

## 2024-10-01 DIAGNOSIS — I70.0 ATHEROSCLEROSIS OF AORTA (HCC): ICD-10-CM

## 2024-10-01 DIAGNOSIS — I65.22 CAROTID ARTERY STENOSIS, ASYMPTOMATIC, LEFT: ICD-10-CM

## 2024-10-01 DIAGNOSIS — I73.9 PERIPHERAL VASCULAR DISEASE (HCC): ICD-10-CM

## 2024-10-01 DIAGNOSIS — I15.2 HYPERTENSION ASSOCIATED WITH DIABETES (HCC): ICD-10-CM

## 2024-10-01 DIAGNOSIS — G89.29 CHRONIC BILATERAL THORACIC BACK PAIN: ICD-10-CM

## 2024-10-01 DIAGNOSIS — E11.59 HYPERTENSION ASSOCIATED WITH DIABETES (HCC): ICD-10-CM

## 2024-10-01 DIAGNOSIS — M54.50 ACUTE LEFT-SIDED LOW BACK PAIN WITHOUT SCIATICA: ICD-10-CM

## 2024-10-01 DIAGNOSIS — E11.65 TYPE 2 DIABETES MELLITUS WITH HYPERGLYCEMIA, WITHOUT LONG-TERM CURRENT USE OF INSULIN (HCC): ICD-10-CM

## 2024-10-01 DIAGNOSIS — D70.8 OTHER NEUTROPENIA (HCC): ICD-10-CM

## 2024-10-01 DIAGNOSIS — Z87.891 HISTORY OF TOBACCO ABUSE: ICD-10-CM

## 2024-10-01 DIAGNOSIS — I74.09 CHRONIC DISTAL AORTIC OCCLUSION (HCC): ICD-10-CM

## 2024-10-01 DIAGNOSIS — I73.9 PAD (PERIPHERAL ARTERY DISEASE) (HCC): ICD-10-CM

## 2024-10-01 DIAGNOSIS — E55.9 VITAMIN D DEFICIENCY: ICD-10-CM

## 2024-10-01 DIAGNOSIS — I10 ESSENTIAL HYPERTENSION: ICD-10-CM

## 2024-10-01 DIAGNOSIS — E11.29 MICROALBUMINURIA DUE TO TYPE 2 DIABETES MELLITUS (HCC): ICD-10-CM

## 2024-10-01 LAB
25(OH)D3 SERPL-MCNC: 46 NG/ML (ref 30–100)
ALBUMIN SERPL BCP-MCNC: 3.8 G/DL (ref 3.2–4.9)
ALBUMIN/GLOB SERPL: 1.2 G/DL
ALP SERPL-CCNC: 92 U/L (ref 30–99)
ALT SERPL-CCNC: 129 U/L (ref 2–50)
ANION GAP SERPL CALC-SCNC: 13 MMOL/L (ref 7–16)
AST SERPL-CCNC: 130 U/L (ref 12–45)
BASOPHILS # BLD AUTO: 0.8 % (ref 0–1.8)
BASOPHILS # BLD: 0.03 K/UL (ref 0–0.12)
BILIRUB SERPL-MCNC: 0.5 MG/DL (ref 0.1–1.5)
BUN SERPL-MCNC: 20 MG/DL (ref 8–22)
CALCIUM ALBUM COR SERPL-MCNC: 9.5 MG/DL (ref 8.5–10.5)
CALCIUM SERPL-MCNC: 9.3 MG/DL (ref 8.5–10.5)
CHLORIDE SERPL-SCNC: 106 MMOL/L (ref 96–112)
CHOLEST SERPL-MCNC: 87 MG/DL (ref 100–199)
CO2 SERPL-SCNC: 22 MMOL/L (ref 20–33)
CREAT SERPL-MCNC: 0.97 MG/DL (ref 0.5–1.4)
EOSINOPHIL # BLD AUTO: 0.26 K/UL (ref 0–0.51)
EOSINOPHIL NFR BLD: 7.1 % (ref 0–6.9)
ERYTHROCYTE [DISTWIDTH] IN BLOOD BY AUTOMATED COUNT: 43.8 FL (ref 35.9–50)
EST. AVERAGE GLUCOSE BLD GHB EST-MCNC: 134 MG/DL
GFR SERPLBLD CREATININE-BSD FMLA CKD-EPI: 81 ML/MIN/1.73 M 2
GLOBULIN SER CALC-MCNC: 3.3 G/DL (ref 1.9–3.5)
GLUCOSE SERPL-MCNC: 134 MG/DL (ref 65–99)
HBA1C MFR BLD: 6.3 % (ref 4–5.6)
HCT VFR BLD AUTO: 41.6 % (ref 42–52)
HDLC SERPL-MCNC: 27 MG/DL
HGB BLD-MCNC: 14 G/DL (ref 14–18)
IMM GRANULOCYTES # BLD AUTO: 0.01 K/UL (ref 0–0.11)
IMM GRANULOCYTES NFR BLD AUTO: 0.3 % (ref 0–0.9)
LDLC SERPL CALC-MCNC: 44 MG/DL
LYMPHOCYTES # BLD AUTO: 1.6 K/UL (ref 1–4.8)
LYMPHOCYTES NFR BLD: 43.8 % (ref 22–41)
MCH RBC QN AUTO: 30 PG (ref 27–33)
MCHC RBC AUTO-ENTMCNC: 33.7 G/DL (ref 32.3–36.5)
MCV RBC AUTO: 89.1 FL (ref 81.4–97.8)
MONOCYTES # BLD AUTO: 0.29 K/UL (ref 0–0.85)
MONOCYTES NFR BLD AUTO: 7.9 % (ref 0–13.4)
NEUTROPHILS # BLD AUTO: 1.46 K/UL (ref 1.82–7.42)
NEUTROPHILS NFR BLD: 40.1 % (ref 44–72)
NRBC # BLD AUTO: 0 K/UL
NRBC BLD-RTO: 0 /100 WBC (ref 0–0.2)
PLATELET # BLD AUTO: 87 K/UL (ref 164–446)
PLATELETS.RETICULATED NFR BLD AUTO: 9.7 % (ref 0.6–13.1)
PMV BLD AUTO: 12.3 FL (ref 9–12.9)
POTASSIUM SERPL-SCNC: 4.3 MMOL/L (ref 3.6–5.5)
PROT SERPL-MCNC: 7.1 G/DL (ref 6–8.2)
RBC # BLD AUTO: 4.67 M/UL (ref 4.7–6.1)
SODIUM SERPL-SCNC: 141 MMOL/L (ref 135–145)
T4 FREE SERPL-MCNC: 1.12 NG/DL (ref 0.93–1.7)
TRIGL SERPL-MCNC: 80 MG/DL (ref 0–149)
TSH SERPL-ACNC: 3.45 UIU/ML (ref 0.35–5.5)
WBC # BLD AUTO: 3.7 K/UL (ref 4.8–10.8)

## 2024-10-01 PROCEDURE — 3078F DIAST BP <80 MM HG: CPT

## 2024-10-01 PROCEDURE — 82306 VITAMIN D 25 HYDROXY: CPT

## 2024-10-01 PROCEDURE — 83036 HEMOGLOBIN GLYCOSYLATED A1C: CPT

## 2024-10-01 PROCEDURE — 85025 COMPLETE CBC W/AUTO DIFF WBC: CPT

## 2024-10-01 PROCEDURE — 80053 COMPREHEN METABOLIC PANEL: CPT

## 2024-10-01 PROCEDURE — 36415 COLL VENOUS BLD VENIPUNCTURE: CPT

## 2024-10-01 PROCEDURE — 85055 RETICULATED PLATELET ASSAY: CPT

## 2024-10-01 PROCEDURE — 84443 ASSAY THYROID STIM HORMONE: CPT

## 2024-10-01 PROCEDURE — 84439 ASSAY OF FREE THYROXINE: CPT

## 2024-10-01 PROCEDURE — 80061 LIPID PANEL: CPT

## 2024-10-01 PROCEDURE — 99214 OFFICE O/P EST MOD 30 MIN: CPT

## 2024-10-01 PROCEDURE — 3075F SYST BP GE 130 - 139MM HG: CPT

## 2024-10-01 RX ORDER — ATORVASTATIN CALCIUM 80 MG/1
80 TABLET, FILM COATED ORAL
Qty: 100 TABLET | Refills: 3 | Status: SHIPPED | OUTPATIENT
Start: 2024-10-01

## 2024-10-01 ASSESSMENT — ENCOUNTER SYMPTOMS
MYALGIAS: 1
BACK PAIN: 1

## 2024-10-01 ASSESSMENT — FIBROSIS 4 INDEX: FIB4 SCORE: 7.83

## 2024-10-04 ENCOUNTER — TELEPHONE (OUTPATIENT)
Dept: HEALTH INFORMATION MANAGEMENT | Facility: OTHER | Age: 74
End: 2024-10-04
Payer: MEDICARE

## 2024-10-21 ENCOUNTER — HOSPITAL ENCOUNTER (OUTPATIENT)
Dept: LAB | Facility: MEDICAL CENTER | Age: 74
End: 2024-10-21
Attending: STUDENT IN AN ORGANIZED HEALTH CARE EDUCATION/TRAINING PROGRAM
Payer: MEDICARE

## 2024-10-21 LAB — PSA SERPL-MCNC: 0.41 NG/ML (ref 0–4)

## 2024-10-21 PROCEDURE — 84153 ASSAY OF PSA TOTAL: CPT

## 2024-10-21 PROCEDURE — 36415 COLL VENOUS BLD VENIPUNCTURE: CPT

## 2024-11-01 ENCOUNTER — TELEPHONE (OUTPATIENT)
Dept: VASCULAR LAB | Facility: MEDICAL CENTER | Age: 74
End: 2024-11-01
Payer: MEDICARE

## 2024-11-01 DIAGNOSIS — M54.2 CERVICAL PAIN: ICD-10-CM

## 2024-11-01 NOTE — TELEPHONE ENCOUNTER
Received request via: Pharmacy    Was the patient seen in the last year in this department? Yes    Does the patient have an active prescription (recently filled or refills available) for medication(s) requested? No    Pharmacy Name: [facility]     Does the patient have [facility] and need 100-day supply? (This applies to ALL medications) Yes, quantity updated to 100 days

## 2024-11-01 NOTE — TELEPHONE ENCOUNTER
Called to confirm if this will be first time patient is Vascular Med provider and review if any recent testing completed or hospital admissions. No answer, left voicemail to call back.    Correct appointment type? YES   Referral attached to appointment? YES   New patient packet sent via ethology?  YES

## 2024-11-04 ENCOUNTER — APPOINTMENT (OUTPATIENT)
Dept: VASCULAR LAB | Facility: MEDICAL CENTER | Age: 74
End: 2024-11-04
Attending: FAMILY MEDICINE
Payer: MEDICARE

## 2024-11-04 ENCOUNTER — DOCUMENTATION (OUTPATIENT)
Dept: VASCULAR LAB | Facility: MEDICAL CENTER | Age: 74
End: 2024-11-04
Payer: MEDICARE

## 2024-11-04 NOTE — PROGRESS NOTES
BI CHOI has been referred for evaluation and management in the vascular care clinic.     Unfortunately patient was not seen today, clinical supervisor notes indicate declined to be seen citing co-pay expense.     We will be unable to take part in care until or unless patient makes an appointment for a face-to-face visit in our center  We will ask our  or medical assistant to call and reschedule     Pending further patient contact, we will defer all vascular care, including management of cardiovascular risk factors, to PCP and other members of the care team    Vascular Medicine Clinic   [facility]   [phone]

## 2024-11-04 NOTE — PROGRESS NOTES
[facility]      Per PARs, this pt doesn't want to pay any kind of copay for visits.  Pt refusing to be seen due to copay of $40.    Bharat Mejia, FreyaD

## 2024-11-07 ENCOUNTER — TELEPHONE (OUTPATIENT)
Dept: HEALTH INFORMATION MANAGEMENT | Facility: OTHER | Age: 74
End: 2024-11-07
Payer: MEDICARE

## 2024-11-07 RX ORDER — IBUPROFEN 800 MG/1
TABLET, FILM COATED ORAL
Qty: 100 TABLET | Refills: 0 | Status: SHIPPED | OUTPATIENT
Start: 2024-11-07

## 2024-11-27 ENCOUNTER — TELEPHONE (OUTPATIENT)
Dept: VASCULAR LAB | Facility: MEDICAL CENTER | Age: 74
End: 2024-11-27
Payer: MEDICARE

## 2024-11-27 NOTE — TELEPHONE ENCOUNTER
Spoke to patient in regarding NP appointment.    Any recent testing (labs or imaging) outside of [facility]?  No    Were any records requested?  No    New patient packet sent via [email or web address] or mail?  Yes    Is appointment virtual? No

## 2024-12-05 ENCOUNTER — OFFICE VISIT (OUTPATIENT)
Dept: VASCULAR LAB | Facility: MEDICAL CENTER | Age: 74
End: 2024-12-05
Attending: FAMILY MEDICINE
Payer: MEDICARE

## 2024-12-05 VITALS
WEIGHT: 165 LBS | BODY MASS INDEX: 23.62 KG/M2 | HEART RATE: 97 BPM | HEIGHT: 70 IN | DIASTOLIC BLOOD PRESSURE: 76 MMHG | SYSTOLIC BLOOD PRESSURE: 125 MMHG

## 2024-12-05 DIAGNOSIS — E78.5 HYPERLIPIDEMIA DUE TO TYPE 2 DIABETES MELLITUS (HCC): ICD-10-CM

## 2024-12-05 DIAGNOSIS — I74.5 BILATERAL ILIAC ARTERY OCCLUSION (HCC): ICD-10-CM

## 2024-12-05 DIAGNOSIS — I65.22 CAROTID ARTERY STENOSIS, ASYMPTOMATIC, LEFT: ICD-10-CM

## 2024-12-05 DIAGNOSIS — I70.0 ATHEROSCLEROSIS OF AORTA (HCC): ICD-10-CM

## 2024-12-05 DIAGNOSIS — E11.59 HYPERTENSION ASSOCIATED WITH DIABETES (HCC): ICD-10-CM

## 2024-12-05 DIAGNOSIS — I15.2 HYPERTENSION ASSOCIATED WITH DIABETES (HCC): ICD-10-CM

## 2024-12-05 DIAGNOSIS — E11.69 HYPERLIPIDEMIA DUE TO TYPE 2 DIABETES MELLITUS (HCC): ICD-10-CM

## 2024-12-05 DIAGNOSIS — N18.2 CHRONIC KIDNEY DISEASE (CKD) STAGE G2/A3, MILDLY DECREASED GLOMERULAR FILTRATION RATE (GFR) BETWEEN 60-89 ML/MIN/1.73 SQUARE METER AND ALBUMINURIA CREATININE RATIO GREATER THAN 300 MG/G: ICD-10-CM

## 2024-12-05 DIAGNOSIS — E11.29 TYPE 2 DIABETES MELLITUS WITH DIABETIC MICROALBUMINURIA, WITHOUT LONG-TERM CURRENT USE OF INSULIN (HCC): ICD-10-CM

## 2024-12-05 DIAGNOSIS — I74.09 CHRONIC DISTAL AORTIC OCCLUSION (HCC): ICD-10-CM

## 2024-12-05 DIAGNOSIS — R80.9 TYPE 2 DIABETES MELLITUS WITH DIABETIC MICROALBUMINURIA, WITHOUT LONG-TERM CURRENT USE OF INSULIN (HCC): ICD-10-CM

## 2024-12-05 DIAGNOSIS — I73.9 PAD (PERIPHERAL ARTERY DISEASE) (HCC): ICD-10-CM

## 2024-12-05 PROCEDURE — G2211 COMPLEX E/M VISIT ADD ON: HCPCS | Performed by: FAMILY MEDICINE

## 2024-12-05 PROCEDURE — 3078F DIAST BP <80 MM HG: CPT | Performed by: FAMILY MEDICINE

## 2024-12-05 PROCEDURE — 99204 OFFICE O/P NEW MOD 45 MIN: CPT | Performed by: FAMILY MEDICINE

## 2024-12-05 PROCEDURE — 3074F SYST BP LT 130 MM HG: CPT | Performed by: FAMILY MEDICINE

## 2024-12-05 PROCEDURE — 99212 OFFICE O/P EST SF 10 MIN: CPT

## 2024-12-05 RX ORDER — CILOSTAZOL 50 MG/1
50 TABLET ORAL 2 TIMES DAILY
Qty: 60 TABLET | Refills: 5 | Status: SHIPPED | OUTPATIENT
Start: 2024-12-05

## 2024-12-05 ASSESSMENT — FIBROSIS 4 INDEX: FIB4 SCORE: 9.87

## 2024-12-05 NOTE — PROGRESS NOTES
INITIAL VASCULAR MEDICINE CLINIC VISIT   12/05/24     BI CHOI is a 74 y.o. male referred for eval/med mgmt of PAD, est 12/2024  Referring provider: Ana Menchaca,*     Subjective      Last seen 7/2021, here to re-establish.  missed appt to reestablish in 11/2024.      Lower extremity PAD:  denies current CLTI including rest pain, nonhealing leg wounds, cold extremities, coloration changes   Location: bilat calves   Pain-free walking distance: 100-200  Maximum walking distance, prior to stopping due to symptoms: 400ft  Prior 6-min walking test: No  How long until pain subsides with rest: 2 min   Pertinent PAD pmxh:  Initial visit hx: reports progressive bilat calf pain with walking.  Reports cannot afford surgery and so has not pursued additional testing.  Noting progressive sx.    Tobacco hx:  reports that he quit smoking about 13 years ago. His smoking use included cigarettes. He started smoking about 64 years ago. He has a 58 pack-year smoking history. He has never used smokeless tobacco.  Prior imaging studies:  Yes, Details: RIMA 2022 was last   Current/ prior treatment:    Prescribed Exercise therapy: Yes, Details: walks >30min most days    GDMT: yes    Surg Interventions: Yes, Details: 2012 - aorto/angiogram, no interventions, deemed candidate for Ao-bifem BPG per Dr. Lee     Other established ASCVD: carotid artery athero, aortic athero     HTN: Stable, tolerating meds with good adherence, Home BP log: not checking   HLD: Stable, current treatment: High intensity statin - tolerating, good adherence   Dysglycemia: yes, T2D  Antithrombotic tx: plavix 75mg - no hx of bleeding      Patient Active Problem List    Diagnosis Date Noted    Senile purpura (HCC) 03/26/2024    Prostate nodule 08/28/2023    Constipation due to outlet dysfunction 08/01/2023    Vitamin D deficiency 04/21/2022    Thrombocytopenia (HCC) 03/15/2022    Hyperlipidemia due to type 2 diabetes mellitus (HCC) 03/15/2022     Degenerative disc disease, lumbar 03/15/2022    Nonrheumatic aortic valve stenosis 03/02/2022    Chronic bilateral thoracic back pain 01/04/2021    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HCC) 09/21/2020    Chronic distal aortic occlusion (HCC) 09/21/2020    Carotid artery stenosis, asymptomatic, left 09/21/2020    BMI 23.0-23.9, adult 05/12/2020    Transaminitis 05/12/2020    Alcohol dependence in remission (HCC) 04/19/2019    Cervical radiculopathy, chronic 04/05/2018    Encounter for colorectal cancer screening 04/05/2018    Hypertension associated with diabetes (HCC) 04/05/2018    Chronic hepatitis C without hepatic coma (HCC) 05/18/2017    Atherosclerosis of aorta (HCC) 01/31/2017    Type 2 diabetes mellitus with hyperglycemia, without long-term current use of insulin (HCC) 11/15/2016    Microalbuminuria due to type 2 diabetes mellitus (HCC) 11/15/2016    Stress-induced cardiomyopathy 03/10/2015    PAD (peripheral artery disease) (HCC) 01/21/2015    Claudication ([facility]) 10/22/2012      Past Surgical History:   Procedure Laterality Date    MASS EXCISION GENERAL Right 7/31/2019    Procedure: EXCISION, MASS, GENERAL - 15 CM LIPOMA OF SHOULDER;  Surgeon: Augustin Rosales M.D.;  Location: SURGERY [facility];  Service: General    COLONOSCOPY  2017    WIDE EXCISION  5/6/2014    Performed by Nicholas Govea M.D. at SURGERY SAME DAY [facility]    FLAP GRAFT  5/6/2014    Performed by Nicholas Govea M.D. at SURGERY SAME DAY [facility]S    RECOVERY  10/22/2012    Performed by Pily Lee M.D. at SURGERY [facility]      Family History   Problem Relation Age of Onset    Heart Disease Father     Arthritis Mother     No Known Problems Sister     Cancer Brother         type unknown    Cancer Brother         skin     No Known Problems Maternal Grandmother     No Known Problems Maternal Grandfather     No Known Problems Paternal Grandmother     No Known Problems Paternal Grandfather     Diabetes Neg Hx     Hypertension  Neg Hx     Stroke Neg Hx     Hyperlipidemia Neg Hx       Current Outpatient Medications on File Prior to Visit   Medication Sig Dispense Refill    ibuprofen (MOTRIN) 800 MG Tab TAKE ONE TABLET BY MOUTH EVERY 8 HOURS AS NEEDED 100 Tablet 0    atorvastatin (LIPITOR) 80 MG tablet Take 1 Tablet by mouth every day. 100 Tablet 3    Dulaglutide 0.75 MG/0.5ML Solution Pen-injector Inject 0.5 mL under the skin every 7 days. 6 mL 3    fluticasone (FLONASE) 50 MCG/ACT nasal spray Administer 2 Sprays into affected nostril(S) every day. 16 g 0    metoprolol SR (TOPROL XL) 25 MG TABLET SR 24 HR Take 1 Tablet by mouth every day. 100 Tablet 3    methocarbamol (ROBAXIN) 500 MG Tab Take 1 Tablet by mouth 3 times a day. 30 Tablet 1    lisinopril (PRINIVIL) 20 MG Tab Take 1 Tablet by mouth every day. 100 Tablet 3    clopidogrel (PLAVIX) 75 MG Tab Take 1 Tablet by mouth every day. 100 Tablet 3    dapagliflozin propanediol (FARXIGA) 5 MG Tab Take 1 Tablet by mouth every day. 100 Tablet 3    sennosides-docusate sodium (SENOKOT-S) 8.6-50 MG tablet Take 1 Tablet by mouth every day. 90 Tablet 1    glucose blood (CONTOUR NEXT TEST) strip USE TO TEST BLOOD SUGAR ONCE DAILY EARLY MORNING BEFORE FIRST MEAL OF DAY 50 Strip 6    VITAMIN D PO Take  by mouth.      Multiple Vitamins-Minerals (CENTRUM SILVER PO) Take  by mouth.      Blood Glucose Meter Kit Test blood sugar as recommended by provider.  Per insurance preference, blood glucose monitoring kit. 1 Kit 0    Blood Glucose Test Strips Use one per insurance preference, strip to test blood sugar once daily early morning before first meal. 100 Strip 6    Lancets Use one per insurance preference, lancet to test blood sugar once daily early morning before first meal. 100 Each 3    Alcohol Swabs Wipe site with prep pad prior to injection. 100 Each 3    B Complex Vitamins (B COMPLEX PO) Take  by mouth.       No current facility-administered medications on file prior to visit.      Allergies  "  Allergen Reactions    Ozempic (0.25 Or 0.5 Mg-Dose) [Semaglutide]      Severe constipation       Social History     Tobacco Use    Smoking status: Former     Current packs/day: 0.00     Average packs/day: 1 pack/day for 58.0 years (58.0 ttl pk-yrs)     Types: Cigarettes     Start date: 1960     Quit date: 2011     Years since quittin.2    Smokeless tobacco: Never    Tobacco comments:     avoid all tobacco products   Vaping Use    Vaping status: Never Used   Substance Use Topics    Alcohol use: No     Alcohol/week: 0.0 oz     Comment: quit , drank 2 times since    Drug use: Yes     Types: Inhaled, Marijuana     DIET AND EXERCISE:  Weight Change: stable   Diet: common adult  Exercise: moderate regular exercise program     Review of Systems   Constitutional:  Negative for chills, fever and malaise/fatigue.   HENT:  Negative for nosebleeds.    Respiratory:  Negative for cough, hemoptysis, sputum production, shortness of breath and wheezing.    Cardiovascular:  Positive for leg swelling. Negative for chest pain, palpitations, orthopnea, claudication and PND.   Gastrointestinal:  Negative for blood in stool and melena.   Genitourinary:  Negative for hematuria.   Skin:  Negative for rash.   Neurological:  Negative for sensory change and focal weakness.   Endo/Heme/Allergies:  Bruises/bleeds easily.          Objective    Vitals:    24 1455   BP: 125/76   BP Location: Left arm   Patient Position: Sitting   BP Cuff Size: Large adult   Pulse: 97   Weight: 74.8 kg (165 lb)   Height: 1.778 m (5' 10\")      BP Readings from Last 4 Encounters:   24 125/76   10/01/24 130/60   24 110/60   24 138/80      Body mass index is 23.68 kg/m².   Wt Readings from Last 4 Encounters:   24 74.8 kg (165 lb)   10/01/24 78.5 kg (173 lb)   24 78.6 kg (173 lb 3.2 oz)   24 74.4 kg (164 lb)      Physical Exam  Constitutional:       General: He is not in acute distress.     Appearance: Normal " "appearance. He is not diaphoretic.   HENT:      Head: Normocephalic and atraumatic.   Eyes:      Conjunctiva/sclera: Conjunctivae normal.   Cardiovascular:      Rate and Rhythm: Normal rate and regular rhythm.      Pulses:           Carotid pulses are 2+ on the right side and 2+ on the left side.       Radial pulses are 2+ on the right side and 2+ on the left side.        Dorsalis pedis pulses are 0 on the right side and 0 on the left side.        Posterior tibial pulses are 0 on the right side and 0 on the left side.      Heart sounds: Normal heart sounds.      Comments: Distal ext warm, pink.  No wounds, cyanosis or ruborous discoloration  Pulmonary:      Effort: Pulmonary effort is normal.      Breath sounds: Normal breath sounds.   Musculoskeletal:      Right lower leg: No edema.      Left lower leg: No edema.   Skin:     General: Skin is warm and dry.   Neurological:      Mental Status: He is alert and oriented to person, place, and time.      Cranial Nerves: No cranial nerve deficit.      Gait: Gait is intact.   Psychiatric:         Mood and Affect: Mood and affect normal.          DATA REVIEW    Lab Results   Component Value Date/Time    CHOLSTRLTOT 87 (L) 10/01/2024 06:36 AM    LDL 44 10/01/2024 06:36 AM    HDL 27 (A) 10/01/2024 06:36 AM    TRIGLYCERIDE 80 10/01/2024 06:36 AM       Lab Results   Component Value Date/Time    LDL 44 10/01/2024 06:36 AM    LDL 50 04/26/2024 06:35 AM    LDL 46 08/12/2022 07:07 AM    LDL 47 09/29/2020 06:46 AM    LDL 53 06/27/2019 06:22 AM    LDL 53 06/27/2019 06:22 AM       Lab Results   Component Value Date/Time    LIPOPROTA 25 09/28/2020 01:38 PM      No results found for: \"APOB\"   Lab Results   Component Value Date/Time    CRPHIGHSEN <0.2 09/21/2012 11:59 AM       Lab Results   Component Value Date/Time    SODIUM 141 10/01/2024 06:36 AM    POTASSIUM 4.3 10/01/2024 06:36 AM    CHLORIDE 106 10/01/2024 06:36 AM    CO2 22 10/01/2024 06:36 AM    GLUCOSE 134 (H) 10/01/2024 06:36 AM "    BUN 20 10/01/2024 06:36 AM    CREATININE 0.97 10/01/2024 06:36 AM     Lab Results   Component Value Date/Time    ALKPHOSPHAT 92 10/01/2024 06:36 AM    ASTSGOT 130 (H) 10/01/2024 06:36 AM    ALTSGPT 129 (H) 10/01/2024 06:36 AM    TBILIRUBIN 0.5 10/01/2024 06:36 AM       Lab Results   Component Value Date/Time    HBA1C 6.3 (H) 10/01/2024 06:36 AM       Lab Results   Component Value Date/Time    MALBCRT 479 (H) 04/26/2024 06:35 AM    MICROALBUR 50.0 04/26/2024 06:35 AM         IMAGING    BLE art duplex 2015   1.  Low velocity monophasic flow in the bilateral common femoral arteries    without stenosis from inguinal ligament to the ankle, indicitive of aorto-    iliac occlusion.     Carotid 2016   Mild stenosis of the right internal carotid (< 50%).    Moderate stenosis of the left internal carotid (50-69%).    Flow within both subclavian arteries appears to be within normal limits.      Antegrade flow, bilateral vertebral arteries.    No prior study is available for comparison..    LDCT chest 5/2019   There are atherosclerotic calcifications of the aorta and its branch vessels including the coronary arteries. There is no mediastinal, hilar or axillary adenopathy.    Echo 9/2020  Compared to the images of the prior study done on 06/08/15, there is   now mild to moderate aortic stenosis.  Left ventricular ejection fraction is visually estimated to be 65%.  Mild to moderate aortic stenosis.    Carotid 10/9/2020   CONCLUSIONS   Mild stenosis of the right internal carotid (< 50%).    Moderate stenosis of the left internal carotid (50-69%).     10/9/2020 art/merle   Vascular Laboratory   CONCLUSIONS   50-75% stenosis in RIGHT proximal profunda femoral artery.   Heavy plaque in the RIGHT femoral artery with no flow seen in the distal    femoral which appears to be occluded.   1 vessel run off in the RIGHT leg.      50-75% stenosis in LEFT common femoral artery .   Greater than 75% stenosis in the  LEFT proximal profunda  femoral artery.   The LEFT femoral artery occluded in the proximal and mid thigh with flow    reconstitution seen distally.   1 vessel run off seen in the LEFT lower leg.     Findings   Right-   Ankle pressures are not accurately measured due to calcification and    noncompressibility of the tibial vessels.    Doppler waveform of the common femoral is abnormally dampened and    monophasic suggesting more proximal aorta/iliac stenosis or occlusion   Doppler waveforms at the ankle are monophasic with moderate amplitude.        Left-   Ankle pressures are not accurately measured due to calcification and    noncompressibility of the tibial vessels.    Doppler waveform of the common femoral is abnormally dampened and    monophasic consistent with known arterial occlusive disease in the iliac    artery.    Doppler waveforms at the ankle are monophasic with low amplitude.     10/9/2020 aortoiliac duplex   Vascular Laboratory   CONCLUSIONS   No evidence of aortic aneurysm.    Right external iliac artery is occluded.   50-75% stenosis in the right proximal common iliac artery.   Left external iliac artery is occluded.   Left common iliac artery appears occluded yet is poorly visualized.    10/2022 ao/iliac duplex    No evidence of abdominal aortic or iliac artery aneurysm.    Greater than 75% stenosis in the right common iliac artery.   No flow seen at the right proximal external iliac artery which may be occluded.     Artery is patent distally with monophasic waveforms.   The left common iliac artery has monophasic waveforms; the external iliac artery appears to be occluded.   Compared to the prior study on 10/1/2021 - there has been no significant  change.     10/2022 RIMA    TBI's are moderately reduced.   Arterial duplex scan and Aorta-iliac exam was performed in accordance with    lower extremity arterial evaluation protocol - see separate reports.                  RIGHT      Waveform            Systolic BPs (mmHg)                               199           Brachial   Monophasic                               Common Femoral   Monophasic                               Popliteal   Monophasic                 0             Posterior Tibial   Absent                                   Dorsalis Pedis                              63            Digit                                            RIMA                              0.32          TBI                           LEFT   Waveform        Systolic BPs (mmHg)                              190           Brachial   Monophasic                               Common Femoral   Monophasic                               Popliteal   Absent                                   Posterior Tibial   Monophasic                 150           Dorsalis Pedis                              46            Digit                                            RIMA                              0.23          TBI    10/2022 Carotid    Compared to the prior study on 10/1/21 - there has been no significant  change.    Mild bilateral internal carotid artery stenosis (<50%).     10/2022 BLE art duplex    RIGHT:.    The superficial femoral artery is occluded mid & distally.   The distal anterior tibial and peroneal arteries appear to be occluded; no flow identified.   LEFT:.    The superficial femoral artery is occluded from the origin with distal flow reconstitution observed.   No flow in the posterior tibial artery which appears to be occluded.   The distal peroneal artery appear to be occluded; no flow identified.    Compared to the images of the duplex dated 10/1/2021 - there is now only single vessel runoff bilaterally at the ankle, previously double vessel.    1/2023 Echo   Compared to the images of the prior study 09/17/2020, the AS appears to have progressed.  The left ventricular ejection fraction is visually estimated to be 60%.  Likely low flow low gradient severe aortic stenosis.  Consider dobutamine stress  echo.        PROCEDURES:      IR abdominal aortography 2012 (Dr. Lee)  IMPRESSION:  1.  Patent bilateral renal arteries.  2.  The infrarenal abdominal aorta is chronically occluded at [id number]   part.  The iliac systems are also occluded.  There is reconstitution of the lower extremity circulation via collateral flow.  3.  Patent right common femoral artery with significant posterior plaque formation.  The right profunda femoral artery is patent.  The right superficial femoral artery is occluded at mid-thigh.  There is reconstitution of the above knee popliteal artery via collateral flow.  The right peroneal   artery is chronically occluded.  The right posterior tibial artery is the dominant runoff vessel.  4.  Patent left common femoral artery with plaque formation.  The left profunda and superficial femoral arteries are patent.  The popliteal artery is also patent.  The peroneal artery is occluded.  The anterior tibial artery is the dominant runoff vessels.  5.  Patent aortic arch.  There is a moderate stenosis seen in the proximal left subclavian artery.  There also appeared to be mild stenosis seen in the proximal left internal carotid artery.     Based on the angiogram result, an endovascular intervention would not be appropriate.  The patient would need to undergo an aortobifemoral bypass grafting.  The patient will be seen back in the office and surgical bypass options will be discussed with him.        Medical Decision Making:  Today's Assessment / Status / Plan:     1. Chronic distal aortic occlusion (HCC)  US-EXTREMITY ARTERY LOWER BILAT W/RIMA (COMBO)    US-AORTA/ILIACS DUPLEX COMPLETE      2. PAD (peripheral artery disease) (HCC)  US-EXTREMITY ARTERY LOWER BILAT W/RIMA (COMBO)    US-AORTA/ILIACS DUPLEX COMPLETE    cilostazol (PLETAL) 50 MG tablet      3. 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HCC)  US-EXTREMITY ARTERY LOWER BILAT W/RIMA (COMBO)    US-AORTA/ILIACS DUPLEX COMPLETE      4. Chronic kidney  "disease (CKD) stage G2/A3, mildly decreased glomerular filtration rate (GFR) between 60-89 mL/min/1.73 square meter and albuminuria creatinine ratio greater than 300 mg/g        5. Type 2 diabetes mellitus with diabetic microalbuminuria, without long-term current use of insulin (HCC)        6. Atherosclerosis of aorta (HCC)        7. Carotid artery stenosis, asymptomatic, left        8. Hyperlipidemia due to type 2 diabetes mellitus (HCC)        9. Hypertension associated with diabetes (HCC)           Established CVD:     1) LOWER EXTREMITY PAD - inflow/outflow (aorto-iliac, femoropopliteal, infrapopliteal), chronic symptomatic, no indications of true chronic limb threatening ischemia (CLTI) changes or hx of lifestyle-limiting symptoms   - PAD \"risk-amplifiers\": 75+ years old and diabetes  - Dannie class:  IIB  - Change in Pain-free walking distance: no  - Change in Maximum walking distance: no  - change in 6MWT: N\A  - as reviewed with patient, evidenced-based standard of care includes risk factor reduction, med mgmt, and exercise therapy with limited need for surgical intervention in majority of patients if there is CLTI or severe lifestyle limiting symptoms after >6mo of standard therapy   Plan:    Lifestyle tx:  - continue lifestyle measures and tobacco cessation and/or complete avoidance strongly encouraged   - start/continue structured exercise therapy  - monitor change in pain-free and total walking distance to monitor progress  - consider 6MWT for formal progress measurement     Med tx:  - continue intensive med mgmt   - continue clopidogrel 75mg daily   - consider ASA + xarelto protocol as per 2024 AHA/ACC PAD guidelines   - tart cilostazol 50mg BID (no signs of CHF) - reviewed common ADRs     Imaging/surveillance   - check RIMA/art duplex, ao/iliac duplex   - consider CTA Ao with run-off     Other tx:  - may require eval for revasc with vasc surg if no response or worsening functionally impairing symptoms " over time     2) mild carotid artery stenosis, L, no symptoms or hx of CVA   Plan: continue med mgmt, repeat surveillance duplex in future (2025-26)    3) moderate Ao stenosis, no symptoms  Plan: defer mgmt and surveillance to cardiology    4) Non-aneurysmal aortic atherosclerosis (AS) per CT -  no symptoms or prior known associated clinical manifestations  or indications of complex features  - general indicator of systemic AS with higher potential AS in other vascular beds, possible indicator of higher overall ASCVD risk   Plan:  - no further imaging surveillance, continue med mgmt      LIPID MANAGEMENT  Qualifies for Statin Therapy Based on 2018 ACC/AHA Guidelines: yes, Secondary Prevention - Very high risk ASCVD - 2 major events or 1 event with other high-risk conditions  Major ASCVD events: Symptomatic PAD (hx of claudication with RIMA <0.85, previous revascularization/amputation)    High-risk conditions: >64yr old , Diabetes , and Hypertension   Currently on Statin: Yes  Tx goals: LDL-C <55   At goal?  yes  Plan:   - continue atorva 80mg daily   - candidate for non-statin therapies if needed       BLOOD PRESSURE CONTROL   ACC/AHA goal <130/80  Home BP at goal:  yes  Office BP at goal:  yes  24h ABPM:  not ordered to date   Plan:   - start/continue home BP monitoring, reviewed correct technique, provide BP log and instructions  - order 24h ABPM:  NO  - routine monitoring of lytes/gfr   Medications:  - continue lisinopril 20mg daily   - continue metoprolol 25mg ER daily     GLYCEMIC MANAGEMENT Diabetic, T2 with HTN, HLD, PAD  Goal A1c < 7.0  Lab Results   Component Value Date    HBA1C 6.3 (H) 10/01/2024      Lab Results   Component Value Date/Time    MALBCRT 479 (H) 04/26/2024 06:35 AM    MICROALBUR 50.0 04/26/2024 06:35 AM     Plan:  - continue current medication plan   - recommmend for routine care with PCP (or endocrine) to include regular A1c monitoring, annual albumin/creatinine ratio (ACR), annual diabetic  retinopathy screening, foot exams, annual flu vaccine, and updates to pneumonia vaccines as appropriate      ANTITHROMBOTIC THERAPY: yes  - continue clopidogrel 75mg daily   - consider ASA + xarelto 2.5mg BID (dual pathway inhibition) as per 2024 ACC/AHA PAD guidelines - effective as per COMPASS and VOYAGER-PAD trials for reducing MACE and MALE    LIFESTYLE INTERVENTIONS  TOBACCO: Stages of Change: Maintenance (sustained change >6mo)    reports that he quit smoking about 13 years ago. His smoking use included cigarettes. He started smoking about 64 years ago. He has a 58 pack-year smoking history. He has never used smokeless tobacco.   - continued complete avoidance of all tobacco products   PHYSICAL ACTIVITY: >150min/week of mod-intensity activity or as much as tolerated  NUTRITION: Mediterranean, Plant-based - increase nuts, beans, whole grains, plant protein for animal 1-2 times/week, and reduce Na to 1500mg/day   ETOH: limit to 2 or less standard drinks/day   WT MGMT: maintain healthy weight     OTHER:  none     STUDIES: RIMA/art duplex, Ao/iliac - ordered, RN to track   LABS: none  FOLLOW-UP: 3 months     Bharat Caicedo M.D.   [facility]  [facility]  [phone]    Cc:

## 2024-12-06 ASSESSMENT — ENCOUNTER SYMPTOMS
PND: 0
BRUISES/BLEEDS EASILY: 1
SPUTUM PRODUCTION: 0
HEMOPTYSIS: 0
SENSORY CHANGE: 0
FOCAL WEAKNESS: 0
COUGH: 0
SHORTNESS OF BREATH: 0
WHEEZING: 0
ORTHOPNEA: 0
CHILLS: 0
FEVER: 0
BLOOD IN STOOL: 0
CLAUDICATION: 0
PALPITATIONS: 0

## 2024-12-10 ENCOUNTER — HOSPITAL ENCOUNTER (OUTPATIENT)
Dept: LAB | Facility: MEDICAL CENTER | Age: 74
End: 2024-12-10
Attending: NURSE PRACTITIONER
Payer: MEDICARE

## 2024-12-10 ENCOUNTER — OFFICE VISIT (OUTPATIENT)
Dept: URGENT CARE | Facility: PHYSICIAN GROUP | Age: 74
End: 2024-12-10
Payer: MEDICARE

## 2024-12-10 VITALS
RESPIRATION RATE: 16 BRPM | HEIGHT: 70 IN | WEIGHT: 170.6 LBS | BODY MASS INDEX: 24.42 KG/M2 | HEART RATE: 90 BPM | OXYGEN SATURATION: 94 % | SYSTOLIC BLOOD PRESSURE: 102 MMHG | DIASTOLIC BLOOD PRESSURE: 56 MMHG | TEMPERATURE: 97.4 F

## 2024-12-10 DIAGNOSIS — R10.32 LEFT LOWER QUADRANT ABDOMINAL PAIN: ICD-10-CM

## 2024-12-10 DIAGNOSIS — R10.9 LEFT FLANK PAIN: ICD-10-CM

## 2024-12-10 LAB
ALBUMIN SERPL BCP-MCNC: 4.2 G/DL (ref 3.2–4.9)
ALBUMIN/GLOB SERPL: 1.3 G/DL
ALP SERPL-CCNC: 76 U/L (ref 30–99)
ALT SERPL-CCNC: 81 U/L (ref 2–50)
ANION GAP SERPL CALC-SCNC: 10 MMOL/L (ref 7–16)
APPEARANCE UR: NORMAL
AST SERPL-CCNC: 78 U/L (ref 12–45)
BASOPHILS # BLD AUTO: 1.1 % (ref 0–1.8)
BASOPHILS # BLD: 0.07 K/UL (ref 0–0.12)
BILIRUB SERPL-MCNC: 0.5 MG/DL (ref 0.1–1.5)
BILIRUB UR STRIP-MCNC: NORMAL MG/DL
BUN SERPL-MCNC: 28 MG/DL (ref 8–22)
CALCIUM ALBUM COR SERPL-MCNC: 9.4 MG/DL (ref 8.5–10.5)
CALCIUM SERPL-MCNC: 9.6 MG/DL (ref 8.5–10.5)
CHLORIDE SERPL-SCNC: 103 MMOL/L (ref 96–112)
CO2 SERPL-SCNC: 25 MMOL/L (ref 20–33)
COLOR UR AUTO: NORMAL
CREAT SERPL-MCNC: 1.48 MG/DL (ref 0.5–1.4)
EOSINOPHIL # BLD AUTO: 0.23 K/UL (ref 0–0.51)
EOSINOPHIL NFR BLD: 3.7 % (ref 0–6.9)
ERYTHROCYTE [DISTWIDTH] IN BLOOD BY AUTOMATED COUNT: 42.9 FL (ref 35.9–50)
GFR SERPLBLD CREATININE-BSD FMLA CKD-EPI: 49 ML/MIN/1.73 M 2
GLOBULIN SER CALC-MCNC: 3.3 G/DL (ref 1.9–3.5)
GLUCOSE SERPL-MCNC: 123 MG/DL (ref 65–99)
GLUCOSE UR STRIP.AUTO-MCNC: 250 MG/DL
HCT VFR BLD AUTO: 41.1 % (ref 42–52)
HGB BLD-MCNC: 14.1 G/DL (ref 14–18)
IMM GRANULOCYTES # BLD AUTO: 0.01 K/UL (ref 0–0.11)
IMM GRANULOCYTES NFR BLD AUTO: 0.2 % (ref 0–0.9)
KETONES UR STRIP.AUTO-MCNC: NORMAL MG/DL
LEUKOCYTE ESTERASE UR QL STRIP.AUTO: NORMAL
LIPASE SERPL-CCNC: 59 U/L (ref 11–82)
LYMPHOCYTES # BLD AUTO: 2.49 K/UL (ref 1–4.8)
LYMPHOCYTES NFR BLD: 40.4 % (ref 22–41)
MCH RBC QN AUTO: 29.9 PG (ref 27–33)
MCHC RBC AUTO-ENTMCNC: 34.3 G/DL (ref 32.3–36.5)
MCV RBC AUTO: 87.1 FL (ref 81.4–97.8)
MONOCYTES # BLD AUTO: 0.48 K/UL (ref 0–0.85)
MONOCYTES NFR BLD AUTO: 7.8 % (ref 0–13.4)
NEUTROPHILS # BLD AUTO: 2.88 K/UL (ref 1.82–7.42)
NEUTROPHILS NFR BLD: 46.8 % (ref 44–72)
NITRITE UR QL STRIP.AUTO: NORMAL
NRBC # BLD AUTO: 0 K/UL
NRBC BLD-RTO: 0 /100 WBC (ref 0–0.2)
PH UR STRIP.AUTO: 5.5 [PH] (ref 5–8)
PLATELET # BLD AUTO: 147 K/UL (ref 164–446)
PMV BLD AUTO: 12.2 FL (ref 9–12.9)
POTASSIUM SERPL-SCNC: 4.7 MMOL/L (ref 3.6–5.5)
PROT SERPL-MCNC: 7.5 G/DL (ref 6–8.2)
PROT UR QL STRIP: 100 MG/DL
RBC # BLD AUTO: 4.72 M/UL (ref 4.7–6.1)
RBC UR QL AUTO: NORMAL
SODIUM SERPL-SCNC: 138 MMOL/L (ref 135–145)
SP GR UR STRIP.AUTO: 1.03
UROBILINOGEN UR STRIP-MCNC: 0.2 MG/DL
WBC # BLD AUTO: 6.2 K/UL (ref 4.8–10.8)

## 2024-12-10 PROCEDURE — 85025 COMPLETE CBC W/AUTO DIFF WBC: CPT

## 2024-12-10 PROCEDURE — 3078F DIAST BP <80 MM HG: CPT | Performed by: NURSE PRACTITIONER

## 2024-12-10 PROCEDURE — 3074F SYST BP LT 130 MM HG: CPT | Performed by: NURSE PRACTITIONER

## 2024-12-10 PROCEDURE — 36415 COLL VENOUS BLD VENIPUNCTURE: CPT

## 2024-12-10 PROCEDURE — 80053 COMPREHEN METABOLIC PANEL: CPT

## 2024-12-10 PROCEDURE — 81002 URINALYSIS NONAUTO W/O SCOPE: CPT | Performed by: NURSE PRACTITIONER

## 2024-12-10 PROCEDURE — 99213 OFFICE O/P EST LOW 20 MIN: CPT | Performed by: NURSE PRACTITIONER

## 2024-12-10 PROCEDURE — 83690 ASSAY OF LIPASE: CPT

## 2024-12-10 RX ORDER — DULAGLUTIDE 0.75 MG/.5ML
INJECTION, SOLUTION SUBCUTANEOUS
COMMUNITY
Start: 2024-12-05

## 2024-12-10 RX ORDER — ASPIRIN 81 MG/1
TABLET ORAL
COMMUNITY

## 2024-12-10 RX ORDER — HYDROCODONE BITARTRATE AND ACETAMINOPHEN 5; 325 MG/1; MG/1
TABLET ORAL
COMMUNITY
Start: 2024-10-08

## 2024-12-10 RX ORDER — CLOPIDOGREL BISULFATE 75 MG/1
TABLET ORAL
COMMUNITY

## 2024-12-10 RX ORDER — LISINOPRIL 10 MG/1
TABLET ORAL
COMMUNITY

## 2024-12-10 RX ORDER — CHLORHEXIDINE GLUCONATE ORAL RINSE 1.2 MG/ML
SOLUTION DENTAL
COMMUNITY
Start: 2024-10-10

## 2024-12-10 RX ORDER — ALPRAZOLAM 0.5 MG
TABLET ORAL
COMMUNITY

## 2024-12-10 RX ORDER — TRAMADOL HYDROCHLORIDE 50 MG/1
TABLET ORAL
COMMUNITY

## 2024-12-10 RX ORDER — METOPROLOL SUCCINATE 25 MG/1
TABLET, EXTENDED RELEASE ORAL
COMMUNITY

## 2024-12-10 RX ORDER — DULAGLUTIDE 1.5 MG/.5ML
INJECTION, SOLUTION SUBCUTANEOUS
COMMUNITY

## 2024-12-10 RX ORDER — PENTOXIFYLLINE 400 MG/1
TABLET, EXTENDED RELEASE ORAL
COMMUNITY

## 2024-12-10 RX ORDER — ATORVASTATIN CALCIUM 80 MG/1
TABLET, FILM COATED ORAL
COMMUNITY

## 2024-12-10 ASSESSMENT — ENCOUNTER SYMPTOMS
CONSTITUTIONAL NEGATIVE: 1
RESPIRATORY NEGATIVE: 1
CARDIOVASCULAR NEGATIVE: 1
BLOOD IN STOOL: 0
SHORTNESS OF BREATH: 0
CONSTIPATION: 1
CHILLS: 0
ABDOMINAL PAIN: 1
FEVER: 0
NAUSEA: 0
DIARRHEA: 0
VOMITING: 0
FLANK PAIN: 1

## 2024-12-10 ASSESSMENT — FIBROSIS 4 INDEX: FIB4 SCORE: 9.87

## 2024-12-10 ASSESSMENT — VISUAL ACUITY: OU: 1

## 2024-12-10 NOTE — PROGRESS NOTES
Subjective:     BI CHOI is a 74 y.o. male who presents for Flank Pain (L sided flank pain, constant pain, x10 days)       Abdominal Pain  This is a new problem. The problem has been gradually worsening. The pain is located in the LLQ, LUQ and left flank. Associated symptoms include constipation. Pertinent negatives include no diarrhea, dysuria, fever, frequency, nausea or vomiting.     Patient reports for the past 5 days, he has been having worsening left-sided flank pain and sensation of constipation.  Concerned of obstruction.  Describes discomfort as sensation that he strained the muscle.  However, denies injury.  Worsens with leaning towards left side.  Improves with leaning towards right side.  Improves with lying flat on his back.  Worsens with turning towards and sleeping on his left side.  Reports pain has been keeping him up at night.    History of constipation.  However, worse than usual.  Uses Metamucil.  Occasionally uses laxatives.  Last bowel movement this morning with hard stool.  Continues to pass gas normally.    Review of Systems   Constitutional: Negative.  Negative for chills, fever and malaise/fatigue.   Respiratory: Negative.  Negative for shortness of breath.    Cardiovascular: Negative.  Negative for chest pain.   Gastrointestinal:  Positive for abdominal pain and constipation. Negative for blood in stool, diarrhea, nausea and vomiting.   Genitourinary:  Positive for flank pain. Negative for dysuria, frequency and urgency.   All other systems reviewed and are negative.    Refer to HPI for additional details.    During this visit, appropriate PPE was worn, and hand hygiene was performed.    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), Bruit (11/01/2016), Cancer ([facility]), Cervical disc disease (05/18/2017), Chronic right shoulder pain, Claudication ([facility]) (08/22/2012), Dental disorder (07/26/2019),  Dermoid cyst of ear, left (07/03/2019), Diabetes (HCC) (07/26/2019), H/O colonoscopy with polypectomy (05/07/2013), Hayfever, Healthcare maintenance (04/05/2018), Hepatitis C, History of alcoholism (HCC) (08/22/2012), History of tobacco abuse, [place] (hard of hearing) (07/26/2019), Hyperlipidemia, Hypertension, Insulin dependent diabetes mellitus (07/26/2019), Internal nasal lesion (07/22/2021), Other chest pain, and S/P excision of lipoma (09/18/2019).    MEDS:   Current Outpatient Medications:     ALPRAZolam (XANAX) 0.5 MG Tab, 40, Disp: , Rfl:     aspirin 81 MG EC tablet, tablet, Disp: , Rfl:     chlorhexidine (PERIDEX) 0.12 % Solution, , Disp: , Rfl:     HYDROcodone-acetaminophen (NORCO) 5-325 MG Tab per tablet, , Disp: , Rfl:     pentoxifylline CR (TRENTAL) 400 MG CR tablet, 60, Disp: , Rfl:     traMADol (ULTRAM) 50 MG Tab, 14, Disp: , Rfl:     atorvastatin (LIPITOR) 80 MG tablet, 90, Disp: , Rfl:     clopidogrel (PLAVIX) 75 MG Tab, 90, Disp: , Rfl:     Dulaglutide (TRULICITY) 1.5 MG/0.5ML Solution Auto-injector, 2, Disp: , Rfl:     TRULICITY 0.75 MG/0.5ML Solution Auto-injector, , Disp: , Rfl:     lisinopril (PRINIVIL) 10 MG Tab, 90, Disp: , Rfl:     metoprolol SR (TOPROL XL) 25 MG TABLET SR 24 HR, 90, Disp: , Rfl:     cilostazol (PLETAL) 50 MG tablet, Take 1 Tablet by mouth 2 times a day. To improve arterial leg pain, Disp: 60 Tablet, Rfl: 5    ibuprofen (MOTRIN) 800 MG Tab, TAKE ONE TABLET BY MOUTH EVERY 8 HOURS AS NEEDED, Disp: 100 Tablet, Rfl: 0    atorvastatin (LIPITOR) 80 MG tablet, Take 1 Tablet by mouth every day., Disp: 100 Tablet, Rfl: 3    Dulaglutide 0.75 MG/0.5ML Solution Pen-injector, Inject 0.5 mL under the skin every 7 days., Disp: 6 mL, Rfl: 3    fluticasone (FLONASE) 50 MCG/ACT nasal spray, Administer 2 Sprays into affected nostril(S) every day., Disp: 16 g, Rfl: 0    metoprolol SR (TOPROL XL) 25 MG TABLET SR 24 HR, Take 1 Tablet by mouth every day., Disp: 100 Tablet, Rfl: 3    methocarbamol  (ROBAXIN) 500 MG Tab, Take 1 Tablet by mouth 3 times a day., Disp: 30 Tablet, Rfl: 1    lisinopril (PRINIVIL) 20 MG Tab, Take 1 Tablet by mouth every day., Disp: 100 Tablet, Rfl: 3    clopidogrel (PLAVIX) 75 MG Tab, Take 1 Tablet by mouth every day., Disp: 100 Tablet, Rfl: 3    dapagliflozin propanediol (FARXIGA) 5 MG Tab, Take 1 Tablet by mouth every day., Disp: 100 Tablet, Rfl: 3    sennosides-docusate sodium (SENOKOT-S) 8.6-50 MG tablet, Take 1 Tablet by mouth every day., Disp: 90 Tablet, Rfl: 1    glucose blood (CONTOUR NEXT TEST) strip, USE TO TEST BLOOD SUGAR ONCE DAILY EARLY MORNING BEFORE FIRST MEAL OF DAY, Disp: 50 Strip, Rfl: 6    VITAMIN D PO, Take  by mouth., Disp: , Rfl:     Multiple Vitamins-Minerals (CENTRUM SILVER PO), Take  by mouth., Disp: , Rfl:     Blood Glucose Meter Kit, Test blood sugar as recommended by provider.  Per insurance preference, blood glucose monitoring kit., Disp: 1 Kit, Rfl: 0    Blood Glucose Test Strips, Use one per insurance preference, strip to test blood sugar once daily early morning before first meal., Disp: 100 Strip, Rfl: 6    Lancets, Use one per insurance preference, lancet to test blood sugar once daily early morning before first meal., Disp: 100 Each, Rfl: 3    Alcohol Swabs, Wipe site with prep pad prior to injection., Disp: 100 Each, Rfl: 3    B Complex Vitamins (B COMPLEX PO), Take  by mouth., Disp: , Rfl:     ALLERGIES:   Allergies   Allergen Reactions    Ozempic (0.25 Or 0.5 Mg-Dose) [Semaglutide]      Severe constipation      SURGHX:   Past Surgical History:   Procedure Laterality Date    MASS EXCISION GENERAL Right 7/31/2019    Procedure: EXCISION, MASS, GENERAL - 15 CM LIPOMA OF SHOULDER;  Surgeon: Augustin Rosales M.D.;  Location: SURGERY [facility];  Service: General    COLONOSCOPY  2017    WIDE EXCISION  5/6/2014    Performed by Nicholas Govea M.D. at SURGERY SAME DAY [facility]    FLAP GRAFT  5/6/2014    Performed by Nicholas Govea M.D. at SURGERY  "SAME DAY [facility]    RECOVERY  10/22/2012    Performed by Pily Lee M.D. at SURGERY [facility]     SOCHX:  reports that he quit smoking about 13 years ago. His smoking use included cigarettes. He started smoking about 64 years ago. He has a 58 pack-year smoking history. He has never used smokeless tobacco. He reports current drug use. Drugs: Inhaled and Marijuana. He reports that he does not drink alcohol.    FH: Per HPI as applicable/pertinent.      Objective:     /56 (BP Location: Left arm, Patient Position: Sitting, BP Cuff Size: Adult)   Pulse 90   Temp 36.3 °C (97.4 °F) (Temporal)   Resp 16   Ht 1.778 m (5' 10\")   Wt 77.4 kg (170 lb 9.6 oz)   SpO2 94%   BMI 24.48 kg/m²     Physical Exam  Nursing note reviewed.   Constitutional:       General: He is not in acute distress.     Appearance: He is well-developed. He is not ill-appearing or toxic-appearing.   Eyes:      General: Vision grossly intact.   Cardiovascular:      Rate and Rhythm: Normal rate.   Pulmonary:      Effort: Pulmonary effort is normal. No respiratory distress.   Abdominal:      General: There is no distension.      Palpations: Abdomen is soft.      Tenderness: There is no abdominal tenderness. There is no right CVA tenderness, left CVA tenderness, guarding or rebound.   Musculoskeletal:         General: No deformity. Normal range of motion.   Skin:     General: Skin is warm and dry.      Coloration: Skin is not pale.   Neurological:      Mental Status: He is alert and oriented to person, place, and time.      Motor: No weakness.   Psychiatric:         Behavior: Behavior normal. Behavior is cooperative.     UA: brown, glucose 250, small bili, large protein      Assessment/Plan:     1. Left flank pain  - POCT Urinalysis  - CT-ABDOMEN-PELVIS WITH; Future  - CBC WITH DIFFERENTIAL; Future  - Comp Metabolic Panel; Future  - ESTIMATED GFR; Future  - LIPASE; Future    2. Left lower quadrant abdominal pain  - " CT-ABDOMEN-PELVIS WITH; Future  - CBC WITH DIFFERENTIAL; Future  - Comp Metabolic Panel; Future  - ESTIMATED GFR; Future  - LIPASE; Future    Labs and imaging pending to further evaluate symptoms.    Differential diagnosis, natural history, supportive care, over-the-counter symptom management per 's instructions, close monitoring, and indications for immediate follow-up discussed.     All questions answered. Patient agrees with the plan of care.    Discharge summary provided via [facility].

## 2024-12-11 ENCOUNTER — HOSPITAL ENCOUNTER (OUTPATIENT)
Dept: RADIOLOGY | Facility: MEDICAL CENTER | Age: 74
End: 2024-12-11
Attending: NURSE PRACTITIONER
Payer: MEDICARE

## 2024-12-11 ENCOUNTER — OFFICE VISIT (OUTPATIENT)
Dept: URGENT CARE | Facility: PHYSICIAN GROUP | Age: 74
End: 2024-12-11
Payer: MEDICARE

## 2024-12-11 VITALS
BODY MASS INDEX: 25.52 KG/M2 | HEIGHT: 69 IN | HEART RATE: 91 BPM | OXYGEN SATURATION: 97 % | WEIGHT: 172.3 LBS | TEMPERATURE: 98.2 F | RESPIRATION RATE: 16 BRPM | DIASTOLIC BLOOD PRESSURE: 72 MMHG | SYSTOLIC BLOOD PRESSURE: 136 MMHG

## 2024-12-11 DIAGNOSIS — R10.32 LEFT LOWER QUADRANT ABDOMINAL PAIN: ICD-10-CM

## 2024-12-11 DIAGNOSIS — E11.22 TYPE 2 DIABETES MELLITUS WITH STAGE 3B CHRONIC KIDNEY DISEASE, UNSPECIFIED WHETHER LONG TERM INSULIN USE (HCC): ICD-10-CM

## 2024-12-11 DIAGNOSIS — K57.92 DIVERTICULITIS: ICD-10-CM

## 2024-12-11 DIAGNOSIS — N28.9 RENAL INSUFFICIENCY: ICD-10-CM

## 2024-12-11 DIAGNOSIS — N18.32 TYPE 2 DIABETES MELLITUS WITH STAGE 3B CHRONIC KIDNEY DISEASE, UNSPECIFIED WHETHER LONG TERM INSULIN USE (HCC): ICD-10-CM

## 2024-12-11 DIAGNOSIS — R10.9 LEFT FLANK PAIN: ICD-10-CM

## 2024-12-11 DIAGNOSIS — R74.8 ELEVATED LIVER ENZYMES: ICD-10-CM

## 2024-12-11 DIAGNOSIS — R30.0 DYSURIA: ICD-10-CM

## 2024-12-11 LAB
APPEARANCE UR: NORMAL
BILIRUB UR STRIP-MCNC: NEGATIVE MG/DL
COLOR UR AUTO: NORMAL
GLUCOSE UR STRIP.AUTO-MCNC: NORMAL MG/DL
KETONES UR STRIP.AUTO-MCNC: NEGATIVE MG/DL
LEUKOCYTE ESTERASE UR QL STRIP.AUTO: NEGATIVE
NITRITE UR QL STRIP.AUTO: NEGATIVE
PH UR STRIP.AUTO: 5 [PH] (ref 5–8)
PROT UR QL STRIP: NORMAL MG/DL
RBC UR QL AUTO: NEGATIVE
SP GR UR STRIP.AUTO: <=1.005
UROBILINOGEN UR STRIP-MCNC: 0.2 MG/DL

## 2024-12-11 PROCEDURE — 700117 HCHG RX CONTRAST REV CODE 255: Performed by: NURSE PRACTITIONER

## 2024-12-11 PROCEDURE — 3075F SYST BP GE 130 - 139MM HG: CPT | Performed by: FAMILY MEDICINE

## 2024-12-11 PROCEDURE — 99214 OFFICE O/P EST MOD 30 MIN: CPT | Performed by: FAMILY MEDICINE

## 2024-12-11 PROCEDURE — 3078F DIAST BP <80 MM HG: CPT | Performed by: FAMILY MEDICINE

## 2024-12-11 PROCEDURE — 81002 URINALYSIS NONAUTO W/O SCOPE: CPT | Performed by: FAMILY MEDICINE

## 2024-12-11 PROCEDURE — 74177 CT ABD & PELVIS W/CONTRAST: CPT

## 2024-12-11 RX ORDER — CIPROFLOXACIN 500 MG/1
500 TABLET, FILM COATED ORAL 2 TIMES DAILY
Qty: 14 TABLET | Refills: 0 | Status: SHIPPED | OUTPATIENT
Start: 2024-12-11 | End: 2024-12-18

## 2024-12-11 RX ADMIN — IOHEXOL 100 ML: 350 INJECTION, SOLUTION INTRAVENOUS at 16:49

## 2024-12-11 ASSESSMENT — ENCOUNTER SYMPTOMS
CONSTIPATION: 1
MUSCULOSKELETAL NEGATIVE: 1
CONSTITUTIONAL NEGATIVE: 1
CARDIOVASCULAR NEGATIVE: 1
RESPIRATORY NEGATIVE: 1
ABDOMINAL PAIN: 1

## 2024-12-11 ASSESSMENT — FIBROSIS 4 INDEX: FIB4 SCORE: 4.42

## 2024-12-12 NOTE — PROGRESS NOTES
"Subjective:   BI CHOI is a 74 y.o. male who presents for Dysuria (Pt states there is an \"issue\" with his urine and that someone at [place] told him his \"urine is too brown\")      Dysuria         Review of Systems   Constitutional: Negative.    HENT: Negative.     Respiratory: Negative.     Cardiovascular: Negative.    Gastrointestinal:  Positive for abdominal pain and constipation.   Genitourinary:  Positive for dysuria.   Musculoskeletal: Negative.    Skin: Negative.        Medications, Allergies, and current problem list reviewed today in Epic.     Objective:     /72 (BP Location: Left arm, Patient Position: Sitting, BP Cuff Size: Adult)   Pulse 91   Temp 36.8 °C (98.2 °F) (Temporal)   Resp 16   Ht 1.74 m (5' 8.5\")   Wt 78.2 kg (172 lb 4.8 oz)   SpO2 97%     Physical Exam  Vitals and nursing note reviewed.   Constitutional:       Appearance: Normal appearance.   HENT:      Right Ear: Tympanic membrane normal.      Left Ear: Tympanic membrane normal.   Cardiovascular:      Rate and Rhythm: Normal rate.      Pulses: Normal pulses.      Heart sounds: Normal heart sounds.   Pulmonary:      Effort: Pulmonary effort is normal.      Breath sounds: Normal breath sounds.   Abdominal:      General: Abdomen is flat. Bowel sounds are normal. There is no distension.      Palpations: Abdomen is soft. There is no mass.      Tenderness: There is abdominal tenderness. There is no right CVA tenderness, left CVA tenderness, guarding or rebound.      Hernia: No hernia is present.   Neurological:      Mental Status: He is alert.         Assessment/Plan:     Diagnosis and associated orders:     1. Dysuria  POCT Urinalysis      2. Elevated liver enzymes        3. Renal insufficiency        4. Type 2 diabetes mellitus with stage 3b chronic kidney disease, unspecified whether long term insulin use (HCC)        5. Diverticulitis  ciprofloxacin (CIPRO) 500 MG Tab         Comments/MDM:     Review Abd " CT-diverticulitis, cirrhosis of the liver  Ua pos glu  Blood test renal insufficiency   Elevated liver enzymes         Differential diagnosis, natural history, supportive care, and indications for immediate follow-up discussed.    Advised the patient to follow-up with the primary care physician for recheck, reevaluation, and consideration of further management.    Please note that this dictation was created using voice recognition software. I have made a reasonable attempt to correct obvious errors, but I expect that there are errors of grammar and possibly content that I did not discover before finalizing the note.    This note was electronically signed by Jake Romeo M.D.

## 2024-12-14 DIAGNOSIS — N28.9 RENAL INSUFFICIENCY: ICD-10-CM

## 2024-12-20 ENCOUNTER — TELEPHONE (OUTPATIENT)
Dept: HEALTH INFORMATION MANAGEMENT | Facility: OTHER | Age: 74
End: 2024-12-20
Payer: MEDICARE

## 2025-01-02 ENCOUNTER — TELEPHONE (OUTPATIENT)
Dept: HEALTH INFORMATION MANAGEMENT | Facility: OTHER | Age: 75
End: 2025-01-02
Payer: MEDICARE

## 2025-01-06 ENCOUNTER — APPOINTMENT (OUTPATIENT)
Dept: MEDICAL GROUP | Facility: PHYSICIAN GROUP | Age: 75
End: 2025-01-06
Payer: MEDICARE

## 2025-01-13 ENCOUNTER — APPOINTMENT (OUTPATIENT)
Dept: MEDICAL GROUP | Facility: PHYSICIAN GROUP | Age: 75
End: 2025-01-13
Payer: MEDICARE

## 2025-01-15 ENCOUNTER — HOSPITAL ENCOUNTER (OUTPATIENT)
Dept: RADIOLOGY | Facility: MEDICAL CENTER | Age: 75
End: 2025-01-15
Payer: MEDICARE

## 2025-01-15 DIAGNOSIS — F17.211 NICOTINE DEPENDENCE, CIGARETTES, IN REMISSION: ICD-10-CM

## 2025-01-15 PROCEDURE — 71271 CT THORAX LUNG CANCER SCR C-: CPT

## 2025-01-22 ENCOUNTER — APPOINTMENT (OUTPATIENT)
Dept: MEDICAL GROUP | Facility: PHYSICIAN GROUP | Age: 75
End: 2025-01-22
Payer: MEDICARE

## 2025-03-13 ENCOUNTER — OFFICE VISIT (OUTPATIENT)
Dept: VASCULAR LAB | Facility: MEDICAL CENTER | Age: 75
End: 2025-03-13
Attending: FAMILY MEDICINE
Payer: MEDICARE

## 2025-03-13 VITALS
BODY MASS INDEX: 24.73 KG/M2 | WEIGHT: 167 LBS | HEIGHT: 69 IN | HEART RATE: 90 BPM | SYSTOLIC BLOOD PRESSURE: 91 MMHG | DIASTOLIC BLOOD PRESSURE: 56 MMHG

## 2025-03-13 DIAGNOSIS — E11.59 HYPERTENSION ASSOCIATED WITH DIABETES (HCC): ICD-10-CM

## 2025-03-13 DIAGNOSIS — M54.2 CERVICAL PAIN: ICD-10-CM

## 2025-03-13 DIAGNOSIS — E11.69 HYPERLIPIDEMIA DUE TO TYPE 2 DIABETES MELLITUS (HCC): ICD-10-CM

## 2025-03-13 DIAGNOSIS — I70.0 ATHEROSCLEROSIS OF AORTA (HCC): ICD-10-CM

## 2025-03-13 DIAGNOSIS — I73.9 PAD (PERIPHERAL ARTERY DISEASE) (HCC): ICD-10-CM

## 2025-03-13 DIAGNOSIS — E78.5 HYPERLIPIDEMIA DUE TO TYPE 2 DIABETES MELLITUS (HCC): ICD-10-CM

## 2025-03-13 DIAGNOSIS — I74.5 BILATERAL ILIAC ARTERY OCCLUSION (HCC): ICD-10-CM

## 2025-03-13 DIAGNOSIS — R80.9 TYPE 2 DIABETES MELLITUS WITH DIABETIC MICROALBUMINURIA, WITHOUT LONG-TERM CURRENT USE OF INSULIN (HCC): ICD-10-CM

## 2025-03-13 DIAGNOSIS — I65.22 CAROTID ARTERY STENOSIS, ASYMPTOMATIC, LEFT: ICD-10-CM

## 2025-03-13 DIAGNOSIS — I74.09 CHRONIC DISTAL AORTIC OCCLUSION (HCC): ICD-10-CM

## 2025-03-13 DIAGNOSIS — N18.2 CHRONIC KIDNEY DISEASE (CKD) STAGE G2/A3, MILDLY DECREASED GLOMERULAR FILTRATION RATE (GFR) BETWEEN 60-89 ML/MIN/1.73 SQUARE METER AND ALBUMINURIA CREATININE RATIO GREATER THAN 300 MG/G: ICD-10-CM

## 2025-03-13 DIAGNOSIS — E11.29 TYPE 2 DIABETES MELLITUS WITH DIABETIC MICROALBUMINURIA, WITHOUT LONG-TERM CURRENT USE OF INSULIN (HCC): ICD-10-CM

## 2025-03-13 DIAGNOSIS — I15.2 HYPERTENSION ASSOCIATED WITH DIABETES (HCC): ICD-10-CM

## 2025-03-13 DIAGNOSIS — I35.0 NONRHEUMATIC AORTIC VALVE STENOSIS: ICD-10-CM

## 2025-03-13 PROCEDURE — 99214 OFFICE O/P EST MOD 30 MIN: CPT | Performed by: FAMILY MEDICINE

## 2025-03-13 PROCEDURE — 3078F DIAST BP <80 MM HG: CPT | Performed by: FAMILY MEDICINE

## 2025-03-13 PROCEDURE — 3074F SYST BP LT 130 MM HG: CPT | Performed by: FAMILY MEDICINE

## 2025-03-13 PROCEDURE — 99212 OFFICE O/P EST SF 10 MIN: CPT

## 2025-03-13 PROCEDURE — G2211 COMPLEX E/M VISIT ADD ON: HCPCS | Performed by: FAMILY MEDICINE

## 2025-03-13 RX ORDER — CILOSTAZOL 100 MG/1
100 TABLET ORAL 2 TIMES DAILY
Qty: 60 TABLET | Refills: 5 | Status: SHIPPED | OUTPATIENT
Start: 2025-03-13

## 2025-03-13 ASSESSMENT — ENCOUNTER SYMPTOMS
HEMOPTYSIS: 0
COUGH: 0
ORTHOPNEA: 0
SPUTUM PRODUCTION: 0
BRUISES/BLEEDS EASILY: 1
WHEEZING: 0
PND: 0
CHILLS: 0
FOCAL WEAKNESS: 0
PALPITATIONS: 0
SHORTNESS OF BREATH: 0
FEVER: 0
BLOOD IN STOOL: 0
SENSORY CHANGE: 0

## 2025-03-13 ASSESSMENT — FIBROSIS 4 INDEX: FIB4 SCORE: 4.48

## 2025-03-13 NOTE — PROGRESS NOTES
FOLLOW-UP VASCULAR MEDICINE CLINIC   03/13/25     BI CHOI,  referred for eval/med mgmt of PAD, est 12/2024  Referring provider: Ana Menchaca APRN    Subjective      Interval hx/concerns: last seen 12/2024, has active order for RIMA/art duplex and Ao/iliac duplex from last visit.   Stopped trulicity due to constipation, insomnia and no longer taking.    Felt that cilostazol did not help after a few weeks and stopped.    No changes in pain pattern or PFWD, MWD    Lower extremity PAD, s/p angio w/o interventions (2012):  denies current CLTI including rest pain, nonhealing leg wounds, cold extremities, coloration changes   Location: bilat calves   Pain-free walking distance: 100-200ft  Maximum walking distance, prior to stopping due to symptoms: 400ft  How long until pain subsides with rest: 2 min   Pertinent PAD pmxh:  Initial visit hx: reports progressive bilat calf pain with walking.  Reports cannot afford surgery and so has not pursued additional testing.  Noting progressive sx.    Tobacco hx:  reports that he quit smoking about 13 years ago. His smoking use included cigarettes. He started smoking about 65 years ago. He has a 58 pack-year smoking history. He has never used smokeless tobacco.  Current/ prior treatment:    Prescribed Exercise therapy: Yes, Details: walks >30min most days    GDMT: yes    Surg Interventions: Yes, Details: 2012 - aorto/angiogram, no interventions, deemed candidate for Ao-bifem BPG per Dr. Lee     Other established ASCVD: carotid artery athero, aortic athero     HTN: Stable, tolerating meds with good adherence, Home BP log: not checking   HLD: Stable, current treatment: High intensity statin - tolerating, good adherence   Dysglycemia: yes, T2D, tolerating all meds     Antithrombotic tx: plavix 75mg - no hx of bleeding       Current Outpatient Medications on File Prior to Visit   Medication Sig Dispense Refill    lisinopril (PRINIVIL) 10 MG Tab 90      atorvastatin  (LIPITOR) 80 MG tablet Take 1 Tablet by mouth every day. 100 Tablet 3    fluticasone (FLONASE) 50 MCG/ACT nasal spray Administer 2 Sprays into affected nostril(S) every day. 16 g 0    metoprolol SR (TOPROL XL) 25 MG TABLET SR 24 HR Take 1 Tablet by mouth every day. 100 Tablet 3    methocarbamol (ROBAXIN) 500 MG Tab Take 1 Tablet by mouth 3 times a day. 30 Tablet 1    lisinopril (PRINIVIL) 20 MG Tab Take 1 Tablet by mouth every day. 100 Tablet 3    clopidogrel (PLAVIX) 75 MG Tab Take 1 Tablet by mouth every day. 100 Tablet 3    dapagliflozin propanediol (FARXIGA) 5 MG Tab Take 1 Tablet by mouth every day. 100 Tablet 3    glucose blood (CONTOUR NEXT TEST) strip USE TO TEST BLOOD SUGAR ONCE DAILY EARLY MORNING BEFORE FIRST MEAL OF DAY 50 Strip 6    VITAMIN D PO Take  by mouth.      Multiple Vitamins-Minerals (CENTRUM SILVER PO) Take  by mouth.      Blood Glucose Meter Kit Test blood sugar as recommended by provider.  Per insurance preference, blood glucose monitoring kit. 1 Kit 0    Blood Glucose Test Strips Use one per insurance preference, strip to test blood sugar once daily early morning before first meal. 100 Strip 6    Lancets Use one per insurance preference, lancet to test blood sugar once daily early morning before first meal. 100 Each 3    Alcohol Swabs Wipe site with prep pad prior to injection. 100 Each 3    B Complex Vitamins (B COMPLEX PO) Take  by mouth.      ALPRAZolam (XANAX) 0.5 MG Tab       aspirin 81 MG EC tablet       chlorhexidine (PERIDEX) 0.12 % Solution       HYDROcodone-acetaminophen (NORCO) 5-325 MG Tab per tablet  (Patient not taking: Reported on 3/14/2025)      traMADol (ULTRAM) 50 MG Tab 14      atorvastatin (LIPITOR) 80 MG tablet  (Patient not taking: Reported on 3/14/2025)      clopidogrel (PLAVIX) 75 MG Tab  (Patient not taking: Reported on 3/14/2025)      Dulaglutide (TRULICITY) 1.5 MG/0.5ML Solution Auto-injector 2 (Patient not taking: Reported on 3/14/2025)      TRULICITY 0.75  MG/0.5ML Solution Auto-injector  (Patient not taking: Reported on 3/14/2025)      metoprolol SR (TOPROL XL) 25 MG TABLET SR 24 HR  (Patient not taking: Reported on 3/14/2025)      ibuprofen (MOTRIN) 800 MG Tab TAKE ONE TABLET BY MOUTH EVERY 8 HOURS AS NEEDED 100 Tablet 0    Dulaglutide 0.75 MG/0.5ML Solution Pen-injector Inject 0.5 mL under the skin every 7 days. (Patient not taking: Reported on 3/14/2025) 6 mL 3    sennosides-docusate sodium (SENOKOT-S) 8.6-50 MG tablet Take 1 Tablet by mouth every day. 90 Tablet 1     No current facility-administered medications on file prior to visit.      Allergies   Allergen Reactions    Ozempic (0.25 Or 0.5 Mg-Dose) [Semaglutide]      Severe constipation       Social History     Tobacco Use    Smoking status: Former     Current packs/day: 0.00     Average packs/day: 1 pack/day for 58.0 years (58.0 ttl pk-yrs)     Types: Cigarettes     Start date: 1960     Quit date: 2011     Years since quittin.5    Smokeless tobacco: Never    Tobacco comments:     avoid all tobacco products   Vaping Use    Vaping status: Never Used   Substance Use Topics    Alcohol use: No     Alcohol/week: 0.0 oz     Comment: quit , drank 2 times since    Drug use: Yes     Types: Inhaled, Marijuana     DIET AND EXERCISE:  Weight Change: stable   Diet: common adult  Exercise: moderate regular exercise program     Review of Systems   Constitutional:  Negative for chills, fever and malaise/fatigue.   HENT:  Negative for nosebleeds.    Respiratory:  Negative for cough, hemoptysis, sputum production, shortness of breath and wheezing.    Cardiovascular:  Positive for claudication and leg swelling. Negative for chest pain, palpitations, orthopnea and PND.   Gastrointestinal:  Negative for blood in stool and melena.   Genitourinary:  Negative for hematuria.   Skin:  Negative for rash.   Neurological:  Negative for sensory change and focal weakness.   Endo/Heme/Allergies:  Bruises/bleeds easily.     "      Objective    Vitals:    03/13/25 1431   BP: 91/56   BP Location: Left arm   Patient Position: Sitting   BP Cuff Size: Adult   Pulse: 90   Weight: 75.8 kg (167 lb)   Height: 1.74 m (5' 8.5\")      BP Readings from Last 4 Encounters:   03/14/25 108/72   03/13/25 91/56   12/11/24 136/72   12/10/24 102/56      Body mass index is 25.02 kg/m².   Wt Readings from Last 4 Encounters:   03/14/25 73.9 kg (163 lb)   03/13/25 75.8 kg (167 lb)   12/11/24 78.2 kg (172 lb 4.8 oz)   12/10/24 77.4 kg (170 lb 9.6 oz)      Physical Exam  Constitutional:       General: He is not in acute distress.     Appearance: Normal appearance. He is not diaphoretic.   HENT:      Head: Normocephalic and atraumatic.   Eyes:      Conjunctiva/sclera: Conjunctivae normal.   Cardiovascular:      Rate and Rhythm: Normal rate and regular rhythm.      Pulses:           Carotid pulses are 2+ on the right side and 2+ on the left side.       Radial pulses are 2+ on the right side and 2+ on the left side.        Dorsalis pedis pulses are detected w/ Doppler on the right side and detected w/ Doppler on the left side.        Posterior tibial pulses are detected w/ Doppler on the right side and detected w/ Doppler on the left side.      Heart sounds: Murmur heard.      Crescendo decrescendo systolic murmur is present with a grade of 4/6.      No diastolic murmur is present.      Comments: Distal ext warm, pink.  No wounds, cyanosis or ruborous discoloration  Pulmonary:      Effort: Pulmonary effort is normal.      Breath sounds: Normal breath sounds.   Musculoskeletal:      Right lower leg: Edema present.      Left lower leg: Edema present.   Skin:     General: Skin is warm and dry.   Neurological:      Mental Status: He is alert and oriented to person, place, and time.      Cranial Nerves: No cranial nerve deficit.      Gait: Gait is intact.   Psychiatric:         Mood and Affect: Mood and affect normal.        DATA REVIEW    Lab Results   Component Value " "Date/Time    CHOLSTRLTOT 87 (L) 10/01/2024 06:36 AM    LDL 44 10/01/2024 06:36 AM    HDL 27 (A) 10/01/2024 06:36 AM    TRIGLYCERIDE 80 10/01/2024 06:36 AM       Lab Results   Component Value Date/Time    LDL 44 10/01/2024 06:36 AM    LDL 50 04/26/2024 06:35 AM    LDL 46 08/12/2022 07:07 AM    LDL 47 09/29/2020 06:46 AM    LDL 53 06/27/2019 06:22 AM    LDL 53 06/27/2019 06:22 AM       Lab Results   Component Value Date/Time    LIPOPROTA 25 09/28/2020 01:38 PM      No results found for: \"APOB\"   Lab Results   Component Value Date/Time    CRPHIGHSEN <0.2 09/21/2012 11:59 AM       Lab Results   Component Value Date/Time    SODIUM 138 12/10/2024 02:09 PM    POTASSIUM 4.7 12/10/2024 02:09 PM    CHLORIDE 103 12/10/2024 02:09 PM    CO2 25 12/10/2024 02:09 PM    GLUCOSE 123 (H) 12/10/2024 02:09 PM    BUN 28 (H) 12/10/2024 02:09 PM    CREATININE 1.48 (H) 12/10/2024 02:09 PM     Lab Results   Component Value Date/Time    ALKPHOSPHAT 76 12/10/2024 02:09 PM    ASTSGOT 78 (H) 12/10/2024 02:09 PM    ALTSGPT 81 (H) 12/10/2024 02:09 PM    TBILIRUBIN 0.5 12/10/2024 02:09 PM       Lab Results   Component Value Date/Time    HBA1C 6.3 (H) 10/01/2024 06:36 AM    HBA1C 6.8 (H) 04/26/2024 06:35 AM    HBA1C 5.8 (A) 01/23/2023 02:15 PM       Lab Results   Component Value Date/Time    MALBCRT 479 (H) 04/26/2024 06:35 AM    MICROALBUR 50.0 04/26/2024 06:35 AM         IMAGING    BLE art duplex 2015   1.  Low velocity monophasic flow in the bilateral common femoral arteries    without stenosis from inguinal ligament to the ankle, indicitive of aorto-    iliac occlusion.     Carotid 2016   Mild stenosis of the right internal carotid (< 50%).    Moderate stenosis of the left internal carotid (50-69%).    Flow within both subclavian arteries appears to be within normal limits.      Antegrade flow, bilateral vertebral arteries.    No prior study is available for comparison..    LDCT chest 5/2019   There are atherosclerotic calcifications of the " aorta and its branch vessels including the coronary arteries. There is no mediastinal, hilar or axillary adenopathy.    Echo 9/2020  Compared to the images of the prior study done on 06/08/15, there is   now mild to moderate aortic stenosis.  Left ventricular ejection fraction is visually estimated to be 65%.  Mild to moderate aortic stenosis.    Carotid 10/9/2020   CONCLUSIONS   Mild stenosis of the right internal carotid (< 50%).    Moderate stenosis of the left internal carotid (50-69%).     10/9/2020 art/merle   Vascular Laboratory   CONCLUSIONS   50-75% stenosis in RIGHT proximal profunda femoral artery.   Heavy plaque in the RIGHT femoral artery with no flow seen in the distal    femoral which appears to be occluded.   1 vessel run off in the RIGHT leg.      50-75% stenosis in LEFT common femoral artery .   Greater than 75% stenosis in the  LEFT proximal profunda femoral artery.   The LEFT femoral artery occluded in the proximal and mid thigh with flow    reconstitution seen distally.   1 vessel run off seen in the LEFT lower leg.     Findings   Right-   Ankle pressures are not accurately measured due to calcification and    noncompressibility of the tibial vessels.    Doppler waveform of the common femoral is abnormally dampened and    monophasic suggesting more proximal aorta/iliac stenosis or occlusion   Doppler waveforms at the ankle are monophasic with moderate amplitude.        Left-   Ankle pressures are not accurately measured due to calcification and    noncompressibility of the tibial vessels.    Doppler waveform of the common femoral is abnormally dampened and    monophasic consistent with known arterial occlusive disease in the iliac    artery.    Doppler waveforms at the ankle are monophasic with low amplitude.     10/9/2020 aortoiliac duplex   Vascular Laboratory   CONCLUSIONS   No evidence of aortic aneurysm.    Right external iliac artery is occluded.   50-75% stenosis in the right proximal  common iliac artery.   Left external iliac artery is occluded.   Left common iliac artery appears occluded yet is poorly visualized.    10/2022 ao/iliac duplex    No evidence of abdominal aortic or iliac artery aneurysm.    Greater than 75% stenosis in the right common iliac artery.   No flow seen at the right proximal external iliac artery which may be occluded.     Artery is patent distally with monophasic waveforms.   The left common iliac artery has monophasic waveforms; the external iliac artery appears to be occluded.   Compared to the prior study on 10/1/2021 - there has been no significant  change.     10/2022 RIMA    TBI's are moderately reduced.   Arterial duplex scan and Aorta-iliac exam was performed in accordance with    lower extremity arterial evaluation protocol - see separate reports.                  RIGHT      Waveform            Systolic BPs (mmHg)                              199           Brachial   Monophasic                               Common Femoral   Monophasic                               Popliteal   Monophasic                 0             Posterior Tibial   Absent                                   Dorsalis Pedis                              63            Digit                                            RIMA                              0.32          TBI                           LEFT   Waveform        Systolic BPs (mmHg)                              190           Brachial   Monophasic                               Common Femoral   Monophasic                               Popliteal   Absent                                   Posterior Tibial   Monophasic                 150           Dorsalis Pedis                              46            Digit                                            RIMA                              0.23          TBI    10/2022 Carotid    Compared to the prior study on 10/1/21 - there has been no significant  change.    Mild bilateral internal carotid artery stenosis  (<50%).     10/2022 BLE art duplex    RIGHT:.    The superficial femoral artery is occluded mid & distally.   The distal anterior tibial and peroneal arteries appear to be occluded; no flow identified.   LEFT:.    The superficial femoral artery is occluded from the origin with distal flow reconstitution observed.   No flow in the posterior tibial artery which appears to be occluded.   The distal peroneal artery appear to be occluded; no flow identified.    Compared to the images of the duplex dated 10/1/2021 - there is now only single vessel runoff bilaterally at the ankle, previously double vessel.    1/2023 Echo   Compared to the images of the prior study 09/17/2020, the AS appears to have progressed.  The left ventricular ejection fraction is visually estimated to be 60%.  Likely low flow low gradient severe aortic stenosis.  Consider dobutamine stress echo.          PROCEDURES:      IR abdominal aortography 2012 (Dr. Lee)  IMPRESSION:  1.  Patent bilateral renal arteries.  2.  The infrarenal abdominal aorta is chronically occluded at [id number]   part.  The iliac systems are also occluded.  There is reconstitution of the lower extremity circulation via collateral flow.  3.  Patent right common femoral artery with significant posterior plaque formation.  The right profunda femoral artery is patent.  The right superficial femoral artery is occluded at mid-thigh.  There is reconstitution of the above knee popliteal artery via collateral flow.  The right peroneal   artery is chronically occluded.  The right posterior tibial artery is the dominant runoff vessel.  4.  Patent left common femoral artery with plaque formation.  The left profunda and superficial femoral arteries are patent.  The popliteal artery is also patent.  The peroneal artery is occluded.  The anterior tibial artery is the dominant runoff vessels.  5.  Patent aortic arch.  There is a moderate stenosis seen in the proximal left subclavian  "artery.  There also appeared to be mild stenosis seen in the proximal left internal carotid artery.     Based on the angiogram result, an endovascular intervention would not be appropriate.  The patient would need to undergo an aortobifemoral bypass grafting.  The patient will be seen back in the office and surgical bypass options will be discussed with him.        Medical Decision Making:  Today's Assessment / Status / Plan:     1. PAD (peripheral artery disease) ([facility])  CRP HIGH SENSITIVE (CARDIAC)    cilostazol (PLETAL) 100 MG Tab      2. 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HCC)        3. Chronic distal aortic occlusion ([facility])        4. Atherosclerosis of aorta ([facility])  Lipid Profile    CRP HIGH SENSITIVE (CARDIAC)      5. Hyperlipidemia due to type 2 diabetes mellitus ([facility])        6. Hypertension associated with diabetes ([facility])        7. Type 2 diabetes mellitus with diabetic microalbuminuria, without long-term current use of insulin ([facility])  HEMOGLOBIN A1C    CBC WITHOUT DIFFERENTIAL    Comp Metabolic Panel    Lipid Profile    MICROALBUMIN CREAT RATIO URINE    CRP HIGH SENSITIVE (CARDIAC)      8. Carotid artery stenosis, asymptomatic, left        9. Chronic kidney disease (CKD) stage G2/A3, mildly decreased glomerular filtration rate (GFR) between 60-89 mL/min/1.73 square meter and albuminuria creatinine ratio greater than 300 mg/g        10. Nonrheumatic aortic valve stenosis  EC-ECHOCARDIOGRAM COMPLETE W/O CONT    REFERRAL TO CARDIOLOGY         Established CVD:     1) LOWER EXTREMITY PAD (aorto-iliac, fem-pop regions), chronic symptomatic, no indications of true chronic limb threatening ischemia (CLTI) changes or hx of lifestyle-limiting symptoms   - per 2012 angio, deemed candidate for Ao-bifem BPG per Dr. Lee (vasc surg) but never completed per pt preference   - PAD \"risk-amplifiers\": 75+ years old and diabetes  - Dannie class:  IIB  - Change in Pain-free walking distance: no  - Change in Maximum walking " distance: no  - change in 6MWT: N\A  - as reviewed with patient, evidenced-based standard of care includes risk factor reduction, med mgmt, and exercise therapy with limited need for surgical intervention in majority of patients if there is CLTI or severe lifestyle limiting symptoms after >6mo of standard therapy   Plan:  Lifestyle tx:  - continue lifestyle measures and tobacco cessation and/or complete avoidance strongly encouraged   - start/continue structured exercise therapy  - monitor change in pain-free and total walking distance to monitor progress  - consider 6MWT for formal progress measurement   Med tx:  - continue intensive med mgmt   - continue clopidogrel 75mg daily   - consider ASA + xarelto protocol as per 2024 AHA/ACC PAD guidelines   - start cilostazol 100mg BID (no signs of CHF) - reviewed common ADRs   Imaging/surveillance   - check RIMA/art duplex, ao/iliac duplex now - gave reprinted orders and phone number to schedulg   - consider CTA Ao with run-off   Other tx:  - may require eval for revasc with vasc surg if no response or worsening functionally impairing symptoms over time     2) mild carotid artery stenosis, L, no symptoms or hx of CVA   Plan: continue med mgmt, repeat surveillance duplex in future (2025-26)    3) mod-severe Ao stenosis, some LE edema, no major GREGORY or syncope   11/2023 echo = severe stenosis   Plan:   - update echo  - ref to re-establish with cardio     4) Non-aneurysmal aortic atherosclerosis (AS) per CT -  no symptoms or prior known associated clinical manifestations  or indications of complex features  - general indicator of systemic AS with higher potential AS in other vascular beds, possible indicator of higher overall ASCVD risk   Plan:  - no further imaging surveillance, continue med mgmt      LIPID MANAGEMENT  Qualifies for Statin Therapy Based on 2018 ACC/AHA Guidelines: yes, Secondary Prevention - Very high risk ASCVD - 2 major events or 1 event with other  high-risk conditions  Major ASCVD events: Symptomatic PAD (hx of claudication with RIMA <0.85, previous revascularization/amputation)    High-risk conditions: >64yr old , Diabetes , and Hypertension   Currently on Statin: Yes  Tx goals: LDL-C <55   At goal?  yes  Plan:   - continue atorva 80mg daily   - candidate for non-statin therapies if needed       BLOOD PRESSURE CONTROL   ACC/AHA goal <130/80  Home BP at goal:  yes  Office BP at goal:  yes  24h ABPM:  not ordered to date   Plan:   - start/continue home BP monitoring, reviewed correct technique, provide BP log and instructions  - order 24h ABPM:  NO  - routine monitoring of lytes/gfr   Medications:  - continue lisinopril 20mg daily   - continue metoprolol 25mg ER daily     GLYCEMIC MANAGEMENT Diabetic, T2 with HTN, HLD, PAD  Goal A1c < 7.0  Lab Results   Component Value Date/Time    HBA1C 6.3 (H) 10/01/2024 06:36 AM    HBA1C 6.8 (H) 04/26/2024 06:35 AM    HBA1C 5.8 (A) 01/23/2023 02:15 PM       Lab Results   Component Value Date/Time    MALBCRT 479 (H) 04/26/2024 06:35 AM    MICROALBUR 50.0 04/26/2024 06:35 AM     Plan:  - continue current medication plan   - recommmend for routine care with PCP (or endocrine) to include regular A1c monitoring, annual albumin/creatinine ratio (ACR), annual diabetic retinopathy screening, foot exams, annual flu vaccine, and updates to pneumonia vaccines as appropriate      ANTITHROMBOTIC THERAPY: yes  - continue clopidogrel 75mg daily   - consider ASA + xarelto 2.5mg BID (dual pathway inhibition) as per 2024 ACC/AHA PAD guidelines - effective as per COMPASS and VOYAGER-PAD trials for reducing MACE and MALE    LIFESTYLE INTERVENTIONS  TOBACCO: Stages of Change: Maintenance (sustained change >6mo)    reports that he quit smoking about 13 years ago. His smoking use included cigarettes. He started smoking about 65 years ago. He has a 58 pack-year smoking history. He has never used smokeless tobacco.   - continued complete avoidance  of all tobacco products   PHYSICAL ACTIVITY: >150min/week of mod-intensity activity or as much as tolerated  NUTRITION: Mediterranean, Plant-based - increase nuts, beans, whole grains, plant protein for animal 1-2 times/week, and reduce Na to 1500mg/day   ETOH: limit to 2 or less standard drinks/day   WT MGMT: maintain healthy weight     OTHER:  none     STUDIES: now RIMA/art duplex, Ao/iliac - ordered, RN to track - reprinted orders to complete  LABS: none  FOLLOW-UP: 3 months     Bharat Caicedo M.D.   [facility]  [facility]  [phone]    Cc:

## 2025-03-13 NOTE — TELEPHONE ENCOUNTER
Received request via: Pharmacy    Was the patient seen in the last year in this department? Yes    Does the patient have an active prescription (recently filled or refills available) for medication(s) requested? No    [facility]     Does the patient have [facility] and need 100-day supply? (This applies to ALL medications) Yes, quantity updated to 100 days

## 2025-03-14 ENCOUNTER — OFFICE VISIT (OUTPATIENT)
Dept: MEDICAL GROUP | Facility: PHYSICIAN GROUP | Age: 75
End: 2025-03-14
Payer: MEDICARE

## 2025-03-14 VITALS
RESPIRATION RATE: 16 BRPM | OXYGEN SATURATION: 96 % | HEART RATE: 76 BPM | DIASTOLIC BLOOD PRESSURE: 72 MMHG | TEMPERATURE: 97.6 F | WEIGHT: 163 LBS | BODY MASS INDEX: 24.71 KG/M2 | SYSTOLIC BLOOD PRESSURE: 108 MMHG | HEIGHT: 68 IN

## 2025-03-14 DIAGNOSIS — I73.9 PAD (PERIPHERAL ARTERY DISEASE) (HCC): ICD-10-CM

## 2025-03-14 DIAGNOSIS — E78.5 HYPERLIPIDEMIA DUE TO TYPE 2 DIABETES MELLITUS (HCC): ICD-10-CM

## 2025-03-14 DIAGNOSIS — M54.50 ACUTE LEFT-SIDED LOW BACK PAIN WITHOUT SCIATICA: ICD-10-CM

## 2025-03-14 DIAGNOSIS — G47.09 OTHER INSOMNIA: ICD-10-CM

## 2025-03-14 DIAGNOSIS — I25.2 HISTORY OF MI (MYOCARDIAL INFARCTION): ICD-10-CM

## 2025-03-14 DIAGNOSIS — I73.9 CLAUDICATION (HCC): ICD-10-CM

## 2025-03-14 DIAGNOSIS — I70.0 ATHEROSCLEROSIS OF AORTA (HCC): ICD-10-CM

## 2025-03-14 DIAGNOSIS — Z87.891 HISTORY OF TOBACCO ABUSE: ICD-10-CM

## 2025-03-14 DIAGNOSIS — R80.9 MICROALBUMINURIA DUE TO TYPE 2 DIABETES MELLITUS (HCC): ICD-10-CM

## 2025-03-14 DIAGNOSIS — E11.59 HYPERTENSION ASSOCIATED WITH DIABETES (HCC): ICD-10-CM

## 2025-03-14 DIAGNOSIS — E11.69 HYPERLIPIDEMIA DUE TO TYPE 2 DIABETES MELLITUS (HCC): ICD-10-CM

## 2025-03-14 DIAGNOSIS — I15.2 HYPERTENSION ASSOCIATED WITH DIABETES (HCC): ICD-10-CM

## 2025-03-14 DIAGNOSIS — E11.65 TYPE 2 DIABETES MELLITUS WITH HYPERGLYCEMIA, WITHOUT LONG-TERM CURRENT USE OF INSULIN (HCC): ICD-10-CM

## 2025-03-14 DIAGNOSIS — I10 ESSENTIAL HYPERTENSION: ICD-10-CM

## 2025-03-14 DIAGNOSIS — M54.2 CERVICAL PAIN: ICD-10-CM

## 2025-03-14 DIAGNOSIS — E11.29 MICROALBUMINURIA DUE TO TYPE 2 DIABETES MELLITUS (HCC): ICD-10-CM

## 2025-03-14 PROCEDURE — 99214 OFFICE O/P EST MOD 30 MIN: CPT | Performed by: NURSE PRACTITIONER

## 2025-03-14 PROCEDURE — 3074F SYST BP LT 130 MM HG: CPT | Performed by: NURSE PRACTITIONER

## 2025-03-14 PROCEDURE — 3078F DIAST BP <80 MM HG: CPT | Performed by: NURSE PRACTITIONER

## 2025-03-14 RX ORDER — AVOBENZONE, HOMOSALATE, OCTISALATE, OCTOCRYLENE 30; 40; 45; 26 MG/ML; MG/ML; MG/ML; MG/ML
CREAM TOPICAL
Qty: 100 EACH | Refills: 3 | Status: SHIPPED | OUTPATIENT
Start: 2025-03-14

## 2025-03-14 RX ORDER — IBUPROFEN 800 MG/1
800 TABLET, FILM COATED ORAL EVERY 8 HOURS PRN
Qty: 100 TABLET | Refills: 0 | OUTPATIENT
Start: 2025-03-14

## 2025-03-14 RX ORDER — DAPAGLIFLOZIN 5 MG/1
5 TABLET, FILM COATED ORAL DAILY
Qty: 100 TABLET | Refills: 3 | Status: SHIPPED | OUTPATIENT
Start: 2025-03-14

## 2025-03-14 RX ORDER — GLUCOSAMINE HCL/CHONDROITIN SU 500-400 MG
CAPSULE ORAL
Qty: 100 EACH | Refills: 3 | Status: SHIPPED | OUTPATIENT
Start: 2025-03-14

## 2025-03-14 RX ORDER — TRAZODONE HYDROCHLORIDE 50 MG/1
25-100 TABLET ORAL NIGHTLY PRN
Qty: 135 TABLET | Refills: 1 | Status: SHIPPED | OUTPATIENT
Start: 2025-03-14

## 2025-03-14 RX ORDER — IBUPROFEN 800 MG/1
TABLET, FILM COATED ORAL
Qty: 100 TABLET | Refills: 1 | Status: SHIPPED | OUTPATIENT
Start: 2025-03-14

## 2025-03-14 RX ORDER — CLOPIDOGREL BISULFATE 75 MG/1
75 TABLET ORAL DAILY
Qty: 100 TABLET | Refills: 3 | Status: SHIPPED | OUTPATIENT
Start: 2025-03-14

## 2025-03-14 RX ORDER — LISINOPRIL 20 MG/1
20 TABLET ORAL DAILY
Qty: 100 TABLET | Refills: 3 | Status: SHIPPED | OUTPATIENT
Start: 2025-03-14

## 2025-03-14 RX ORDER — METOPROLOL SUCCINATE 25 MG/1
25 TABLET, EXTENDED RELEASE ORAL
Qty: 100 TABLET | Refills: 3 | Status: SHIPPED | OUTPATIENT
Start: 2025-03-14

## 2025-03-14 RX ORDER — METHOCARBAMOL 500 MG/1
500 TABLET, FILM COATED ORAL 3 TIMES DAILY
Qty: 30 TABLET | Refills: 1 | Status: SHIPPED | OUTPATIENT
Start: 2025-03-14

## 2025-03-14 ASSESSMENT — ENCOUNTER SYMPTOMS: CLAUDICATION: 1

## 2025-03-14 ASSESSMENT — PATIENT HEALTH QUESTIONNAIRE - PHQ9: CLINICAL INTERPRETATION OF PHQ2 SCORE: 0

## 2025-03-14 ASSESSMENT — FIBROSIS 4 INDEX: FIB4 SCORE: 4.48

## 2025-03-14 NOTE — PROGRESS NOTES
Chief Complaint   Patient presents with    Chronic Care Management    Diabetes Follow-up                                                                                                                                       HPI:   Chung presents today with the following.    Patient consented to the use of [employer] to record and transcribe notes during this visit.      The patient attended the consultation today to discuss medication changes, ongoing symptoms, and management of various health conditions.    The patient reports discontinuing Trulicity 5 weeks ago due to side effects. Since stopping the medication, his constipation has resolved and abdominal pain has improved. However, he continues to experience stabbing pain in the large intestine during defecation. As a consequence of discontinuing Trulicity, the patient's blood glucose levels have increased to 180-190 mg/dL. He expresses readiness to start metformin as an alternative treatment.    Sleep difficulties are a significant concern for the patient, who reports getting only 3-4 hours of sleep per week. He attributes this to age, medications, or stress. The patient has tried melatonin but found it ineffective in improving his sleep.    The patient previously took cilostazol twice daily for leg pain but did not perceive any benefit. Dr. Caicedo has since prescribed a higher dose of cilostazol to address this issue.    The patient's medical history includes Type 2 Diabetes Mellitus, Peripheral Artery Disease, a history of unspecified surgery, dental work, allergies, and insomnia.    Current medications and supplements include Lisinopril, Plavix, Cilostazol (at a higher dose), Flonase, an unspecified muscle relaxer, and melatonin. The patient is also using a new blood glucose meter, the Accu-Check Guide.    The patient's social history indicates that he is experiencing stress and poor sleep, getting only 3-4 hours of sleep per week.    A review of systems  "reveals resolved constipation and eased stomach pain, but persistent stabbing pain in the large intestine during defecation. The patient's endocrine system shows elevated blood glucose levels of 180-190 mg/dL. Sleep difficulties and leg pain (implied by cilostazol use) are also noted.    The patient's current health management strategies and concerns have been discussed, with particular attention to medication adjustments, gastrointestinal symptoms, blood glucose control, sleep issues, and leg pain management.    ROS:  All systems negative expect as addressed in assessment and plan.     /72 (BP Location: Left arm, Patient Position: Sitting)   Pulse 76   Temp 36.4 °C (97.6 °F) (Temporal)   Resp 16   Ht 1.727 m (5' 8\")   Wt 73.9 kg (163 lb)   SpO2 96%   BMI 24.78 kg/m²     Objective:    Physical Exam  Vitals reviewed.   Constitutional:       Appearance: Normal appearance.   HENT:      Head: Normocephalic and atraumatic.      Mouth/Throat:      Mouth: Mucous membranes are moist.   Eyes:      Extraocular Movements: Extraocular movements intact.      Conjunctiva/sclera: Conjunctivae normal.   Pulmonary:      Effort: Pulmonary effort is normal.   Musculoskeletal:         General: Normal range of motion.      Cervical back: Normal range of motion.   Skin:     General: Skin is warm and dry.   Neurological:      General: No focal deficit present.      Mental Status: He is alert and oriented to person, place, and time.   Psychiatric:         Mood and Affect: Mood normal.         Behavior: Behavior normal.         Thought Content: Thought content normal.           Assessment and Plan:  75 y.o. male with the following issues.    1. Cervical pain  - ibuprofen (MOTRIN) 800 MG Tab; TAKE ONE TABLET BY MOUTH EVERY 8 HOURS AS NEEDED  Dispense: 100 Tablet; Refill: 1    2. Essential hypertension  - lisinopril (PRINIVIL) 20 MG Tab; Take 1 Tablet by mouth every day.  Dispense: 100 Tablet; Refill: 3  - metoprolol SR (TOPROL " XL) 25 MG TABLET SR 24 HR; Take 1 Tablet by mouth every day.  Dispense: 100 Tablet; Refill: 3    3. Atherosclerosis of aorta (HCC)  - lisinopril (PRINIVIL) 20 MG Tab; Take 1 Tablet by mouth every day.  Dispense: 100 Tablet; Refill: 3    4. History of tobacco abuse  - lisinopril (PRINIVIL) 20 MG Tab; Take 1 Tablet by mouth every day.  Dispense: 100 Tablet; Refill: 3    5. Acute left-sided low back pain without sciatica  - methocarbamol (ROBAXIN) 500 MG Tab; Take 1 Tablet by mouth 3 times a day.  Dispense: 30 Tablet; Refill: 1    6. History of MI (myocardial infarction)  - metoprolol SR (TOPROL XL) 25 MG TABLET SR 24 HR; Take 1 Tablet by mouth every day.  Dispense: 100 Tablet; Refill: 3    7. Other insomnia  - traZODone (DESYREL) 50 MG Tab; Take 0.5-2 Tablets by mouth at bedtime as needed for Sleep.  Dispense: 135 Tablet; Refill: 1    8. Type 2 diabetes mellitus with hyperglycemia, without long-term current use of insulin ([facility])  - Blood Glucose Meter Kit; Test blood sugar as recommended by provider. Accucheck Guide blood glucose monitoring kit.  Dispense: 1 Kit; Refill: 0  - Blood Glucose Test Strips; Use one Accucheck Guide strip to test blood sugar once daily early morning before first meal.  Dispense: 100 Strip; Refill: 3  - Lancets; Use one Accucheck Guide lancet to test blood sugar once daily early morning before first meal.  Dispense: 100 Each; Refill: 3  - Alcohol Swabs; Wipe site with prep pad prior to injection.  Dispense: 100 Each; Refill: 3  - dapagliflozin propanediol (FARXIGA) 5 MG Tab; Take 1 Tablet by mouth every day.  Dispense: 100 Tablet; Refill: 3  - metFORMIN ER (GLUCOPHAGE XR) 500 MG TABLET SR 24 HR; Take 2 Tablets by mouth every day.  Dispense: 200 Tablet; Refill: 3    9. Microalbuminuria due to type 2 diabetes mellitus (HCC)  - dapagliflozin propanediol (FARXIGA) 5 MG Tab; Take 1 Tablet by mouth every day.  Dispense: 100 Tablet; Refill: 3    10. Hypertension associated with diabetes ([facility])    11.  Hyperlipidemia due to type 2 diabetes mellitus (HCC)    12. PAD (peripheral artery disease) (HCC)  - clopidogrel (PLAVIX) 75 MG Tab; Take 1 Tablet by mouth every day.  Dispense: 100 Tablet; Refill: 3    13. Claudication (HCC)  - clopidogrel (PLAVIX) 75 MG Tab; Take 1 Tablet by mouth every day.  Dispense: 100 Tablet; Refill: 3    HCC Gap Form    Diagnosis to address: F10.21 - Alcohol dependence in remission (HCC)  Assessment and plan: Chronic, stable. Continue with current defined treatment plan: continue to abstain from alcohol. Follow-up at least annually.  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 Continue with current treatment plan: Continue to follow with vascular for medical management. Follow-up with specialist as directed, but at least annually.  Diagnosis: I74.09 - Chronic distal aortic occlusion (HCC)  Assessment and plan: Chronic, stable, as based on today's assessment and impact on other conditions evaluated today. Continue with current treatment plan: Continue to follow with vascular for medical management. Follow-up with specialist as directed, but at least annually.  Diagnosis: E11.69, E78.5 - Hyperlipidemia due to type 2 diabetes mellitus (HCC)  Assessment and plan: Chronic, stable. Continue with current defined treatment plan: Continue atorvastatin 80mg daily. Follow-up at least annually.  Diagnosis: E11.29, R80.9 - Microalbuminuria due to type 2 diabetes mellitus (HCC)  Assessment and plan: Chronic, stable. Continue with current defined treatment plan: Continue lisinopril 20mg daily and farxiga 5mg daily. Follow-up at least annually.  Diagnosis: E11.59, I15.2 - Hypertension associated with diabetes (HCC)  Assessment and plan: Chronic, stable. Continue with current defined treatment plan: Continue lisinopril 20mg daily and metoprolol XL 25mg daily. Follow-up at least  annually.  Diagnosis: E11.65 - Type 2 diabetes mellitus with hyperglycemia, without long-term current use of insulin (HCC)  Assessment and plan: Chronic, stable. Continue with current defined treatment plan: Patient with adverse effects to trulicity. He would like to switch back to metformin. Will start metformin XR 100mg daily. Follow-up at least annually.  Diagnosis: B18.2 - Chronic hepatitis C without hepatic coma (HCC)  Assessment and plan: Chronic, stable. Continue with current defined treatment plan: Patient has attempted treatment for Hep C in the past but did not tolerate medications. He sees GI consultants yearly. Continue to monitor liver function. Follow-up at least annually.  Last edited 03/20/25 07:38 PDT by SHAHAB Griggs.[email or web address]           1. Type 2 Diabetes Mellitus (T2DM):     - Assessment: Patient discontinued Trulicity 5 weeks ago due to constipation and stomach pain. Blood glucose levels increased to 180-190 mg/dL. Patient no longer eligible for previous test strips, using less frequently. Ready to start metformin.     - Plan:          a. Discontinue Trulicity          b. Initiate metformin (dose/frequency to be determined)          c. Prescribe new blood glucose meter (Accu-Check Guide)          d. Educate on potential metformin side effects, including looser stools          e. Follow up in 6 months    2. GI Discomfort:     - Assessment: Constipation resolved, stomach pain improved since discontinuing Trulicity. Patient still has stabbing pain in large intestine during bowel movements. Some patients' digestive tracts don't tolerate GLP-1 receptor agonists well.     - Plan:          a. Monitor GI symptoms          b. Educate on potential gastroparesis risk with GLP-1 receptor agonists    3. Insomnia:     - Assessment: Patient reports 3-4 hours of sleep in past week. Melatonin ineffective. Attributes to age, medication effects, or stress.     - Plan:          a. Prescribe trazodone for sleep  (start: half tablet; max: 2 tablets)          b. Educate on proper use, non-addictive properties, and expected effects          c. Instruct to follow up if ineffective    4. Medication Management:     - Assessment: Medication list needs review. Duplicate entries for lisinopril and Plavix. Several discontinued meds remain, including post-surgery opioid.     - Plan:          a. Review/update medication list, remove duplicates and discontinued meds          b. Refill ibuprofen and muscle relaxer          c. Confirm cilostazol dosage and use with Plavix (review Dr. Caicedo's notes)          d. Refill all current medications for 90 days with 3 refills    5. Peripheral Artery Disease (PAD):     - Assessment: Dr. Caicedo initiated cilostazol for leg pain. Initial lower dose ineffective, dosage increased.     - Plan:          a. Confirm cilostazol dosage and effectiveness          b. Educate on proper use and expectations    Return in about 6 months (around 9/14/2025) for Diabetes.      Please note that this dictation was created using voice recognition software. I have worked with consultants from the vendor as well as technical experts from [employer] to optimize the interface. I have made every reasonable attempt to correct obvious errors, but I expect that there are errors of grammar and possibly content that I did not discover before finalizing the note.

## 2025-03-15 ENCOUNTER — HOSPITAL ENCOUNTER (OUTPATIENT)
Dept: LAB | Facility: MEDICAL CENTER | Age: 75
End: 2025-03-15
Attending: FAMILY MEDICINE
Payer: MEDICARE

## 2025-03-15 DIAGNOSIS — I73.9 PAD (PERIPHERAL ARTERY DISEASE) (HCC): ICD-10-CM

## 2025-03-15 DIAGNOSIS — R80.9 TYPE 2 DIABETES MELLITUS WITH DIABETIC MICROALBUMINURIA, WITHOUT LONG-TERM CURRENT USE OF INSULIN (HCC): ICD-10-CM

## 2025-03-15 DIAGNOSIS — I70.0 ATHEROSCLEROSIS OF AORTA (HCC): ICD-10-CM

## 2025-03-15 DIAGNOSIS — E11.29 TYPE 2 DIABETES MELLITUS WITH DIABETIC MICROALBUMINURIA, WITHOUT LONG-TERM CURRENT USE OF INSULIN (HCC): ICD-10-CM

## 2025-03-15 LAB
ALBUMIN SERPL BCP-MCNC: 4.1 G/DL (ref 3.2–4.9)
ALBUMIN/GLOB SERPL: 1.1 G/DL
ALP SERPL-CCNC: 88 U/L (ref 30–99)
ALT SERPL-CCNC: 133 U/L (ref 2–50)
ANION GAP SERPL CALC-SCNC: 10 MMOL/L (ref 7–16)
AST SERPL-CCNC: 125 U/L (ref 12–45)
BILIRUB SERPL-MCNC: 0.5 MG/DL (ref 0.1–1.5)
BUN SERPL-MCNC: 26 MG/DL (ref 8–22)
CALCIUM ALBUM COR SERPL-MCNC: 9.8 MG/DL (ref 8.5–10.5)
CALCIUM SERPL-MCNC: 9.9 MG/DL (ref 8.5–10.5)
CHLORIDE SERPL-SCNC: 106 MMOL/L (ref 96–112)
CHOLEST SERPL-MCNC: 106 MG/DL (ref 100–199)
CO2 SERPL-SCNC: 23 MMOL/L (ref 20–33)
CREAT SERPL-MCNC: 1.11 MG/DL (ref 0.5–1.4)
CREAT UR-MCNC: 90 MG/DL
CRP SERPL HS-MCNC: <0.2 MG/L (ref 0–3)
ERYTHROCYTE [DISTWIDTH] IN BLOOD BY AUTOMATED COUNT: 44.2 FL (ref 35.9–50)
EST. AVERAGE GLUCOSE BLD GHB EST-MCNC: 154 MG/DL
GFR SERPLBLD CREATININE-BSD FMLA CKD-EPI: 69 ML/MIN/1.73 M 2
GLOBULIN SER CALC-MCNC: 3.6 G/DL (ref 1.9–3.5)
GLUCOSE SERPL-MCNC: 158 MG/DL (ref 65–99)
HBA1C MFR BLD: 7 % (ref 4–5.6)
HCT VFR BLD AUTO: 43.5 % (ref 42–52)
HDLC SERPL-MCNC: 30 MG/DL
HGB BLD-MCNC: 14.3 G/DL (ref 14–18)
LDLC SERPL CALC-MCNC: 54 MG/DL
MCH RBC QN AUTO: 29.1 PG (ref 27–33)
MCHC RBC AUTO-ENTMCNC: 32.9 G/DL (ref 32.3–36.5)
MCV RBC AUTO: 88.6 FL (ref 81.4–97.8)
MICROALBUMIN UR-MCNC: 28.6 MG/DL
MICROALBUMIN/CREAT UR: 318 MG/G (ref 0–30)
PLATELET # BLD AUTO: 129 K/UL (ref 164–446)
PMV BLD AUTO: 12.6 FL (ref 9–12.9)
POTASSIUM SERPL-SCNC: 5 MMOL/L (ref 3.6–5.5)
PROT SERPL-MCNC: 7.7 G/DL (ref 6–8.2)
RBC # BLD AUTO: 4.91 M/UL (ref 4.7–6.1)
SODIUM SERPL-SCNC: 139 MMOL/L (ref 135–145)
TRIGL SERPL-MCNC: 109 MG/DL (ref 0–149)
WBC # BLD AUTO: 3.8 K/UL (ref 4.8–10.8)

## 2025-03-15 PROCEDURE — 86141 C-REACTIVE PROTEIN HS: CPT

## 2025-03-15 PROCEDURE — 82043 UR ALBUMIN QUANTITATIVE: CPT

## 2025-03-15 PROCEDURE — 83036 HEMOGLOBIN GLYCOSYLATED A1C: CPT

## 2025-03-15 PROCEDURE — 82570 ASSAY OF URINE CREATININE: CPT

## 2025-03-15 PROCEDURE — 80061 LIPID PANEL: CPT

## 2025-03-15 PROCEDURE — 80053 COMPREHEN METABOLIC PANEL: CPT

## 2025-03-15 PROCEDURE — 85027 COMPLETE CBC AUTOMATED: CPT

## 2025-03-15 PROCEDURE — 36415 COLL VENOUS BLD VENIPUNCTURE: CPT

## 2025-03-17 ENCOUNTER — TELEPHONE (OUTPATIENT)
Dept: MEDICAL GROUP | Facility: PHYSICIAN GROUP | Age: 75
End: 2025-03-17
Payer: MEDICARE

## 2025-03-17 NOTE — TELEPHONE ENCOUNTER
1. Name:  Subhash Limon    Call Back Number: [phone]        How would the patient prefer to be contacted with a response: Phone call OK to leave a detailed message    2. Which medication(s) is being requested?   Metformin (states problem with Trulicity)    3. What is the preferred Pharmacy?   [facility] in [place]    Patient was informed they may receive a return phone call from our office with any additional questions before processing this request.

## 2025-03-18 NOTE — TELEPHONE ENCOUNTER
Phone Number Called: [phone]    Call outcome: Spoke to patient regarding message below.    Message: Patient stated that when he last saw you, you talked about starting him on Metformin?    Please advise?

## 2025-03-19 RX ORDER — METFORMIN HYDROCHLORIDE 500 MG/1
1000 TABLET, EXTENDED RELEASE ORAL DAILY
Qty: 200 TABLET | Refills: 3 | Status: SHIPPED | OUTPATIENT
Start: 2025-03-19 | End: 2026-04-23

## 2025-03-24 ENCOUNTER — TELEPHONE (OUTPATIENT)
Dept: CARDIOLOGY | Facility: MEDICAL CENTER | Age: 75
End: 2025-03-24
Payer: MEDICARE

## 2025-03-24 NOTE — TELEPHONE ENCOUNTER
Referral from: Dr. Bharat Caicedo for moderate aortic stenosis. Moved up his echocardiogram to sooner this month.    Patient called on 3/24/2025, scheduled echo for 3/31/25 and Dr. Frost on 4/2/25.    Discussed with patient consultation appointments, testing needed, and plan of care.    Patient given dates and times of testing and consultations.    All questions answered.    Phone number given to patient for [facility] for any further questions or concerns.

## 2025-03-24 NOTE — Clinical Note
[facility]  [address] [place] [address]    [id number]    Tuan Limon  [address]  [address]  [place] [address]    March 24, 2025    Member Name: Chung Limon   Member Number: [id number]   Reference Number: [id number]   Approved Services: Echos and EKG   Approved Service Dates: 03/13/2025 - 06/11/2025   Requesting Provider: Bharat Caicedo   Requested Provider: [facility]     Dear Chung Limon:    The following medical service(s) requested by Bharat Caicedo have been approved:    Procedure Code Procedure Code Name Requested Quantity Approved Quantity Status   31978 (CPT®) MI ECHO HEART XTHORACIC,COMPLETE W DOPPLER 1 1 Authorized       Approved Quantity means the number of visits approved for medication treatments and/or medical services.    The services should be provided by [facility] no later than 06/11/2025. Please contact the provider listed below with any questions.     Provider Information:  [facility]  [phone]    Your plan benefit may require a deductible, co-payment or coinsurance for these services. This authorization does not guarantee [facility] will pay the claim for services that you receive. Payment by [facility] for these services is subject to the terms of your Evidence of Coverage, your eligibility at the time of service, and confirmation of benefit coverage.    For any questions or additional information, please contact Customer Service:    [facility] Toll Free: [phone]  TTY users dial: [phone]   Call Center Hours:  Oct 1 - Mar 31, Mon - Fri 7 AM to 8 PM PST  Oct 1 - Mar 31, Sat - Sun 8 AM to 8 PM PST  Apr 1 - Sep 30, Mon - Fri 7 AM to 8 PM PST   Office Hours: Mon - Fri 8 AM to 5 PM PST   E-mail: [email or web address]   Website:  [email or web address]      This information is available for free in other languages. Please contact Customer Service at  "Subjective   Jan Behrendsen is a 67 y.o. female       Chief Complaint    Annual Exam          67-year-old female returns for follow-up she is doing very well, denies any cardiovascular events, complaints or nitrate usage or hospitalizations.  She is still walking 1-1/2 miles daily.  She has not had any substantial improvement in her obesity but remains aerobically active and asymptomatic and healthy.  She had normal stress testing in 2023, details of which have been reviewed.     She has known coronary disease with previous coronary intervention of the LAD with 1 drug-eluting stent in the RCA with 3 drug-eluting stents dating back to 2010. She is followed with Dr. Obando up until his recent MCC    Her palpitations have resolved completely over the past year, last echo monitor was normal sinus rhythm without arrhythmia    Recommendations, continue current activity level and exercise and aerobic activity and dietary discretion, will follow-up in 1 year         Review of Systems   All other systems reviewed and are negative.           Vitals:    06/11/24 1017   BP: 132/82   BP Location: Right arm   Patient Position: Sitting   Pulse: 68   Weight: 102 kg (224 lb 6.4 oz)   Height: 1.626 m (5' 4\")        Objective   Physical Exam  Constitutional:       Appearance: Normal appearance.   HENT:      Nose: Nose normal.   Neck:      Vascular: No carotid bruit.   Cardiovascular:      Rate and Rhythm: Normal rate.      Pulses: Normal pulses.      Heart sounds: Normal heart sounds.   Pulmonary:      Effort: Pulmonary effort is normal.   Abdominal:      General: Bowel sounds are normal.      Palpations: Abdomen is soft.   Musculoskeletal:         General: Normal range of motion.      Cervical back: Normal range of motion.      Right lower leg: No edema.      Left lower leg: No edema.   Skin:     General: Skin is warm and dry.   Neurological:      General: No focal deficit present.      Mental Status: She is alert.   Psychiatric: "         Mood and Affect: Mood normal.         Behavior: Behavior normal.         Thought Content: Thought content normal.         Judgment: Judgment normal.         Allergies  Penicillins     Current Medications    Current Outpatient Medications:     amLODIPine (Norvasc) 10 mg tablet, Take 1 tablet (10 mg) by mouth once daily., Disp: , Rfl:     aspirin 81 mg EC tablet, Take 1 tablet (81 mg) by mouth once daily., Disp: , Rfl:     atorvastatin (Lipitor) 40 mg tablet, Take 1 tablet (40 mg) by mouth once daily at bedtime., Disp: , Rfl:     escitalopram (Lexapro) 5 mg tablet, Take 1 tablet (5 mg) by mouth once daily., Disp: , Rfl:     levothyroxine (Synthroid, Levoxyl) 88 mcg tablet, Take 1 tablet (88 mcg) by mouth once daily., Disp: , Rfl:                      Assessment/Plan   1. Atherosclerosis of native coronary artery of native heart without angina pectoris        2. History of PTCA        3. BMI 38.0-38.9,adult        4. Former smoker                 Scribe Attestation  By signing my name below, INataly LPN Scribe   attest that this documentation has been prepared under the direction and in the presence of Leonardo Patino DO.     Provider Attestation - Scribe documentation    All medical record entries made by the Scribe were at my direction and personally dictated by me. I have reviewed the chart and agree that the record accurately reflects my personal performance of the history, physical exam, discussion and plan.    the phone number above for more information. [facility] complies with applicable Federal civil rights laws and does not discriminate on the basis of race, color, national origin, age, disability or sex.    Sincerely,     Healthcare Utilization Management Department     Cc: [facility]   Bharat Caicedo    Multi-Language Insert  Multi- Services  English: We have free  services to answer any questions you may have about our health or drug plan.  To get an , just call us at [phone].  Someone who speaks English/Language can help you.  This is a free service.  Czech: Tenemos servicios de intérprete sin costo alguno  para responder cualquier pregunta que pueda tener sobre nuestro plan de amanda o medicamentos. Para hablar con un intérprete, por favor llame al [phone]. Alguien que hable español le podrá ayudar. Dee es un servicio gratuito.  Chinese Mandarin: ?????????????????????????????? ???????????????? [phone]????????????????? ?????????  Chinese Cantonese: ?????????????????????????????? ????????????? [phone]???????????????????? ????????  Tagalog:  Felix eric serbisyo sa taylorsabenitez houstona sly sierra hinggil sa edna willingham o panggamot.  zaina Forman  [phone]. Marlo zamora Tagalog.  Scott rinaldi.  Danish:  Nous proposons teresa services gratuits d'interprétation pour répondre à toutes devon questions relatives à notre régime de santé ou d'assurance-médicaments. Pour accéder au service d'interprétation, il vous suffit de nous appeler au [phone]. Un interlocuteur parlant Français pourra vous aider. Ce service est gratuit.  English:  Ambika lauren có d?ch v? thông d?ch mi?n phí ð? tr? l?i các câu h?i v? chýõng s?c kh?e và chýõng trình thu?c men. N?u quí v? c?n ashwin  d?ch viên yinka g?i [phone] s? có nhân viên nói ti?ng Vi?t giúp ð? quí v?. Ðây là d?ch v? mi?n phí .  Persian:  Unser kostenser Dolmetscherservice beantwortet Ihren Fragen zu unserem Gesundheits- und Arzneimittelplan. Unsere Dolmetscher erreichen Sie [phone]. Man wird Ihnen michael auf [facility]. Dieser Service ist [facility].  Hungarian:  ??? ?? ?? ?? ?? ??? ?? ??? ?? ???? ?? ?? ???? ???? ????. ?? ???? ????? ?? [phone] ??? ??? ????.  ???? ?? ???? ?? ?? ????. ? ???? ??? ?????.   Puerto Rican: Åñëè ó âàñ âîçíèêíóò âîïðîñû îòíîñèòåëüíî ñòðàõîâîãî èëè ìåäèêàìåíòíîãî ïëàíà, âû ìîæåòå âîñïîëüçîâàòüñÿ íàøèìè áåñïëàòíûìè óñëóãàìè ïåðåâîä÷èêîâ. ×òîáû âîñïîëüçîâàòüñÿ óñëóãàìè ïåðåâîä÷èêà, ïîçâîíèòå íàì ïî òåëåôîíó [phone]. Âàì îêàæåò ïîìîùü ñîòðóäíèê, êîòîðûé ãîâîðèò ïî-póññêè. Äàííàÿ óñëóãà áåñïëàòíàÿ.  Malay: ÅääÇ äÞÏã ÎÏãÇÊ ÇáãÊÑÌã ÇáÝæÑí ÇáãÌÇäíÉ ááÅÌÇÈÉ Úä Ãí ÃÓÆáÉ ÊÊÚáÞ ÈÇáÕÍÉ Ãæ ÌÏæá ÇáÃÏæíÉ áÏíäÇ. ááÍÕæá Úáì ãÊÑÌã ÝæÑí¡ áíÓ Úáíß Óæì ÇáÇÊÕÇá ÈäÇ Úáì [phone] . ÓíÞæã ÔÎÕ ãÇ íÊÍÏË ÇáÚÑÈíÉ ÈãÓÇÚÏÊß. åÐå ÎÏãÉ ãÌÇäíÉ.  Joya: ????? ????????? ?? ??? ?? ????? ?? ???? ??? ???? ???? ?? ?????? ?? ???? ???? ?? ??? ????? ??? ????? ???????? ?????? ?????? ???. ?? ???????? ??????? ???? ?? ???, ?? ???? [phone] ?? ??? ????. ??? ??????? ?? ?????? ????? ?? ???? ??? ?? ???? ??. ?? ?? ????? ???? ??.   Latvian:  È disponibile un servizio di interpretariato gratuito per rispondere a eventuali domande sul nostro piano sanitario e farmaceutico. Per un interprete, contattare il lesly [phone]. Un nostro incaricato allen parla Italianovi fornirà l'assistenza necessaria. È un servizio gratuito.  Portugués:  Dispomos de serviços de interpretação gratuitos para responder a qualquer questão que tenha acerca do nosso plano de saúde ou de medicação. Para obter um intérprete, contacte-nos através do número [phone]. Irá encontrar alguém que fale o idioma  Português para o ajudar. Dee serviço é  gratuito.  Amharic Creole:  Nou genyen sèvis entèprèt gratis estefania reponn tout kesyon ou ta genyen konsènan plan medikal oswa dwòg nou an.  Estefania jwenn yon entèprèt, jis rele nou nan [phone]. Yon moun ki pale Kreyòl kapab ignacio w.  Sa a se yon sèvis ki gratis.  Polish:  Umo¿liwiamy bezp³atne skorzystanie z us³ug t³umacza ustnego, który pomo¿e w uzyskaniu odpowiedzi na temat planu zdrowotnego lub dawkowania antw. Dodie skorzystaæ z pomocy t³umacza znaj¹cego doreen kruse¿y zadzwoniæ pod numer [phone]. Ta us³uga jest bezp³atna.  Ukrainian: ????? ??????? ????????????????????? ??????????????????????????????????[phone] ???????????????? ? ????????????????? ?????

## 2025-03-26 ENCOUNTER — TELEPHONE (OUTPATIENT)
Dept: CARDIOLOGY | Facility: MEDICAL CENTER | Age: 75
End: 2025-03-26
Payer: MEDICARE

## 2025-03-26 NOTE — TELEPHONE ENCOUNTER
Spoke with patient and confirmed patient has seen previous cardiologist here at  [facility]. Has not been seen by outside providers, all testing labs and imaging is up to date per chart review.     Appointment time, date, and location verified with patient.

## 2025-04-07 ENCOUNTER — TELEPHONE (OUTPATIENT)
Dept: CARDIOLOGY | Facility: MEDICAL CENTER | Age: 75
End: 2025-04-07
Payer: MEDICARE

## 2025-04-07 NOTE — LETTER
April 9, 2025        Chung Limon  [address]     [address]  [place] [address]        Dear Tuan,    We have been unable to reach you regarding rescheduling your echo and cardiology visit with one of our structural heart providers for evaluation of your cardiac valvular disease. You missed your appointments on 3/31 and 4/2.     Please call our office at [phone] to reschedule.           Sincerely,           JOSE Harrison, RN  [facility] Nurse Coordinator

## 2025-04-07 NOTE — TELEPHONE ENCOUNTER
Referral from: Dr. Bharat Caicedo for mod AS (last echo 1/9/2023). Patient no-showed echo appt 3/31/2025 and FU with Dr. Frost 4/2/2025.    Patient called on 4/7/2025. LVM for patient to call back back and discuss.     First attempt

## 2025-04-08 NOTE — TELEPHONE ENCOUNTER
Called and left message for patient requesting call back to reschedule imaging and visit.      Second attempt

## 2025-04-09 NOTE — TELEPHONE ENCOUNTER
Called and left message for patient requesting call back to reschedule echo and visit.    MyChart message sent and letter placed in mail to patient.       Third attempt

## 2025-04-16 ENCOUNTER — TELEPHONE (OUTPATIENT)
Dept: HEALTH INFORMATION MANAGEMENT | Facility: OTHER | Age: 75
End: 2025-04-16
Payer: MEDICARE

## 2025-04-21 ENCOUNTER — APPOINTMENT (OUTPATIENT)
Dept: MEDICAL GROUP | Facility: PHYSICIAN GROUP | Age: 75
End: 2025-04-21
Payer: MEDICARE

## 2025-05-07 PROBLEM — F13.20 SEDATIVE, HYPNOTIC OR ANXIOLYTIC DEPENDENCE, UNCOMPLICATED (HCC): Status: ACTIVE | Noted: 2025-05-07

## 2025-05-07 PROBLEM — F51.01 PRIMARY INSOMNIA: Status: ACTIVE | Noted: 2025-05-07

## 2025-05-07 NOTE — ASSESSMENT & PLAN NOTE
Chronic, stable. Scattered ecchymoses to bilateral upper and lower extremities. Currently on clopidogrel and aspirin daily. Denies abnormal bleeding. Followed by primary care provider.

## 2025-05-07 NOTE — ASSESSMENT & PLAN NOTE
Chronic, stable. Currently taking trazodone 50 mg nightly. Reports an average of 7 hours of sleep per night. Denies excessive sedation and amnesia. Managed by primary care provider.

## 2025-05-07 NOTE — ASSESSMENT & PLAN NOTE
Chronic, ongoing. Reports bilateral calf pain with ambulation. Continues taking magnesium nightly with improvement in nocturnal leg cramps. Followed by vascular medicine, Dr. Caicedo.

## 2025-05-07 NOTE — ASSESSMENT & PLAN NOTE
Stable. Reports diagnosis 20+ years ago when he attempted to donate blood. Reports that he attempted treatment three years ago, but did not tolerate the medications. Monitored by primary care provider.

## 2025-05-07 NOTE — ASSESSMENT & PLAN NOTE
Chronic, ongoing. Most recent hemoglobin A1C was 7.0 in March 2025. Reports that fasting blood sugars are in the 140s. Currently taking farxiga 5 mg daily. Reports that he stopped taking metformin due to GI upset and diarrhea. Routinely monitored by primary care provider.

## 2025-05-07 NOTE — ASSESSMENT & PLAN NOTE
Chronic, stable. Reviewed lipid profile from March 2025. Currently taking atorvastatin 80 mg daily. Provided education on heart healthy diet with adequate intake of fruits, vegetables, and whole grains. Encourage 30 minutes of moderate exercise 3-4 times a week. Denies chest pain and claudication. Routinely monitored by primary care provider.

## 2025-05-07 NOTE — ASSESSMENT & PLAN NOTE
Chronic, stable. Reviewed ultrasound of aorta and iliac duplex from 2023. History of aortography and lower extremity arteriography by Dr. Lee in 2012. Continues on aspirin and statin therapy daily. Monitored by vascular medicine, Dr. Caicedo.   Complex Repair And O-L Flap Text: The defect edges were debeveled with a #15 scalpel blade.  The primary defect was closed partially with a complex linear closure.  Given the location of the remaining defect, shape of the defect and the proximity to free margins an O-L flap was deemed most appropriate for complete closure of the defect.  Using a sterile surgical marker, an appropriate flap was drawn incorporating the defect and placing the expected incisions within the relaxed skin tension lines where possible.    The area thus outlined was incised deep to adipose tissue with a #15 scalpel blade.  The skin margins were undermined to an appropriate distance in all directions utilizing iris scissors.

## 2025-05-07 NOTE — ASSESSMENT & PLAN NOTE
Chronic, ongoing. Reviewed most recent CBC from March 2025 with baseline trend of thrombocytopenia. Denies abnormal bleeding. Followed by primary care provider.    Component  Ref Range & Units (hover) 1 mo ago  (3/15/25)   Platelet Count 129 Low

## 2025-05-07 NOTE — ASSESSMENT & PLAN NOTE
Chronic, stable. Currently taking lisinopril 20 mg and metoprolol SR 25 daily. In-office blood pressure is   Denies chest pain, lightheadedness, and lower extremity edema. Continue to follow with vascular medicine for medication and symptom management.

## 2025-05-12 ENCOUNTER — OFFICE VISIT (OUTPATIENT)
Dept: MEDICAL GROUP | Facility: MEDICAL CENTER | Age: 75
End: 2025-05-12
Payer: MEDICARE

## 2025-05-12 VITALS
DIASTOLIC BLOOD PRESSURE: 66 MMHG | OXYGEN SATURATION: 96 % | WEIGHT: 170.4 LBS | HEIGHT: 69 IN | BODY MASS INDEX: 25.24 KG/M2 | HEART RATE: 86 BPM | SYSTOLIC BLOOD PRESSURE: 120 MMHG

## 2025-05-12 DIAGNOSIS — E11.65 TYPE 2 DIABETES MELLITUS WITH HYPERGLYCEMIA, WITHOUT LONG-TERM CURRENT USE OF INSULIN (HCC): ICD-10-CM

## 2025-05-12 DIAGNOSIS — F13.20 SEDATIVE, HYPNOTIC OR ANXIOLYTIC DEPENDENCE, UNCOMPLICATED (HCC): ICD-10-CM

## 2025-05-12 DIAGNOSIS — I15.2 HYPERTENSION ASSOCIATED WITH DIABETES (HCC): ICD-10-CM

## 2025-05-12 DIAGNOSIS — D69.2 SENILE PURPURA (HCC): ICD-10-CM

## 2025-05-12 DIAGNOSIS — I73.9 CLAUDICATION (HCC): ICD-10-CM

## 2025-05-12 DIAGNOSIS — E11.69 HYPERLIPIDEMIA DUE TO TYPE 2 DIABETES MELLITUS (HCC): ICD-10-CM

## 2025-05-12 DIAGNOSIS — F51.01 PRIMARY INSOMNIA: ICD-10-CM

## 2025-05-12 DIAGNOSIS — E11.59 HYPERTENSION ASSOCIATED WITH DIABETES (HCC): ICD-10-CM

## 2025-05-12 DIAGNOSIS — F10.21 ALCOHOL DEPENDENCE IN REMISSION (HCC): ICD-10-CM

## 2025-05-12 DIAGNOSIS — E78.5 HYPERLIPIDEMIA DUE TO TYPE 2 DIABETES MELLITUS (HCC): ICD-10-CM

## 2025-05-12 DIAGNOSIS — I74.5 BILATERAL ILIAC ARTERY OCCLUSION (HCC): ICD-10-CM

## 2025-05-12 DIAGNOSIS — D69.6 THROMBOCYTOPENIA (HCC): ICD-10-CM

## 2025-05-12 PROBLEM — Z12.11 ENCOUNTER FOR COLORECTAL CANCER SCREENING: Status: RESOLVED | Noted: 2018-04-05 | Resolved: 2025-05-12

## 2025-05-12 PROBLEM — Z12.12 ENCOUNTER FOR COLORECTAL CANCER SCREENING: Status: RESOLVED | Noted: 2018-04-05 | Resolved: 2025-05-12

## 2025-05-12 PROCEDURE — 3074F SYST BP LT 130 MM HG: CPT

## 2025-05-12 PROCEDURE — G0439 PPPS, SUBSEQ VISIT: HCPCS

## 2025-05-12 PROCEDURE — 3078F DIAST BP <80 MM HG: CPT

## 2025-05-12 PROCEDURE — 1126F AMNT PAIN NOTED NONE PRSNT: CPT

## 2025-05-12 SDOH — ECONOMIC STABILITY: HOUSING INSECURITY: AT ANY TIME IN THE PAST 12 MONTHS, WERE YOU HOMELESS OR LIVING IN A SHELTER (INCLUDING NOW)?: NO

## 2025-05-12 SDOH — ECONOMIC STABILITY: FOOD INSECURITY: HOW HARD IS IT FOR YOU TO PAY FOR THE VERY BASICS LIKE FOOD, HOUSING, MEDICAL CARE, AND HEATING?: NOT VERY HARD

## 2025-05-12 SDOH — ECONOMIC STABILITY: HOUSING INSECURITY: IN THE LAST 12 MONTHS, WAS THERE A TIME WHEN YOU WERE NOT ABLE TO PAY THE MORTGAGE OR RENT ON TIME?: NO

## 2025-05-12 SDOH — ECONOMIC STABILITY: FOOD INSECURITY
WITHIN THE PAST 12 MONTHS, YOU WORRIED THAT YOUR FOOD WOULD RUN OUT BEFORE YOU GOT THE MONEY TO BUY MORE.: SOMETIMES TRUE

## 2025-05-12 SDOH — ECONOMIC STABILITY: FOOD INSECURITY: WITHIN THE PAST 12 MONTHS, THE FOOD YOU BOUGHT JUST DIDN'T LAST AND YOU DIDN'T HAVE MONEY TO GET MORE.: SOMETIMES TRUE

## 2025-05-12 SDOH — ECONOMIC STABILITY: TRANSPORTATION INSECURITY: IN THE PAST 12 MONTHS, HAS LACK OF TRANSPORTATION KEPT YOU FROM MEDICAL APPOINTMENTS OR FROM GETTING MEDICATIONS?: NO

## 2025-05-12 ASSESSMENT — PATIENT HEALTH QUESTIONNAIRE - PHQ9
SUM OF ALL RESPONSES TO PHQ QUESTIONS 1-9: 8
CLINICAL INTERPRETATION OF PHQ2 SCORE: 4
5. POOR APPETITE OR OVEREATING: 0 - NOT AT ALL

## 2025-05-12 ASSESSMENT — FIBROSIS 4 INDEX: FIB4 SCORE: 6.39

## 2025-05-12 ASSESSMENT — ACTIVITIES OF DAILY LIVING (ADL)
LACK_OF_TRANSPORTATION: NO
BATHING_REQUIRES_ASSISTANCE: 0

## 2025-05-12 ASSESSMENT — PAIN SCALES - GENERAL: PAINLEVEL_OUTOF10: NO PAIN

## 2025-05-12 ASSESSMENT — ENCOUNTER SYMPTOMS: GENERAL WELL-BEING: FAIR

## 2025-05-12 NOTE — PROGRESS NOTES
Comprehensive Health Assessment Program     Chung Limon is a 75 y.o. here for his comprehensive health assessment.    Patient Active Problem List    Diagnosis Date Noted    Primary insomnia 05/07/2025    Sedative, hypnotic or anxiolytic dependence, uncomplicated (HCC) 05/07/2025    Senile purpura ([facility]) 03/26/2024    Prostate nodule 08/28/2023    Constipation due to outlet dysfunction 08/01/2023    Vitamin D deficiency 04/21/2022    Thrombocytopenia (HCC) 03/15/2022    Hyperlipidemia due to type 2 diabetes mellitus ([facility]) 03/15/2022    Degenerative disc disease, lumbar 03/15/2022    Nonrheumatic aortic valve stenosis 03/02/2022    Chronic bilateral thoracic back pain 01/04/2021    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[facility]) 09/21/2020    Chronic distal aortic occlusion ([facility]) 09/21/2020    Carotid artery stenosis, asymptomatic, left 09/21/2020    BMI 23.0-23.9, adult 05/12/2020    Transaminitis 05/12/2020    Alcohol dependence in remission ([facility]) 04/19/2019    Cervical radiculopathy, chronic 04/05/2018    Hypertension associated with diabetes ([facility]) 04/05/2018    Chronic hepatitis C without hepatic coma ([facility]) 05/18/2017    Atherosclerosis of aorta ([facility]) 01/31/2017    Type 2 diabetes mellitus with hyperglycemia, without long-term current use of insulin ([facility]) 11/15/2016    Microalbuminuria due to type 2 diabetes mellitus ([facility]) 11/15/2016    Stress-induced cardiomyopathy 03/10/2015    PAD (peripheral artery disease) ([facility]) 01/21/2015    Claudication ([facility]) 10/22/2012       Current Outpatient Medications   Medication Sig Dispense Refill    Blood Glucose Meter Kit Test blood sugar as recommended by provider. Accucheck Guide blood glucose monitoring kit. 1 Kit 0    Blood Glucose Test Strips Use one Accucheck Guide strip to test blood sugar once daily early morning before first meal. 100 Strip 3    Lancets Use one Accucheck Guide lancet to test blood sugar once daily early morning before first meal. 100 Each 3    Alcohol  Swabs Wipe site with prep pad prior to injection. 100 Each 3    clopidogrel (PLAVIX) 75 MG Tab Take 1 Tablet by mouth every day. 100 Tablet 3    dapagliflozin propanediol (FARXIGA) 5 MG Tab Take 1 Tablet by mouth every day. 100 Tablet 3    ibuprofen (MOTRIN) 800 MG Tab TAKE ONE TABLET BY MOUTH EVERY 8 HOURS AS NEEDED 100 Tablet 1    lisinopril (PRINIVIL) 20 MG Tab Take 1 Tablet by mouth every day. 100 Tablet 3    methocarbamol (ROBAXIN) 500 MG Tab Take 1 Tablet by mouth 3 times a day. 30 Tablet 1    metoprolol SR (TOPROL XL) 25 MG TABLET SR 24 HR Take 1 Tablet by mouth every day. 100 Tablet 3    traZODone (DESYREL) 50 MG Tab Take 0.5-2 Tablets by mouth at bedtime as needed for Sleep. 135 Tablet 1    cilostazol (PLETAL) 100 MG Tab Take 1 Tablet by mouth 2 times a day. 60 Tablet 5    chlorhexidine (PERIDEX) 0.12 % Solution       atorvastatin (LIPITOR) 80 MG tablet Take 1 Tablet by mouth every day. 100 Tablet 3    sennosides-docusate sodium (SENOKOT-S) 8.6-50 MG tablet Take 1 Tablet by mouth every day. 90 Tablet 1    VITAMIN D PO Take  by mouth.      Multiple Vitamins-Minerals (CENTRUM SILVER PO) Take  by mouth.      B Complex Vitamins (B COMPLEX PO) Take  by mouth.      metFORMIN ER (GLUCOPHAGE XR) 500 MG TABLET SR 24 HR Take 2 Tablets by mouth every day. (Patient not taking: Reported on 2025) 200 Tablet 3    ALPRAZolam (XANAX) 0.5 MG Tab  (Patient not taking: Reported on 2025)      aspirin 81 MG EC tablet  (Patient not taking: Reported on 2025)       No current facility-administered medications for this visit.          Current supplements as per medication list.     Allergies:   Ozempic (0.25 or 0.5 mg-dose) [semaglutide]  Social History     Tobacco Use    Smoking status: Former     Current packs/day: 0.00     Average packs/day: 1 pack/day for 58.0 years (58.0 ttl pk-yrs)     Types: Cigarettes     Start date: 1960     Quit date: 2011     Years since quittin.7    Smokeless tobacco: Never     Tobacco comments:     avoid all tobacco products   Vaping Use    Vaping status: Never Used   Substance Use Topics    Alcohol use: No     Alcohol/week: 0.0 oz     Comment: quit 1985, drank 2 times since    Drug use: Yes     Types: Inhaled, Marijuana     Comment: weekends     Family History   Problem Relation Age of Onset    Heart Disease Father     Arthritis Mother     No Known Problems Sister     Cancer Brother         type unknown    Cancer Brother         skin     No Known Problems Maternal Grandmother     No Known Problems Maternal Grandfather     No Known Problems Paternal Grandmother     No Known Problems Paternal Grandfather     Diabetes Neg Hx     Hypertension Neg Hx     Stroke Neg Hx     Hyperlipidemia Neg Hx      Chung  has a past medical history of Acute left-sided low back pain without sciatica (8/23/2023), 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), Cervical disc disease (05/18/2017), Chronic right shoulder pain (9/18/2019), Claudication ([facility]) (08/22/2012), Dental disorder (07/26/2019), Dermoid cyst of ear, left (07/03/2019), Diabetes ([facility]) (07/26/2019), H/O colonoscopy with polypectomy (05/07/2013), Hayfever, Healthcare maintenance (04/05/2018), Hepatitis C, History of alcoholism ([facility]) (08/22/2012), History of tobacco abuse (1/31/2017), [place] (hard of hearing) (07/26/2019), Hyperlipidemia, Hypertension, Insulin dependent diabetes mellitus (07/26/2019), Internal nasal lesion (07/22/2021), Other chest pain (1/4/2021), and S/P excision of lipoma (09/18/2019).   Past Surgical History:   Procedure Laterality Date    MASS EXCISION GENERAL Right 7/31/2019    Procedure: EXCISION, MASS, GENERAL - 15 CM LIPOMA OF SHOULDER;  Surgeon: Augustin Rosales M.D.;  Location: SURGERY [facility];  Service: General    COLONOSCOPY  2017    WIDE EXCISION  5/6/2014    Performed by Nicholas Govea M.D. at SURGERY SAME DAY [facility]    FLAP GRAFT  5/6/2014    Performed by Nicholas Govea,  M.D. at SURGERY SAME DAY [facility]    RECOVERY  10/22/2012    Performed by Pily Lee M.D. at SURGERY [facility]       Screening:  In the last six months have you experienced any leakage of urine? No    Depression Screening  Little interest or pleasure in doing things?  3 - nearly every day  Feeling down, depressed , or hopeless? 1 - several days  Trouble falling or staying asleep, or sleeping too much?  3 - nearly every day  Feeling tired or having little energy?  1 - several days  Poor appetite or overeating?  0 - not at all  Feeling bad about yourself - or that you are a failure or have let yourself or your family down? 0 - not at all  Trouble concentrating on things, such as reading the newspaper or watching television? 0 - not at all  Moving or speaking so slowly that other people could have noticed.  Or the opposite - being so fidgety or restless that you have been moving around a lot more than usual?  0 - not at all  Thoughts that you would be better off dead, or of hurting yourself?  0 - not at all  Patient Health Questionnaire Score: 8    If depressive symptoms identified deferred to follow up visit unless specifically addressed in assessment and plan.    Interpretation of PHQ-9 Total Score   Score Severity   1-4 No Depression   5-9 Mild Depression   10-14 Moderate Depression   15-19 Moderately Severe Depression   20-27 Severe Depression    Screening for Cognitive Impairment  Do you or any of your friends or family members have any concern about your memory? Yes  Three Minute Recall (Village, Kitchen, Baby) 3/3    Rashawn clock face with all 12 numbers and set the hands to show 10 minutes past 11.  Yes 5/5  Cognitive concerns identified deferred for follow up unless specifically addressed in assessment and plan.    Fall Risk Assessment  Has the patient had two or more falls in the last year or any fall with injury in the last year?  No    Safety Assessment  Do you always wear your seatbelt?   Yes  Any changes to home needed to function safely? No  Difficulty hearing.  Yes  Patient counseled about all safety risks that were identified.    Functional Assessment ADLs  Are there any barriers preventing you from cooking for yourself or meeting nutritional needs?  No.    Are there any barriers preventing you from driving safely or obtaining transportation?  No.    Are there any barriers preventing you from using a telephone or calling for help?  No    Are there any barriers preventing you from shopping?  No.    Are there any barriers preventing you from taking care of your own finances?  No    Are there any barriers preventing you from managing your medications?  No    Are there any barriers preventing you from showering, bathing or dressing yourself? No    Are there any barriers preventing you from doing housework or laundry? No    Are there any barriers preventing you from using the toilet?No    Are you currently engaging in any exercise or physical activity?  No.      Self-Assessment of Health  What is your perception of your health? Fair    Do you sleep more than six hours a night? Yes    In the past 7 days, how much did pain keep you from doing your normal work? Some Back & neck pain   Do you spend quality time with family or friends (virtually or in person)? Yes    Do you usually eat a heart healthy diet that constists of a variety of fruits, vegetables, whole grains and fiber? Yes    Do you eat foods high in fat and/or Fast Food more than three times per week? Yes    How concerned are you that your medical conditions are not being well managed? very    Are you worried that in the next 2 months, you may not have stable housing that you own, rent, or stay in as part of a household? No        Advance Care Planning  Do you have an Advance Directive, Living Will, Durable Power of , or POLST? Yes  Advance Directive       is on file      Health Maintenance Summary            Current Care Gaps        Hepatitis A Vaccine (Hep A) (1 of 2 - Risk 2-dose series) Never done     No completion history exists for this topic.              Hepatitis B Vaccine (Hep B) (1 of 3 - Risk 3-dose series) Never done     No completion history exists for this topic.                      Upcoming       Zoster (Shingles) Vaccines (2 of 3) Postponed until 8/14/2025      11/15/2016  Imm Admin: Zoster Vaccine Live (ZVL) (Zostavax) - HISTORICAL DATA              IMM DTaP/Tdap/Td Vaccine (2 - Td or Tdap) Postponed until 3/14/2026      12/02/2014  Imm Admin: Tdap Vaccine              COVID-19 Vaccine (5 - 2024-25 season) Postponed until 3/14/2026      12/02/2022  Imm Admin: PFIZER BIVALENT SARS-COV-2 VACCINE (12+)    12/02/2021  Imm Admin: PFIZER PURPLE CAP SARS-COV-2 VACCINATION (12+)    04/08/2021  Imm Admin: PFIZER PURPLE CAP SARS-COV-2 VACCINATION (12+)    03/17/2021  Imm Admin: PFIZER PURPLE CAP SARS-COV-2 VACCINATION (12+)              Influenza Vaccine (Season Ended) Next due on 9/1/2025 11/28/2022  Imm Admin: Influenza Vaccine Adult HD    12/02/2021  Imm Admin: Influenza Vaccine Adult HD    01/04/2021  Imm Admin: Influenza Vaccine Adult HD    11/15/2016  Imm Admin: Influenza Vaccine Adult HD    10/28/2015  Imm Admin: Influenza Vaccine Adult HD     Only the first 5 history entries have been loaded, but more history exists.            A1c Screening (Every 6 Months) Next due on 9/15/2025      03/15/2025  HEMOGLOBIN A1C    10/01/2024  HEMOGLOBIN A1C    04/26/2024  HEMOGLOBIN A1C    01/23/2023  POCT Hemoglobin A1C    08/12/2022  HEMOGLOBIN A1C      Only the first 5 history entries have been loaded, but more history exists.              Diabetes: Monofilament / LE Exam (Yearly) Next due on 10/1/2025      10/01/2024  Smart[employer]: WORKFLOW - DIABETES - DIABETIC FOOT EXAM PERFORMED    10/01/2024  Diabetic Monofilament Lower Extremity Exam    08/18/2022  SmartData: WORKFLOW - DIABETES - DIABETIC FOOT EXAM PERFORMED    08/18/2022  Diabetic  Monofilament Lower Extremity Exam    11/12/2020  Diabetic Monofilament LE Exam      Only the first 5 history entries have been loaded, but more history exists.              Diabetes: Retinopathy Screening (Yearly) Next due on 10/3/2025      10/03/2024  RETINAL SCREENING RESULTS    01/23/2023  Done    11/12/2020  Done    08/23/2019  REFERRAL FOR RETINAL SCREENING EXAM    08/16/2019  REFERRAL FOR RETINAL SCREENING EXAM      Only the first 5 history entries have been loaded, but more history exists.              Lung Cancer Screening (Yearly) Next due on 1/15/2026      01/15/2025  CT-LUNG CANCER-SCREENING    09/06/2023  CT-LUNG CANCER-SCREENING    12/02/2020  CT-LUNG CANCER-SCREENING    05/15/2019  CT-LUNG CANCER-SCREENING    02/27/2018  CT-LUNG CANCER-SCREENING      Only the first 5 history entries have been loaded, but more history exists.              Fasting Lipid Profile (Yearly) Next due on 3/15/2026      03/15/2025  Lipid Profile    10/01/2024  Lipid Profile    04/26/2024  Lipid Profile    08/12/2022  Lipid Profile    09/29/2020  Lipid Profile      Only the first 5 history entries have been loaded, but more history exists.              Diabetes: Urine Protein Screening (Yearly) Next due on 3/15/2026      03/15/2025  MICROALBUMIN CREAT RATIO URINE    04/26/2024  MICROALBUMIN CREAT RATIO URINE    03/26/2024  MICROALBUMIN CREAT RATIO URINE    08/12/2022  MICROALBUMIN CREAT RATIO URINE    05/13/2022  MICROALBUMIN CREAT RATIO URINE      Only the first 5 history entries have been loaded, but more history exists.              SERUM CREATININE (Yearly) Next due on 3/15/2026      03/15/2025  Comp Metabolic Panel    12/10/2024  Comp Metabolic Panel    10/01/2024  Comp Metabolic Panel    04/26/2024  Comp Metabolic Panel    12/29/2022  Comp Metabolic Panel      Only the first 5 history entries have been loaded, but more history exists.              Annual Wellness Visit (Yearly) Next due on 5/12/2026 05/12/2025  Level  of Service: AK ANNUAL WELLNESS VISIT-INCLUDES PPPS SUBSEQUE*    05/12/2025  Done - Comprehensive Health Assessment    03/26/2024  Level of Service: AK ANNUAL WELLNESS VISIT-INCLUDES PPPS SUBSEQUE*    03/26/2024  Done - Comprehensive Health Assessment    01/11/2023  Level of Service: AK ANNUAL WELLNESS VISIT-INCLUDES PPPS SUBSEQUE*      Only the first 5 history entries have been loaded, but more history exists.                      Completed or No Longer Recommended       Lung Cancer Screening Shared Decision Making  Completed      02/16/2018  Level of Service: AK COUNSELING LUNG CA SCREEN LDCT    02/28/2017  Level of Service: AK COUNSELING LUNG CA SCREEN LDCT              Pneumococcal Vaccine: 50+ Years (Series Information) Completed      01/14/2016  Imm Admin: Pneumococcal Conjugate Vaccine (Prevnar/PCV-13)    12/02/2014  Imm Admin: Pneumococcal polysaccharide vaccine (PPSV-23)              HPV Vaccines (Series Information) Aged Out      No completion history exists for this topic.              Polio Vaccine (Inactivated Polio) (Series Information) Aged Out     No completion history exists for this topic.              Meningococcal Immunization (Series Information) Aged Out     No completion history exists for this topic.              Colorectal Cancer Screening  Discontinued        Frequency changed to Never automatically (Topic No Longer Applies)    03/26/2024  Order placed for Referral to GI for Colonoscopy by ELLIE Gómez.    01/23/2023  Colon Cancer Screening Annual FIT (Done)    01/23/2023  Colon Cancer Screening Annual FIT (Done)    10/03/2016  REFERRAL TO GI FOR COLONOSCOPY     Only the first 5 history entries have been loaded, but more history exists.            Abdominal Aortic Aneurysm (AAA) Screening  Discontinued        Frequency changed to Never automatically (Topic No Longer Applies)    12/11/2024  CT-ABDOMEN-PELVIS WITH    10/19/2022  US-AORTA/ILIACS DUPLEX COMPLETE    10/01/2021   "US-AORTA/ILIACS DUPLEX COMPLETE    10/09/2020  US-AORTA/ILIACS DUPLEX COMPLETE      Only the first 5 history entries have been loaded, but more history exists.                            Patient Care Team:  LEVI Griggs as PCP - General (Nurse Practitioner Family)  Enrike Killian M.D. as PCP - [facility] Paneled  Manjit Gleason M.D. as Consulting Physician (Cardiovascular Disease (Cardiology))  [facility] as Consulting Physician (Optometry)  Fabienne Scruggs C.N.A. (Inactive) as    Bharat Caicedo M.D. as Consulting Physician (Family Medicine)  Candelario Narayan M.D. as Consulting Physician (Otolaryngology)    Financial Resource Strain: Low Risk  (5/12/2025)    Overall Financial Resource Strain (CARDIA)     Difficulty of Paying Living Expenses: Not very hard      Transportation Needs: No Transportation Needs (5/12/2025)    PRAPARE - Transportation     Lack of Transportation (Medical): No     Lack of Transportation (Non-Medical): No      Food Insecurity: Food Insecurity Present (5/12/2025)    Hunger Vital Sign     Worried About Running Out of Food in the Last Year: Sometimes true     Ran Out of Food in the Last Year: Sometimes true     Provided resources to multiple food panties.        Encounter Vitals  Blood Pressure : 120/66  Pulse: 86  Pulse Oximetry: 96 %  Weight: 77.3 kg (170 lb 6.4 oz)  Height: 175.3 cm (5' 9\")  BMI (Calculated): 25.16  Pain Score: No pain     ROS:  No fever, chills, nausea, vomiting, diarrhea, chest pain or shortness of breath. See HPI.    Physical Exam  Vitals reviewed.   Constitutional:       Appearance: Normal appearance.   Cardiovascular:      Rate and Rhythm: Normal rate and regular rhythm.      Pulses: Normal pulses.      Heart sounds: Normal heart sounds.   Pulmonary:      Effort: Pulmonary effort is normal.      Breath sounds: Normal breath sounds.   Skin:     General: Skin is warm and dry.      Capillary Refill: Capillary refill takes " less than 2 seconds.   Neurological:      General: No focal deficit present.      Mental Status: He is alert and oriented to person, place, and time.   Psychiatric:         Mood and Affect: Mood normal.       Assessment and Plan. The following treatment and monitoring plan is recommended:    Alcohol dependence in remission (HCC)  Stable. Reports that he quit drinking alcohol in 1985. Encourage continued sobriety. Monitored by primary care provider.    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HCC)  Chronic, stable. Reviewed ultrasound of aorta and iliac duplex from 2023. History of aortography and lower extremity arteriography by Dr. Lee in 2012. Continues on statin therapy daily. Monitored by vascular medicine, Dr. Caicedo.    Claudication ([facility])  Chronic, ongoing. Reports bilateral calf pain with ambulation. Continues taking magnesium nightly with improvement in nocturnal leg cramps. Followed by vascular medicine, Dr. Caicedo.     Hyperlipidemia due to type 2 diabetes mellitus ([facility])  Chronic, stable. Reviewed lipid profile from March 2025. Currently taking atorvastatin 80 mg daily. Provided education on heart healthy diet with adequate intake of fruits, vegetables, and whole grains. Encourage 30 minutes of moderate exercise 3-4 times a week. Denies chest pain and claudication. Routinely monitored by primary care provider.    Hypertension associated with diabetes ([facility])  Chronic, stable. Currently taking lisinopril 20 mg and metoprolol SR 25 mg daily. In-office blood pressure is 120/66. Denies chest pain, lightheadedness, and lower extremity edema. Continue to follow with vascular medicine for medication and symptom management.    Primary insomnia  Sedative, hypnotic or anxiolytic dependence, uncomplicated (HCC)  Chronic, stable. Currently taking trazodone 50 mg nightly. Reports an average of 7 hours of sleep per night. Denies excessive sedation and amnesia. Managed by primary care provider.    Senile purpura  (HCC)  Chronic, stable. Scattered ecchymoses to bilateral upper and lower extremities. Continues on clopidogrel daily. Followed by primary care provider.     Thrombocytopenia (HCC)  Chronic, ongoing. Reviewed most recent CBC from March 2025 with baseline trend of thrombocytopenia. Denies abnormal bleeding. Monitored by primary care provider.    Component  Ref Range & Units (hover) 1 mo ago  (3/15/25)   Platelet Count 129 Low      Type 2 diabetes mellitus with hyperglycemia, without long-term current use of insulin (HCC)  Chronic, ongoing. Most recent hemoglobin A1C was 7.0 in March 2025. Reports that fasting blood sugars are in the 140s. Currently taking farxiga 5 mg daily. Reports that he stopped taking metformin due to GI upset and diarrhea. Routinely monitored by primary care provider.        Services suggested: No services needed at this time  Health Care Screening: Age-appropriate preventive services recommended by USPTF and ACIP covered by Medicare were discussed today. Services ordered if indicated and agreed upon by the patient.  Referrals offered: Community-based lifestyle interventions to reduce health risks and promote self-management and wellness, fall prevention, nutrition, physical activity, tobacco-use cessation, weight loss, and mental health services as per orders if indicated.    Discussion today about general wellness and lifestyle habits:    Prevent falls and reduce trip hazards; Cautioned about securing or removing rugs.  Have a working fire alarm and carbon monoxide detector.  Engage in regular physical activity and social activities.    Follow-up: Return for appointment with Primary Care Provider as needed.

## 2025-05-13 ENCOUNTER — APPOINTMENT (OUTPATIENT)
Dept: VASCULAR LAB | Facility: MEDICAL CENTER | Age: 75
End: 2025-05-13
Attending: FAMILY MEDICINE
Payer: MEDICARE

## 2025-05-19 ENCOUNTER — TELEPHONE (OUTPATIENT)
Dept: VASCULAR LAB | Facility: MEDICAL CENTER | Age: 75
End: 2025-05-19
Payer: MEDICARE

## 2025-05-19 NOTE — TELEPHONE ENCOUNTER
Pt due for surveillance Ultrasound Aorta and Ultrasound Lower Extremity in February 2025. Despite multiple attempts to reach pt imaging has not been completed. Letter drafted and sent to pt via [employer] and mail.

## 2025-07-07 ENCOUNTER — DOCUMENTATION (OUTPATIENT)
Dept: HEALTH INFORMATION MANAGEMENT | Facility: OTHER | Age: 75
End: 2025-07-07
Payer: MEDICARE

## 2025-07-15 ENCOUNTER — OFFICE VISIT (OUTPATIENT)
Dept: MEDICAL GROUP | Facility: MEDICAL CENTER | Age: 75
End: 2025-07-15
Payer: MEDICARE

## 2025-07-15 VITALS
BODY MASS INDEX: 25.55 KG/M2 | TEMPERATURE: 97.1 F | HEIGHT: 68 IN | HEART RATE: 63 BPM | RESPIRATION RATE: 17 BRPM | DIASTOLIC BLOOD PRESSURE: 68 MMHG | SYSTOLIC BLOOD PRESSURE: 148 MMHG | OXYGEN SATURATION: 98 % | WEIGHT: 168.6 LBS

## 2025-07-15 DIAGNOSIS — J43.8 OTHER EMPHYSEMA (HCC): ICD-10-CM

## 2025-07-15 DIAGNOSIS — I35.0 NONRHEUMATIC AORTIC VALVE STENOSIS: ICD-10-CM

## 2025-07-15 DIAGNOSIS — I15.2 HYPERTENSION ASSOCIATED WITH DIABETES (HCC): Chronic | ICD-10-CM

## 2025-07-15 DIAGNOSIS — E11.59 HYPERTENSION ASSOCIATED WITH DIABETES (HCC): Chronic | ICD-10-CM

## 2025-07-15 DIAGNOSIS — F51.01 PRIMARY INSOMNIA: ICD-10-CM

## 2025-07-15 DIAGNOSIS — K59.02 CONSTIPATION DUE TO OUTLET DYSFUNCTION: ICD-10-CM

## 2025-07-15 DIAGNOSIS — I73.9 PAD (PERIPHERAL ARTERY DISEASE) (HCC): Chronic | ICD-10-CM

## 2025-07-15 DIAGNOSIS — E11.69 HYPERLIPIDEMIA DUE TO TYPE 2 DIABETES MELLITUS (HCC): Chronic | ICD-10-CM

## 2025-07-15 DIAGNOSIS — I51.81 STRESS-INDUCED CARDIOMYOPATHY: Chronic | ICD-10-CM

## 2025-07-15 DIAGNOSIS — D69.6 THROMBOCYTOPENIA (HCC): Chronic | ICD-10-CM

## 2025-07-15 DIAGNOSIS — E11.65 TYPE 2 DIABETES MELLITUS WITH HYPERGLYCEMIA, WITHOUT LONG-TERM CURRENT USE OF INSULIN (HCC): Primary | ICD-10-CM

## 2025-07-15 DIAGNOSIS — E78.5 HYPERLIPIDEMIA DUE TO TYPE 2 DIABETES MELLITUS (HCC): Chronic | ICD-10-CM

## 2025-07-15 PROBLEM — I74.09 CHRONIC DISTAL AORTIC OCCLUSION (HCC): Chronic | Status: ACTIVE | Noted: 2020-09-21

## 2025-07-15 PROBLEM — F13.20 SEDATIVE, HYPNOTIC OR ANXIOLYTIC DEPENDENCE, UNCOMPLICATED (HCC): Chronic | Status: ACTIVE | Noted: 2025-05-07

## 2025-07-15 PROBLEM — I70.0 ATHEROSCLEROSIS OF AORTA (HCC): Chronic | Status: ACTIVE | Noted: 2017-01-31

## 2025-07-15 PROBLEM — I74.5 BILATERAL ILIAC ARTERY OCCLUSION (HCC): Chronic | Status: ACTIVE | Noted: 2020-09-21

## 2025-07-15 PROBLEM — Z86.0100 HISTORY OF COLONIC POLYPS: Status: RESOLVED | Noted: 2025-07-15 | Resolved: 2025-07-15

## 2025-07-15 LAB
HBA1C MFR BLD: 7.1 % (ref ?–5.8)
POCT INT CON NEG: NEGATIVE
POCT INT CON POS: POSITIVE

## 2025-07-15 PROCEDURE — 83036 HEMOGLOBIN GLYCOSYLATED A1C: CPT | Performed by: FAMILY MEDICINE

## 2025-07-15 PROCEDURE — 3078F DIAST BP <80 MM HG: CPT | Performed by: FAMILY MEDICINE

## 2025-07-15 PROCEDURE — 3077F SYST BP >= 140 MM HG: CPT | Performed by: FAMILY MEDICINE

## 2025-07-15 PROCEDURE — 99214 OFFICE O/P EST MOD 30 MIN: CPT | Performed by: FAMILY MEDICINE

## 2025-07-15 SDOH — ECONOMIC STABILITY: FOOD INSECURITY: WITHIN THE PAST 12 MONTHS, THE FOOD YOU BOUGHT JUST DIDN'T LAST AND YOU DIDN'T HAVE MONEY TO GET MORE.: SOMETIMES TRUE

## 2025-07-15 SDOH — HEALTH STABILITY: PHYSICAL HEALTH: ON AVERAGE, HOW MANY DAYS PER WEEK DO YOU ENGAGE IN MODERATE TO STRENUOUS EXERCISE (LIKE A BRISK WALK)?: 0 DAYS

## 2025-07-15 SDOH — ECONOMIC STABILITY: INCOME INSECURITY: HOW HARD IS IT FOR YOU TO PAY FOR THE VERY BASICS LIKE FOOD, HOUSING, MEDICAL CARE, AND HEATING?: SOMEWHAT HARD

## 2025-07-15 SDOH — ECONOMIC STABILITY: INCOME INSECURITY: IN THE LAST 12 MONTHS, WAS THERE A TIME WHEN YOU WERE NOT ABLE TO PAY THE MORTGAGE OR RENT ON TIME?: NO

## 2025-07-15 SDOH — ECONOMIC STABILITY: FOOD INSECURITY: WITHIN THE PAST 12 MONTHS, YOU WORRIED THAT YOUR FOOD WOULD RUN OUT BEFORE YOU GOT MONEY TO BUY MORE.: SOMETIMES TRUE

## 2025-07-15 SDOH — HEALTH STABILITY: PHYSICAL HEALTH: ON AVERAGE, HOW MANY MINUTES DO YOU ENGAGE IN EXERCISE AT THIS LEVEL?: 0 MIN

## 2025-07-15 SDOH — ECONOMIC STABILITY: TRANSPORTATION INSECURITY
IN THE PAST 12 MONTHS, HAS LACK OF RELIABLE TRANSPORTATION KEPT YOU FROM MEDICAL APPOINTMENTS, MEETINGS, WORK OR FROM GETTING THINGS NEEDED FOR DAILY LIVING?: NO

## 2025-07-15 SDOH — HEALTH STABILITY: MENTAL HEALTH
STRESS IS WHEN SOMEONE FEELS TENSE, NERVOUS, ANXIOUS, OR CAN'T SLEEP AT NIGHT BECAUSE THEIR MIND IS TROUBLED. HOW STRESSED ARE YOU?: ONLY A LITTLE

## 2025-07-15 ASSESSMENT — SOCIAL DETERMINANTS OF HEALTH (SDOH)
IN THE PAST 12 MONTHS, HAS THE ELECTRIC, GAS, OIL, OR WATER COMPANY THREATENED TO SHUT OFF SERVICE IN YOUR HOME?: NO
HOW OFTEN DO YOU ATTEND CHURCH OR RELIGIOUS SERVICES?: NEVER
HOW OFTEN DO YOU HAVE SIX OR MORE DRINKS ON ONE OCCASION: NEVER
IN A TYPICAL WEEK, HOW MANY TIMES DO YOU TALK ON THE PHONE WITH FAMILY, FRIENDS, OR NEIGHBORS?: NEVER
HOW OFTEN DO YOU ATTEND CHURCH OR RELIGIOUS SERVICES?: NEVER
WITHIN THE PAST 12 MONTHS, YOU WORRIED THAT YOUR FOOD WOULD RUN OUT BEFORE YOU GOT THE MONEY TO BUY MORE: SOMETIMES TRUE
HOW OFTEN DO YOU GET TOGETHER WITH FRIENDS OR RELATIVES?: NEVER
HOW OFTEN DO YOU ATTENT MEETINGS OF THE CLUB OR ORGANIZATION YOU BELONG TO?: NEVER
DO YOU BELONG TO ANY CLUBS OR ORGANIZATIONS SUCH AS CHURCH GROUPS UNIONS, FRATERNAL OR ATHLETIC GROUPS, OR SCHOOL GROUPS?: NO
DO YOU BELONG TO ANY CLUBS OR ORGANIZATIONS SUCH AS CHURCH GROUPS UNIONS, FRATERNAL OR ATHLETIC GROUPS, OR SCHOOL GROUPS?: NO
HOW HARD IS IT FOR YOU TO PAY FOR THE VERY BASICS LIKE FOOD, HOUSING, MEDICAL CARE, AND HEATING?: SOMEWHAT HARD
HOW MANY DRINKS CONTAINING ALCOHOL DO YOU HAVE ON A TYPICAL DAY WHEN YOU ARE DRINKING: PATIENT DOES NOT DRINK
HOW OFTEN DO YOU HAVE A DRINK CONTAINING ALCOHOL: NEVER
IN A TYPICAL WEEK, HOW MANY TIMES DO YOU TALK ON THE PHONE WITH FAMILY, FRIENDS, OR NEIGHBORS?: NEVER
HOW OFTEN DO YOU ATTENT MEETINGS OF THE CLUB OR ORGANIZATION YOU BELONG TO?: NEVER
HOW OFTEN DO YOU GET TOGETHER WITH FRIENDS OR RELATIVES?: NEVER

## 2025-07-15 ASSESSMENT — FIBROSIS 4 INDEX: FIB4 SCORE: 6.39

## 2025-07-15 ASSESSMENT — LIFESTYLE VARIABLES
HOW OFTEN DO YOU HAVE SIX OR MORE DRINKS ON ONE OCCASION: NEVER
HOW MANY STANDARD DRINKS CONTAINING ALCOHOL DO YOU HAVE ON A TYPICAL DAY: PATIENT DOES NOT DRINK
HOW OFTEN DO YOU HAVE A DRINK CONTAINING ALCOHOL: NEVER
SKIP TO QUESTIONS 9-10: 1
AUDIT-C TOTAL SCORE: 0

## 2025-07-15 NOTE — PROGRESS NOTES
Verbal consent was acquired by the patient to use [employer] ambient listening note generation during this visit    Subjective:     CC:  The primary encounter diagnosis was Type 2 diabetes mellitus with hyperglycemia, without long-term current use of insulin ([facility]). Diagnoses of Stress-induced cardiomyopathy, PAD (peripheral artery disease) ([facility]), Nonrheumatic aortic valve stenosis, Hyperlipidemia due to type 2 diabetes mellitus ([facility]), Primary insomnia, Constipation due to outlet dysfunction, Other emphysema ([facility]), Hypertension associated with diabetes (HCC), and Thrombocytopenia ([facility]) were also pertinent to this visit.    HISTORY OF THE PRESENT ILLNESS: Patient is a 75 y.o. male. This pleasant patient is here today to establish care and discuss chronic disease management. His/her prior PCP was at [facility].    History of Present Illness  The patient presents for evaluation of diabetes, peripheral artery disease, aortic stenosis, hyperlipidemia, and sleep issues.    Diabetes Management  The primary reason for this visit is to review and manage his chronic conditions. He has discontinued metformin due to stomach upset after just one day of use. He also experienced adverse effects from Trulicity and Ozempic, including a 10-day period of constipation. He continues to take Farxiga 5 mg daily, which helps manage his blood sugars, kidneys, and heart.  - Onset: Stomach upset after one day of metformin use; 10-day period of constipation from Trulicity and Ozempic.  - Alleviating/Aggravating Factors: Discontinued metformin due to stomach upset; Farxiga helps manage blood sugars, kidneys, and heart.    Peripheral Artery Disease  He has some peripheral artery disease and continues to experience claudication in his legs, which he attributes to a clogged aorta. He believes that surgery would be required to address this issue, but he is not interested in pursuing that option at this time. He does not take cilostazol as  it does not alleviate his leg pain.  - Character: Claudication in legs.  - Alleviating/Aggravating Factors: Cilostazol does not alleviate leg pain; not interested in surgery.    Aortic Stenosis and Cardiomyopathy  He has a history of cardiomyopathy and aortic stenosis, with a murmur present. He reports no leg swelling. He experiences burning sensations in his calves after walking 100 to 150 feet. He is not considering valve replacement surgery at this time.  - Character: Burning sensations in calves after walking 100 to 150 feet.  - Alleviating/Aggravating Factors: Not considering valve replacement surgery.    Hyperlipidemia  He has atherosclerosis of the aorta and hyperlipidemia. His last lipid panel on 03/15/2025 showed a cholesterol level of 106, LDL of 54, and triglycerides of 109. He is currently on atorvastatin 80 mg for cholesterol management.  - Timing: Last lipid panel on 03/15/2025.  - Severity: Cholesterol level of 106, LDL of 54, triglycerides of 109.    Sleep Issues  He takes trazodone 50 mg in the evenings to aid sleep, which he finds effective. However, he wakes up in the middle of the night with severe dry mouth.  - Onset: Wakes up in the middle of the night.  - Character: Severe dry mouth.  - Alleviating/Aggravating Factors: Trazodone 50 mg in the evenings aids sleep.    Back Pain  He has been taking methocarbamol regularly for back pain and has recently started chiropractic treatment, which seems to help.  - Alleviating/Aggravating Factors: Methocarbamol and chiropractic treatment help.    Bruising and Bleeding  He has ceased aspirin intake due to excessive bruising but reports no gum bleeding or blood in urine. However, he notes prolonged bleeding from minor scrapes. He continues to take Plavix 75 mg and metoprolol 25 mg, which helps with his heart rate and blood pressure.  - Alleviating/Aggravating Factors: Ceased aspirin intake due to excessive bruising; Plavix and metoprolol help with heart  "rate and blood pressure.  - Character: Prolonged bleeding from minor scrapes.    Constipation  He reports no issues with constipation and maintains good hydration. He uses Metamucil and occasionally Ex-Lax if needed. He does not use Senokot anymore.  - Alleviating/Aggravating Factors: Uses Metamucil and occasionally Ex-Lax; does not use Senokot anymore.    Suspected Emphysema  He suspects the onset of emphysema due to his long history of smoking, although he quit 15 years ago. He still smokes marijuana. He experiences phlegm production but does not report shortness of breath or coughing. He does not feel the need for an inhaler.  - Onset: Suspects onset due to long history of smoking.  - Character: Phlegm production.  - Alleviating/Aggravating Factors: Does not feel the need for an inhaler.    Occupations: Sheet rocker for 35 years  Tobacco: Smoked cigarettes for many years, quit 15 years ago  Recreational Drugs: Smokes marijuana  Sleep: Goes to bed around 8:00 PM, generally sleeps until about 5:00 AM, wakes up with severe dry mouth    Current Medications and Prescriptions Ordered in Epic[1]    Allergies[2]    Past Medical History[3]    Past Surgical History[4]    Family History   Problem Relation Age of Onset    Heart Disease Father     Arthritis Mother     No Known Problems Sister     Cancer Brother         type unknown    Cancer Brother         skin     No Known Problems Maternal Grandmother     No Known Problems Maternal Grandfather     No Known Problems Paternal Grandmother     No Known Problems Paternal Grandfather     Diabetes Neg Hx     Hypertension Neg Hx     Stroke Neg Hx     Hyperlipidemia Neg Hx        Social History[5]    Health Maintenance: Completed    ROS:   ROS see HPI      Objective:       Exam: BP (!) 148/68 (BP Location: Left arm, Patient Position: Sitting, BP Cuff Size: Adult)   Pulse 63   Temp 36.2 °C (97.1 °F) (Temporal)   Resp 17   Ht 1.727 m (5' 8\")   Wt 76.5 kg (168 lb 9.6 oz)   SpO2 " 98%  Body mass index is 25.64 kg/m².    Physical Exam  General Appearance: Normal.  Vital signs: Within normal limits.  HEENT: Within normal limits.  Respiratory: Clear to auscultation, no wheezing, rales or rhonchi.  Cardiovascular: Murmur present, regular rate and rhythm.  Skin: Warm and dry, no rash.  Neurological: Normal.  Physical Exam    Results  Labs   - A1c: 7.1%   - Platelet count: 03/15/2025, 129   - Lipid panel: 03/15/2025, Cholesterol: 106 mg/dL, LDL: 54 mg/dL, Triglycerides: 109 mg/dL       Assessment & Plan:   75 y.o. male with the following -    1. Type 2 diabetes mellitus with hyperglycemia, without long-term current use of insulin ([facility])  - POCT Hemoglobin A1C  - CBC WITH DIFFERENTIAL; Future  - Comp Metabolic Panel; Future  - HEMOGLOBIN A1C; Future  - Lipid Profile; Future  - MICROALBUMIN CREAT RATIO URINE; Future  - VITAMIN B12; Future    2. Stress-induced cardiomyopathy    3. PAD (peripheral artery disease) ([facility])    4. Nonrheumatic aortic valve stenosis    5. Hyperlipidemia due to type 2 diabetes mellitus ([facility])  - Comp Metabolic Panel; Future  - Lipid Profile; Future    6. Primary insomnia    7. Constipation due to outlet dysfunction  - CBC WITH DIFFERENTIAL; Future  - Comp Metabolic Panel; Future    8. Other emphysema ([facility])    9. Hypertension associated with diabetes ([facility])  - Comp Metabolic Panel; Future  - MICROALBUMIN CREAT RATIO URINE; Future    10. Thrombocytopenia ([facility])  - CBC WITH DIFFERENTIAL; Future      Assessment & Plan  1. Diabetes mellitus: Stable.  - A1c levels have remained stable, with the most recent reading at 7.1.  - Discontinued metformin due to gastrointestinal upset and adverse reactions to Trulicity and Ozempic.  - Currently taking Farxiga 5 mg daily, which helps manage blood sugar levels and provides additional benefits for kidneys and heart.  - No uncontrolled symptoms observed.    2. Stress-induced cardiomyopathy.  - Currently taking metoprolol 25 mg, which helps manage  heart rate and blood pressure.  - Blood pressure slightly elevated today, potentially situational; will be rechecked before leaving.  - No changes in medication planned at this time.    3. Peripheral artery disease.  - Reports significant claudication in legs due to a clogged aorta.  - Currently taking Plavix 75 mg daily.  - Aspirin 81 mg will be reintroduced every other day to help manage condition while minimizing bruising.  - No surgical intervention planned; monitoring for worsening symptoms.    4. Aortic stenosis.  - Diagnosed with aortic stenosis, which could potentially lead to heart failure if not managed properly.  - Declined surgical intervention.  - Symptoms such as leg swelling and shortness of breath will be monitored.  - Regular follow-up and monitoring planned.    5. Hyperlipidemia.  - Cholesterol levels well-controlled with atorvastatin 80 mg daily.  - Last lipid panel showed cholesterol level of 106, LDL of 54, and triglycerides of 109.  - No changes in medication planned.  - Continued monitoring of lipid levels.    6. Insomnia.  - Takes trazodone 50 mg in the evenings to help with sleep.  - Reports waking up with a dry mouth but finds the medication effective.  - No changes in medication planned.  - Monitoring for any changes in sleep patterns.    7. Constipation.  - Manages constipation with Metamucil and occasional use of Ex-Lax as needed.  - No longer uses Senokot.  - No current issues with constipation reported.  - Continued use of Metamucil recommended.    8. Suspected emphysema.  - Quit smoking 15 years ago but still smokes marijuana, which may contribute to lung issues.  - Reports phlegm production but no shortness of breath or need for an inhaler.  - Hydration recommended to help manage symptoms.  - Monitoring for any changes in respiratory status.    9.  Hypertension associated with type 2 diabetes  - Chronic condition, stable  - Blood pressure noted to be elevated today, improved on  recheck  - Patient to monitor home do believe this is situational elevation  - Continue metoprolol 25 mg and lisinopril 20 mg    10.  Thrombocytopenia  - Chronic and stable, not dangerously low but he was previously on 3 medicines that could be contributing to his bruising, will continue Plavix 75 mg daily patient agreed to try aspirin 81 mg every other day, patient discontinued cilostazol 100 mg bid      Follow-up  - Follow up in January 2026 or sooner if necessary.        Return in about 6 months (around 1/15/2026), or if symptoms worsen or fail to improve, for Diabetes F/U, Lab F/U.    Please note that this dictation was created using voice recognition software. I have made every reasonable attempt to correct obvious errors, but I expect that there are errors of grammar and possibly content that I did not discover before finalizing the note.         [1]   Current Outpatient Medications Ordered in Epic   Medication Sig Dispense Refill    Blood Glucose Meter Kit Test blood sugar as recommended by provider. Accucheck Guide blood glucose monitoring kit. 1 Kit 0    Blood Glucose Test Strips Use one Accucheck Guide strip to test blood sugar once daily early morning before first meal. 100 Strip 3    Lancets Use one Accucheck Guide lancet to test blood sugar once daily early morning before first meal. 100 Each 3    Alcohol Swabs Wipe site with prep pad prior to injection. 100 Each 3    clopidogrel (PLAVIX) 75 MG Tab Take 1 Tablet by mouth every day. 100 Tablet 3    dapagliflozin propanediol (FARXIGA) 5 MG Tab Take 1 Tablet by mouth every day. 100 Tablet 3    ibuprofen (MOTRIN) 800 MG Tab TAKE ONE TABLET BY MOUTH EVERY 8 HOURS AS NEEDED 100 Tablet 1    lisinopril (PRINIVIL) 20 MG Tab Take 1 Tablet by mouth every day. 100 Tablet 3    methocarbamol (ROBAXIN) 500 MG Tab Take 1 Tablet by mouth 3 times a day. 30 Tablet 1    metoprolol SR (TOPROL XL) 25 MG TABLET SR 24 HR Take 1 Tablet by mouth every day. 100 Tablet 3     "traZODone (DESYREL) 50 MG Tab Take 0.5-2 Tablets by mouth at bedtime as needed for Sleep. 135 Tablet 1    aspirin 81 MG EC tablet  (Patient taking differently: Take 81 mg by mouth every 48 hours.)      chlorhexidine (PERIDEX) 0.12 % Solution       atorvastatin (LIPITOR) 80 MG tablet Take 1 Tablet by mouth every day. 100 Tablet 3    VITAMIN D PO Take  by mouth.      Multiple Vitamins-Minerals (CENTRUM SILVER PO) Take  by mouth.      B Complex Vitamins (B COMPLEX PO) Take  by mouth.       No current Epic-ordered facility-administered medications on file.   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   Moderate stenosis of the left internal carotid (50-69%).   Flow within both subclavian arteries appears to be within normal limits.     Antegrade flow, bilateral vertebral arteries.      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 states checks his blood sugar daily. Not currently taking medication for.    H/O colonoscopy with polypectomy 05/07/2013    [facility] maintenance 04/05/2018    Hepatitis C     no treatment    History of alcoholism (HCC) 08/22/2012    History of colonic polyps 07/15/2025    History of tobacco abuse 01/31/2017    [place] (hard of hearing) 07/26/2019    Hyperlipidemia     Hypertension     Insulin dependent diabetes mellitus 07/26/2019    Pt. states used to take Insulin 2 years ago.    Internal nasal lesion " 2021    Other chest pain 2021    S/P excision of lipoma 2019   [4]   Past Surgical History:  Procedure Laterality Date    MASS EXCISION GENERAL Right 2019    Procedure: EXCISION, MASS, GENERAL - 15 CM LIPOMA OF SHOULDER;  Surgeon: Augustin Rosales M.D.;  Location: SURGERY [facility];  Service: General    COLONOSCOPY  2017    WIDE EXCISION  2014    Performed by Nicholas Govea M.D. at SURGERY SAME DAY [facility]    FLAP GRAFT  2014    Performed by Nicholas Govea M.D. at SURGERY SAME DAY [facility] ORS    RECOVERY  10/22/2012    Performed by Pily Lee M.D. at SURGERY [facility]   [5]   Social History  Socioeconomic History    Marital status: Single    Highest education level: Associate degree: academic program   Tobacco Use    Smoking status: Former     Current packs/day: 0.00     Average packs/day: 1 pack/day for 58.0 years (58.0 ttl pk-yrs)     Types: Cigarettes     Start date: 1960     Quit date: 2011     Years since quittin.8    Smokeless tobacco: Never    Tobacco comments:     avoid all tobacco products   Vaping Use    Vaping status: Never Used   Substance and Sexual Activity    Alcohol use: No     Alcohol/week: 0.0 oz     Comment: quit , drank 2 times since    Drug use: Yes     Types: Inhaled, Marijuana     Comment: weekends    Sexual activity: Never     Social Drivers of Health     Financial Resource Strain: Medium Risk (7/15/2025)    Overall Financial Resource Strain (CARDIA)     Difficulty of Paying Living Expenses: Somewhat hard   Food Insecurity: Food Insecurity Present (7/15/2025)    Hunger Vital Sign     Worried About Running Out of Food in the Last Year: Sometimes true     Ran Out of Food in the Last Year: Sometimes true   Transportation Needs: No Transportation Needs (7/15/2025)    PRAPARE - Transportation     Lack of Transportation (Medical): No     Lack of Transportation (Non-Medical): No   Physical Activity: Inactive (7/15/2025)     Exercise Vital Sign     Days of Exercise per Week: 0 days     Minutes of Exercise per Session: 0 min   Stress: No Stress Concern Present (7/15/2025)    [employer] - Occupational Stress Questionnaire     Feeling of Stress : Only a little   Social Connections: Socially Isolated (7/15/2025)    Social Connection and Isolation Panel [NHANES]     Frequency of Communication with Friends and Family: Never     Frequency of Social Gatherings with Friends and Family: Never     Attends Restoration[employer] Services: Never     Active Member of Clubs or Organizations: No     Attends Club or Organization Meetings: Never     Marital Status:    Housing Stability: Low Risk  (7/15/2025)    Housing Stability Vital Sign     Unable to Pay for Housing in the Last Year: No     Number of Times Moved in the Last Year: 0     Homeless in the Last Year: No

## 2025-07-28 ENCOUNTER — PATIENT OUTREACH (OUTPATIENT)
Dept: HEALTH INFORMATION MANAGEMENT | Facility: OTHER | Age: 75
End: 2025-07-28
Payer: MEDICARE

## 2025-07-28 DIAGNOSIS — E11.65 TYPE 2 DIABETES MELLITUS WITH HYPERGLYCEMIA, WITHOUT LONG-TERM CURRENT USE OF INSULIN (HCC): Primary | Chronic | ICD-10-CM

## 2025-07-28 NOTE — PROGRESS NOTES
CHW tried calling the pt to introduce the PCM program. Pt did not answer and this CHW left a VM requesting a return call. 7/2  Community Health Worker Follow-Up    Reason for outreach: CHW called the pt to introduce the PCM program    CHW Interventions: This CHW and the pt discussed the PCM program and the benefits of the program for the pt. This CHW and the pt completed the [facility] and this CHW will be sending out resources for exercise. CHW will also send out a [employer] message with all, program details.    Specific Resources Provided:  Housing/Shelter: n/a  Transportation: n/a  Food: n/a  Financial: n/a  Social Supports: n/a  Other: Chair Yoga and easy exercises for a chair bound person    Plan: Pt accepted the program and is set for enrollment on 8/6 @2pm with PCM nurse Joanna. This CHW sent a [employer] message with Program details and encouraged the pt to reach out with any questions or needs before then.

## 2025-07-30 SDOH — HEALTH STABILITY: MENTAL HEALTH: HOW OFTEN DO YOU HAVE A DRINK CONTAINING ALCOHOL?: NEVER

## 2025-07-30 SDOH — ECONOMIC STABILITY: INCOME INSECURITY: IN THE PAST 12 MONTHS HAS THE ELECTRIC, GAS, OIL, OR WATER COMPANY THREATENED TO SHUT OFF SERVICES IN YOUR HOME?: NO

## 2025-07-30 SDOH — SOCIAL STABILITY: SOCIAL INSECURITY: WITHIN THE LAST YEAR, HAVE YOU BEEN AFRAID OF YOUR PARTNER OR EX-PARTNER?: NO

## 2025-07-30 SDOH — SOCIAL STABILITY: SOCIAL INSECURITY: WITHIN THE LAST YEAR, HAVE YOU BEEN HUMILIATED OR EMOTIONALLY ABUSED IN OTHER WAYS BY YOUR PARTNER OR EX-PARTNER?: NO

## 2025-07-30 SDOH — ECONOMIC STABILITY: HOUSING INSECURITY: IN THE PAST 12 MONTHS, HOW MANY TIMES HAVE YOU MOVED WHERE YOU WERE LIVING?: 0

## 2025-07-30 SDOH — ECONOMIC STABILITY: FOOD INSECURITY: WITHIN THE PAST 12 MONTHS, THE FOOD YOU BOUGHT JUST DIDN'T LAST AND YOU DIDN'T HAVE MONEY TO GET MORE.: NEVER TRUE

## 2025-07-30 SDOH — SOCIAL STABILITY: SOCIAL INSECURITY
WITHIN THE LAST YEAR, HAVE YOU BEEN KICKED, HIT, SLAPPED, OR OTHERWISE PHYSICALLY HURT BY YOUR PARTNER OR EX-PARTNER?: NO

## 2025-07-30 SDOH — SOCIAL STABILITY: SOCIAL INSECURITY: ARE YOU MARRIED, WIDOWED, DIVORCED, SEPARATED, NEVER MARRIED, OR LIVING WITH A PARTNER?: DIVORCED

## 2025-07-30 SDOH — SOCIAL STABILITY: SOCIAL NETWORK: HOW OFTEN DO YOU ATTEND CHURCH OR RELIGIOUS SERVICES?: NEVER

## 2025-07-30 SDOH — HEALTH STABILITY: MENTAL HEALTH: HOW OFTEN DO YOU HAVE SIX OR MORE DRINKS ON ONE OCCASION?: NEVER

## 2025-07-30 SDOH — SOCIAL STABILITY: SOCIAL NETWORK
DO YOU BELONG TO ANY CLUBS OR ORGANIZATIONS SUCH AS CHURCH GROUPS, UNIONS, FRATERNAL OR ATHLETIC GROUPS, OR SCHOOL GROUPS?: NO

## 2025-07-30 SDOH — HEALTH STABILITY: PHYSICAL HEALTH: ON AVERAGE, HOW MANY DAYS PER WEEK DO YOU ENGAGE IN MODERATE TO STRENUOUS EXERCISE (LIKE A BRISK WALK)?: 0 DAYS

## 2025-07-30 SDOH — ECONOMIC STABILITY: FOOD INSECURITY: WITHIN THE PAST 12 MONTHS, YOU WORRIED THAT YOUR FOOD WOULD RUN OUT BEFORE YOU GOT THE MONEY TO BUY MORE.: NEVER TRUE

## 2025-07-30 SDOH — ECONOMIC STABILITY: FOOD INSECURITY: HOW HARD IS IT FOR YOU TO PAY FOR THE VERY BASICS LIKE FOOD, HOUSING, MEDICAL CARE, AND HEATING?: NOT HARD AT ALL

## 2025-07-30 SDOH — SOCIAL STABILITY: SOCIAL NETWORK: IN A TYPICAL WEEK, HOW MANY TIMES DO YOU TALK ON THE PHONE WITH FAMILY, FRIENDS, OR NEIGHBORS?: THREE TIMES A WEEK

## 2025-07-30 SDOH — ECONOMIC STABILITY: HOUSING INSECURITY: AT ANY TIME IN THE PAST 12 MONTHS, WERE YOU HOMELESS OR LIVING IN A SHELTER (INCLUDING NOW)?: NO

## 2025-07-30 SDOH — HEALTH STABILITY: MENTAL HEALTH: HOW MANY DRINKS CONTAINING ALCOHOL DO YOU HAVE ON A TYPICAL DAY WHEN YOU ARE DRINKING?: PATIENT DOES NOT DRINK

## 2025-07-30 SDOH — HEALTH STABILITY: PHYSICAL HEALTH: ON AVERAGE, HOW MANY MINUTES DO YOU ENGAGE IN EXERCISE AT THIS LEVEL?: 0 MIN

## 2025-07-30 SDOH — HEALTH STABILITY: PHYSICAL HEALTH
HOW OFTEN DO YOU NEED TO HAVE SOMEONE HELP YOU WHEN YOU READ INSTRUCTIONS, PAMPHLETS, OR OTHER WRITTEN MATERIAL FROM YOUR DOCTOR OR PHARMACY?: NEVER

## 2025-07-30 SDOH — SOCIAL STABILITY: SOCIAL NETWORK: HOW OFTEN DO YOU ATTEND MEETINGS OF THE CLUBS OR ORGANIZATIONS YOU BELONG TO?: NEVER

## 2025-07-30 SDOH — ECONOMIC STABILITY: TRANSPORTATION INSECURITY: IN THE PAST 12 MONTHS, HAS LACK OF TRANSPORTATION KEPT YOU FROM MEDICAL APPOINTMENTS OR FROM GETTING MEDICATIONS?: NO

## 2025-07-30 SDOH — SOCIAL STABILITY: SOCIAL NETWORK: HOW OFTEN DO YOU GET TOGETHER WITH FRIENDS OR RELATIVES?: MORE THAN THREE TIMES A WEEK

## 2025-07-30 ASSESSMENT — ACTIVITIES OF DAILY LIVING (ADL): LACK_OF_TRANSPORTATION: NO

## 2025-07-30 ASSESSMENT — LIFESTYLE VARIABLES
AUDIT-C TOTAL SCORE: 0
SKIP TO QUESTIONS 9-10: 1

## 2025-08-01 ENCOUNTER — APPOINTMENT (OUTPATIENT)
Dept: RADIOLOGY | Facility: MEDICAL CENTER | Age: 75
End: 2025-08-01
Attending: EMERGENCY MEDICINE
Payer: MEDICARE

## 2025-08-01 ENCOUNTER — OFFICE VISIT (OUTPATIENT)
Dept: URGENT CARE | Facility: PHYSICIAN GROUP | Age: 75
End: 2025-08-01
Payer: MEDICARE

## 2025-08-01 ENCOUNTER — HOSPITAL ENCOUNTER (EMERGENCY)
Facility: MEDICAL CENTER | Age: 75
End: 2025-08-01
Attending: EMERGENCY MEDICINE | Admitting: STUDENT IN AN ORGANIZED HEALTH CARE EDUCATION/TRAINING PROGRAM
Payer: MEDICARE

## 2025-08-01 ENCOUNTER — PATIENT OUTREACH (OUTPATIENT)
Dept: HEALTH INFORMATION MANAGEMENT | Facility: OTHER | Age: 75
End: 2025-08-01

## 2025-08-01 VITALS
RESPIRATION RATE: 16 BRPM | TEMPERATURE: 98 F | WEIGHT: 174 LBS | DIASTOLIC BLOOD PRESSURE: 66 MMHG | BODY MASS INDEX: 25.77 KG/M2 | SYSTOLIC BLOOD PRESSURE: 130 MMHG | HEIGHT: 69 IN | OXYGEN SATURATION: 96 % | HEART RATE: 86 BPM

## 2025-08-01 VITALS
TEMPERATURE: 96.7 F | DIASTOLIC BLOOD PRESSURE: 107 MMHG | RESPIRATION RATE: 18 BRPM | HEART RATE: 75 BPM | HEIGHT: 69 IN | OXYGEN SATURATION: 96 % | SYSTOLIC BLOOD PRESSURE: 215 MMHG | BODY MASS INDEX: 24.16 KG/M2 | WEIGHT: 163.14 LBS

## 2025-08-01 DIAGNOSIS — E11.65 TYPE 2 DIABETES MELLITUS WITH HYPERGLYCEMIA, WITHOUT LONG-TERM CURRENT USE OF INSULIN (HCC): Primary | ICD-10-CM

## 2025-08-01 DIAGNOSIS — R26.89 LOSS OF BALANCE: ICD-10-CM

## 2025-08-01 DIAGNOSIS — R47.89 EPISODE OF CHANGE IN SPEECH: ICD-10-CM

## 2025-08-01 DIAGNOSIS — I16.0 HYPERTENSIVE URGENCY: ICD-10-CM

## 2025-08-01 DIAGNOSIS — G45.9 TIA (TRANSIENT ISCHEMIC ATTACK): Primary | ICD-10-CM

## 2025-08-01 DIAGNOSIS — R42 DIZZINESS: ICD-10-CM

## 2025-08-01 LAB
ALBUMIN SERPL BCP-MCNC: 4.1 G/DL (ref 3.2–4.9)
ALBUMIN/GLOB SERPL: 1.2 G/DL
ALP SERPL-CCNC: 74 U/L (ref 30–99)
ALT SERPL-CCNC: 110 U/L (ref 2–50)
ANION GAP SERPL CALC-SCNC: 12 MMOL/L (ref 7–16)
APTT PPP: 29.4 SEC (ref 24.7–36)
AST SERPL-CCNC: 98 U/L (ref 12–45)
BASOPHILS # BLD AUTO: 2 % (ref 0–1.8)
BASOPHILS # BLD: 0.09 K/UL (ref 0–0.12)
BILIRUB SERPL-MCNC: 0.6 MG/DL (ref 0.1–1.5)
BUN SERPL-MCNC: 24 MG/DL (ref 8–22)
BURR CELLS BLD QL SMEAR: NORMAL
CALCIUM ALBUM COR SERPL-MCNC: 9.3 MG/DL (ref 8.5–10.5)
CALCIUM SERPL-MCNC: 9.4 MG/DL (ref 8.5–10.5)
CHLORIDE SERPL-SCNC: 104 MMOL/L (ref 96–112)
CO2 SERPL-SCNC: 21 MMOL/L (ref 20–33)
COMMENT NL1176: NORMAL
CREAT SERPL-MCNC: 1.08 MG/DL (ref 0.5–1.4)
EKG IMPRESSION: NORMAL
EOSINOPHIL # BLD AUTO: 0.19 K/UL (ref 0–0.51)
EOSINOPHIL NFR BLD: 4.1 % (ref 0–6.9)
ERYTHROCYTE [DISTWIDTH] IN BLOOD BY AUTOMATED COUNT: 41.5 FL (ref 35.9–50)
GFR SERPLBLD CREATININE-BSD FMLA CKD-EPI: 71 ML/MIN/1.73 M 2
GLOBULIN SER CALC-MCNC: 3.3 G/DL (ref 1.9–3.5)
GLUCOSE SERPL-MCNC: 128 MG/DL (ref 65–99)
HCT VFR BLD AUTO: 37.8 % (ref 42–52)
HGB BLD-MCNC: 13.1 G/DL (ref 14–18)
INR PPP: 1.05 (ref 0.87–1.13)
LYMPHOCYTES # BLD AUTO: 1.72 K/UL (ref 1–4.8)
LYMPHOCYTES NFR BLD: 37.4 % (ref 22–41)
MANUAL DIFF BLD: NORMAL
MCH RBC QN AUTO: 29.8 PG (ref 27–33)
MCHC RBC AUTO-ENTMCNC: 34.7 G/DL (ref 32.3–36.5)
MCV RBC AUTO: 85.9 FL (ref 81.4–97.8)
MONOCYTES # BLD AUTO: 0.2 K/UL (ref 0–0.85)
MONOCYTES NFR BLD AUTO: 4 % (ref 0–13.4)
MORPHOLOGY BLD-IMP: NORMAL
NEUTROPHILS # BLD AUTO: 2.42 K/UL (ref 1.82–7.42)
NEUTROPHILS NFR BLD: 52.5 % (ref 44–72)
NRBC # BLD AUTO: 0 K/UL
NRBC BLD-RTO: 0 /100 WBC (ref 0–0.2)
OVALOCYTES BLD QL SMEAR: NORMAL
PLATELET # BLD AUTO: 135 K/UL (ref 164–446)
PLATELET BLD QL SMEAR: NORMAL
PMV BLD AUTO: 11.8 FL (ref 9–12.9)
POIKILOCYTOSIS BLD QL SMEAR: NORMAL
POTASSIUM SERPL-SCNC: 4.8 MMOL/L (ref 3.6–5.5)
PROT SERPL-MCNC: 7.4 G/DL (ref 6–8.2)
PROTHROMBIN TIME: 13.7 SEC (ref 12–14.6)
RBC # BLD AUTO: 4.4 M/UL (ref 4.7–6.1)
RBC BLD AUTO: PRESENT
SMUDGE CELLS BLD QL SMEAR: NORMAL
SODIUM SERPL-SCNC: 137 MMOL/L (ref 135–145)
TROPONIN T SERPL-MCNC: 21 NG/L (ref 6–19)
WBC # BLD AUTO: 4.6 K/UL (ref 4.8–10.8)

## 2025-08-01 PROCEDURE — 3078F DIAST BP <80 MM HG: CPT | Performed by: STUDENT IN AN ORGANIZED HEALTH CARE EDUCATION/TRAINING PROGRAM

## 2025-08-01 PROCEDURE — 93005 ELECTROCARDIOGRAM TRACING: CPT | Mod: TC

## 2025-08-01 PROCEDURE — 85730 THROMBOPLASTIN TIME PARTIAL: CPT

## 2025-08-01 PROCEDURE — 84484 ASSAY OF TROPONIN QUANT: CPT

## 2025-08-01 PROCEDURE — 80053 COMPREHEN METABOLIC PANEL: CPT

## 2025-08-01 PROCEDURE — 36415 COLL VENOUS BLD VENIPUNCTURE: CPT

## 2025-08-01 PROCEDURE — 85027 COMPLETE CBC AUTOMATED: CPT

## 2025-08-01 PROCEDURE — 99215 OFFICE O/P EST HI 40 MIN: CPT | Performed by: STUDENT IN AN ORGANIZED HEALTH CARE EDUCATION/TRAINING PROGRAM

## 2025-08-01 PROCEDURE — 85007 BL SMEAR W/DIFF WBC COUNT: CPT

## 2025-08-01 PROCEDURE — 93005 ELECTROCARDIOGRAM TRACING: CPT | Mod: TC | Performed by: EMERGENCY MEDICINE

## 2025-08-01 PROCEDURE — 70450 CT HEAD/BRAIN W/O DYE: CPT

## 2025-08-01 PROCEDURE — 99284 EMERGENCY DEPT VISIT MOD MDM: CPT

## 2025-08-01 PROCEDURE — 71045 X-RAY EXAM CHEST 1 VIEW: CPT

## 2025-08-01 PROCEDURE — 3075F SYST BP GE 130 - 139MM HG: CPT | Performed by: STUDENT IN AN ORGANIZED HEALTH CARE EDUCATION/TRAINING PROGRAM

## 2025-08-01 PROCEDURE — 85610 PROTHROMBIN TIME: CPT

## 2025-08-01 RX ORDER — CLONIDINE HYDROCHLORIDE 0.1 MG/1
0.1 TABLET ORAL ONCE
Status: DISCONTINUED | OUTPATIENT
Start: 2025-08-01 | End: 2025-08-01 | Stop reason: HOSPADM

## 2025-08-01 ASSESSMENT — ENCOUNTER SYMPTOMS
SPEECH CHANGE: 1
FEVER: 0
PALPITATIONS: 0
TINGLING: 0
SEIZURES: 0
WEAKNESS: 0
COUGH: 0
FOCAL WEAKNESS: 0
LOSS OF CONSCIOUSNESS: 0
TREMORS: 0
WHEEZING: 0
SHORTNESS OF BREATH: 0
CHILLS: 0

## 2025-08-01 ASSESSMENT — FIBROSIS 4 INDEX
FIB4 SCORE: 6.39
FIB4 SCORE: 6.39

## 2025-08-04 ENCOUNTER — PATIENT OUTREACH (OUTPATIENT)
Dept: HEALTH INFORMATION MANAGEMENT | Facility: OTHER | Age: 75
End: 2025-08-04
Payer: MEDICARE

## 2025-08-04 DIAGNOSIS — E11.65 TYPE 2 DIABETES MELLITUS WITH HYPERGLYCEMIA, WITHOUT LONG-TERM CURRENT USE OF INSULIN (HCC): Primary | ICD-10-CM

## 2025-08-06 ENCOUNTER — PATIENT OUTREACH (OUTPATIENT)
Dept: HEALTH INFORMATION MANAGEMENT | Facility: OTHER | Age: 75
End: 2025-08-06
Payer: MEDICARE

## 2025-08-06 ENCOUNTER — TELEPHONE (OUTPATIENT)
Dept: NEUROLOGY | Facility: MEDICAL CENTER | Age: 75
End: 2025-08-06
Payer: MEDICARE

## 2025-08-06 DIAGNOSIS — E11.65 TYPE 2 DIABETES MELLITUS WITH HYPERGLYCEMIA, WITHOUT LONG-TERM CURRENT USE OF INSULIN (HCC): Primary | ICD-10-CM

## 2025-08-06 DIAGNOSIS — E78.5 HYPERLIPIDEMIA DUE TO TYPE 2 DIABETES MELLITUS (HCC): Chronic | ICD-10-CM

## 2025-08-06 DIAGNOSIS — E11.59 HYPERTENSION ASSOCIATED WITH DIABETES (HCC): Chronic | ICD-10-CM

## 2025-08-06 DIAGNOSIS — I15.2 HYPERTENSION ASSOCIATED WITH DIABETES (HCC): Chronic | ICD-10-CM

## 2025-08-06 DIAGNOSIS — E11.69 HYPERLIPIDEMIA DUE TO TYPE 2 DIABETES MELLITUS (HCC): Chronic | ICD-10-CM

## 2025-08-10 ENCOUNTER — APPOINTMENT (OUTPATIENT)
Dept: RADIOLOGY | Facility: MEDICAL CENTER | Age: 75
End: 2025-08-10
Attending: EMERGENCY MEDICINE
Payer: MEDICARE

## 2025-08-10 ENCOUNTER — HOSPITAL ENCOUNTER (EMERGENCY)
Facility: MEDICAL CENTER | Age: 75
End: 2025-08-10
Attending: EMERGENCY MEDICINE
Payer: MEDICARE

## 2025-08-10 VITALS
WEIGHT: 163 LBS | HEART RATE: 62 BPM | BODY MASS INDEX: 24.14 KG/M2 | SYSTOLIC BLOOD PRESSURE: 216 MMHG | HEIGHT: 69 IN | TEMPERATURE: 97.2 F | DIASTOLIC BLOOD PRESSURE: 102 MMHG | RESPIRATION RATE: 20 BRPM | OXYGEN SATURATION: 97 %

## 2025-08-10 DIAGNOSIS — M54.50 LUMBAR BACK PAIN: Primary | ICD-10-CM

## 2025-08-10 DIAGNOSIS — R74.01 TRANSAMINITIS: ICD-10-CM

## 2025-08-10 DIAGNOSIS — I10 HYPERTENSION, UNSPECIFIED TYPE: ICD-10-CM

## 2025-08-10 LAB
ALBUMIN SERPL BCP-MCNC: 4.1 G/DL (ref 3.2–4.9)
ALBUMIN/GLOB SERPL: 1.1 G/DL
ALP SERPL-CCNC: 75 U/L (ref 30–99)
ALT SERPL-CCNC: 112 U/L (ref 2–50)
ANION GAP SERPL CALC-SCNC: 13 MMOL/L (ref 7–16)
APPEARANCE UR: CLEAR
AST SERPL-CCNC: 104 U/L (ref 12–45)
BACTERIA #/AREA URNS HPF: NORMAL /HPF
BASOPHILS # BLD AUTO: 0.8 % (ref 0–1.8)
BASOPHILS # BLD: 0.03 K/UL (ref 0–0.12)
BILIRUB SERPL-MCNC: 0.5 MG/DL (ref 0.1–1.5)
BILIRUB UR QL STRIP.AUTO: NEGATIVE
BUN SERPL-MCNC: 23 MG/DL (ref 8–22)
CALCIUM ALBUM COR SERPL-MCNC: 9.3 MG/DL (ref 8.5–10.5)
CALCIUM SERPL-MCNC: 9.4 MG/DL (ref 8.5–10.5)
CASTS URNS QL MICRO: NORMAL /LPF (ref 0–2)
CHLORIDE SERPL-SCNC: 105 MMOL/L (ref 96–112)
CO2 SERPL-SCNC: 21 MMOL/L (ref 20–33)
COLOR UR: YELLOW
CREAT SERPL-MCNC: 1 MG/DL (ref 0.5–1.4)
EKG IMPRESSION: NORMAL
EOSINOPHIL # BLD AUTO: 0.1 K/UL (ref 0–0.51)
EOSINOPHIL NFR BLD: 2.5 % (ref 0–6.9)
EPITHELIAL CELLS 1715: NORMAL /HPF (ref 0–5)
ERYTHROCYTE [DISTWIDTH] IN BLOOD BY AUTOMATED COUNT: 40.9 FL (ref 35.9–50)
GFR SERPLBLD CREATININE-BSD FMLA CKD-EPI: 78 ML/MIN/1.73 M 2
GLOBULIN SER CALC-MCNC: 3.6 G/DL (ref 1.9–3.5)
GLUCOSE SERPL-MCNC: 171 MG/DL (ref 65–99)
GLUCOSE UR STRIP.AUTO-MCNC: >=1000 MG/DL
HCT VFR BLD AUTO: 38.1 % (ref 42–52)
HGB BLD-MCNC: 13.2 G/DL (ref 14–18)
IMM GRANULOCYTES # BLD AUTO: 0.01 K/UL (ref 0–0.11)
IMM GRANULOCYTES NFR BLD AUTO: 0.3 % (ref 0–0.9)
KETONES UR STRIP.AUTO-MCNC: NEGATIVE MG/DL
LEUKOCYTE ESTERASE UR QL STRIP.AUTO: NEGATIVE
LIPASE SERPL-CCNC: 78 U/L (ref 11–82)
LYMPHOCYTES # BLD AUTO: 1.6 K/UL (ref 1–4.8)
LYMPHOCYTES NFR BLD: 40.7 % (ref 22–41)
MCH RBC QN AUTO: 29.5 PG (ref 27–33)
MCHC RBC AUTO-ENTMCNC: 34.6 G/DL (ref 32.3–36.5)
MCV RBC AUTO: 85.2 FL (ref 81.4–97.8)
MICRO URNS: ABNORMAL
MONOCYTES # BLD AUTO: 0.27 K/UL (ref 0–0.85)
MONOCYTES NFR BLD AUTO: 6.9 % (ref 0–13.4)
NEUTROPHILS # BLD AUTO: 1.92 K/UL (ref 1.82–7.42)
NEUTROPHILS NFR BLD: 48.8 % (ref 44–72)
NITRITE UR QL STRIP.AUTO: NEGATIVE
NRBC # BLD AUTO: 0 K/UL
NRBC BLD-RTO: 0 /100 WBC (ref 0–0.2)
PH UR STRIP.AUTO: 5.5 [PH] (ref 5–8)
PLATELET # BLD AUTO: 115 K/UL (ref 164–446)
PMV BLD AUTO: 11.9 FL (ref 9–12.9)
POTASSIUM SERPL-SCNC: 3.7 MMOL/L (ref 3.6–5.5)
PROT SERPL-MCNC: 7.7 G/DL (ref 6–8.2)
PROT UR QL STRIP: 100 MG/DL
RBC # BLD AUTO: 4.47 M/UL (ref 4.7–6.1)
RBC # URNS HPF: NORMAL /HPF (ref 0–2)
RBC UR QL AUTO: NEGATIVE
SODIUM SERPL-SCNC: 139 MMOL/L (ref 135–145)
SP GR UR STRIP.AUTO: 1.02
UROBILINOGEN UR STRIP.AUTO-MCNC: 0.2 EU/DL
WBC # BLD AUTO: 3.9 K/UL (ref 4.8–10.8)
WBC #/AREA URNS HPF: NORMAL /HPF

## 2025-08-10 PROCEDURE — 36415 COLL VENOUS BLD VENIPUNCTURE: CPT

## 2025-08-10 PROCEDURE — 93005 ELECTROCARDIOGRAM TRACING: CPT | Mod: TC

## 2025-08-10 PROCEDURE — 74176 CT ABD & PELVIS W/O CONTRAST: CPT

## 2025-08-10 PROCEDURE — 83690 ASSAY OF LIPASE: CPT

## 2025-08-10 PROCEDURE — 700102 HCHG RX REV CODE 250 W/ 637 OVERRIDE(OP)

## 2025-08-10 PROCEDURE — 81001 URINALYSIS AUTO W/SCOPE: CPT

## 2025-08-10 PROCEDURE — 93005 ELECTROCARDIOGRAM TRACING: CPT | Mod: TC | Performed by: EMERGENCY MEDICINE

## 2025-08-10 PROCEDURE — 80053 COMPREHEN METABOLIC PANEL: CPT

## 2025-08-10 PROCEDURE — 99284 EMERGENCY DEPT VISIT MOD MDM: CPT

## 2025-08-10 PROCEDURE — A9270 NON-COVERED ITEM OR SERVICE: HCPCS

## 2025-08-10 PROCEDURE — 72131 CT LUMBAR SPINE W/O DYE: CPT

## 2025-08-10 PROCEDURE — 85025 COMPLETE CBC W/AUTO DIFF WBC: CPT

## 2025-08-10 RX ORDER — ACETAMINOPHEN 500 MG
1000 TABLET ORAL EVERY 6 HOURS PRN
Qty: 20 TABLET | Refills: 0 | Status: SHIPPED | OUTPATIENT
Start: 2025-08-10 | End: 2025-08-14

## 2025-08-10 RX ORDER — IBUPROFEN 800 MG/1
800 TABLET, FILM COATED ORAL EVERY 8 HOURS PRN
Qty: 9 TABLET | Refills: 0 | Status: SHIPPED | OUTPATIENT
Start: 2025-08-10 | End: 2025-08-13

## 2025-08-10 RX ORDER — CYCLOBENZAPRINE HCL 10 MG
10 TABLET ORAL 3 TIMES DAILY PRN
Qty: 10 TABLET | Refills: 0 | Status: SHIPPED | OUTPATIENT
Start: 2025-08-10 | End: 2025-08-13

## 2025-08-10 RX ORDER — LIDOCAINE 50 MG/G
1 PATCH TOPICAL EVERY 24 HOURS
Qty: 5 PATCH | Refills: 0 | Status: SHIPPED | OUTPATIENT
Start: 2025-08-10 | End: 2025-08-15

## 2025-08-10 RX ORDER — OXYCODONE AND ACETAMINOPHEN 5; 325 MG/1; MG/1
1 TABLET ORAL ONCE
Refills: 0 | Status: COMPLETED | OUTPATIENT
Start: 2025-08-10 | End: 2025-08-10

## 2025-08-10 RX ADMIN — OXYCODONE AND ACETAMINOPHEN 1 TABLET: 5; 325 TABLET ORAL at 17:08

## 2025-08-10 ASSESSMENT — FIBROSIS 4 INDEX: FIB4 SCORE: 6.49

## 2025-08-11 ENCOUNTER — DOCUMENTATION (OUTPATIENT)
Dept: VASCULAR LAB | Facility: MEDICAL CENTER | Age: 75
End: 2025-08-11
Payer: MEDICARE

## 2025-08-11 DIAGNOSIS — I10 ESSENTIAL HYPERTENSION: ICD-10-CM

## 2025-08-11 DIAGNOSIS — Z87.891 HISTORY OF TOBACCO ABUSE: ICD-10-CM

## 2025-08-11 DIAGNOSIS — I70.0 ATHEROSCLEROSIS OF AORTA (HCC): ICD-10-CM

## 2025-08-11 PROCEDURE — RXMED WILLOW AMBULATORY MEDICATION CHARGE: Performed by: FAMILY MEDICINE

## 2025-08-11 RX ORDER — LISINOPRIL 20 MG/1
20 TABLET ORAL DAILY
Qty: 100 TABLET | Refills: 1 | Status: SHIPPED | OUTPATIENT
Start: 2025-08-11 | End: 2025-08-14

## 2025-08-12 ENCOUNTER — PHARMACY VISIT (OUTPATIENT)
Dept: PHARMACY | Facility: MEDICAL CENTER | Age: 75
End: 2025-08-12
Payer: COMMERCIAL

## 2025-08-13 ENCOUNTER — TELEPHONE (OUTPATIENT)
Dept: HEALTH INFORMATION MANAGEMENT | Facility: OTHER | Age: 75
End: 2025-08-13
Payer: MEDICARE

## 2025-08-14 ENCOUNTER — OFFICE VISIT (OUTPATIENT)
Dept: VASCULAR LAB | Facility: MEDICAL CENTER | Age: 75
End: 2025-08-14
Attending: FAMILY MEDICINE
Payer: MEDICARE

## 2025-08-14 VITALS
SYSTOLIC BLOOD PRESSURE: 147 MMHG | HEIGHT: 69 IN | WEIGHT: 162.7 LBS | HEART RATE: 83 BPM | BODY MASS INDEX: 24.1 KG/M2 | DIASTOLIC BLOOD PRESSURE: 85 MMHG

## 2025-08-14 DIAGNOSIS — I70.0 ABDOMINAL AORTIC ATHEROSCLEROSIS (HCC): Primary | ICD-10-CM

## 2025-08-14 DIAGNOSIS — D64.9 NORMOCYTIC ANEMIA: ICD-10-CM

## 2025-08-14 DIAGNOSIS — I15.2 HYPERTENSION ASSOCIATED WITH DIABETES (HCC): ICD-10-CM

## 2025-08-14 DIAGNOSIS — I74.5 BILATERAL ILIAC ARTERY OCCLUSION (HCC): ICD-10-CM

## 2025-08-14 DIAGNOSIS — R80.9 TYPE 2 DIABETES MELLITUS WITH DIABETIC MICROALBUMINURIA, WITHOUT LONG-TERM CURRENT USE OF INSULIN (HCC): ICD-10-CM

## 2025-08-14 DIAGNOSIS — E11.59 HYPERTENSION ASSOCIATED WITH DIABETES (HCC): ICD-10-CM

## 2025-08-14 DIAGNOSIS — I74.09 CHRONIC DISTAL AORTIC OCCLUSION (HCC): ICD-10-CM

## 2025-08-14 DIAGNOSIS — E11.69 HYPERLIPIDEMIA DUE TO TYPE 2 DIABETES MELLITUS (HCC): ICD-10-CM

## 2025-08-14 DIAGNOSIS — I65.22 CAROTID ARTERY STENOSIS, ASYMPTOMATIC, LEFT: ICD-10-CM

## 2025-08-14 DIAGNOSIS — E11.29 TYPE 2 DIABETES MELLITUS WITH DIABETIC MICROALBUMINURIA, WITHOUT LONG-TERM CURRENT USE OF INSULIN (HCC): ICD-10-CM

## 2025-08-14 DIAGNOSIS — R74.8 ELEVATED LIVER ENZYMES: ICD-10-CM

## 2025-08-14 DIAGNOSIS — I73.9 PAD (PERIPHERAL ARTERY DISEASE) (HCC): ICD-10-CM

## 2025-08-14 DIAGNOSIS — D69.6 THROMBOCYTOPENIA (HCC): Chronic | ICD-10-CM

## 2025-08-14 DIAGNOSIS — E78.5 HYPERLIPIDEMIA DUE TO TYPE 2 DIABETES MELLITUS (HCC): ICD-10-CM

## 2025-08-14 DIAGNOSIS — Z59.86 FINANCIAL INSECURITY: Chronic | ICD-10-CM

## 2025-08-14 PROCEDURE — 3078F DIAST BP <80 MM HG: CPT | Performed by: FAMILY MEDICINE

## 2025-08-14 PROCEDURE — G2211 COMPLEX E/M VISIT ADD ON: HCPCS | Performed by: FAMILY MEDICINE

## 2025-08-14 PROCEDURE — 99214 OFFICE O/P EST MOD 30 MIN: CPT | Performed by: FAMILY MEDICINE

## 2025-08-14 PROCEDURE — 99213 OFFICE O/P EST LOW 20 MIN: CPT | Performed by: FAMILY MEDICINE

## 2025-08-14 PROCEDURE — 3075F SYST BP GE 130 - 139MM HG: CPT | Performed by: FAMILY MEDICINE

## 2025-08-14 RX ORDER — OLMESARTAN MEDOXOMIL 40 MG/1
40 TABLET ORAL DAILY
Qty: 100 TABLET | Refills: 3 | Status: SHIPPED | OUTPATIENT
Start: 2025-08-14

## 2025-08-14 RX ORDER — BLOOD PRESSURE TEST KIT
1 KIT MISCELLANEOUS ONCE
Qty: 1 KIT | Refills: 0 | Status: SHIPPED | OUTPATIENT
Start: 2025-08-14 | End: 2025-08-14

## 2025-08-14 SDOH — ECONOMIC STABILITY - INCOME SECURITY: FINANCIAL INSECURITY: Z59.86

## 2025-08-14 ASSESSMENT — ENCOUNTER SYMPTOMS
CHILLS: 0
COUGH: 0
PND: 0
BLOOD IN STOOL: 0
HEMOPTYSIS: 0
BRUISES/BLEEDS EASILY: 1
SHORTNESS OF BREATH: 0
CLAUDICATION: 1
PALPITATIONS: 0
ORTHOPNEA: 0
SENSORY CHANGE: 0
WHEEZING: 0
FEVER: 0
SPUTUM PRODUCTION: 0
FOCAL WEAKNESS: 0

## 2025-08-14 ASSESSMENT — FIBROSIS 4 INDEX: FIB4 SCORE: 6.49

## 2025-08-15 ENCOUNTER — OFFICE VISIT (OUTPATIENT)
Dept: MEDICAL GROUP | Facility: MEDICAL CENTER | Age: 75
End: 2025-08-15
Payer: MEDICARE

## 2025-08-15 ENCOUNTER — HOSPITAL ENCOUNTER (OUTPATIENT)
Facility: MEDICAL CENTER | Age: 75
End: 2025-08-15
Attending: FAMILY MEDICINE
Payer: MEDICARE

## 2025-08-15 VITALS
HEART RATE: 65 BPM | TEMPERATURE: 97.2 F | RESPIRATION RATE: 16 BRPM | HEIGHT: 69 IN | WEIGHT: 159 LBS | SYSTOLIC BLOOD PRESSURE: 190 MMHG | BODY MASS INDEX: 23.55 KG/M2 | DIASTOLIC BLOOD PRESSURE: 84 MMHG | OXYGEN SATURATION: 96 %

## 2025-08-15 DIAGNOSIS — M54.50 ACUTE LEFT-SIDED LOW BACK PAIN WITHOUT SCIATICA: ICD-10-CM

## 2025-08-15 DIAGNOSIS — Z79.899 MEDICATION MANAGEMENT: ICD-10-CM

## 2025-08-15 DIAGNOSIS — E11.69 HYPERLIPIDEMIA DUE TO TYPE 2 DIABETES MELLITUS (HCC): Chronic | ICD-10-CM

## 2025-08-15 DIAGNOSIS — E11.59 HYPERTENSION ASSOCIATED WITH DIABETES (HCC): Chronic | ICD-10-CM

## 2025-08-15 DIAGNOSIS — E78.5 HYPERLIPIDEMIA DUE TO TYPE 2 DIABETES MELLITUS (HCC): Chronic | ICD-10-CM

## 2025-08-15 DIAGNOSIS — I73.9 CLAUDICATION (HCC): Chronic | ICD-10-CM

## 2025-08-15 DIAGNOSIS — I15.2 HYPERTENSION ASSOCIATED WITH DIABETES (HCC): Chronic | ICD-10-CM

## 2025-08-15 DIAGNOSIS — E11.65 TYPE 2 DIABETES MELLITUS WITH HYPERGLYCEMIA, WITHOUT LONG-TERM CURRENT USE OF INSULIN (HCC): Chronic | ICD-10-CM

## 2025-08-15 DIAGNOSIS — I73.9 PAD (PERIPHERAL ARTERY DISEASE) (HCC): Chronic | ICD-10-CM

## 2025-08-15 DIAGNOSIS — M54.50 ACUTE LEFT-SIDED LOW BACK PAIN WITHOUT SCIATICA: Primary | ICD-10-CM

## 2025-08-15 DIAGNOSIS — J43.8 OTHER EMPHYSEMA (HCC): Chronic | ICD-10-CM

## 2025-08-15 PROCEDURE — G0480 DRUG TEST DEF 1-7 CLASSES: HCPCS

## 2025-08-15 PROCEDURE — 3079F DIAST BP 80-89 MM HG: CPT | Performed by: FAMILY MEDICINE

## 2025-08-15 PROCEDURE — 99214 OFFICE O/P EST MOD 30 MIN: CPT | Performed by: FAMILY MEDICINE

## 2025-08-15 PROCEDURE — 3077F SYST BP >= 140 MM HG: CPT | Performed by: FAMILY MEDICINE

## 2025-08-15 PROCEDURE — G2211 COMPLEX E/M VISIT ADD ON: HCPCS | Performed by: FAMILY MEDICINE

## 2025-08-15 PROCEDURE — 80307 DRUG TEST PRSMV CHEM ANLYZR: CPT

## 2025-08-15 RX ORDER — OXYCODONE HYDROCHLORIDE 5 MG/1
5 TABLET ORAL EVERY 8 HOURS PRN
Qty: 15 TABLET | Refills: 0 | Status: SHIPPED | OUTPATIENT
Start: 2025-08-15 | End: 2025-08-20

## 2025-08-15 RX ORDER — ATORVASTATIN CALCIUM 80 MG/1
80 TABLET, FILM COATED ORAL
Qty: 100 TABLET | Refills: 3 | Status: SHIPPED | OUTPATIENT
Start: 2025-08-15

## 2025-08-15 RX ORDER — METHYLPREDNISOLONE 4 MG/1
TABLET ORAL
Qty: 21 TABLET | Refills: 0 | Status: SHIPPED | OUTPATIENT
Start: 2025-08-15

## 2025-08-15 ASSESSMENT — FIBROSIS 4 INDEX: FIB4 SCORE: 6.49

## 2025-08-17 LAB
AMPHET CTO UR CFM-MCNC: NEGATIVE NG/ML
BARBITURATES CTO UR CFM-MCNC: NEGATIVE NG/ML
BENZODIAZ CTO UR CFM-MCNC: NEGATIVE NG/ML
CANNABINOIDS CTO UR CFM-MCNC: ABNORMAL NG/ML
COCAINE CTO UR CFM-MCNC: NEGATIVE NG/ML
CREAT UR-MCNC: 62.1 MG/DL (ref 20–400)
DRUG COMMENT 753798: ABNORMAL
METHADONE CTO UR CFM-MCNC: NEGATIVE NG/ML
OPIATES CTO UR CFM-MCNC: NEGATIVE NG/ML
PCP CTO UR CFM-MCNC: NEGATIVE NG/ML
PROPOXYPH CTO UR CFM-MCNC: NEGATIVE NG/ML

## 2025-08-18 ENCOUNTER — RESULTS FOLLOW-UP (OUTPATIENT)
Dept: MEDICAL GROUP | Facility: MEDICAL CENTER | Age: 75
End: 2025-08-18
Payer: MEDICARE

## 2025-08-18 ENCOUNTER — TELEPHONE (OUTPATIENT)
Dept: CARDIOLOGY | Facility: MEDICAL CENTER | Age: 75
End: 2025-08-18
Payer: MEDICARE

## 2025-08-20 ENCOUNTER — TELEPHONE (OUTPATIENT)
Dept: HEALTH INFORMATION MANAGEMENT | Facility: OTHER | Age: 75
End: 2025-08-20
Payer: MEDICARE

## 2025-08-20 LAB — THC UR CFM-MCNC: >500 NG/ML

## 2025-08-21 ENCOUNTER — HOSPITAL ENCOUNTER (EMERGENCY)
Facility: MEDICAL CENTER | Age: 75
End: 2025-08-21
Attending: EMERGENCY MEDICINE
Payer: MEDICARE

## 2025-08-21 VITALS
HEIGHT: 69 IN | HEART RATE: 58 BPM | RESPIRATION RATE: 16 BRPM | BODY MASS INDEX: 23.74 KG/M2 | TEMPERATURE: 97 F | WEIGHT: 160.27 LBS | DIASTOLIC BLOOD PRESSURE: 93 MMHG | SYSTOLIC BLOOD PRESSURE: 213 MMHG | OXYGEN SATURATION: 96 %

## 2025-08-21 DIAGNOSIS — M54.50 ACUTE LEFT-SIDED LOW BACK PAIN WITHOUT SCIATICA: Primary | ICD-10-CM

## 2025-08-21 LAB
BASOPHILS # BLD AUTO: 0.7 % (ref 0–1.8)
BASOPHILS # BLD: 0.03 K/UL (ref 0–0.12)
CRP SERPL HS-MCNC: <0.3 MG/DL (ref 0–0.75)
EOSINOPHIL # BLD AUTO: 0.1 K/UL (ref 0–0.51)
EOSINOPHIL NFR BLD: 2.3 % (ref 0–6.9)
ERYTHROCYTE [DISTWIDTH] IN BLOOD BY AUTOMATED COUNT: 44.3 FL (ref 35.9–50)
ERYTHROCYTE [SEDIMENTATION RATE] IN BLOOD BY WESTERGREN METHOD: 12 MM/HOUR (ref 0–20)
HCT VFR BLD AUTO: 39.5 % (ref 42–52)
HGB BLD-MCNC: 13.4 G/DL (ref 14–18)
IMM GRANULOCYTES # BLD AUTO: 0.02 K/UL (ref 0–0.11)
IMM GRANULOCYTES NFR BLD AUTO: 0.5 % (ref 0–0.9)
LYMPHOCYTES # BLD AUTO: 1.27 K/UL (ref 1–4.8)
LYMPHOCYTES NFR BLD: 29.4 % (ref 22–41)
MCH RBC QN AUTO: 29.8 PG (ref 27–33)
MCHC RBC AUTO-ENTMCNC: 33.9 G/DL (ref 32.3–36.5)
MCV RBC AUTO: 88 FL (ref 81.4–97.8)
MONOCYTES # BLD AUTO: 0.35 K/UL (ref 0–0.85)
MONOCYTES NFR BLD AUTO: 8.1 % (ref 0–13.4)
NEUTROPHILS # BLD AUTO: 2.55 K/UL (ref 1.82–7.42)
NEUTROPHILS NFR BLD: 59 % (ref 44–72)
NRBC # BLD AUTO: 0 K/UL
NRBC BLD-RTO: 0 /100 WBC (ref 0–0.2)
PLATELET # BLD AUTO: 100 K/UL (ref 164–446)
PMV BLD AUTO: 11.7 FL (ref 9–12.9)
PROCALCITONIN SERPL-MCNC: 0.13 NG/ML
RBC # BLD AUTO: 4.49 M/UL (ref 4.7–6.1)
WBC # BLD AUTO: 4.3 K/UL (ref 4.8–10.8)

## 2025-08-21 PROCEDURE — 85652 RBC SED RATE AUTOMATED: CPT

## 2025-08-21 PROCEDURE — 99284 EMERGENCY DEPT VISIT MOD MDM: CPT

## 2025-08-21 PROCEDURE — 85025 COMPLETE CBC W/AUTO DIFF WBC: CPT

## 2025-08-21 PROCEDURE — 700102 HCHG RX REV CODE 250 W/ 637 OVERRIDE(OP): Performed by: EMERGENCY MEDICINE

## 2025-08-21 PROCEDURE — A9270 NON-COVERED ITEM OR SERVICE: HCPCS | Performed by: EMERGENCY MEDICINE

## 2025-08-21 PROCEDURE — 86140 C-REACTIVE PROTEIN: CPT

## 2025-08-21 PROCEDURE — 36415 COLL VENOUS BLD VENIPUNCTURE: CPT

## 2025-08-21 PROCEDURE — 84145 PROCALCITONIN (PCT): CPT

## 2025-08-21 RX ORDER — OXYCODONE HYDROCHLORIDE 5 MG/1
5 TABLET ORAL EVERY 4 HOURS PRN
Qty: 12 TABLET | Refills: 0 | Status: SHIPPED | OUTPATIENT
Start: 2025-08-21 | End: 2025-08-24

## 2025-08-21 RX ORDER — OXYCODONE HYDROCHLORIDE 5 MG/1
10 TABLET ORAL ONCE
Refills: 0 | Status: COMPLETED | OUTPATIENT
Start: 2025-08-21 | End: 2025-08-21

## 2025-08-21 RX ADMIN — OXYCODONE HYDROCHLORIDE 10 MG: 5 TABLET ORAL at 10:51

## 2025-08-21 ASSESSMENT — FIBROSIS 4 INDEX: FIB4 SCORE: 6.49

## 2025-08-21 ASSESSMENT — PAIN DESCRIPTION - PAIN TYPE: TYPE: ACUTE PAIN

## 2025-08-22 ENCOUNTER — OFFICE VISIT (OUTPATIENT)
Dept: MEDICAL GROUP | Facility: MEDICAL CENTER | Age: 75
End: 2025-08-22
Payer: MEDICARE

## 2025-08-22 VITALS
SYSTOLIC BLOOD PRESSURE: 132 MMHG | RESPIRATION RATE: 14 BRPM | BODY MASS INDEX: 23.55 KG/M2 | WEIGHT: 159 LBS | HEIGHT: 69 IN | TEMPERATURE: 97.6 F | HEART RATE: 68 BPM | OXYGEN SATURATION: 96 % | DIASTOLIC BLOOD PRESSURE: 74 MMHG

## 2025-08-22 DIAGNOSIS — M79.10 MUSCLE TENDERNESS: ICD-10-CM

## 2025-08-22 DIAGNOSIS — M54.6 LEFT-SIDED THORACIC BACK PAIN, UNSPECIFIED CHRONICITY: Primary | ICD-10-CM

## 2025-08-22 PROCEDURE — 3075F SYST BP GE 130 - 139MM HG: CPT | Performed by: NURSE PRACTITIONER

## 2025-08-22 PROCEDURE — 3078F DIAST BP <80 MM HG: CPT | Performed by: NURSE PRACTITIONER

## 2025-08-22 PROCEDURE — 99213 OFFICE O/P EST LOW 20 MIN: CPT | Performed by: NURSE PRACTITIONER

## 2025-08-22 ASSESSMENT — FIBROSIS 4 INDEX: FIB4 SCORE: 7.47

## 2025-08-26 ENCOUNTER — OFFICE VISIT (OUTPATIENT)
Dept: MEDICAL GROUP | Facility: MEDICAL CENTER | Age: 75
End: 2025-08-26
Payer: MEDICARE

## 2025-08-26 VITALS
BODY MASS INDEX: 23.19 KG/M2 | OXYGEN SATURATION: 98 % | HEIGHT: 69 IN | DIASTOLIC BLOOD PRESSURE: 60 MMHG | RESPIRATION RATE: 14 BRPM | WEIGHT: 156.6 LBS | HEART RATE: 92 BPM | TEMPERATURE: 96.9 F | SYSTOLIC BLOOD PRESSURE: 138 MMHG

## 2025-08-26 DIAGNOSIS — F10.21 ALCOHOL DEPENDENCE IN REMISSION (HCC): ICD-10-CM

## 2025-08-26 DIAGNOSIS — B18.2 CHRONIC HEPATITIS C WITHOUT HEPATIC COMA (HCC): ICD-10-CM

## 2025-08-26 DIAGNOSIS — K59.02 CONSTIPATION DUE TO OUTLET DYSFUNCTION: ICD-10-CM

## 2025-08-26 DIAGNOSIS — M54.6 LEFT-SIDED THORACIC BACK PAIN, UNSPECIFIED CHRONICITY: Primary | ICD-10-CM

## 2025-08-26 DIAGNOSIS — R63.4 WEIGHT LOSS, NON-INTENTIONAL: ICD-10-CM

## 2025-08-26 DIAGNOSIS — K74.60 CIRRHOSIS OF LIVER WITHOUT ASCITES, UNSPECIFIED HEPATIC CIRRHOSIS TYPE (HCC): ICD-10-CM

## 2025-08-26 RX ORDER — MAG HYDROX/ALUMINUM HYD/SIMETH 400-400-40
1 SUSPENSION, ORAL (FINAL DOSE FORM) ORAL
Qty: 25 SUPPOSITORY | Refills: 3 | Status: SHIPPED | OUTPATIENT
Start: 2025-08-26

## 2025-08-26 RX ORDER — SENNA AND DOCUSATE SODIUM 50; 8.6 MG/1; MG/1
1 TABLET, FILM COATED ORAL
Qty: 100 TABLET | Refills: 3 | Status: SHIPPED | OUTPATIENT
Start: 2025-08-26

## 2025-08-26 RX ORDER — OXYCODONE HYDROCHLORIDE 5 MG/1
5 TABLET ORAL EVERY 8 HOURS PRN
Qty: 90 TABLET | Refills: 0 | Status: SHIPPED | OUTPATIENT
Start: 2025-08-26 | End: 2025-09-25

## 2025-08-26 ASSESSMENT — FIBROSIS 4 INDEX: FIB4 SCORE: 7.47

## 2025-08-28 ENCOUNTER — HOSPITAL ENCOUNTER (OUTPATIENT)
Dept: CARDIOLOGY | Facility: MEDICAL CENTER | Age: 75
End: 2025-08-28
Attending: FAMILY MEDICINE
Payer: MEDICARE

## 2025-08-28 DIAGNOSIS — I35.0 NONRHEUMATIC AORTIC VALVE STENOSIS: ICD-10-CM

## 2025-08-28 LAB
LV EJECT FRACT  99904: 65
LV EJECT FRACT MOD 2C 99903: 71.05
LV EJECT FRACT MOD 4C 99902: 58.37
LV EJECT FRACT MOD BP 99901: 66.33

## 2025-08-28 PROCEDURE — 93306 TTE W/DOPPLER COMPLETE: CPT | Mod: 26 | Performed by: INTERNAL MEDICINE

## 2025-08-28 PROCEDURE — 93306 TTE W/DOPPLER COMPLETE: CPT

## 2025-08-29 ENCOUNTER — RESULTS FOLLOW-UP (OUTPATIENT)
Dept: VASCULAR LAB | Facility: MEDICAL CENTER | Age: 75
End: 2025-08-29
Payer: MEDICARE

## 2025-08-29 ENCOUNTER — DOCUMENTATION (OUTPATIENT)
Dept: VASCULAR LAB | Facility: MEDICAL CENTER | Age: 75
End: 2025-08-29
Payer: MEDICARE

## 2025-09-03 ENCOUNTER — APPOINTMENT (OUTPATIENT)
Dept: PHYSICAL MEDICINE AND REHAB | Facility: MEDICAL CENTER | Age: 75
End: 2025-09-03
Payer: MEDICARE

## 2025-09-24 ENCOUNTER — APPOINTMENT (OUTPATIENT)
Dept: RADIOLOGY | Facility: MEDICAL CENTER | Age: 75
End: 2025-09-24
Attending: FAMILY MEDICINE
Payer: MEDICARE

## (undated) DEVICE — GOWN SURGEONS LARGE - (32/CA)

## (undated) DEVICE — NEPTUNE 4 PORT MANIFOLD - (20/PK)

## (undated) DEVICE — GOWN WARMING STANDARD FLEX - (30/CA)

## (undated) DEVICE — SET LEADWIRE 5 LEAD BEDSIDE DISPOSABLE ECG (1SET OF 5/EA)

## (undated) DEVICE — STAPLER SKIN DISP - (6/BX 10BX/CA) VISISTAT

## (undated) DEVICE — MASK ANESTHESIA ADULT  - (100/CA)

## (undated) DEVICE — LACTATED RINGERS INJ 1000 ML - (14EA/CA 60CA/PF)

## (undated) DEVICE — KIT ANESTHESIA W/CIRCUIT & 3/LT BAG W/FILTER (20EA/CA)

## (undated) DEVICE — SODIUM CHL IRRIGATION 0.9% 1000ML (12EA/CA)

## (undated) DEVICE — HEAD HOLDER JUNIOR/ADULT

## (undated) DEVICE — KIT ROOM DECONTAMINATION

## (undated) DEVICE — SENSOR SPO2 NEO LNCS ADHESIVE (20/BX) SEE USER NOTES

## (undated) DEVICE — SYRINGE 10 ML CONTROL LL (25EA/BX 4BX/CA)

## (undated) DEVICE — CANISTER SUCTION 3000ML MECHANICAL FILTER AUTO SHUTOFF MEDI-VAC NONSTERILE LF DISP  (40EA/CA)

## (undated) DEVICE — SUCTION INSTRUMENT YANKAUER BULBOUS TIP W/O VENT (50EA/CA)

## (undated) DEVICE — GLOVE BIOGEL SZ 8 SURGICAL PF LTX - (50PR/BX 4BX/CA)

## (undated) DEVICE — ELECTRODE 850 FOAM ADHESIVE - HYDROGEL RADIOTRNSPRNT (50/PK)

## (undated) DEVICE — SLEEVE, VASO, THIGH, MED

## (undated) DEVICE — SUTURE GENERAL

## (undated) DEVICE — DRAPE LAPAROTOMY T SHEET - (12EA/CA)

## (undated) DEVICE — SPONGE GAUZESTER 4 X 4 4PLY - (128PK/CA)

## (undated) DEVICE — PROTECTOR ULNA NERVE - (36PR/CA)

## (undated) DEVICE — ELECTRODE DUAL RETURN W/ CORD - (50/PK)

## (undated) DEVICE — TUBING CLEARLINK DUO-VENT - C-FLO (48EA/CA)

## (undated) DEVICE — SET EXTENSION WITH 2 PORTS (48EA/CA) ***PART #2C8610 IS A SUBSTITUTE*****

## (undated) DEVICE — GLOVE BIOGEL PI ORTHO SZ 6 SURGICAL PF LF (40PR/BX)

## (undated) DEVICE — BLADE SURGICAL #15 - (50/BX 3BX/CA)

## (undated) DEVICE — PACK MINOR BASIN - (2EA/CA)

## (undated) DEVICE — CHLORAPREP 26 ML APPLICATOR - ORANGE TINT(25/CA)